# Patient Record
Sex: MALE | Race: WHITE | Employment: OTHER | URBAN - METROPOLITAN AREA
[De-identification: names, ages, dates, MRNs, and addresses within clinical notes are randomized per-mention and may not be internally consistent; named-entity substitution may affect disease eponyms.]

---

## 2017-01-03 ENCOUNTER — APPOINTMENT (OUTPATIENT)
Dept: GENERAL RADIOLOGY | Age: 72
DRG: 207 | End: 2017-01-03
Attending: FAMILY MEDICINE
Payer: MEDICARE

## 2017-01-03 ENCOUNTER — HOSPITAL ENCOUNTER (INPATIENT)
Age: 72
LOS: 23 days | Discharge: NURSING, REHAB OR LONG-TERM CARE | DRG: 207 | End: 2017-01-27
Attending: FAMILY MEDICINE | Admitting: INTERNAL MEDICINE
Payer: MEDICARE

## 2017-01-03 DIAGNOSIS — R09.02 HYPOXIA: ICD-10-CM

## 2017-01-03 DIAGNOSIS — J20.9 ACUTE BRONCHITIS, UNSPECIFIED ORGANISM: ICD-10-CM

## 2017-01-03 DIAGNOSIS — R06.03 ACUTE RESPIRATORY DISTRESS: Primary | ICD-10-CM

## 2017-01-03 LAB
ALBUMIN SERPL BCP-MCNC: 4.1 G/DL (ref 3.4–5)
ALBUMIN/GLOB SERPL: 1.1 {RATIO} (ref 0.8–1.7)
ALP SERPL-CCNC: 94 U/L (ref 45–117)
ALT SERPL-CCNC: 32 U/L (ref 16–61)
ANION GAP BLD CALC-SCNC: 7 MMOL/L (ref 3–18)
APPEARANCE UR: CLEAR
ARTERIAL PATENCY WRIST A: YES
AST SERPL W P-5'-P-CCNC: 17 U/L (ref 15–37)
BASE EXCESS BLD CALC-SCNC: 7 MMOL/L
BASOPHILS # BLD AUTO: 0 K/UL (ref 0–0.06)
BASOPHILS # BLD: 0 % (ref 0–2)
BDY SITE: ABNORMAL
BILIRUB SERPL-MCNC: 0.4 MG/DL (ref 0.2–1)
BILIRUB UR QL: NEGATIVE
BNP SERPL-MCNC: 852 PG/ML (ref 0–900)
BODY TEMPERATURE: 98.6
BUN SERPL-MCNC: 18 MG/DL (ref 7–18)
BUN/CREAT SERPL: 17 (ref 12–20)
CALCIUM SERPL-MCNC: 8.8 MG/DL (ref 8.5–10.1)
CHLORIDE SERPL-SCNC: 102 MMOL/L (ref 100–108)
CO2 SERPL-SCNC: 35 MMOL/L (ref 21–32)
COLOR UR: YELLOW
CREAT SERPL-MCNC: 1.05 MG/DL (ref 0.6–1.3)
DIFFERENTIAL METHOD BLD: ABNORMAL
EOSINOPHIL # BLD: 0.3 K/UL (ref 0–0.4)
EOSINOPHIL NFR BLD: 2 % (ref 0–5)
ERYTHROCYTE [DISTWIDTH] IN BLOOD BY AUTOMATED COUNT: 14.5 % (ref 11.6–14.5)
EST. AVERAGE GLUCOSE BLD GHB EST-MCNC: 126 MG/DL
FLUAV AG NPH QL IA: NEGATIVE
FLUBV AG NOSE QL IA: NEGATIVE
GAS FLOW.O2 O2 DELIVERY SYS: ABNORMAL L/MIN
GLOBULIN SER CALC-MCNC: 3.6 G/DL (ref 2–4)
GLUCOSE BLD STRIP.AUTO-MCNC: 127 MG/DL (ref 70–110)
GLUCOSE BLD STRIP.AUTO-MCNC: 132 MG/DL (ref 70–110)
GLUCOSE SERPL-MCNC: 137 MG/DL (ref 74–99)
GLUCOSE UR STRIP.AUTO-MCNC: NEGATIVE MG/DL
HBA1C MFR BLD: 6 % (ref 4.5–5.6)
HCO3 BLD-SCNC: 31.3 MMOL/L (ref 22–26)
HCT VFR BLD AUTO: 40.6 % (ref 36–48)
HGB BLD-MCNC: 13 G/DL (ref 13–16)
HGB UR QL STRIP: NEGATIVE
KETONES UR QL STRIP.AUTO: NEGATIVE MG/DL
LACTATE SERPL-SCNC: 1.3 MMOL/L (ref 0.4–2)
LEUKOCYTE ESTERASE UR QL STRIP.AUTO: NEGATIVE
LYMPHOCYTES # BLD AUTO: 9 % (ref 21–52)
LYMPHOCYTES # BLD: 1.1 K/UL (ref 0.9–3.6)
MAGNESIUM SERPL-MCNC: 2.1 MG/DL (ref 1.8–2.4)
MCH RBC QN AUTO: 28.9 PG (ref 24–34)
MCHC RBC AUTO-ENTMCNC: 32 G/DL (ref 31–37)
MCV RBC AUTO: 90.2 FL (ref 74–97)
MONOCYTES # BLD: 0.6 K/UL (ref 0.05–1.2)
MONOCYTES NFR BLD AUTO: 5 % (ref 3–10)
NEUTS SEG # BLD: 9.6 K/UL (ref 1.8–8)
NEUTS SEG NFR BLD AUTO: 84 % (ref 40–73)
NITRITE UR QL STRIP.AUTO: NEGATIVE
O2/TOTAL GAS SETTING VFR VENT: 0.21 %
PCO2 BLD: 46.5 MMHG (ref 35–45)
PH BLD: 7.44 [PH] (ref 7.35–7.45)
PH UR STRIP: 6 [PH] (ref 5–8)
PHOSPHATE SERPL-MCNC: 5.3 MG/DL (ref 2.5–4.9)
PLATELET # BLD AUTO: 297 K/UL (ref 135–420)
PMV BLD AUTO: 9.5 FL (ref 9.2–11.8)
PO2 BLD: 56 MMHG (ref 80–100)
POTASSIUM SERPL-SCNC: 4.3 MMOL/L (ref 3.5–5.5)
PROT SERPL-MCNC: 7.7 G/DL (ref 6.4–8.2)
PROT UR STRIP-MCNC: NEGATIVE MG/DL
RBC # BLD AUTO: 4.5 M/UL (ref 4.7–5.5)
SAO2 % BLD: 89 % (ref 92–97)
SERVICE CMNT-IMP: ABNORMAL
SODIUM SERPL-SCNC: 144 MMOL/L (ref 136–145)
SP GR UR REFRACTOMETRY: 1.01 (ref 1–1.03)
SPECIMEN TYPE: ABNORMAL
TOTAL RESP. RATE, ITRR: 20
UROBILINOGEN UR QL STRIP.AUTO: 0.2 EU/DL (ref 0.2–1)
WBC # BLD AUTO: 11.6 K/UL (ref 4.6–13.2)

## 2017-01-03 PROCEDURE — 87804 INFLUENZA ASSAY W/OPTIC: CPT | Performed by: FAMILY MEDICINE

## 2017-01-03 PROCEDURE — 77030005530 HC CATH URETH FOL40 BARD -B

## 2017-01-03 PROCEDURE — 85025 COMPLETE CBC W/AUTO DIFF WBC: CPT | Performed by: FAMILY MEDICINE

## 2017-01-03 PROCEDURE — 74011000250 HC RX REV CODE- 250: Performed by: FAMILY MEDICINE

## 2017-01-03 PROCEDURE — 74011250636 HC RX REV CODE- 250/636: Performed by: INTERNAL MEDICINE

## 2017-01-03 PROCEDURE — 83880 ASSAY OF NATRIURETIC PEPTIDE: CPT | Performed by: FAMILY MEDICINE

## 2017-01-03 PROCEDURE — 77010033678 HC OXYGEN DAILY

## 2017-01-03 PROCEDURE — 99285 EMERGENCY DEPT VISIT HI MDM: CPT

## 2017-01-03 PROCEDURE — 83036 HEMOGLOBIN GLYCOSYLATED A1C: CPT | Performed by: INTERNAL MEDICINE

## 2017-01-03 PROCEDURE — 74011250637 HC RX REV CODE- 250/637: Performed by: INTERNAL MEDICINE

## 2017-01-03 PROCEDURE — 94660 CPAP INITIATION&MGMT: CPT

## 2017-01-03 PROCEDURE — 80053 COMPREHEN METABOLIC PANEL: CPT | Performed by: FAMILY MEDICINE

## 2017-01-03 PROCEDURE — 94640 AIRWAY INHALATION TREATMENT: CPT

## 2017-01-03 PROCEDURE — 51702 INSERT TEMP BLADDER CATH: CPT

## 2017-01-03 PROCEDURE — 84100 ASSAY OF PHOSPHORUS: CPT | Performed by: INTERNAL MEDICINE

## 2017-01-03 PROCEDURE — 82803 BLOOD GASES ANY COMBINATION: CPT

## 2017-01-03 PROCEDURE — 81003 URINALYSIS AUTO W/O SCOPE: CPT | Performed by: FAMILY MEDICINE

## 2017-01-03 PROCEDURE — 71010 XR CHEST PORT: CPT

## 2017-01-03 PROCEDURE — 83735 ASSAY OF MAGNESIUM: CPT | Performed by: INTERNAL MEDICINE

## 2017-01-03 PROCEDURE — 77030013140 HC MSK NEB VYRM -A

## 2017-01-03 PROCEDURE — 74011000250 HC RX REV CODE- 250: Performed by: INTERNAL MEDICINE

## 2017-01-03 PROCEDURE — 83605 ASSAY OF LACTIC ACID: CPT | Performed by: FAMILY MEDICINE

## 2017-01-03 PROCEDURE — 36600 WITHDRAWAL OF ARTERIAL BLOOD: CPT

## 2017-01-03 PROCEDURE — 77030035694 HC MSK BIPAP FLL FAC PERFMAX PHIL -B

## 2017-01-03 PROCEDURE — 87040 BLOOD CULTURE FOR BACTERIA: CPT | Performed by: FAMILY MEDICINE

## 2017-01-03 PROCEDURE — 82962 GLUCOSE BLOOD TEST: CPT

## 2017-01-03 PROCEDURE — 93005 ELECTROCARDIOGRAM TRACING: CPT

## 2017-01-03 RX ORDER — GUAIFENESIN 100 MG/5ML
81 LIQUID (ML) ORAL DAILY
Status: DISCONTINUED | OUTPATIENT
Start: 2017-01-04 | End: 2017-01-27 | Stop reason: HOSPADM

## 2017-01-03 RX ORDER — MAGNESIUM SULFATE 100 %
4 CRYSTALS MISCELLANEOUS AS NEEDED
Status: DISCONTINUED | OUTPATIENT
Start: 2017-01-03 | End: 2017-01-27 | Stop reason: HOSPADM

## 2017-01-03 RX ORDER — GUAIFENESIN 100 MG/5ML
200 SOLUTION ORAL
COMMUNITY
End: 2017-01-27

## 2017-01-03 RX ORDER — FLUTICASONE PROPIONATE 50 MCG
2 SPRAY, SUSPENSION (ML) NASAL DAILY
Status: DISCONTINUED | OUTPATIENT
Start: 2017-01-04 | End: 2017-01-13

## 2017-01-03 RX ORDER — ISOSORBIDE MONONITRATE 30 MG/1
30 TABLET, EXTENDED RELEASE ORAL DAILY
Status: DISCONTINUED | OUTPATIENT
Start: 2017-01-04 | End: 2017-01-05

## 2017-01-03 RX ORDER — ISOSORBIDE DINITRATE 20 MG/1
20 TABLET ORAL 3 TIMES DAILY
Status: DISCONTINUED | OUTPATIENT
Start: 2017-01-03 | End: 2017-01-03

## 2017-01-03 RX ORDER — CITALOPRAM 40 MG/1
40 TABLET, FILM COATED ORAL DAILY
COMMUNITY

## 2017-01-03 RX ORDER — DEXTROSE 50 % IN WATER (D50W) INTRAVENOUS SYRINGE
25-50 AS NEEDED
Status: DISCONTINUED | OUTPATIENT
Start: 2017-01-03 | End: 2017-01-27 | Stop reason: HOSPADM

## 2017-01-03 RX ORDER — FENTANYL 50 UG/1
1 PATCH TRANSDERMAL
COMMUNITY
End: 2017-01-27

## 2017-01-03 RX ORDER — MELATONIN 5 MG
5 CAPSULE ORAL
COMMUNITY
End: 2017-01-27

## 2017-01-03 RX ORDER — INSULIN LISPRO 100 [IU]/ML
INJECTION, SOLUTION INTRAVENOUS; SUBCUTANEOUS
Status: DISCONTINUED | OUTPATIENT
Start: 2017-01-03 | End: 2017-01-05

## 2017-01-03 RX ORDER — ISOSORBIDE MONONITRATE 20 MG/1
20 TABLET ORAL 2 TIMES DAILY
COMMUNITY
End: 2017-01-27

## 2017-01-03 RX ORDER — IPRATROPIUM BROMIDE AND ALBUTEROL SULFATE 2.5; .5 MG/3ML; MG/3ML
3 SOLUTION RESPIRATORY (INHALATION)
Status: DISCONTINUED | OUTPATIENT
Start: 2017-01-03 | End: 2017-01-15

## 2017-01-03 RX ORDER — FUROSEMIDE 40 MG/1
40 TABLET ORAL DAILY
Status: DISCONTINUED | OUTPATIENT
Start: 2017-01-04 | End: 2017-01-05

## 2017-01-03 RX ORDER — FACIAL-BODY WIPES
10 EACH TOPICAL DAILY PRN
Status: DISCONTINUED | OUTPATIENT
Start: 2017-01-03 | End: 2017-01-27 | Stop reason: HOSPADM

## 2017-01-03 RX ORDER — ENALAPRIL MALEATE 5 MG/1
5 TABLET ORAL DAILY
COMMUNITY
End: 2017-01-27

## 2017-01-03 RX ORDER — ACETAMINOPHEN 325 MG/1
650 TABLET ORAL
Status: DISCONTINUED | OUTPATIENT
Start: 2017-01-03 | End: 2017-01-27 | Stop reason: HOSPADM

## 2017-01-03 RX ORDER — ALBUTEROL SULFATE 0.83 MG/ML
5 SOLUTION RESPIRATORY (INHALATION) ONCE
Status: COMPLETED | OUTPATIENT
Start: 2017-01-03 | End: 2017-01-03

## 2017-01-03 RX ORDER — BACLOFEN 10 MG/1
10 TABLET ORAL 3 TIMES DAILY
Status: DISCONTINUED | OUTPATIENT
Start: 2017-01-03 | End: 2017-01-21

## 2017-01-03 RX ORDER — ALBUTEROL SULFATE 0.83 MG/ML
2.5 SOLUTION RESPIRATORY (INHALATION)
Status: DISCONTINUED | OUTPATIENT
Start: 2017-01-03 | End: 2017-01-16

## 2017-01-03 RX ORDER — CLONIDINE 0.2 MG/24H
1 PATCH, EXTENDED RELEASE TRANSDERMAL
COMMUNITY

## 2017-01-03 RX ORDER — GUAIFENESIN 100 MG/5ML
200 SOLUTION ORAL
Status: DISCONTINUED | OUTPATIENT
Start: 2017-01-03 | End: 2017-01-27 | Stop reason: HOSPADM

## 2017-01-03 RX ORDER — IPRATROPIUM BROMIDE AND ALBUTEROL SULFATE 2.5; .5 MG/3ML; MG/3ML
3 SOLUTION RESPIRATORY (INHALATION)
Status: COMPLETED | OUTPATIENT
Start: 2017-01-03 | End: 2017-01-03

## 2017-01-03 RX ORDER — ENALAPRIL MALEATE 5 MG/1
5 TABLET ORAL DAILY
Status: DISCONTINUED | OUTPATIENT
Start: 2017-01-04 | End: 2017-01-05

## 2017-01-03 RX ORDER — FERROUS SULFATE 300 MG/5ML
LIQUID (ML) ORAL DAILY
Status: DISCONTINUED | OUTPATIENT
Start: 2017-01-03 | End: 2017-01-10

## 2017-01-03 RX ORDER — SODIUM CHLORIDE 0.9 % (FLUSH) 0.9 %
5-10 SYRINGE (ML) INJECTION AS NEEDED
Status: DISCONTINUED | OUTPATIENT
Start: 2017-01-03 | End: 2017-01-27 | Stop reason: HOSPADM

## 2017-01-03 RX ORDER — MENTHOL AND ZINC OXIDE .44; 20.625 G/100G; G/100G
OINTMENT TOPICAL 3 TIMES DAILY
COMMUNITY
End: 2017-01-27

## 2017-01-03 RX ORDER — LEVOFLOXACIN 5 MG/ML
750 INJECTION, SOLUTION INTRAVENOUS ONCE
Status: COMPLETED | OUTPATIENT
Start: 2017-01-03 | End: 2017-01-03

## 2017-01-03 RX ORDER — ASPIRIN 81 MG/1
81 TABLET ORAL DAILY
COMMUNITY

## 2017-01-03 RX ORDER — ISOSORBIDE DINITRATE 20 MG/1
20 TABLET ORAL 3 TIMES DAILY
COMMUNITY
End: 2017-01-03

## 2017-01-03 RX ORDER — CLONIDINE 0.1 MG/24H
1 PATCH, EXTENDED RELEASE TRANSDERMAL
Status: DISCONTINUED | OUTPATIENT
Start: 2017-01-09 | End: 2017-01-05

## 2017-01-03 RX ORDER — LOPERAMIDE HYDROCHLORIDE 2 MG/1
2 CAPSULE ORAL
COMMUNITY
End: 2017-01-27

## 2017-01-03 RX ORDER — DICLOFENAC SODIUM 10 MG/G
4 GEL TOPICAL 2 TIMES DAILY
Status: DISCONTINUED | OUTPATIENT
Start: 2017-01-03 | End: 2017-01-10

## 2017-01-03 RX ORDER — FENTANYL 50 UG/1
1 PATCH TRANSDERMAL
Status: DISCONTINUED | OUTPATIENT
Start: 2017-01-05 | End: 2017-01-05

## 2017-01-03 RX ORDER — PROMETHAZINE HYDROCHLORIDE 12.5 MG/1
12.5 TABLET ORAL
COMMUNITY
End: 2017-01-27

## 2017-01-03 RX ORDER — LABETALOL 200 MG/1
200 TABLET, FILM COATED ORAL 2 TIMES DAILY
Status: DISCONTINUED | OUTPATIENT
Start: 2017-01-03 | End: 2017-01-05

## 2017-01-03 RX ORDER — CITALOPRAM 20 MG/1
20 TABLET, FILM COATED ORAL DAILY
Status: DISCONTINUED | OUTPATIENT
Start: 2017-01-04 | End: 2017-01-05

## 2017-01-03 RX ORDER — LEVOFLOXACIN 5 MG/ML
500 INJECTION, SOLUTION INTRAVENOUS EVERY 24 HOURS
Status: DISCONTINUED | OUTPATIENT
Start: 2017-01-04 | End: 2017-01-13

## 2017-01-03 RX ORDER — HEPARIN SODIUM 5000 [USP'U]/ML
5000 INJECTION, SOLUTION INTRAVENOUS; SUBCUTANEOUS EVERY 8 HOURS
Status: DISCONTINUED | OUTPATIENT
Start: 2017-01-03 | End: 2017-01-27 | Stop reason: HOSPADM

## 2017-01-03 RX ADMIN — METHYLPREDNISOLONE SODIUM SUCCINATE 40 MG: 40 INJECTION, POWDER, FOR SOLUTION INTRAMUSCULAR; INTRAVENOUS at 14:56

## 2017-01-03 RX ADMIN — MINERAL SUPPLEMENT IRON 300 MG / 5 ML STRENGTH LIQUID 100 PER BOX UNFLAVORED 300 MG: at 22:28

## 2017-01-03 RX ADMIN — ALBUTEROL SULFATE 5 MG: 2.5 SOLUTION RESPIRATORY (INHALATION) at 15:17

## 2017-01-03 RX ADMIN — LEVOFLOXACIN 750 MG: 5 INJECTION, SOLUTION INTRAVENOUS at 14:56

## 2017-01-03 RX ADMIN — GUAIFENESIN 600 MG: 600 TABLET, EXTENDED RELEASE ORAL at 14:56

## 2017-01-03 RX ADMIN — BACLOFEN 10 MG: 10 TABLET ORAL at 22:28

## 2017-01-03 RX ADMIN — IPRATROPIUM BROMIDE AND ALBUTEROL SULFATE 3 ML: .5; 3 SOLUTION RESPIRATORY (INHALATION) at 20:41

## 2017-01-03 RX ADMIN — BACLOFEN 10 MG: 10 TABLET ORAL at 17:04

## 2017-01-03 RX ADMIN — ISOSORBIDE DINITRATE 20 MG: 20 TABLET ORAL at 17:04

## 2017-01-03 RX ADMIN — METHYLPREDNISOLONE SODIUM SUCCINATE 40 MG: 40 INJECTION, POWDER, FOR SOLUTION INTRAMUSCULAR; INTRAVENOUS at 23:41

## 2017-01-03 RX ADMIN — LABETALOL HCL 200 MG: 200 TABLET, FILM COATED ORAL at 22:20

## 2017-01-03 RX ADMIN — IPRATROPIUM BROMIDE AND ALBUTEROL SULFATE 3 ML: .5; 3 SOLUTION RESPIRATORY (INHALATION) at 15:16

## 2017-01-03 RX ADMIN — HEPARIN SODIUM 5000 UNITS: 5000 INJECTION, SOLUTION INTRAVENOUS; SUBCUTANEOUS at 17:01

## 2017-01-03 RX ADMIN — GUAIFENESIN 600 MG: 600 TABLET, EXTENDED RELEASE ORAL at 22:20

## 2017-01-03 NOTE — ED NOTES
Clonidine patch noted to patient right lower abdomen. [facility] called and confirmed clonidine patch was placed to right lower abdomen 1/2/2017.

## 2017-01-03 NOTE — ED TRIAGE NOTES
Patient arrived via EMS for respiratory distress. EMS states that patient has been having cold symptoms for several days. Today having resp distress. Patient given A&A treatment, nitro and 40mg of lasix. EMS gave A&A and placed patient on CPAP. Sepsis Screening completed    (  )Patient meets SIRS criteria. ( x )Patient does not meet SIRS criteria.       SIRS Criteria is achieved when two or more of the following are present   Temperature < 96.8°F (36°C) or > 100.9°F (38.3°C)   Heart Rate > 90 beats per minute   Respiratory Rate > 20 beats per minute   WBC count > 12,000 or <4,000 or > 10% bands

## 2017-01-03 NOTE — ED NOTES
Pt hourly rounding competed. Safety   Pt (x) resting on stretcher with side rails up and call bell in reach. () in chair    () in parents arms. Toileting   Pt offered ()Bedpan     ()Assistance to Restroom     ()Urinal  Ongoing Updates  Updated on plan of care and status of test results.   Pain Management  Inquired as to comfort and offered comfort measures:    () warm blankets   () dimmed lights

## 2017-01-03 NOTE — ED NOTES
Patient linen changed. Patient noted to be sweating. Patient has no complaints at this time. Patient still have cough and lungs sound wet.

## 2017-01-03 NOTE — ED NOTES
Patient states he has had a cough for a week. He states he has been coughing up yellow mucous. Family at bedside.

## 2017-01-03 NOTE — ED PROVIDER NOTES
[address]  EMERGENCY DEPARTMENT HISTORY AND PHYSICAL EXAM       Date: 1/3/2017   Patient Name: Sharath Campbell   YOB: 1945  Medical Record Number: [id number]    History of Presenting Illness     Chief Complaint   Patient presents with    Respiratory Distress        History Provided By:  patient and EMS    Additional History:   9:09 AM   Sharath Campbell is a 70 y.o. male who presents to the emergency department C/O SOB, onset this morning. Pt was seen by the doctor at [facility] and was give BEN, nitro, and Lasix because his sats were in the 80s. EMS gave pt another BEN and put pt on BiPAP with good relief. Per EMS, pt has had a cold x4-5 days and has a low grade fever. PMHX MI. Pt denies CP, leg swelling, and any other sxs or complaints. Primary Care Provider: Corie Hope MD   Specialist:    Past History     Past Medical History:   Past Medical History   Diagnosis Date    [facility] brain damage ([facility] [address]     Bursitis     CAD (coronary artery disease)     Cardiac arrest ([facility] [address]     Cognitive communication deficit     Contracture of muscle     Depression     Diabetes ([address]     Diarrhea     Disorder of kidney and ureter     Dysphagia     GERD (gastroesophageal reflux disease)     High cholesterol     Hypertension     Hypothyroid     Lack of coordination     Polyarthritis     Sleep apnea     Sleep disorder     Weakness         Past Surgical History:   Past Surgical History   Procedure Laterality Date    Hx gi       peg    Hx amputation      Hx hernia repair          Family History:   History reviewed. No pertinent family history. Social History:   Social History   Substance Use Topics    Smoking status: Never Smoker    Smokeless tobacco: None    Alcohol use No        Allergies:    Allergies   Allergen Reactions    Penicillins Itching        Review of Systems   Review of Systems   Unable to perform ROS: Acuity of condition (ROS limited)   Constitutional: Positive for fever. Respiratory: Positive for shortness of breath. Cardiovascular: Negative for chest pain and leg swelling. All other systems reviewed and are negative. Physical Exam  Vitals:    01/03/17 1130 01/03/17 1215 01/03/17 1500 01/03/17 1545   BP: 142/77 137/84 152/75 123/72   Pulse: 94 90 96 (!) 106   Resp: 18 17 16 18   Temp:       SpO2: 97% 96% 95% 94%   Weight:           Physical Exam   Nursing note and vitals reviewed. Vital signs and nursing notes reviewed    CONSTITUTIONAL: Alert, in no apparent distress; well-developed; well-nourished. Overweight, elderly male on BiPAP. Wearing adult diaper. HEAD:  Normocephalic, atraumatic  EYES: PERRL; EOM's intact. ENTM: Nose: no rhinorrhea; Throat: no erythema or exudate, mucous membranes moist  Neck:  No JVD, supple without lymphadenopathy  RESP: Diminished breath sounds bilaterally, does have some wet breath sounds. CV: S1 and S2 WNL; No murmurs, gallops or rubs. GI: Normal bowel sounds, abdomen soft and non-tender. No masses or organomegaly. Surgical scar of the abdomen. : No costo-vertebral angle tenderness. BACK:  Non-tender  UPPER EXT:  Contraction of the right arm  LOWER EXT: No edema, no calf tenderness. Distal pulses intact. NEURO: CN intact, reflexes 2/4 and sym, strength 5/5 and sym, sensation intact. SKIN: No rashes. Clammy. PSYCH:  Alert and oriented, normal affect.      Diagnostic Study Results     Labs -      Recent Results (from the past 12 hour(s))   EKG, 12 LEAD, INITIAL    Collection Time: 01/03/17  9:14 AM   Result Value Ref Range    Ventricular Rate 97 BPM    Atrial Rate 97 BPM    P-R Interval 176 ms    QRS Duration 80 ms    Q-T Interval 352 ms    QTC Calculation (Bezet) 447 ms    Calculated P Axis 4 degrees    Calculated R Axis 0 degrees    Calculated T Axis 108 degrees    Diagnosis       Normal sinus rhythm  Left ventricular hypertrophy with repolarization abnormality  Abnormal ECG  When compared with ECG of 23-NOV-2016 11:52,  No significant change was found     LACTIC ACID, PLASMA    Collection Time: 01/03/17  9:15 AM   Result Value Ref Range    Lactic acid 1.3 0.4 - 2.0 MMOL/L   METABOLIC PANEL, COMPREHENSIVE    Collection Time: 01/03/17  9:15 AM   Result Value Ref Range    Sodium 144 136 - 145 mmol/L    Potassium 4.3 3.5 - 5.5 mmol/L    Chloride 102 100 - 108 mmol/L    CO2 35 (H) 21 - 32 mmol/L    Anion gap 7 3.0 - 18 mmol/L    Glucose 137 (H) 74 - 99 mg/dL    BUN 18 7.0 - 18 MG/DL    Creatinine 1.05 0.6 - 1.3 MG/DL    BUN/Creatinine ratio 17 12 - 20      GFR est AA >60 >60 ml/min/1.73m2    GFR est non-AA >60 >60 ml/min/1.73m2    Calcium 8.8 8.5 - 10.1 MG/DL    Bilirubin, total 0.4 0.2 - 1.0 MG/DL    ALT 32 16 - 61 U/L    AST 17 15 - 37 U/L    Alk. phosphatase 94 45 - 117 U/L    Protein, total 7.7 6.4 - 8.2 g/dL    Albumin 4.1 3.4 - 5.0 g/dL    Globulin 3.6 2.0 - 4.0 g/dL    A-G Ratio 1.1 0.8 - 1.7     CBC WITH AUTOMATED DIFF    Collection Time: 01/03/17  9:15 AM   Result Value Ref Range    WBC 11.6 4.6 - 13.2 K/uL    RBC 4.50 (L) 4.70 - 5.50 M/uL    HGB 13.0 13.0 - 16.0 g/dL    HCT 40.6 36.0 - 48.0 %    MCV 90.2 74.0 - 97.0 FL    MCH 28.9 24.0 - 34.0 PG    MCHC 32.0 31.0 - 37.0 g/dL    RDW 14.5 11.6 - 14.5 %    PLATELET 675 178 - 265 K/uL    MPV 9.5 9.2 - 11.8 FL    NEUTROPHILS 84 (H) 40 - 73 %    LYMPHOCYTES 9 (L) 21 - 52 %    MONOCYTES 5 3 - 10 %    EOSINOPHILS 2 0 - 5 %    BASOPHILS 0 0 - 2 %    ABS. NEUTROPHILS 9.6 (H) 1.8 - 8.0 K/UL    ABS. LYMPHOCYTES 1.1 0.9 - 3.6 K/UL    ABS. MONOCYTES 0.6 0.05 - 1.2 K/UL    ABS. EOSINOPHILS 0.3 0.0 - 0.4 K/UL    ABS.  BASOPHILS 0.0 0.0 - 0.06 K/UL    DF AUTOMATED     PRO-BNP    Collection Time: 01/03/17  9:15 AM   Result Value Ref Range    NT pro- 0 - 900 PG/ML   URINALYSIS W/ RFLX MICROSCOPIC    Collection Time: 01/03/17 10:55 AM   Result Value Ref Range    Color YELLOW      Appearance CLEAR      Specific gravity 1.007 1.005 - 1.030 pH (UA) 6.0 5.0 - 8.0      Protein NEGATIVE  NEG mg/dL    Glucose NEGATIVE  NEG mg/dL    Ketone NEGATIVE  NEG mg/dL    Bilirubin NEGATIVE  NEG      Blood NEGATIVE  NEG      Urobilinogen 0.2 0.2 - 1.0 EU/dL    Nitrites NEGATIVE  NEG      Leukocyte Esterase NEGATIVE  NEG     INFLUENZA A & B AG (RAPID TEST)    Collection Time: 01/03/17 11:50 AM   Result Value Ref Range    Influenza A Antigen NEGATIVE  NEG      Influenza B Antigen NEGATIVE  NEG     POC G3    Collection Time: 01/03/17  1:29 PM   Result Value Ref Range    Device: ROOM AIR      FIO2 (POC) 0.21 %    pH (POC) 7.436 7.35 - 7.45      pCO2 (POC) 46.5 (H) 35.0 - 45.0 MMHG    pO2 (POC) 56 (L) 80 - 100 MMHG    HCO3 (POC) 31.3 (H) 22 - 26 MMOL/L    sO2 (POC) 89 (L) 92 - 97 %    Base excess (POC) 7 mmol/L    Allens test (POC) YES      Total resp. rate 20      Site RIGHT RADIAL      Patient temp. 98.6      Specimen type (POC) ARTERIAL      Performed by Xuan Small        Radiologic Studies -  XR CHEST PORT   Final Result   Radiographically stable exam. No acute cardiopulmonary process. As read by the radiologist.         Medical Decision Making   I am the first provider for this patient. I reviewed the vital signs, available nursing notes, past medical history, past surgical history, family history and social history. Vital Signs-Reviewed the patient's vital signs.    Patient Vitals for the past 12 hrs:   Temp Pulse Resp BP SpO2   01/03/17 1545 - (!) 106 18 123/72 94 %   01/03/17 1500 - 96 16 152/75 95 %   01/03/17 1215 - 90 17 137/84 96 %   01/03/17 1130 - 94 18 142/77 97 %   01/03/17 1115 - 96 17 130/74 -   01/03/17 1100 - 98 18 126/68 -   01/03/17 1045 - (!) 101 19 (!) 160/93 -   01/03/17 1030 - 97 22 (!) 127/94 -   01/03/17 0932 - - - - 100 %   01/03/17 0930 - 89 15 162/90 100 %   01/03/17 0920 - 95 18 (!) 174/95 100 %   01/03/17 0918 - - - - 100 %   01/03/17 0917 98.9 °F (37.2 °C) 96 17 (!) 203/110 98 %       Pulse Oximetry Analysis - Improved after treatment 100% on 2L O2. Cardiac Monitor:   Rate: 99 bpm  Rhythm: Normal Sinus Rhythm     EKG interpretation: (Preliminary)  9:14 AM  97 bpm, NSR, left ventricular hypertrophy with repolarization abnormality. EKG read by Keny Sampson MD    Provider Notes:   INITIAL CLINICAL IMPRESSION and PLANS:  The patient presents with the primary complaint(s) of: SOB. The presentation, to include historical aspects and clinical findings are consistent with the DX of respiratory distress. However, other possible DX's to consider as primary, associated with, or exacerbated by include: CHF, COPD, pna    Considering the above, my initial management plan to evaluate and therapeutic interventions include the following and as noted in the orders:    1. Labs: sepsis bundle, blood culture, lactic acid, UA, CMP, CBC, Pro-BNP  2. Imaging: EKG, CXR        ED Course:   12:28 PM Updated the family. Vitals remain stable. Sats 92% on O2. Will continue to wean him off O2 and try to d/c home. 1:58 PM Discussed patient's history, exam, and available diagnostics results with Cory Oconnor DO, internal medicine, who agrees to admit to Telemetry    2:20 PM Dr. Joselito Pinto is at bedside. States that pt has been off O2 and his sats are in the low 90s. Recommends observing for 1 hour and giving a neb tx.     3:50 PM  Discussed patient's history, exam, and available diagnostics results with Cory Oconnor DO, internal medicine, who agree with admission.      Medications Given in the ED:  Medications   sodium chloride (NS) flush 5-10 mL (not administered)   levoFLOXacin (LEVAQUIN) 750 mg in D5W IVPB (750 mg IntraVENous New Bag 1/3/17 1456)   guaiFENesin SR (MUCINEX) tablet 600 mg (600 mg Oral Given 1/3/17 1456)   albuterol (PROVENTIL VENTOLIN) nebulizer solution 5 mg (5 mg Nebulization Given 1/3/17 0747)   albuterol-ipratropium (DUO-NEB) 2.5 MG-0.5 MG/3 ML (3 mL Nebulization Given 1/3/17 0046)   methylPREDNISolone (PF) (SOLU-MEDROL) injection 40 mg (40 mg IntraVENous Given 1/3/17 1456)     3:42 PM  Patient is being admitted to the hospital by Leah Castillo DO. The results of their tests and reasons for their admission have been discussed with them and/or available family. They convey agreement and understanding for the need to be admitted and for their admission diagnosis. CONDITIONS ON ADMISSION:  Deep Vein Thrombosis is not present at the time of admission. Thrombosis is not present at the time of admission. Urinary Tract Infection is not present at the time of admission. Pneumonia is not present at the time of admission. MRSA is not present at the time of admission. Wound infection is not present at the time of admission. Pressure Ulcer is not present at the time of admission. Diagnosis   Clinical Impression:   1. Acute respiratory distress (HCC)    2. Hypoxia    3. Acute bronchitis, unspecified organism         Discussion: Pt presented with respiratory distress. Hypoxic per EMS. Required CPAP by ems and BiPAP initially by us. His lab evaluation has been fairly stable and unremarkable. CXR showed no acute failure or infiltrate. Flu swab was negative. Hes been weaned off BiPAP but had an abnormal blood gas. Will be put on O2 and admitted to Telemetry. PLAN:  1. D/C Home  2. Current Discharge Medication List      CONTINUE these medications which have NOT CHANGED    Details   fentaNYL (DURAGESIC) 50 mcg/hr PATCH 1 Patch by TransDERmal route every seventy-two (72) hours. isosorbide dinitrate (ISORDIL) 20 mg tablet Take 20 mg by mouth three (3) times daily. enalapril (VASOTEC) 5 mg tablet Take  by mouth daily. levothyroxine (SYNTHROID) 175 mcg tablet Take 175 mcg by mouth Daily (before breakfast). cloNIDine (CATAPRES) 0.1 mg/24 hr patch 1 Patch by TransDERmal route every seven (7) days. furosemide (LASIX) 40 mg tablet Take 40 mg by mouth daily.       nitroglycerin (NITRODUR) 0.1 mg/hr 1 Patch by TransDERmal route daily. baclofen (LIORESAL) 10 mg tablet Take 10 mg by mouth three (3) times daily. pantoprazole (PROTONIX) 20 mg tablet Take 20 mg by mouth daily. potassium chloride (KAON 10%) 20 mEq/15 mL solution Take 20 mEq by mouth daily. Quantity 22.5 mL      guaiFENesin (ROBAFEN) 100 mg/5 mL liquid Take 200 mg by mouth every six (6) hours as needed for Cough. labetalol (NORMODYNE) 200 mg tablet Take 200 mg by mouth two (2) times a day. Hold for heart rate less than 60 and advise provider. citalopram (CELEXA) 20 mg tablet Take 1 Tab by mouth daily. Qty: 10 Tab, Refills: 0      oxyCODONE-acetaminophen (PERCOCET) 5-325 mg per tablet Take 1 Tab by mouth every four (4) hours as needed for Pain. Max Daily Amount: 6 Tabs. Qty: 10 Tab, Refills: 0      aspirin 81 mg chewable tablet 1 Tab by Per G Tube route daily. Qty: 1 Tab, Refills: 0      nitroglycerin (NITROBID) 2 % ointment Apply 1 Inch to affected area every six (6) hours. Qty: 60 g, Refills: 0      magnesium hydroxide (MCCAULEY MILK OF MAGNESIA) 400 mg/5 mL suspension Take 30 mL by mouth daily as needed for Constipation. bisacodyl (DULCOLAX, BISACODYL,) 10 mg suppository Insert 10 mg into rectum daily as needed. multivitamin, tx-iron-ca-min (THERA-M W/ IRON) 9 mg iron-400 mcg tab tablet Take 1 Tab by mouth daily. mometasone (NASONEX) 50 mcg/actuation nasal spray 2 Sprays by Both Nostrils route daily. GLUCOSAMINE HCL/CHONDR MAY A NA (GLUCOSAMINE-CHONDROITIN) 750-600 mg tab Take 1 Tab by mouth two (2) times a day. predniSONE (DELTASONE) 10 mg tablet Take 10 mg by mouth daily. ferrous sulfate (FEROSUL) 220 mg (44 mg iron)/5 mL elix Take 7.4 mL by mouth daily. acetaminophen (TYLENOL) 325 mg tablet Take 650 mg by mouth every six (6) hours as needed for Pain. diclofenac (VOLTAREN) 1 % gel Apply 4 g to affected area two (2) times a day.  For bilateral knee pain- apply thin layer topically to entire joint area      lidocaine (LIDODERM) 5 % 2 Patches by TransDERmal route every twenty-four (24) hours. Place Two patches to right trapezius region in morning. Remove patches 12 hours later. Apply 7AM Remove 7PM.  Qty: 1 Each, Refills: 0      albuterol-ipratropium (DUO-NEB) 2.5 mg-0.5 mg/3 ml nebulizer solution 3 mL by Nebulization route every four (4) hours as needed for Wheezing. Qty: 30 Vial, Refills: 0           3. Follow-up Information     None        _______________________________   Attestations:     SCRIBE ATTESTATION:  This note is prepared by Cindi Mike, acting as Scribe for Emmie Lindo MD.    PROVIDER ATTESTATION:  Emmie Lindo MD: The scribe's documentation has been prepared under my direction and personally reviewed by me in its entirety.  I confirm that the note above accurately reflects all work, treatment, procedures, and medical decision making performed by me.   _______________________________

## 2017-01-03 NOTE — PROGRESS NOTES
Came to give neb treatment,pt was finishing lunch,audiable wheezes,02 cannula was in nose but was turned off at flowmeter,neb given and placed on 3L NC,02 SATS 100%.

## 2017-01-03 NOTE — H&P
History & Physical    Patient: Sarah Gregorio MRN: [id number]  CSN: [id number]    YOB: 1945  Age: 70 y.o. Sex: male      DOA: 1/3/2017  Primary Care Provider:  J Luis Gardner MD      Assessment/Plan   1. Acute COPD exacerbation  2. Acute bronchitis  3. CAD w cardiac arrest in past  4. Anoxic brain injury  5. Bed- chair bound state  6. DM2  7. HTN      PLAN:  - Admit to medical service with telemetry monitoring  - start IV glucocorticoids, IV antibiotics and scheduled duoenbs  - mucinex prn  - continue home antihypertensives, lasix, pain regimen  - monitor accuchecks ac/hs and utilize correction as able  - he is full code, POST reviewed, dvt ppx heparin    Patient Active Problem List   Diagnosis Code    Cardiac arrest ([facility] I46.9    Anoxic encephalopathy ([facility] G93.1    Acute respiratory failure ([facility] J96.00    DM (diabetes mellitus) ([facility] D45.9    Diastolic congestive heart failure, NYHA class 1 (HCC) I50.30    HTN (hypertension) I10    Hypoxia R09.02     HPI:   CC:shortness of breath  Sarah Gregorio is a 70 y.o. male with past medical history significant for CAD w cardiac arrest and subsequent anoxic brain injury, asbestosis, Dm2 debilitated bed- chair bound male presents to the Er with shortness of breath. He states that he had been coughing for the past 2-3 days, nonproductive , which was associated w low grade fever. He was noted to be hypoxic at the [facility]val[facility] center and was promptly transfer to ER. On presentation to the ER he was afebrile, hypertensive satting in 90s on bipap. His exam revealed scattered rhonchi and expiratory wheezes, no edema or JVD noted. EKG with nothing acute, CXR clear, BNP normal, he was with out leukocytosis or significant left shift, his chemistries were normal as well. He received duonebs, steroids and IV antibioitcs w persistent wheezing. Medicine is asked to admit for further management.          Past Medical History   Diagnosis Date    Anoxic brain damage (HCC)     Bursitis     CAD (coronary artery disease)     Cardiac arrest (HCC)     Cognitive communication deficit     Contracture of muscle     Depression     Diabetes ([facility]     Diarrhea     Disorder of kidney and ureter     Dysphagia     GERD (gastroesophageal reflux disease)     High cholesterol     Hypertension     Hypothyroid     Lack of coordination     Polyarthritis     Sleep apnea     Sleep disorder     Weakness      Past Surgical History   Procedure Laterality Date    Hx gi       peg    Hx amputation      Hx hernia repair        Social History   Substance Use Topics    Smoking status: Never Smoker    Smokeless tobacco: None    Alcohol use No     History reviewed. No pertinent family history. No current facility-administered medications on file prior to encounter. Current Outpatient Prescriptions on File Prior to Encounter   Medication Sig Dispense Refill    levothyroxine (SYNTHROID) 175 mcg tablet Take 175 mcg by mouth Daily (before breakfast).  cloNIDine (CATAPRES) 0.1 mg/24 hr patch 1 Patch by TransDERmal route every seven (7) days.  furosemide (LASIX) 40 mg tablet Take 40 mg by mouth daily.  nitroglycerin (NITRODUR) 0.1 mg/hr 1 Patch by TransDERmal route daily.  baclofen (LIORESAL) 10 mg tablet Take 10 mg by mouth three (3) times daily.  pantoprazole (PROTONIX) 20 mg tablet Take 20 mg by mouth daily.  potassium chloride (KAON 10%) 20 mEq/15 mL solution Take 20 mEq by mouth daily. Quantity 22.5 mL      guaiFENesin (ROBAFEN) 100 mg/5 mL liquid Take 200 mg by mouth every six (6) hours as needed for Cough.  labetalol (NORMODYNE) 200 mg tablet Take 200 mg by mouth two (2) times a day. Hold for heart rate less than 60 and advise provider.  citalopram (CELEXA) 20 mg tablet Take 1 Tab by mouth daily.  10 Tab 0    oxyCODONE-acetaminophen (PERCOCET) 5-325 mg per tablet Take 1 Tab by mouth every four (4) hours as needed for Pain. Max Daily Amount: 6 Tabs. 10 Tab 0    aspirin 81 mg chewable tablet 1 Tab by Per G Tube route daily. 1 Tab 0    nitroglycerin (NITROBID) 2 % ointment Apply 1 Inch to affected area every six (6) hours. 60 g 0    magnesium hydroxide (MCCAULEY MILK OF MAGNESIA) 400 mg/5 mL suspension Take 30 mL by mouth daily as needed for Constipation.  bisacodyl (DULCOLAX, BISACODYL,) 10 mg suppository Insert 10 mg into rectum daily as needed.  multivitamin, tx-iron-ca-min (THERA-M W/ IRON) 9 mg iron-400 mcg tab tablet Take 1 Tab by mouth daily.  mometasone (NASONEX) 50 mcg/actuation nasal spray 2 Sprays by Both Nostrils route daily.  GLUCOSAMINE HCL/CHONDR MAY A NA (GLUCOSAMINE-CHONDROITIN) 750-600 mg tab Take 1 Tab by mouth two (2) times a day.  predniSONE (DELTASONE) 10 mg tablet Take 10 mg by mouth daily.  ferrous sulfate (FEROSUL) 220 mg (44 mg iron)/5 mL elix Take 7.4 mL by mouth daily.  acetaminophen (TYLENOL) 325 mg tablet Take 650 mg by mouth every six (6) hours as needed for Pain.  diclofenac (VOLTAREN) 1 % gel Apply 4 g to affected area two (2) times a day. For bilateral knee pain- apply thin layer topically to entire joint area      lidocaine (LIDODERM) 5 % 2 Patches by TransDERmal route every twenty-four (24) hours. Place Two patches to right trapezius region in morning. Remove patches 12 hours later. Apply 7AM Remove 7PM. 1 Each 0    albuterol-ipratropium (DUO-NEB) 2.5 mg-0.5 mg/3 ml nebulizer solution 3 mL by Nebulization route every four (4) hours as needed for Wheezing.  30 Vial 0      Allergies   Allergen Reactions    Penicillins Itching           Review of Systems  Constitutional: No fever, chills, diaphoresis,+ malaise, + fatigue - weight gain/loss or falls  Skin: no itching or rashes  HEENT: no neck stiffness, hearing loss, tinnitus, epistaxis or sore throat  EYES: no blurry vision, double vision or photophobia  CARDIOVASCULAR: no, cp, palpitations, orthopnea, pnd or LE edema  PULMONARY: no cough, +wheeze,+ shortness of breath - sputum production  GI: no nausea, vomiting, diarrhea, abdominal pain, melena, hematemesis or brbpr  : no dysuria, hematuria  MUSCULOSKELETAL: no back pain, joint pain or myalgias  ENDOCRINE: no heat/cold intolerance, polyuria or polydipsia  HEME: no easy bruising or bleeding  NEURO: no unilateral weakness, numbness, tingling or seizures      Physical Exam:        Visit Vitals    /84    Pulse 90    Temp 98.9 °F (37.2 °C)    Resp 17    Wt 93.4 kg (206 lb)    SpO2 96%    BMI 29.56 kg/m2      O2 Device: BIPAP    Temp (24hrs), Av.9 °F (37.2 °C), Min:98.9 °F (37.2 °C), Max:98.9 °F (37.2 °C)           Body mass index is 29.56 kg/(m^2). General:  Awake, cooperative, no distress. Head:  Normocephalic, without obvious abnormality, atraumatic. Eyes:  Conjunctivae/corneas clear, sclera anicteric, PERRL, EOMs intact. Nose: Nares normal. No drainage or sinus tenderness. Throat: Lips, mucosa, and tongue normal. .   Neck: Supple, symmetrical, trachea midline, no adenopathy. Lungs:   Diffuse expiratory wheezes, no rales or rhonchi noted       Heart:  Regular rate and rhythm, S1, S2 normal, no murmur, click, rub or gallop, cap refill normal      Abdomen: Soft, non-tender. Bowel sounds normal. No masses,  No organomegaly. Extremities: Extremities normal, atraumatic, no cyanosis or edema. Pulses: 2+ and symmetric all extremities. Skin: Skin color pale, texture, turgor normal. No rashes or lesions   Neurologic: CNII-XII intact. No focal motor or sensory deficit.            Laboratory Studies:    CMP:   Lab Results   Component Value Date/Time     2017 09:15 AM    K 4.3 2017 09:15 AM     2017 09:15 AM    CO2 35 (H) 2017 09:15 AM    AGAP 7 2017 09:15 AM     (H) 2017 09:15 AM    BUN 18 2017 09:15 AM    CREA 1.05 2017 09:15 AM    GFRAA >60 2017 09:15 AM GFRNA >60 01/03/2017 09:15 AM    CA 8.8 01/03/2017 09:15 AM    ALB 4.1 01/03/2017 09:15 AM    TP 7.7 01/03/2017 09:15 AM    GLOB 3.6 01/03/2017 09:15 AM    AGRAT 1.1 01/03/2017 09:15 AM    SGOT 17 01/03/2017 09:15 AM    ALT 32 01/03/2017 09:15 AM     CBC:   Lab Results   Component Value Date/Time    WBC 11.6 01/03/2017 09:15 AM    HGB 13.0 01/03/2017 09:15 AM    HCT 40.6 01/03/2017 09:15 AM     01/03/2017 09:15 AM       Imaging studies personally reviewed:  CXR: clear  EKG: nothing acute    Reviewed old records including old H&P, consultation notes and DC summaries    ACP : 15 minutes spent aside from h&p    Saulo Orozco DO  Internal Medicine/Geriatrics

## 2017-01-03 NOTE — PROGRESS NOTES
Admission Medication Reconciliation has been performed on this ED patient consisting of interview of the patient regarding their PTA Home Medication List, Allergies and PMH as well as obtaining outpatient pharmacy information. Pt is a resident of [facility] and Med list sent with pt. Medication Reconciliation Interventions:   Wrong Medication Identified 2  Wrong/missing medication strength or dose identified  4  Wrong/missing Interval Identified 0  Wrong/missing Route Identified 0  Medication Duplication 0  Omissions 5  Commissions 1  Other Issue(s) Identified (Indicate): 0            Medication Compliance Issues and/or Medication Concerns:  TigerTexted Dr. Sofy Tee as admission orders already written.     [address] [place] [address] Pharmacist  [phone]

## 2017-01-04 ENCOUNTER — APPOINTMENT (OUTPATIENT)
Dept: GENERAL RADIOLOGY | Age: 72
DRG: 207 | End: 2017-01-04
Attending: FAMILY MEDICINE
Payer: MEDICARE

## 2017-01-04 LAB
ANION GAP BLD CALC-SCNC: 10 MMOL/L (ref 3–18)
BASOPHILS # BLD AUTO: 0 K/UL (ref 0–0.06)
BASOPHILS # BLD: 0 % (ref 0–2)
BUN SERPL-MCNC: 32 MG/DL (ref 7–18)
BUN/CREAT SERPL: 18 (ref 12–20)
CALCIUM SERPL-MCNC: 9.1 MG/DL (ref 8.5–10.1)
CHLORIDE SERPL-SCNC: 100 MMOL/L (ref 100–108)
CO2 SERPL-SCNC: 29 MMOL/L (ref 21–32)
CREAT SERPL-MCNC: 1.81 MG/DL (ref 0.6–1.3)
DIFFERENTIAL METHOD BLD: ABNORMAL
EOSINOPHIL # BLD: 0 K/UL (ref 0–0.4)
EOSINOPHIL NFR BLD: 0 % (ref 0–5)
ERYTHROCYTE [DISTWIDTH] IN BLOOD BY AUTOMATED COUNT: 14.5 % (ref 11.6–14.5)
GLUCOSE BLD STRIP.AUTO-MCNC: 130 MG/DL (ref 70–110)
GLUCOSE BLD STRIP.AUTO-MCNC: 171 MG/DL (ref 70–110)
GLUCOSE BLD STRIP.AUTO-MCNC: 172 MG/DL (ref 70–110)
GLUCOSE BLD STRIP.AUTO-MCNC: 176 MG/DL (ref 70–110)
GLUCOSE SERPL-MCNC: 148 MG/DL (ref 74–99)
HCT VFR BLD AUTO: 39.3 % (ref 36–48)
HGB BLD-MCNC: 12.4 G/DL (ref 13–16)
LYMPHOCYTES # BLD AUTO: 3 % (ref 21–52)
LYMPHOCYTES # BLD: 0.4 K/UL (ref 0.9–3.6)
MCH RBC QN AUTO: 28.4 PG (ref 24–34)
MCHC RBC AUTO-ENTMCNC: 31.6 G/DL (ref 31–37)
MCV RBC AUTO: 89.9 FL (ref 74–97)
MONOCYTES # BLD: 0.5 K/UL (ref 0.05–1.2)
MONOCYTES NFR BLD AUTO: 4 % (ref 3–10)
NEUTS SEG # BLD: 13.6 K/UL (ref 1.8–8)
NEUTS SEG NFR BLD AUTO: 93 % (ref 40–73)
PLATELET # BLD AUTO: 331 K/UL (ref 135–420)
PMV BLD AUTO: 9.9 FL (ref 9.2–11.8)
POTASSIUM SERPL-SCNC: 4.8 MMOL/L (ref 3.5–5.5)
RBC # BLD AUTO: 4.37 M/UL (ref 4.7–5.5)
SODIUM SERPL-SCNC: 139 MMOL/L (ref 136–145)
WBC # BLD AUTO: 14.5 K/UL (ref 4.6–13.2)

## 2017-01-04 PROCEDURE — 80048 BASIC METABOLIC PNL TOTAL CA: CPT | Performed by: INTERNAL MEDICINE

## 2017-01-04 PROCEDURE — 71010 XR CHEST PORT: CPT

## 2017-01-04 PROCEDURE — 36415 COLL VENOUS BLD VENIPUNCTURE: CPT | Performed by: INTERNAL MEDICINE

## 2017-01-04 PROCEDURE — 77010033678 HC OXYGEN DAILY

## 2017-01-04 PROCEDURE — 92611 MOTION FLUOROSCOPY/SWALLOW: CPT

## 2017-01-04 PROCEDURE — 74011636637 HC RX REV CODE- 636/637: Performed by: INTERNAL MEDICINE

## 2017-01-04 PROCEDURE — 92610 EVALUATE SWALLOWING FUNCTION: CPT

## 2017-01-04 PROCEDURE — 65660000000 HC RM CCU STEPDOWN

## 2017-01-04 PROCEDURE — 82962 GLUCOSE BLOOD TEST: CPT

## 2017-01-04 PROCEDURE — 74230 X-RAY XM SWLNG FUNCJ C+: CPT

## 2017-01-04 PROCEDURE — 71020 XR CHEST PA LAT: CPT

## 2017-01-04 PROCEDURE — 85025 COMPLETE CBC W/AUTO DIFF WBC: CPT | Performed by: INTERNAL MEDICINE

## 2017-01-04 PROCEDURE — 74011250636 HC RX REV CODE- 250/636: Performed by: INTERNAL MEDICINE

## 2017-01-04 PROCEDURE — 94640 AIRWAY INHALATION TREATMENT: CPT

## 2017-01-04 PROCEDURE — 74011000255 HC RX REV CODE- 255: Performed by: INTERNAL MEDICINE

## 2017-01-04 PROCEDURE — 74011000250 HC RX REV CODE- 250: Performed by: INTERNAL MEDICINE

## 2017-01-04 PROCEDURE — 74011250637 HC RX REV CODE- 250/637: Performed by: INTERNAL MEDICINE

## 2017-01-04 RX ADMIN — HEPARIN SODIUM 5000 UNITS: 5000 INJECTION, SOLUTION INTRAVENOUS; SUBCUTANEOUS at 16:19

## 2017-01-04 RX ADMIN — CITALOPRAM HYDROBROMIDE 20 MG: 20 TABLET ORAL at 09:41

## 2017-01-04 RX ADMIN — LEVOFLOXACIN 500 MG: 5 INJECTION, SOLUTION INTRAVENOUS at 15:04

## 2017-01-04 RX ADMIN — METHYLPREDNISOLONE SODIUM SUCCINATE 40 MG: 40 INJECTION, POWDER, FOR SOLUTION INTRAMUSCULAR; INTRAVENOUS at 19:16

## 2017-01-04 RX ADMIN — MULTIPLE VITAMINS W/ MINERALS TAB 1 TABLET: TAB at 09:41

## 2017-01-04 RX ADMIN — LEVOTHYROXINE SODIUM 175 MCG: 150 TABLET ORAL at 06:46

## 2017-01-04 RX ADMIN — INSULIN LISPRO 2 UNITS: 100 INJECTION, SOLUTION INTRAVENOUS; SUBCUTANEOUS at 21:43

## 2017-01-04 RX ADMIN — LABETALOL HCL 200 MG: 200 TABLET, FILM COATED ORAL at 09:41

## 2017-01-04 RX ADMIN — BARIUM SULFATE 60 ML: 400 SUSPENSION ORAL at 15:14

## 2017-01-04 RX ADMIN — BARIUM SULFATE 15 ML: 400 SUSPENSION ORAL at 15:13

## 2017-01-04 RX ADMIN — GUAIFENESIN 600 MG: 600 TABLET, EXTENDED RELEASE ORAL at 21:42

## 2017-01-04 RX ADMIN — INSULIN LISPRO 2 UNITS: 100 INJECTION, SOLUTION INTRAVENOUS; SUBCUTANEOUS at 16:30

## 2017-01-04 RX ADMIN — HEPARIN SODIUM 5000 UNITS: 5000 INJECTION, SOLUTION INTRAVENOUS; SUBCUTANEOUS at 01:18

## 2017-01-04 RX ADMIN — LABETALOL HCL 200 MG: 200 TABLET, FILM COATED ORAL at 21:42

## 2017-01-04 RX ADMIN — IPRATROPIUM BROMIDE AND ALBUTEROL SULFATE 3 ML: .5; 3 SOLUTION RESPIRATORY (INHALATION) at 19:38

## 2017-01-04 RX ADMIN — METHYLPREDNISOLONE SODIUM SUCCINATE 40 MG: 40 INJECTION, POWDER, FOR SOLUTION INTRAMUSCULAR; INTRAVENOUS at 06:48

## 2017-01-04 RX ADMIN — BACLOFEN 10 MG: 10 TABLET ORAL at 09:41

## 2017-01-04 RX ADMIN — ALBUTEROL SULFATE 2.5 MG: 2.5 SOLUTION RESPIRATORY (INHALATION) at 00:57

## 2017-01-04 RX ADMIN — IPRATROPIUM BROMIDE AND ALBUTEROL SULFATE 3 ML: .5; 3 SOLUTION RESPIRATORY (INHALATION) at 13:03

## 2017-01-04 RX ADMIN — BARIUM SULFATE 15 ML: 400 PASTE ORAL at 15:15

## 2017-01-04 RX ADMIN — BARIUM SULFATE 40 G: 960 POWDER, FOR SUSPENSION ORAL at 15:10

## 2017-01-04 RX ADMIN — BACLOFEN 10 MG: 10 TABLET ORAL at 16:20

## 2017-01-04 RX ADMIN — ISOSORBIDE MONONITRATE 30 MG: 30 TABLET, EXTENDED RELEASE ORAL at 09:41

## 2017-01-04 RX ADMIN — GUAIFENESIN 600 MG: 600 TABLET, EXTENDED RELEASE ORAL at 09:41

## 2017-01-04 RX ADMIN — IPRATROPIUM BROMIDE AND ALBUTEROL SULFATE 3 ML: .5; 3 SOLUTION RESPIRATORY (INHALATION) at 16:44

## 2017-01-04 RX ADMIN — METHYLPREDNISOLONE SODIUM SUCCINATE 40 MG: 40 INJECTION, POWDER, FOR SOLUTION INTRAMUSCULAR; INTRAVENOUS at 12:34

## 2017-01-04 RX ADMIN — ASPIRIN 81 MG 81 MG: 81 TABLET ORAL at 09:41

## 2017-01-04 RX ADMIN — HEPARIN SODIUM 5000 UNITS: 5000 INJECTION, SOLUTION INTRAVENOUS; SUBCUTANEOUS at 09:42

## 2017-01-04 RX ADMIN — IPRATROPIUM BROMIDE AND ALBUTEROL SULFATE 3 ML: .5; 3 SOLUTION RESPIRATORY (INHALATION) at 07:42

## 2017-01-04 RX ADMIN — BACLOFEN 10 MG: 10 TABLET ORAL at 21:42

## 2017-01-04 RX ADMIN — ALBUTEROL SULFATE 2.5 MG: 2.5 SOLUTION RESPIRATORY (INHALATION) at 05:02

## 2017-01-04 RX ADMIN — FUROSEMIDE 40 MG: 40 TABLET ORAL at 09:41

## 2017-01-04 NOTE — ROUTINE PROCESS
TRANSFER - IN REPORT:    Verbal report received from David Yo(name) on Lennox Host  being received from ED (unit) for routine progression of care      Report consisted of patients Situation, Background, Assessment and   Recommendations(SBAR). Information from the following report(s) SBAR, Kardex, ED Summary, MAR, Recent Results and Med Rec Status was reviewed with the receiving nurse. Opportunity for questions and clarification was provided. Assessment completed upon patients arrival to unit and care assumed.

## 2017-01-04 NOTE — ROUTINE PROCESS
0941 am- accepted po meds with apple sauce / crushed. HOB to 45 degree. Moist cough noted. Encouraged to slow down and no talking when taking po . yaunkeer in use PRN. Weakness to upper extremities noted. Needs assist on each meal .   1120 am- address issues with Hospitalist- New order received. Barium swallow and NPO for now. Possible aspiration. 1340 pm- to modified barium test via bed. o at 2l/NC. Intermittent cough after po intake. Will keep NPO for now. 1545 Bedside and Verbal shift change report given to ROBERTA Rivas RN (oncoming nurse) by Bryan Estrella RN   (offgoing nurse). Report included the following information SBAR, Kardex, Intake/Output, MAR, Med Rec Status and Cardiac Rhythm NSR.

## 2017-01-04 NOTE — PROGRESS NOTES
Hospitalist Progress Note    Patient: Wayne Spicer MRN: [id number]  CSN: [id number]    YOB: 1945  Age: 70 y.o. Sex: male    DOA: 1/3/2017 LOS:  LOS: 0 days          Chief Complaint:    Shortness of breath. Assessment/Plan     Principal Problem:    Acute respiratory failure ([facility] (8/3/2015): Currently treating as COPD exacerbation but increasing concern for aspiration based on his physical exam.  Continue levaquin and solumedrol as he will likely have difficulty transitioning to PO medications. Continuous pulse oximetry. NPO until modified swallow complete. Repeat CXR today. Active Problems:    Anoxic encephalopathy ([facility] (8/3/2015): As above. NPO, swallow study. Diastolic congestive heart failure, NYHA class 1 ([facility] (11/23/2016): monitor HR and fluid balance. HTN (hypertension) (11/23/2016): at goal.  No changes. Subjective:    SOB improved. No new complaints.      Review of systems:    Constitutional: denies fevers, chills, myalgias  Respiratory: denies SOB, cough  Cardiovascular: denies chest pain, palpitations  Gastrointestinal: denies nausea, vomiting, diarrhea      Vital signs/Intake and Output:  Visit Vitals    /81 (BP 1 Location: Left arm, BP Patient Position: At rest;Supine)    Pulse 86    Temp 98.4 °F (36.9 °C)    Resp 20    Wt 93.4 kg (206 lb)    SpO2 94%    BMI 29.56 kg/m2     Current Shift:  01/04 0701 - 01/04 1900  In: 480 [P.O.:480]  Out: -   Last three shifts:       Exam:    General: Well developed, alert, NAD appears to be having difficulty managing secretions/liquids  Head/Neck: NCAT, supple, No masses, No lymphadenopathy  CVS:Regular rate and rhythm, no M/R/G, S1/S2 heard, no thrill  Lungs:Coarse breath sounds throughout; more prominent in RUL  Abdomen: Soft, Nontender, No distention, Normal Bowel sounds, No hepatomegaly  Extremities: No C/C/E, pulses palpable 2+  Skin:normal texture and turgor, no rashes, no lesions  Neuro:post-cva; follows commands  Psych:answering questions; slow to respond appropriately. Labs: Results:       Chemistry Recent Labs      01/04/17 0217 01/03/17 0915   GLU  148*  137*   NA  139  144   K  4.8  4.3   CL  100  102   CO2  29  35*   BUN  32*  18   CREA  1.81*  1.05   CA  9.1  8.8   AGAP  10  7   BUCR  18  17   AP   --   94   TP   --   7.7   ALB   --   4.1   GLOB   --   3.6   AGRAT   --   1.1      CBC w/Diff Recent Labs      01/04/17 0217 01/03/17 0915   WBC  14.5*  11.6   RBC  4.37*  4.50*   HGB  12.4*  13.0   HCT  39.3  40.6   PLT  331  297   GRANS  93*  84*   LYMPH  3*  9*   EOS  0  2      Cardiac Enzymes No results for input(s): CPK, CKND1, MARCO A in the last 72 hours. No lab exists for component: CKRMB, TROIP   Coagulation No results for input(s): PTP, INR, APTT in the last 72 hours. No lab exists for component: INREXT    Lipid Panel No results found for: CHOL, CHOLPOCT, CHOLX, CHLST, CHOLV, [id number], HDL, LDL, NLDLCT, DLDL, LDLC, DLDLP, 525005, VLDLC, VLDL, TGL, TGLX, TRIGL, [id number], TRIGP, TGLPOCT, [id number], CHHD, CHHDX   BNP No results for input(s): BNPP in the last 72 hours.    Liver Enzymes Recent Labs      01/03/17 0915   TP  7.7   ALB  4.1   AP  94   SGOT  17      Thyroid Studies Lab Results   Component Value Date/Time    TSH 1.37 08/13/2015 04:35 AM        Procedures/imaging: see electronic medical records for all procedures/Xrays and details which were not copied into this note but were reviewed prior to creation of [id number] Wilfrido Mendoza DO

## 2017-01-04 NOTE — ED NOTES
Pt hourly rounding competed. Safety   Pt (x) resting on stretcher with side rails up and call bell in reach. () in chair    () in parents arms. Toileting   Pt offered ()Bedpan     ()Assistance to Restroom     (x)Urinal  Ongoing Updates  Updated on plan of care and status of test results.   Pain Management  Inquired as to comfort and offered comfort measures:    (x) warm blankets   () dimmed lights

## 2017-01-04 NOTE — ROUTINE PROCESS
Bedside and Verbal shift change report given to Julisa Maria (oncoming nurse) by Savanah Hawkins (offgoing nurse). Report included the following information SBAR, Kardex, MAR, Accordion, Recent Results, Med Rec Status and Cardiac Rhythm NSR/ST.

## 2017-01-04 NOTE — ED NOTES
Pt hourly rounding competed. Safety   Pt (x) resting on stretcher with side rails up and call bell in reach. () in chair    () in parents arms. Toileting   Pt offered ()Bedpan     ()Assistance to Restroom     (x)Urinal  Ongoing Updates  Updated on plan of care and status of test results.   Pain Management  Inquired as to comfort and offered comfort measures:    () warm blankets   (x) dimmed lights

## 2017-01-04 NOTE — ED NOTES
Bedside report received from Sully Byrd RN using SBAR. Patient cleaned of stool and urine, clean brief applied. Patient denies pain at this time. Requesting breathing treatment. Patient updated on plan of care. Vital signs stable.

## 2017-01-04 NOTE — PROGRESS NOTES
Chart Reviewed. Noted patient admitted to the hospital for further medical management. Care Management to follow up with patient and/or family for transition of care needs prn.

## 2017-01-04 NOTE — PROGRESS NOTES
Problem: Dysphagia (Adult)  Goal: *Acute Goals and Plan of Care (Insert Text)  MBS Complete. Based on the objective data described below, the patient presents with (+) mild oral and moderate pharyngeal dysphagia. (+) moderate penetration with NTL via cup/straw and thin liquid via cup, (+) flash penetration with HTL via cup, (+) silent aspiration noted with thin liquid via straw during the course of this study. Consistencies evaluated included puree, mechanical soft, mixed, solid, honey thick liquid, nectar thick liquid, and thin liquid. Deglutition characterized by adequate mastication, delayed bolus formation and anterior-posterior transit with all consistencies. Patient with delayed pharyngeal swallow initiation with all consistencies resulting in premature spillage to the valleculae with pooling for 2-3 seconds with NTL and thin; pooling in the valleculae noted for 6-8 seconds with intermittent prompting to initiate swallow response with puree, mech soft, mixed, solid, and HTL. Patient with decreased base of tongue retraction with positive valleculae residue with puree, mech soft and solid requiring min-mod cues from therapist to double swallow to remediate residue. Residue is successfully remediated; liquid presentation also successfully in remediating residuals. Again, (+) penetration observed with HTL via cup, NTL via cup and straw, as well as thin liquid via cup, (+) silent aspiration noted with thin liquid via straw visualized. Compensatory swallow strategies to include small bites/sips, alternate liquids/solids and double swallow and aspiration/reflux precautions including HOB > 30 with all po and upright at least 30 degrees after po discussed at length with patient and nurse, Odilia Merlos. Recommend regular diet with HTL. Results of modified barium swallow and recommendations discussed at length with patient and nursing. Diet recommendations approved per Dr. Aline Lehman.  Patient is at increased risk for aspiration due to respiratory compromise and ease of increased work of breathing. Rec: Regular with HTL with assistance and supervision with all po. Strict aspiration/reflux precautions  1:1 supervision with all po  Pt must be fed w/ HOB > 45, remain >45 for 30-45 minutes after po   Small bites/sips; alternate liquid/solid with slow feeding rate   Medications crushed in applesauce  Monitor WOB during po feeding sessions    Patient will:  Utilize aspiration/reflux precautions, compensatory eating/swallow strategies, diet modifications and oral care recommendations with minimal A (visual, verbal, tactile cues) in 4/5 trials. Tolerate least restrictive diet using above without overt s/sx aspiration/distress in 4/5 trials with min A (visual, verbal, tactile cues). Perform compensatory swallow maneuvers and exercise to increase swallow function/safety as indicated with min A (visual, verbal, tactile cues). Outcome: Progressing Towards Goal  SPEECH-LANGUAGE PATHOLOGY MODIFIED BARIUM SWALLOW STUDY  Patient: Patric Díaz (89 y.o. male)  Date: 1/4/2017  Primary Diagnosis: Hypoxia        Precautions: Aspiration     ASSESSMENT :  As above. Patient will benefit from skilled intervention to address the above impairments. Patients rehabilitation potential is considered to be Fair  Factors which may influence rehabilitation potential include:   [ ]              None noted  [X]              Mental ability/status  [X]              Medical condition  [X]              Home/family situation and support systems  [X]              Safety awareness  [ ]              Pain tolerance/management  [ ]              Other:        PLAN :  Recommendations and Planned Interventions:  As above. Frequency/Duration: Patient will be followed by speech-language pathology 3 times a week to address goals. Discharge Recommendations: To Be Determined       SUBJECTIVE:   Patient stated That's that white stuff.   OBJECTIVE:       Past Medical History   Diagnosis Date    Anoxic brain damage ([facility] [address]      Bursitis      CAD (coronary artery disease)      Cardiac arrest (HCC)      Cognitive communication deficit      Contracture of muscle      Depression      Diabetes (HCC)      Diarrhea      Disorder of kidney and ureter      Dysphagia      GERD (gastroesophageal reflux disease)      High cholesterol      Hypertension      Hypothyroid      Lack of coordination      Polyarthritis      Sleep apnea      Sleep disorder      Weakness       Past Surgical History   Procedure Laterality Date    Hx gi           peg    Hx amputation        Hx hernia repair         Prior Level of Function/Home Situation:   Home Situation  Living Alone: No  Support Systems: Skilled nursing facility  Patient Expects to be Discharged to[de-identified] Skilled nursing facility  Current Diet:  NPO  Barriers to Learning/Limitations: yes;  sensory deficits-vision/hearing/speech  Compensate with: visual, verbal, tactile, kinesthetic cues/model  Radiologist:    Film Views: Lateral;Fluoro  Patient Position: Upright in chair at 90  Trial 1: Trial 2:   Consistency Presented: Mechanical soft;Mixed consistency;Puree; Solid Consistency Presented: Honey thick liquid   How Presented: SLP-fed/presented;Spoon How Presented: Self-fed/presented;Cup/sip;Straw;Successive swallows   Bolus Acceptance: No impairment Bolus Acceptance: No impairment   Bolus Formation/Control: Impaired: Premature spillage Bolus Formation/Control: Impaired: Delayed;Premature spillage   Propulsion: No impairment Propulsion: No impairment   Oral Residue: None Oral Residue: None   Initiation of Swallow: Triggered at vallecula Initiation of Swallow: Triggered at valleculae   Timing: Pooling 6-10 sec;Vallecular Timing: Pooling 1-5 sec;Vallecular   Penetration: None Penetration: Flash/transient;Trace;During swallow; To laryngeal vestibule (Cup)   Aspiration/Timing: No evidence of aspiration Aspiration/Timing: No evidence of aspiration   Pharyngeal Clearance: Vallecular residue; Less than 10% Pharyngeal Clearance: No residue   Attempted Modifications: Double swallow;Small sips and bites; Spoon Attempted Modifications: Cup/sip;Small sips and bites;Straw   Effective Modifications:  (Double swallow;Small sips and bites; Spoon) Effective Modifications:  (Cup/sip;Small sips and bites;Straw)   Cues for Modifications: Minimal-moderate Cues for Modifications: Minimal       Trial 3: Trial 4:   Consistency Presented: Nectar thick liquid Consistency Presented: Thin liquid   How Presented: Self-fed/presented;Cup/sip;Straw;Successive swallows How Presented: Self-fed/presented;Cup/sip;Straw;Successive swallows   Bolus Acceptance: No impairment Bolus Acceptance: No impairment   Bolus Formation/Control: Impaired: Premature spillage Bolus Formation/Control: Impaired: Premature spillage   Propulsion: No impairment Propulsion: No impairment   Oral Residue: None Oral Residue: None   Initiation of Swallow: Triggered at valleculae     Timing: Pooling 1-5 sec;Vallecular Timing: Pooling 1-5 sec;Vallecular   Penetration: Trace;During swallow;From initial swallow (Moderate penetation) Penetration: Flash/transient;Trace;During swallow; To laryngeal vestibule;From initial swallow (Moderate penetration wtih cup)   Aspiration/Timing: No evidence of aspiration Aspiration/Timing: Silent ;Trace;During;From initial swallow (With straw)   Pharyngeal Clearance: No residue Pharyngeal Clearance: No residue   Attempted Modifications: Cup/sip;Small sips and bites;Straw Attempted Modifications: Cup/sip;Small sips and bites;Straw   Effective Modifications:  (Cup/sip;Small sips and bites;Straw) Effective Modifications: None   Cues for Modifications: Minimal Cues for Modifications: Minimal-Mod   Decreased Tongue Base Retraction?: Yes  Laryngeal Elevation: Incomplete laryngeal closure  Aspiration/Penetration Score: 8 (Aspiration-Contrast passes cords/glottis with no effort to eject, ie/silent aspiration)  Pharyngeal Symmetry: Not assessed  Pharyngeal-Esophageal Segment: No impairment  Pharyngeal Dysfunction: Decreased tongue base retraction;Decreased strength;Decreased elevation/closure  Oral Phase Severity: Mild  Pharyngeal Phase Severity: Moderate  COMMUNICATION/EDUCATION:   [X]  The patients plan of care including recommendations, planned interventions, and recommended diet changes were discussed with: Registered NurseJulio. [X]  Posted safety precautions in patient's room. [X]  Patient/family have participated as able in goal setting and plan of care. [ ]  Patient/family agree to work toward stated goals and plan of care. [ ]  Patient understands intent and goals of therapy, but is neutral about his/her participation. [ ]  Patient is unable to participate in goal setting and plan of care.      Thank you for this referral.  Matti Ashley, SLP  Time Calculation: 30 mins

## 2017-01-04 NOTE — ROUTINE PROCESS
TRANSFER - OUT REPORT:    Verbal report given to Ubaldo Hines RN (name) on Sharmin Cabrera  being transferred to Telemetry (unit) for routine progression of care       Report consisted of patients Situation, Background, Assessment and   Recommendations(SBAR). Information from the following report(s) SBAR, ED Summary and MAR was reviewed with the receiving nurse. Lines:   Peripheral IV 01/03/17 Right Arm (Active)   Site Assessment Clean, dry, & intact 1/3/2017  9:15 AM   Phlebitis Assessment 0 1/3/2017  9:15 AM   Infiltration Assessment 0 1/3/2017  9:15 AM   Dressing Status Clean, dry, & intact 1/3/2017  9:15 AM   Dressing Type Transparent 1/3/2017  9:15 AM   Hub Color/Line Status Green 1/3/2017  9:15 AM   Alcohol Cap Used Yes 1/3/2017  9:15 AM       Peripheral IV 01/03/17 Left Arm (Active)   Site Assessment Clean, dry, & intact 1/3/2017  4:10 PM   Phlebitis Assessment 0 1/3/2017  4:10 PM   Infiltration Assessment 0 1/3/2017  4:10 PM   Dressing Status Clean, dry, & intact 1/3/2017  4:10 PM   Hub Color/Line Status Pink 1/3/2017  4:10 PM        Opportunity for questions and clarification was provided.       Patient transported with:   Monitor  O2 @ 2 liters  Tech

## 2017-01-04 NOTE — ROUTINE PROCESS
1688- Pt transferred in from ED department. Admission assessment performed and documented. Patient oriented to room, no signs of distress. Call bell within reach. Whiteboard updated, bed locked and in lowest position. Alarm parameters reviewed and opportunities for questions provided. Education provided on how to call for assistance, patient demonstrated and verbalized understanding. Pt is on 2L/min nasal cannula oxygen and no continuous IV fluids. Pt sounds congested, paged RT for evaluation and duoneb treatment. 1721- RT performed deep suction via nasal passages. Pt reports being able to breathe better. Pt denies pain at this time. 0158- Pt resting quietly in bed. No needs at this time. 1141- Reassessment performed and documented. Auscultation of Pt, Pt remains moist in upper airway. Pt coughing but unable to produce productive cough. Paged RT for nebulization treatment and deep suction per Pt's request.     Shana Luna- Pt states is able to swallow pill with pudding or applesauce. Placed consult order for speech evaluation. Tolerated well taking pills in applesauce.

## 2017-01-04 NOTE — PROGRESS NOTES
Problem: Dysphagia (Adult)  Goal: *Acute Goals and Plan of Care (Insert Text)  Patient presents with (+) mild oral and moderate pharyngeal dysphagia. Patient with (+) overt s/sx of aspiration noted with thin, NTL and HTL characterized by increase in RR, facial redness, and strong coughing. Patient able to safely tolerate trials of solid, puree and mechanical soft without overt s/sx of aspiration. Patient with adequate oral manipulation of consistencies with cohesive bolus formation. He is able to maintain bolus cohesion during propulsion and clear oral cavity without stasis post swallow. Delayed and decreased hyolaryngeal excursion is noted. Again s/sx of aspiration noted with 100% of thin trials, 100% of NTL trials and 10% of HTL trials. Patient with intermittent reflexive swallows after initially swallow which may be indicated of decreased hyolaryngeal excursion with incomplete bolus clearance from the pharynx and possible pharyngeal residue. Patient noted to previously have PEG tube in 2015, however, he is without PEG tube now. Patient first states feeding tube was removed months ago then he stated years ago. He is not able to definitive identify the current diet he consumes at [facility]. Patient admitted for hypoxia and is at increased risk for aspiration and aspiration related complications. Recommend regular with HTL diet pending completion of MBS with strict 1:1 supervision and presentation of all po by nursing. Patients nurse, Wilfrido Hawley, informed of recommendation. Strict use of compensatory swallow strategies and aspiration/reflux precautions required; strategies and precautions include small bites, small, single sips of liquids, alternate liquids/solids, slow feeding rate, upright at least 90 degrees with all po, and upright at least 30 degrees after po.      Rec: Cautious po of Regular with HONEY THICK LIQUIDS with SUPERVISION with all po   Strict aspiration/reflux precautions; HOB >30 at all times   Small bites/sips; alternate liquid/solids  Upright at least 90 with all po and upright at least 30 after po  Monitor fatigue during po feeding sessions    Patient will: Tolerate least restrictive diet using above without overt s/sx aspiration/distress in 4/5 trials with min A (visual, verbal, tactile cues). Utilize aspiration/reflux precautions, compensatory eating/swallow strategies, diet modifications and oral care recommendations with minimal A (visual, verbal, tactile cues) in 4/5 trials. Perform compensatory swallow maneuvers and exercise to increase swallow function/safety as indicated with min A (visual, verbal, tactile cues). Outcome: Progressing Towards Goal  SPEECH LANGUAGE PATHOLOGY BEDSIDE SWALLOW EVALUATION     Patient: Charlotte Foreman (67 y.o. male)  Date: 1/4/2017  Primary Diagnosis: Hypoxia        Precautions: Aspiration         ASSESSMENT :  As above. Patient will benefit from skilled intervention to address the above impairments. Patients rehabilitation potential is considered to be Fair  Factors which may influence rehabilitation potential include:   [ ]            None noted  [X]            Mental ability/status  [X]            Medical condition  [ ]            Home/family situation and support systems  [X]            Safety awareness  [ ]            Pain tolerance/management  [ ]            Other:        PLAN :  Recommendations and Planned Interventions:  As above. Frequency/Duration: Patient will be followed by speech-language pathology 3 times a week to address goals. Discharge Recommendations: To Be Determined       SUBJECTIVE:   Patient stated Angeles Simmons know over at the home.   OBJECTIVE:       Past Medical History   Diagnosis Date    [facility]oxic brain damage ([facility] [address]      Bursitis      CAD (coronary artery disease)      Cardiac arrest (HCC)      Cognitive communication deficit      Contracture of muscle      Depression      Diabetes (HCC)      Diarrhea      Disorder of kidney and ureter      Dysphagia      GERD (gastroesophageal reflux disease)      High cholesterol      Hypertension      Hypothyroid      Lack of coordination      Polyarthritis      Sleep apnea      Sleep disorder      Weakness       Past Surgical History   Procedure Laterality Date    Hx gi           peg    Hx amputation        Hx hernia repair         Prior Level of Function/Home Situation:   Home Situation  Living Alone: No  Support Systems: Skilled nursing facility  Patient Expects to be Discharged to[de-identified] Skilled nursing facility  Current Diet: Regular   Cognitive and Communication Status:  Neurologic State: Alert, Appropriate for age  Orientation Level: Oriented to person, Oriented to place, Oriented to situation  Cognition: Follows commands  Perception: Appears intact  Perseveration: No perseveration noted  Safety/Judgement: Decreased insight into deficits  Oral Assessment:  Oral Assessment  Labial: Decreased rate  Dentition: Natural  Oral Hygiene: Good  Lingual: Decreased rate  Velum: No impairment  Mandible: No impairment  Gag Reflex:  (Not elicited)  P.O. Trials:  Patient Position: HOB > 45  Vocal quality prior to P.O.: No impairment  Consistency Presented: Honey thick liquid; Ice chips;Mechanical soft; Nectar thick liquid;Puree; Solid; Thin liquid  How Presented: SLP-fed/presented;Cup/sip;Spoon;Straw     Bolus Acceptance: No impairment  Bolus Formation/Control: Impaired  Type of Impairment: Premature spillage  Propulsion: No impairment  Oral Residue: None  Initiation of Swallow: Delayed (# of seconds)  Laryngeal Elevation: Decreased;Weak  Aspiration Signs/Symptoms: Facial redness; Increase in RR;Strong cough  Pharyngeal Phase Characteristics: Multiple swallows; Suspected pharyngeal residue  Effective Modifications: Cup/sip;Straw;Spoon;Small sips and bites  Cues for Modifications: Maximal  Oral Phase Severity: Mild  Pharyngeal Phase Severity : Moderate     GCODESwallowing:  Swallow Current Status CK= 40-59%   Swallow Goal Status CJ= 20-39%     The severity rating is based on the following outcomes:  LINUS Noms Swallow Level 5              Clinical Judgement     PAIN:  Start of Eval: 0  End of Eval: 0     After treatment:   [ ]            Patient left in no apparent distress sitting up in chair  [X]            Patient left in no apparent distress in bed  [X]            Call bell left within reach  [X]            Nursing notified  [ ]            Family present  [ ]            Caregiver present  [ ]            Bed alarm activated  COMMUNICATION/EDUCATION:   [X]            Aspiration precautions; swallow safety; compensatory techniques. [X]            Patient/family have participated as able in goal setting and plan of care. [ ]            Patient/family agree to work toward stated goals and plan of care. [ ]            Patient understands intent and goals of therapy; neutral about participation. [ ]            Patient unable to participate in goal setting/plan of care; educ ongoing with interdisciplinary staff  [ ]             Posted safety precautions in patient's room.      Thank you for this referral.  Ruiz Sosa, SLP  Time Calculation: 26 mins

## 2017-01-05 ENCOUNTER — APPOINTMENT (OUTPATIENT)
Dept: GENERAL RADIOLOGY | Age: 72
DRG: 207 | End: 2017-01-05
Attending: HOSPITALIST
Payer: MEDICARE

## 2017-01-05 ENCOUNTER — ANESTHESIA (OUTPATIENT)
Dept: ICU | Age: 72
DRG: 207 | End: 2017-01-05
Payer: MEDICARE

## 2017-01-05 ENCOUNTER — APPOINTMENT (OUTPATIENT)
Dept: GENERAL RADIOLOGY | Age: 72
DRG: 207 | End: 2017-01-05
Attending: INTERNAL MEDICINE
Payer: MEDICARE

## 2017-01-05 ENCOUNTER — ANESTHESIA EVENT (OUTPATIENT)
Dept: ICU | Age: 72
DRG: 207 | End: 2017-01-05
Payer: MEDICARE

## 2017-01-05 PROBLEM — J44.0 COPD (CHRONIC OBSTRUCTIVE PULMONARY DISEASE) WITH ACUTE BRONCHITIS (HCC): Status: ACTIVE | Noted: 2017-01-05

## 2017-01-05 PROBLEM — N17.9 AKI (ACUTE KIDNEY INJURY) (HCC): Status: ACTIVE | Noted: 2017-01-05

## 2017-01-05 PROBLEM — J20.9 COPD (CHRONIC OBSTRUCTIVE PULMONARY DISEASE) WITH ACUTE BRONCHITIS (HCC): Status: ACTIVE | Noted: 2017-01-05

## 2017-01-05 LAB
ALBUMIN SERPL BCP-MCNC: 3.7 G/DL (ref 3.4–5)
ALBUMIN/GLOB SERPL: 1.1 {RATIO} (ref 0.8–1.7)
ALP SERPL-CCNC: 71 U/L (ref 45–117)
ALT SERPL-CCNC: 33 U/L (ref 16–61)
ANION GAP BLD CALC-SCNC: 10 MMOL/L (ref 3–18)
ANION GAP BLD CALC-SCNC: 14 MMOL/L (ref 3–18)
ARTERIAL PATENCY WRIST A: YES
AST SERPL W P-5'-P-CCNC: 23 U/L (ref 15–37)
BASE DEFICIT BLD-SCNC: 3 MMOL/L
BASOPHILS # BLD AUTO: 0 K/UL (ref 0–0.06)
BASOPHILS # BLD: 0 % (ref 0–2)
BDY SITE: ABNORMAL
BILIRUB SERPL-MCNC: 0.4 MG/DL (ref 0.2–1)
BNP SERPL-MCNC: 2072 PG/ML (ref 0–900)
BODY TEMPERATURE: 98.6
BUN SERPL-MCNC: 68 MG/DL (ref 7–18)
BUN SERPL-MCNC: 83 MG/DL (ref 7–18)
BUN/CREAT SERPL: 23 (ref 12–20)
BUN/CREAT SERPL: 23 (ref 12–20)
CALCIUM SERPL-MCNC: 8.6 MG/DL (ref 8.5–10.1)
CALCIUM SERPL-MCNC: 9.1 MG/DL (ref 8.5–10.1)
CHLORIDE SERPL-SCNC: 100 MMOL/L (ref 100–108)
CHLORIDE SERPL-SCNC: 100 MMOL/L (ref 100–108)
CK MB CFR SERPL CALC: 3.3 % (ref 0–4)
CK MB SERPL-MCNC: 5.8 NG/ML (ref 0.5–3.6)
CK SERPL-CCNC: 175 U/L (ref 39–308)
CO2 SERPL-SCNC: 30 MMOL/L (ref 21–32)
CO2 SERPL-SCNC: 30 MMOL/L (ref 21–32)
CREAT SERPL-MCNC: 2.97 MG/DL (ref 0.6–1.3)
CREAT SERPL-MCNC: 3.68 MG/DL (ref 0.6–1.3)
D DIMER PPP FEU-MCNC: 2.14 UG/ML(FEU)
DIFFERENTIAL METHOD BLD: ABNORMAL
EOSINOPHIL # BLD: 0 K/UL (ref 0–0.4)
EOSINOPHIL NFR BLD: 0 % (ref 0–5)
ERYTHROCYTE [DISTWIDTH] IN BLOOD BY AUTOMATED COUNT: 14.8 % (ref 11.6–14.5)
ERYTHROCYTE [DISTWIDTH] IN BLOOD BY AUTOMATED COUNT: 14.9 % (ref 11.6–14.5)
GAS FLOW.O2 O2 DELIVERY SYS: ABNORMAL L/MIN
GAS FLOW.O2 SETTING OXYMISER: 14 BPM
GLOBULIN SER CALC-MCNC: 3.4 G/DL (ref 2–4)
GLUCOSE BLD STRIP.AUTO-MCNC: 109 MG/DL (ref 70–110)
GLUCOSE BLD STRIP.AUTO-MCNC: 134 MG/DL (ref 70–110)
GLUCOSE BLD STRIP.AUTO-MCNC: 207 MG/DL (ref 70–110)
GLUCOSE SERPL-MCNC: 156 MG/DL (ref 74–99)
GLUCOSE SERPL-MCNC: 158 MG/DL (ref 74–99)
HCO3 BLD-SCNC: 25.7 MMOL/L (ref 22–26)
HCT VFR BLD AUTO: 36.5 % (ref 36–48)
HCT VFR BLD AUTO: 38.1 % (ref 36–48)
HGB BLD-MCNC: 11.5 G/DL (ref 13–16)
HGB BLD-MCNC: 12.3 G/DL (ref 13–16)
LYMPHOCYTES # BLD AUTO: 4 % (ref 21–52)
LYMPHOCYTES # BLD: 0.6 K/UL (ref 0.9–3.6)
MAGNESIUM SERPL-MCNC: 3 MG/DL (ref 1.8–2.4)
MCH RBC QN AUTO: 28.9 PG (ref 24–34)
MCH RBC QN AUTO: 29 PG (ref 24–34)
MCHC RBC AUTO-ENTMCNC: 31.5 G/DL (ref 31–37)
MCHC RBC AUTO-ENTMCNC: 32.3 G/DL (ref 31–37)
MCV RBC AUTO: 89.6 FL (ref 74–97)
MCV RBC AUTO: 92.2 FL (ref 74–97)
MONOCYTES # BLD: 0.5 K/UL (ref 0.05–1.2)
MONOCYTES NFR BLD AUTO: 3 % (ref 3–10)
NEUTS SEG # BLD: 15.2 K/UL (ref 1.8–8)
NEUTS SEG NFR BLD AUTO: 93 % (ref 40–73)
O2/TOTAL GAS SETTING VFR VENT: 1 %
PCO2 BLD: 68.4 MMHG (ref 35–45)
PEEP RESPIRATORY: 5 CMH2O
PH BLD: 7.18 [PH] (ref 7.35–7.45)
PLATELET # BLD AUTO: 305 K/UL (ref 135–420)
PLATELET # BLD AUTO: 331 K/UL (ref 135–420)
PMV BLD AUTO: 9.5 FL (ref 9.2–11.8)
PMV BLD AUTO: 9.8 FL (ref 9.2–11.8)
PO2 BLD: 246 MMHG (ref 80–100)
POTASSIUM SERPL-SCNC: 4.4 MMOL/L (ref 3.5–5.5)
POTASSIUM SERPL-SCNC: 4.7 MMOL/L (ref 3.5–5.5)
PROT SERPL-MCNC: 7.1 G/DL (ref 6.4–8.2)
RBC # BLD AUTO: 3.96 M/UL (ref 4.7–5.5)
RBC # BLD AUTO: 4.25 M/UL (ref 4.7–5.5)
SAO2 % BLD: 100 % (ref 92–97)
SERVICE CMNT-IMP: ABNORMAL
SODIUM SERPL-SCNC: 140 MMOL/L (ref 136–145)
SODIUM SERPL-SCNC: 144 MMOL/L (ref 136–145)
SPECIMEN TYPE: ABNORMAL
TOTAL RESP. RATE, ITRR: 20
TROPONIN I SERPL-MCNC: 0.09 NG/ML (ref 0–0.06)
VENTILATION MODE VENT: ABNORMAL
VT SETTING VENT: 450 ML
WBC # BLD AUTO: 16.3 K/UL (ref 4.6–13.2)
WBC # BLD AUTO: 16.3 K/UL (ref 4.6–13.2)

## 2017-01-05 PROCEDURE — 5A12012 PERFORMANCE OF CARDIAC OUTPUT, SINGLE, MANUAL: ICD-10-PCS | Performed by: HOSPITALIST

## 2017-01-05 PROCEDURE — 77010033678 HC OXYGEN DAILY

## 2017-01-05 PROCEDURE — 83880 ASSAY OF NATRIURETIC PEPTIDE: CPT | Performed by: INTERNAL MEDICINE

## 2017-01-05 PROCEDURE — 82962 GLUCOSE BLOOD TEST: CPT

## 2017-01-05 PROCEDURE — 74011000258 HC RX REV CODE- 258: Performed by: INTERNAL MEDICINE

## 2017-01-05 PROCEDURE — 36600 WITHDRAWAL OF ARTERIAL BLOOD: CPT

## 2017-01-05 PROCEDURE — 74011250636 HC RX REV CODE- 250/636: Performed by: INTERNAL MEDICINE

## 2017-01-05 PROCEDURE — 94640 AIRWAY INHALATION TREATMENT: CPT

## 2017-01-05 PROCEDURE — 92950 HEART/LUNG RESUSCITATION CPR: CPT

## 2017-01-05 PROCEDURE — 83735 ASSAY OF MAGNESIUM: CPT | Performed by: HOSPITALIST

## 2017-01-05 PROCEDURE — 74011636637 HC RX REV CODE- 636/637: Performed by: INTERNAL MEDICINE

## 2017-01-05 PROCEDURE — 65610000006 HC RM INTENSIVE CARE

## 2017-01-05 PROCEDURE — 85025 COMPLETE CBC W/AUTO DIFF WBC: CPT | Performed by: INTERNAL MEDICINE

## 2017-01-05 PROCEDURE — 74011000250 HC RX REV CODE- 250: Performed by: HOSPITALIST

## 2017-01-05 PROCEDURE — 87086 URINE CULTURE/COLONY COUNT: CPT | Performed by: HOSPITALIST

## 2017-01-05 PROCEDURE — 36415 COLL VENOUS BLD VENIPUNCTURE: CPT | Performed by: INTERNAL MEDICINE

## 2017-01-05 PROCEDURE — 82553 CREATINE MB FRACTION: CPT | Performed by: HOSPITALIST

## 2017-01-05 PROCEDURE — 80053 COMPREHEN METABOLIC PANEL: CPT | Performed by: HOSPITALIST

## 2017-01-05 PROCEDURE — 87106 FUNGI IDENTIFICATION YEAST: CPT | Performed by: INTERNAL MEDICINE

## 2017-01-05 PROCEDURE — 5A1945Z RESPIRATORY VENTILATION, 24-96 CONSECUTIVE HOURS: ICD-10-PCS | Performed by: HOSPITALIST

## 2017-01-05 PROCEDURE — 74011000250 HC RX REV CODE- 250: Performed by: INTERNAL MEDICINE

## 2017-01-05 PROCEDURE — 85379 FIBRIN DEGRADATION QUANT: CPT | Performed by: INTERNAL MEDICINE

## 2017-01-05 PROCEDURE — 74000 XR ABD (KUB): CPT

## 2017-01-05 PROCEDURE — 94002 VENT MGMT INPAT INIT DAY: CPT

## 2017-01-05 PROCEDURE — 82803 BLOOD GASES ANY COMBINATION: CPT

## 2017-01-05 PROCEDURE — 0BH17EZ INSERTION OF ENDOTRACHEAL AIRWAY INTO TRACHEA, VIA NATURAL OR ARTIFICIAL OPENING: ICD-10-PCS | Performed by: HOSPITALIST

## 2017-01-05 PROCEDURE — 80048 BASIC METABOLIC PNL TOTAL CA: CPT | Performed by: INTERNAL MEDICINE

## 2017-01-05 PROCEDURE — 92526 ORAL FUNCTION THERAPY: CPT

## 2017-01-05 PROCEDURE — 74011250636 HC RX REV CODE- 250/636: Performed by: HOSPITALIST

## 2017-01-05 PROCEDURE — 74011000258 HC RX REV CODE- 258: Performed by: HOSPITALIST

## 2017-01-05 PROCEDURE — C9113 INJ PANTOPRAZOLE SODIUM, VIA: HCPCS | Performed by: INTERNAL MEDICINE

## 2017-01-05 PROCEDURE — 85027 COMPLETE CBC AUTOMATED: CPT | Performed by: HOSPITALIST

## 2017-01-05 PROCEDURE — 74000 XR ABD PORT  1 V: CPT

## 2017-01-05 PROCEDURE — 74011250637 HC RX REV CODE- 250/637: Performed by: INTERNAL MEDICINE

## 2017-01-05 PROCEDURE — 74011250636 HC RX REV CODE- 250/636

## 2017-01-05 PROCEDURE — 71010 XR CHEST SNGL V: CPT

## 2017-01-05 PROCEDURE — 87070 CULTURE OTHR SPECIMN AEROBIC: CPT | Performed by: INTERNAL MEDICINE

## 2017-01-05 RX ORDER — INSULIN LISPRO 100 [IU]/ML
INJECTION, SOLUTION INTRAVENOUS; SUBCUTANEOUS EVERY 6 HOURS
Status: DISCONTINUED | OUTPATIENT
Start: 2017-01-06 | End: 2017-01-27 | Stop reason: HOSPADM

## 2017-01-05 RX ORDER — PROPOFOL 10 MG/ML
INJECTION, EMULSION INTRAVENOUS AS NEEDED
Status: DISCONTINUED | OUTPATIENT
Start: 2017-01-05 | End: 2017-01-05 | Stop reason: HOSPADM

## 2017-01-05 RX ORDER — MIDAZOLAM IN 0.9 % SOD.CHLORID 1 MG/ML
2-10 PLASTIC BAG, INJECTION (ML) INTRAVENOUS
Status: DISCONTINUED | OUTPATIENT
Start: 2017-01-05 | End: 2017-01-09

## 2017-01-05 RX ORDER — SODIUM CHLORIDE 9 MG/ML
100 INJECTION, SOLUTION INTRAVENOUS CONTINUOUS
Status: DISCONTINUED | OUTPATIENT
Start: 2017-01-05 | End: 2017-01-08

## 2017-01-05 RX ADMIN — CLINDAMYCIN PHOSPHATE 600 MG: 150 INJECTION, SOLUTION, CONCENTRATE INTRAVENOUS at 23:38

## 2017-01-05 RX ADMIN — ASPIRIN 81 MG 81 MG: 81 TABLET ORAL at 10:22

## 2017-01-05 RX ADMIN — METHYLPREDNISOLONE SODIUM SUCCINATE 40 MG: 40 INJECTION, POWDER, FOR SOLUTION INTRAMUSCULAR; INTRAVENOUS at 13:44

## 2017-01-05 RX ADMIN — PROPOFOL 100 MG: 10 INJECTION, EMULSION INTRAVENOUS at 18:30

## 2017-01-05 RX ADMIN — IPRATROPIUM BROMIDE AND ALBUTEROL SULFATE 3 ML: .5; 3 SOLUTION RESPIRATORY (INHALATION) at 19:35

## 2017-01-05 RX ADMIN — CITALOPRAM HYDROBROMIDE 20 MG: 20 TABLET ORAL at 10:22

## 2017-01-05 RX ADMIN — METHYLPREDNISOLONE SODIUM SUCCINATE 40 MG: 40 INJECTION, POWDER, FOR SOLUTION INTRAMUSCULAR; INTRAVENOUS at 10:22

## 2017-01-05 RX ADMIN — IPRATROPIUM BROMIDE AND ALBUTEROL SULFATE 3 ML: .5; 3 SOLUTION RESPIRATORY (INHALATION) at 15:39

## 2017-01-05 RX ADMIN — MULTIPLE VITAMINS W/ MINERALS TAB 1 TABLET: TAB at 10:23

## 2017-01-05 RX ADMIN — LEVOTHYROXINE SODIUM 175 MCG: 150 TABLET ORAL at 07:08

## 2017-01-05 RX ADMIN — HEPARIN SODIUM 5000 UNITS: 5000 INJECTION, SOLUTION INTRAVENOUS; SUBCUTANEOUS at 02:09

## 2017-01-05 RX ADMIN — BACLOFEN 10 MG: 10 TABLET ORAL at 17:31

## 2017-01-05 RX ADMIN — MINERAL SUPPLEMENT IRON 300 MG / 5 ML STRENGTH LIQUID 100 PER BOX UNFLAVORED 300 MG: at 10:22

## 2017-01-05 RX ADMIN — LEVOFLOXACIN 500 MG: 5 INJECTION, SOLUTION INTRAVENOUS at 17:44

## 2017-01-05 RX ADMIN — BACLOFEN 10 MG: 10 TABLET ORAL at 10:22

## 2017-01-05 RX ADMIN — PROPOFOL 100 MG: 10 INJECTION, EMULSION INTRAVENOUS at 18:27

## 2017-01-05 RX ADMIN — SODIUM CHLORIDE 1000 ML: 900 INJECTION, SOLUTION INTRAVENOUS at 19:00

## 2017-01-05 RX ADMIN — GUAIFENESIN 600 MG: 600 TABLET, EXTENDED RELEASE ORAL at 10:23

## 2017-01-05 RX ADMIN — ALBUTEROL SULFATE 2.5 MG: 2.5 SOLUTION RESPIRATORY (INHALATION) at 19:35

## 2017-01-05 RX ADMIN — IPRATROPIUM BROMIDE AND ALBUTEROL SULFATE 3 ML: .5; 3 SOLUTION RESPIRATORY (INHALATION) at 11:49

## 2017-01-05 RX ADMIN — ENALAPRIL MALEATE 5 MG: 5 TABLET ORAL at 10:22

## 2017-01-05 RX ADMIN — Medication 2 MG/HR: at 19:26

## 2017-01-05 RX ADMIN — FLUTICASONE PROPIONATE 2 SPRAY: 50 SPRAY, METERED NASAL at 10:34

## 2017-01-05 RX ADMIN — HEPARIN SODIUM 5000 UNITS: 5000 INJECTION, SOLUTION INTRAVENOUS; SUBCUTANEOUS at 10:23

## 2017-01-05 RX ADMIN — PROPOFOL 100 MG: 10 INJECTION, EMULSION INTRAVENOUS at 18:26

## 2017-01-05 RX ADMIN — METHYLPREDNISOLONE SODIUM SUCCINATE 40 MG: 40 INJECTION, POWDER, FOR SOLUTION INTRAMUSCULAR; INTRAVENOUS at 21:56

## 2017-01-05 RX ADMIN — SODIUM CHLORIDE 40 MG: 9 INJECTION, SOLUTION INTRAMUSCULAR; INTRAVENOUS; SUBCUTANEOUS at 23:38

## 2017-01-05 RX ADMIN — AZTREONAM 1 G: 1 INJECTION, POWDER, LYOPHILIZED, FOR SOLUTION INTRAMUSCULAR; INTRAVENOUS at 21:56

## 2017-01-05 RX ADMIN — METHYLPREDNISOLONE SODIUM SUCCINATE 40 MG: 40 INJECTION, POWDER, FOR SOLUTION INTRAMUSCULAR; INTRAVENOUS at 02:10

## 2017-01-05 RX ADMIN — HEPARIN SODIUM 5000 UNITS: 5000 INJECTION, SOLUTION INTRAVENOUS; SUBCUTANEOUS at 17:42

## 2017-01-05 RX ADMIN — INSULIN LISPRO 4 UNITS: 100 INJECTION, SOLUTION INTRAVENOUS; SUBCUTANEOUS at 13:44

## 2017-01-05 RX ADMIN — LABETALOL HCL 200 MG: 200 TABLET, FILM COATED ORAL at 10:22

## 2017-01-05 RX ADMIN — IPRATROPIUM BROMIDE AND ALBUTEROL SULFATE 3 ML: .5; 3 SOLUTION RESPIRATORY (INHALATION) at 07:10

## 2017-01-05 RX ADMIN — ISOSORBIDE MONONITRATE 30 MG: 30 TABLET, EXTENDED RELEASE ORAL at 10:23

## 2017-01-05 RX ADMIN — FUROSEMIDE 40 MG: 40 TABLET ORAL at 10:23

## 2017-01-05 RX ADMIN — SODIUM CHLORIDE 100 ML/HR: 900 INJECTION, SOLUTION INTRAVENOUS at 22:00

## 2017-01-05 NOTE — PROGRESS NOTES
Chart reviewed, noted 70 yr old  male with Medicare and hx anoxic brain injury admitted from [facility] for acute resp failure. Met with pt, no family at bedside. Pt indicated that he came from [facility] and he planned to return there. Pt was agreeable to me following up with his wife. T/C Yoel Phillips ([phone]): left voice message requesting call back to my pager. For continuity of care, referral called to CMS for [facility]. T/C [facility] / Koko Brown ([phone]) who reported that: pt was private pay long term care with them since Oct 2015; they were holding his bed; if he had a 3 night stay, they would bring him back SNF. Will await response from pt's wife. Addendum: met with pt's wife who stated that pt would be returning to [facility] when stable. Will cont to follow for return to [facility] when stable.

## 2017-01-05 NOTE — WOUND CARE
Pt seen by wound care at this time, pt turned and assessed, no skin issues noted at this time. Wound care will continue to follow pt per protocol.

## 2017-01-05 NOTE — PROGRESS NOTES
Problem: Dysphagia (Adult)  Goal: *Acute Goals and Plan of Care (Insert Text)  Patient seen for follow up diagnostic dysphagia therapy this morning to reassess swallow function. Patient with suspected aspiration event overnight as per nurse patient became short of breath and vomited. He was made NPO at that time and chest x-ray completed. X-ray came back without new infiltrate. Patient repositioned in bed with the assistance of nursing aid and HOB elevated to 45 degrees. Patient with wheezing noted at baseline at rest prior to po presentations. Patient presented trials of HTL, puree, and mechanical soft. Patient without increase in RR or change in vocal quality nor was increase in wheezing noted with po presentations. Patient able to tolerate 1 ounce of puree, 2 ounces of mechanical soft, HTL via straw in 7/8 presentations, HTL via cup in 1/3 presentation and 4 tsp of HTL. Patient with strong cough noted with HTL presented via cup in 2/3 trials. As noted during MBS completed on 1/4/17, patient with penetration of HTL via cup presentation in 100% of trials, but efficient airway protection with HTL presented via straw. Patient continues to be at increased risk for aspiration due to respiratory compromise and ease of increased work of breathing. He should be assisted with all po with strict 1:1 supervision and no independent po. Strict aspiration precautions and safe swallow strategies should be used to include small bites/sips and slow presentation rate with the use of liquid wash as needed. Rec: Mechanical soft with HTL with assistance and supervision with all po.   Strict aspiration/reflux precautions  1:1 supervision with all po  Pt must be fed w/ HOB > 45, remain >45 for 30-45 minutes after po   Small bites/sips; alternate liquid/solid with slow feeding rate   Medications crushed in applesauce  Monitor WOB during po feeding sessions    Patient will:  Utilize aspiration/reflux precautions, compensatory eating/swallow strategies, diet modifications and oral care recommendations with minimal A (visual, verbal, tactile cues) in 4/5 trials. Tolerate least restrictive diet using above without overt s/sx aspiration/distress in 4/5 trials with min A (visual, verbal, tactile cues). Perform compensatory swallow maneuvers and exercise to increase swallow function/safety as indicated with min A (visual, verbal, tactile cues). Outcome: Progressing Towards Goal  SPEECH LANGUAGE PATHOLOGY DYSPHAGIA TREATMENT     Patient: Devin Chandler (21 y.o. male)  Date: 1/5/2017  Diagnosis: Hypoxia Acute respiratory failure (HCC)       Precautions: Aspiration        ASSESSMENT:  As above. Progression toward goals:  [ ]         Improving appropriately and progressing toward goals  [X]         Improving slowly and progressing toward goals  [ ]         Not making progress toward goals and plan of care will be adjusted       PLAN:  Recommendations and Planned Interventions:  As above. Patient continues to benefit from skilled intervention to address the above impairments. Continue treatment per established plan of care. Discharge Recommendations: To Be Determined       SUBJECTIVE:   Patient stated Thank you.   OBJECTIVE:   Cognitive and Communication Status:  Neurologic State: Alert  Orientation Level: Oriented to person, Oriented to place, Oriented to situation  Cognition: Follows commands  Perception: Appears intact  Perseveration: No perseveration noted  Safety/Judgement: Decreased insight into deficits  Dysphagia Treatment:  Oral Assessment:  Oral Assessment  Labial: Decreased rate  Dentition: Natural  Oral Hygiene: Fair  Lingual: Decreased rate  Velum: No impairment  Mandible: No impairment  Gag Reflex:  (Not elicited)  P.O.  Trials:  Patient Position: HOB > 45  Vocal quality prior to P.O.: No impairment  Consistency Presented: Honey thick liquid, Mechanical soft, Puree  How Presented: SLP-fed/presented, Cup/sip, Spoon, Straw  Bolus Acceptance: No impairment  Bolus Formation/Control: Impaired  Type of Impairment: Delayed, Premature spillage  Propulsion: No impairment  Oral Residue: None  Initiation of Swallow: Delayed (# of seconds)  Laryngeal Elevation: Decreased  Aspiration Signs/Symptoms: Strong cough  Pharyngeal Phase Characteristics: Other (comment)  Effective Modifications: Alternate liquids/solids, Cup/sip, Straw, Spoon, Small sips and bites  Cues for Modifications: Maximal  Oral Phase Severity: Mild  Pharyngeal Phase Severity : Moderate   Pain:  Pain Scale 1: Numeric (0 - 10)  Pain Intensity 1: 0  After treatment:   [ ]              Patient left in no apparent distress sitting up in chair  [X]              Patient left in no apparent distress in bed  [X]              Call bell left within reach  [X]              Nursing notified  [ ]              Caregiver present  [ ]              Bed alarm activated  COMMUNICATION/EDUCATION:   [X]              Posted safety precautions in patient's room.      AL Adler  Time Calculation: 27 mins

## 2017-01-05 NOTE — PROGRESS NOTES
2300: Assumed patient care, he was resting quietly with no signs of distress noted. Patient was A&OX3-4. Vital signs were stable. MEWs score was a one. Patient denied any pain, discomfort, nausea, vomiting, dizziness, or anxiety. Respiratory status remained stable on 2L via nasal cannula. White board was updated and explained. Bed was locked and in lowest position. Call bell and personal belongings were within reach. Patient had no questions, comments or concerns after bedside shift report.

## 2017-01-05 NOTE — PROGRESS NOTES
1930: Pt called nurse to bedside. Pt vomited, increased audible wheezing, feeling distressed. Notified Dr. Vickie Churchill. Orders for stat Xray placed. Will monitor. 2330: Bedside and Verbal shift change report given to Natalia (oncoming nurse) by Darell Baldwin RN   (offgoing nurse). Report included the following information SBAR, Kardex and MAR.

## 2017-01-05 NOTE — PROGRESS NOTES
Code blue note     Admit Date: 1/3/2017 Date/ time: 1/5/2017, 6:46 PM   Start Time 1/5/2017, 6:09  End Time: 1/5/2017, 6: 15 PM  Assessment:   Type of Arrest: PEA  Other medical problems:   Principal Problem:    Acute respiratory failure ([address] (8/3/2015)    Active Problems:    Cardiac arrest ([address] (8/1/2015)      Anoxic encephalopathy ([facility] (8/3/2015)      DM (diabetes mellitus) ([address] (5/4/3563)      Diastolic congestive heart failure, NYHA class 1 ([address] (11/23/2016)      HTN (hypertension) (11/23/2016)      COPD (chronic obstructive pulmonary disease) with acute bronchitis ([facility] (1/5/2017)      TIFFANIE (acute kidney injury) ([address] (1/5/2017)       Patient Active Problem List   Diagnosis Code    Cardiac arrest ([facility] I46.9    Anoxic encephalopathy ([facility] G93.1    Acute respiratory failure ([address] J96.00    DM (diabetes mellitus) ([address] P60.5    Diastolic congestive heart failure, NYHA class 1 ([facility]) I50.30    HTN (hypertension) I10    Hypoxia R09.02    COPD (chronic obstructive pulmonary disease) with acute bronchitis ([address] J44.0    TIFFANIE (acute kidney injury) ([address] N17.9      Past Medical History   Diagnosis Date    Anoxic brain damage ([address]     Bursitis     CAD (coronary artery disease)     Cardiac arrest ([address]     Cognitive communication deficit     Contracture of muscle     Depression     Diabetes ([address]     Diarrhea     Disorder of kidney and ureter     Dysphagia     GERD (gastroesophageal reflux disease)     High cholesterol     Hypertension     Hypothyroid     Lack of coordination     Polyarthritis     Sleep apnea     Sleep disorder     Weakness       Plan:   Transfer: icu    Interventions: intubation   CPR yes   Defib: none   Cardiovert: none   Epi; 1 mg   Atropine 0  amps   Amiodarone ; none     Pt was admitted to hospital due copd exacerbation. Code blue was called due to unresponsiveness. Cpr was started immediately. Epi was given for PEA s/p hypoxia s/p aspiration.  Patient pulse back after 1 mg epi. Patient became responsiveness,but still looked 'blue\". Patient was intubated per anesthesiology. Food was removed from  Grant during the intubation, Dr. Elvi Pruett was consulted. Lab/ekg/ cxr ordered. Family was updated. azactam was added for possible aspiration pneumonia

## 2017-01-05 NOTE — PROGRESS NOTES
Hospitalist Progress Note    Patient: Magdaleno Yao MRN: [id number]  CSN: [id number]    YOB: 1945  Age: 70 y.o. Sex: male    DOA: 1/3/2017 LOS:  LOS: 1 day          Chief Complaint:    Ac COPD exacerbation      Assessment/Plan     70 y.o. debilitated male with prior anoxic injury who lives at a NH    Ac COPD exac with bronchitis  Severe debility  Diastolic CHF  TIFFANIE  HTN  Diabetes  Morbid obesity    Continue IV steroids, nebs, IV abx, add deep suctioning per resp therapist  He has a lot of secretions that he is having trouble mobilizing  I will hold ACE and lasix as Cr elevated and bladder scan him to see if hes retaining  IVF hydration, repeat BMP in am  Monitor BS levels  DVT proph    Patient Active Problem List   Diagnosis Code    Cardiac arrest ([facility] I46.9    Anoxic encephalopathy ([facility] G93.1    Acute respiratory failure ([address] J96.00    DM (diabetes mellitus) ([facility] S43.4    Diastolic congestive heart failure, NYHA class 1 ([address] I50.30    HTN (hypertension) I10    Hypoxia R09.02    COPD (chronic obstructive pulmonary disease) with acute bronchitis ([facility] J44.0    TIFFANIE (acute kidney injury) ([facility] N17.9       Subjective:  Denies CP  Coarse congestion and cough  Having trouble getting sputum up  Denies fevers/chills      Review of systems:    Constitutional: denies fevers, chills, myalgias  Cardiovascular: denies chest pain, palpitations  Gastrointestinal: denies nausea, vomiting, diarrhea      Vital signs/Intake and Output:  Visit Vitals    /85 (BP 1 Location: Left leg, BP Patient Position: At rest;Supine; Head of bed elevated (Comment degrees))    Pulse 89    Temp 98.6 °F (37 °C)    Resp 18    Wt 89 kg (196 lb 3.4 oz)    SpO2 95%    BMI 28.15 kg/m2     Current Shift:     Last three shifts:  01/03 1901 - 01/05 0700  In: 720 [P.O.:720]  Out: 0     Exam:    General: debilitated WM, NAD, coarse audible congested breath sounds  CVS:Regular rate and rhythm, no M/R/G, S1/S2 heard, no thrill  Lungs:rhonchi upper airways, no wheezes, coarse BS  Abdomen: firm, NT, Normal Bowel sounds, No hepatomegaly  Extremities: No C/C/E, pulses palpable 2+  Neuro:grossly normal , follows commands  Psych:appropriate                Labs: Results:       Chemistry Recent Labs      01/05/17   0500  01/04/17 0217 01/03/17   0915   GLU  158*  148*  137*   NA  140  139  144   K  4.4  4.8  4.3   CL  100  100  102   CO2  30  29  35*   BUN  68*  32*  18   CREA  2.97*  1.81*  1.05   CA  9.1  9.1  8.8   AGAP  10  10  7   BUCR  23*  18  17   AP   --    --   94   TP   --    --   7.7   ALB   --    --   4.1   GLOB   --    --   3.6   AGRAT   --    --   1.1      CBC w/Diff Recent Labs      01/05/17   0500  01/04/17 0217 01/03/17   0915   WBC  16.3*  14.5*  11.6   RBC  4.25*  4.37*  4.50*   HGB  12.3*  12.4*  13.0   HCT  38.1  39.3  40.6   PLT  305  331  297   GRANS  93*  93*  84*   LYMPH  4*  3*  9*   EOS  0  0  2      Cardiac Enzymes No results for input(s): CPK, CKND1, MARCO A in the last 72 hours. No lab exists for component: CKRMB, TROIP   Coagulation No results for input(s): PTP, INR, APTT in the last 72 hours. No lab exists for component: INREXT    Lipid Panel No results found for: CHOL, CHOLPOCT, CHOLX, CHLST, CHOLV, [id number], HDL, LDL, NLDLCT, DLDL, LDLC, DLDLP, [id number], VLDLC, VLDL, TGL, TGLX, TRIGL, [id number], TRIGP, TGLPOCT, [id number], CHHD, CHHDX   BNP No results for input(s): BNPP in the last 72 hours.    Liver Enzymes Recent Labs      01/03/17   0915   TP  7.7   ALB  4.1   AP  94   SGOT  17      Thyroid Studies Lab Results   Component Value Date/Time    TSH 1.37 08/13/2015 04:35 AM        Procedures/imaging: see electronic medical records for all procedures/Xrays and details which were not copied into this note but were reviewed prior to creation of Raghav Martinez MD

## 2017-01-05 NOTE — PROGRESS NOTES
1809 Code blue called, compressions begun  1810 Pulse check, no pulse, compressions resumed. 1813 Epi pushed  1815 Pulse check monitor 111 a-fib; code completed  [id number] present Andre Red pt placed on 100% O2 via ambu bag  1821 /90  1825 /90  1826 200 mg propofol  1828 /77  1829 STAT order placed for chest xray per verbal order from Dr. Beatrice Altamirano  1830 100 mg propofol pushed; CRNA performed suctions  1831 144/88  1833 Pt  Intubated   1837 BP 88/56 and 100 mcg phenylephrine  1840  BP 82/57   100 mcg phenylephrine  1843 BP 86/59  1mL phenylephrine  1847 Pt transferred to ICU with nurse present.      Attending staff: Otis Mejia; - pushed medications; Charan Giraldo, RT.

## 2017-01-06 ENCOUNTER — APPOINTMENT (OUTPATIENT)
Dept: GENERAL RADIOLOGY | Age: 72
DRG: 207 | End: 2017-01-06
Attending: INTERNAL MEDICINE
Payer: MEDICARE

## 2017-01-06 LAB
ANION GAP BLD CALC-SCNC: 16 MMOL/L (ref 3–18)
ARTERIAL PATENCY WRIST A: ABNORMAL
ATRIAL RATE: 97 BPM
BASE DEFICIT BLD-SCNC: 3 MMOL/L
BASOPHILS # BLD AUTO: 0 K/UL (ref 0–0.06)
BASOPHILS # BLD: 0 % (ref 0–2)
BDY SITE: ABNORMAL
BODY TEMPERATURE: 98.5
BUN SERPL-MCNC: 75 MG/DL (ref 7–18)
BUN/CREAT SERPL: 26 (ref 12–20)
CALCIUM SERPL-MCNC: 6.7 MG/DL (ref 8.5–10.1)
CALCULATED P AXIS, ECG09: 4 DEGREES
CALCULATED R AXIS, ECG10: 0 DEGREES
CALCULATED T AXIS, ECG11: 108 DEGREES
CHLORIDE SERPL-SCNC: 109 MMOL/L (ref 100–108)
CO2 SERPL-SCNC: 23 MMOL/L (ref 21–32)
CREAT SERPL-MCNC: 2.84 MG/DL (ref 0.6–1.3)
DIAGNOSIS, 93000: NORMAL
DIFFERENTIAL METHOD BLD: ABNORMAL
EOSINOPHIL # BLD: 0 K/UL (ref 0–0.4)
EOSINOPHIL NFR BLD: 0 % (ref 0–5)
ERYTHROCYTE [DISTWIDTH] IN BLOOD BY AUTOMATED COUNT: 15 % (ref 11.6–14.5)
GAS FLOW.O2 O2 DELIVERY SYS: ABNORMAL L/MIN
GAS FLOW.O2 SETTING OXYMISER: 18 BPM
GLUCOSE BLD STRIP.AUTO-MCNC: 119 MG/DL (ref 70–110)
GLUCOSE BLD STRIP.AUTO-MCNC: 143 MG/DL (ref 70–110)
GLUCOSE BLD STRIP.AUTO-MCNC: 144 MG/DL (ref 70–110)
GLUCOSE BLD STRIP.AUTO-MCNC: 152 MG/DL (ref 70–110)
GLUCOSE SERPL-MCNC: 128 MG/DL (ref 74–99)
HCO3 BLD-SCNC: 23 MMOL/L (ref 22–26)
HCT VFR BLD AUTO: 31 % (ref 36–48)
HGB BLD-MCNC: 9.7 G/DL (ref 13–16)
LACTATE SERPL-SCNC: 1.1 MMOL/L (ref 0.4–2)
LYMPHOCYTES # BLD AUTO: 3 % (ref 21–52)
LYMPHOCYTES # BLD: 0.4 K/UL (ref 0.9–3.6)
MAGNESIUM SERPL-MCNC: 2.1 MG/DL (ref 1.8–2.4)
MCH RBC QN AUTO: 28.4 PG (ref 24–34)
MCHC RBC AUTO-ENTMCNC: 31.3 G/DL (ref 31–37)
MCV RBC AUTO: 90.6 FL (ref 74–97)
MONOCYTES # BLD: 0.5 K/UL (ref 0.05–1.2)
MONOCYTES NFR BLD AUTO: 4 % (ref 3–10)
NEUTS SEG # BLD: 10.7 K/UL (ref 1.8–8)
NEUTS SEG NFR BLD AUTO: 93 % (ref 40–73)
O2/TOTAL GAS SETTING VFR VENT: 0.6 %
P-R INTERVAL, ECG05: 176 MS
PCO2 BLD: 43.2 MMHG (ref 35–45)
PEEP RESPIRATORY: 5 CMH2O
PH BLD: 7.33 [PH] (ref 7.35–7.45)
PIP ISTAT,IPIP: 18
PLATELET # BLD AUTO: 246 K/UL (ref 135–420)
PMV BLD AUTO: 9.7 FL (ref 9.2–11.8)
PO2 BLD: 88 MMHG (ref 80–100)
POTASSIUM SERPL-SCNC: 3 MMOL/L (ref 3.5–5.5)
Q-T INTERVAL, ECG07: 352 MS
QRS DURATION, ECG06: 80 MS
QTC CALCULATION (BEZET), ECG08: 447 MS
RBC # BLD AUTO: 3.42 M/UL (ref 4.7–5.5)
SAO2 % BLD: 96 % (ref 92–97)
SERVICE CMNT-IMP: ABNORMAL
SODIUM SERPL-SCNC: 148 MMOL/L (ref 136–145)
SPECIMEN TYPE: ABNORMAL
TOTAL RESP. RATE, ITRR: 18
VENTILATION MODE VENT: ABNORMAL
VENTRICULAR RATE, ECG03: 97 BPM
VOLUME CONTROL IVLC: YES
VT SETTING VENT: 450 ML
WBC # BLD AUTO: 11.6 K/UL (ref 4.6–13.2)

## 2017-01-06 PROCEDURE — 74011250636 HC RX REV CODE- 250/636: Performed by: INTERNAL MEDICINE

## 2017-01-06 PROCEDURE — 74011000250 HC RX REV CODE- 250: Performed by: INTERNAL MEDICINE

## 2017-01-06 PROCEDURE — 36415 COLL VENOUS BLD VENIPUNCTURE: CPT | Performed by: HOSPITALIST

## 2017-01-06 PROCEDURE — 74011000250 HC RX REV CODE- 250: Performed by: HOSPITALIST

## 2017-01-06 PROCEDURE — 94640 AIRWAY INHALATION TREATMENT: CPT

## 2017-01-06 PROCEDURE — 82962 GLUCOSE BLOOD TEST: CPT

## 2017-01-06 PROCEDURE — 77030012003 HC AIRWY OP BRMN VYRM -A

## 2017-01-06 PROCEDURE — 83735 ASSAY OF MAGNESIUM: CPT | Performed by: HOSPITALIST

## 2017-01-06 PROCEDURE — 94003 VENT MGMT INPAT SUBQ DAY: CPT

## 2017-01-06 PROCEDURE — 85025 COMPLETE CBC W/AUTO DIFF WBC: CPT | Performed by: HOSPITALIST

## 2017-01-06 PROCEDURE — 80048 BASIC METABOLIC PNL TOTAL CA: CPT | Performed by: HOSPITALIST

## 2017-01-06 PROCEDURE — 77010033678 HC OXYGEN DAILY

## 2017-01-06 PROCEDURE — 74011000258 HC RX REV CODE- 258: Performed by: INTERNAL MEDICINE

## 2017-01-06 PROCEDURE — 82803 BLOOD GASES ANY COMBINATION: CPT

## 2017-01-06 PROCEDURE — 65610000006 HC RM INTENSIVE CARE

## 2017-01-06 PROCEDURE — 71010 XR CHEST PORT: CPT

## 2017-01-06 PROCEDURE — 74011250637 HC RX REV CODE- 250/637: Performed by: INTERNAL MEDICINE

## 2017-01-06 PROCEDURE — 74011000258 HC RX REV CODE- 258: Performed by: HOSPITALIST

## 2017-01-06 PROCEDURE — 74011636637 HC RX REV CODE- 636/637: Performed by: INTERNAL MEDICINE

## 2017-01-06 PROCEDURE — 74000 XR ABD PORT  1 V: CPT

## 2017-01-06 PROCEDURE — 83605 ASSAY OF LACTIC ACID: CPT | Performed by: INTERNAL MEDICINE

## 2017-01-06 PROCEDURE — 36600 WITHDRAWAL OF ARTERIAL BLOOD: CPT

## 2017-01-06 PROCEDURE — 74011250636 HC RX REV CODE- 250/636: Performed by: HOSPITALIST

## 2017-01-06 RX ORDER — LABETALOL HYDROCHLORIDE 5 MG/ML
20 INJECTION, SOLUTION INTRAVENOUS
Status: DISCONTINUED | OUTPATIENT
Start: 2017-01-06 | End: 2017-01-15

## 2017-01-06 RX ORDER — CHLORHEXIDINE GLUCONATE 1.2 MG/ML
15 RINSE ORAL EVERY 12 HOURS
Status: DISCONTINUED | OUTPATIENT
Start: 2017-01-06 | End: 2017-01-18

## 2017-01-06 RX ORDER — CLONIDINE 0.1 MG/24H
1 PATCH, EXTENDED RELEASE TRANSDERMAL
Status: DISCONTINUED | OUTPATIENT
Start: 2017-01-06 | End: 2017-01-22

## 2017-01-06 RX ORDER — POTASSIUM CHLORIDE 7.45 MG/ML
10 INJECTION INTRAVENOUS
Status: COMPLETED | OUTPATIENT
Start: 2017-01-06 | End: 2017-01-14

## 2017-01-06 RX ADMIN — CHLORHEXIDINE GLUCONATE 15 ML: 1.2 RINSE ORAL at 12:31

## 2017-01-06 RX ADMIN — LEVOFLOXACIN 500 MG: 5 INJECTION, SOLUTION INTRAVENOUS at 15:08

## 2017-01-06 RX ADMIN — METHYLPREDNISOLONE SODIUM SUCCINATE 40 MG: 40 INJECTION, POWDER, FOR SOLUTION INTRAMUSCULAR; INTRAVENOUS at 21:50

## 2017-01-06 RX ADMIN — IPRATROPIUM BROMIDE AND ALBUTEROL SULFATE 3 ML: .5; 3 SOLUTION RESPIRATORY (INHALATION) at 08:22

## 2017-01-06 RX ADMIN — POTASSIUM CHLORIDE 10 MEQ: 10 INJECTION, SOLUTION INTRAVENOUS at 21:50

## 2017-01-06 RX ADMIN — METHYLPREDNISOLONE SODIUM SUCCINATE 40 MG: 40 INJECTION, POWDER, FOR SOLUTION INTRAMUSCULAR; INTRAVENOUS at 15:08

## 2017-01-06 RX ADMIN — METHYLPREDNISOLONE SODIUM SUCCINATE 40 MG: 40 INJECTION, POWDER, FOR SOLUTION INTRAMUSCULAR; INTRAVENOUS at 01:19

## 2017-01-06 RX ADMIN — AZTREONAM 1 G: 1 INJECTION, POWDER, LYOPHILIZED, FOR SOLUTION INTRAMUSCULAR; INTRAVENOUS at 21:50

## 2017-01-06 RX ADMIN — HEPARIN SODIUM 5000 UNITS: 5000 INJECTION, SOLUTION INTRAVENOUS; SUBCUTANEOUS at 01:20

## 2017-01-06 RX ADMIN — POTASSIUM CHLORIDE 10 MEQ: 10 INJECTION, SOLUTION INTRAVENOUS at 19:34

## 2017-01-06 RX ADMIN — CLINDAMYCIN PHOSPHATE 600 MG: 150 INJECTION, SOLUTION, CONCENTRATE INTRAVENOUS at 05:58

## 2017-01-06 RX ADMIN — CLINDAMYCIN PHOSPHATE 600 MG: 150 INJECTION, SOLUTION, CONCENTRATE INTRAVENOUS at 14:25

## 2017-01-06 RX ADMIN — AZTREONAM 1 G: 1 INJECTION, POWDER, LYOPHILIZED, FOR SOLUTION INTRAMUSCULAR; INTRAVENOUS at 16:20

## 2017-01-06 RX ADMIN — AZTREONAM 1 G: 1 INJECTION, POWDER, LYOPHILIZED, FOR SOLUTION INTRAMUSCULAR; INTRAVENOUS at 09:22

## 2017-01-06 RX ADMIN — Medication 3 MG/HR: at 13:18

## 2017-01-06 RX ADMIN — HEPARIN SODIUM 5000 UNITS: 5000 INJECTION, SOLUTION INTRAVENOUS; SUBCUTANEOUS at 16:21

## 2017-01-06 RX ADMIN — METHYLPREDNISOLONE SODIUM SUCCINATE 40 MG: 40 INJECTION, POWDER, FOR SOLUTION INTRAMUSCULAR; INTRAVENOUS at 09:22

## 2017-01-06 RX ADMIN — POTASSIUM CHLORIDE 10 MEQ: 10 INJECTION, SOLUTION INTRAVENOUS at 16:16

## 2017-01-06 RX ADMIN — CHLORHEXIDINE GLUCONATE 15 ML: 1.2 RINSE ORAL at 22:46

## 2017-01-06 RX ADMIN — POTASSIUM CHLORIDE 10 MEQ: 10 INJECTION, SOLUTION INTRAVENOUS at 23:05

## 2017-01-06 RX ADMIN — AZTREONAM 1 G: 1 INJECTION, POWDER, LYOPHILIZED, FOR SOLUTION INTRAMUSCULAR; INTRAVENOUS at 03:06

## 2017-01-06 RX ADMIN — POTASSIUM CHLORIDE 10 MEQ: 10 INJECTION, SOLUTION INTRAVENOUS at 17:44

## 2017-01-06 RX ADMIN — INSULIN LISPRO 2 UNITS: 100 INJECTION, SOLUTION INTRAVENOUS; SUBCUTANEOUS at 12:30

## 2017-01-06 RX ADMIN — HEPARIN SODIUM 5000 UNITS: 5000 INJECTION, SOLUTION INTRAVENOUS; SUBCUTANEOUS at 09:22

## 2017-01-06 RX ADMIN — IPRATROPIUM BROMIDE AND ALBUTEROL SULFATE 3 ML: .5; 3 SOLUTION RESPIRATORY (INHALATION) at 11:41

## 2017-01-06 RX ADMIN — LABETALOL HYDROCHLORIDE 20 MG: 5 INJECTION, SOLUTION INTRAVENOUS at 19:34

## 2017-01-06 RX ADMIN — SODIUM CHLORIDE 100 ML/HR: 900 INJECTION, SOLUTION INTRAVENOUS at 09:37

## 2017-01-06 RX ADMIN — CLINDAMYCIN PHOSPHATE 600 MG: 150 INJECTION, SOLUTION, CONCENTRATE INTRAVENOUS at 22:46

## 2017-01-06 NOTE — PROGRESS NOTES
RESPIRATORY NOTE:  Pt keeps biting and clamping down on ETT, changed ETT pacheco with bite block, taped at 22 cm at the lip.

## 2017-01-06 NOTE — CONSULTS
[facility]  Pulmonary, Critical Care, and Sleep Medicine    Name: Magdaleno Yao MRN: [id number]   : 1945 Hospital: [facility] [place]   Date: 2017        Critical Care Initial Patient Consult    Requesting MD:                                                  Reason for CC Consult:  IMPRESSION:   · Aspiration Pneumonia  · Respiratory Failure  · Previous anoxic head encephalopathy  · Diastolic CHF  · Sp PEA arrest  · COPD exacerbation  · TIFFANIE      RECOMMENDATIONS:   · Neuro- intubated and sedated not arousable. Lighten up on Versed drip RASS of -2. Full code  · Cards- hemodynamically stable now. Resp arrest led to cardiac arrest 1 mg epi given and 1 minute of CPR with ROSC and intubation last evening. History of diastolic dysfunction  · Pulm-  Admitted with COPD exacerbation on steroids and aerosol therapy in SNF after previous anoxic head injury. Rest 48 hours and then weaning trials tomorrow and over weekend  · Renal-  Worsening renal function with electrolyte replacement. IV fluids start enteral feeds  · GI-  Need to advance NG tube prophylaxis  · Heme- H/H and plt stable  · ID- levaquin, clindamycin and azactam for aspiration pneumonia food seen at cords  · DVT prophylaxis  · GI prophylaxis     Subjective/History: This patient has been seen and evaluated at the request of Dr. Bre Younger for aspiration pneumonia and resp failure. The patient is a 70 y.o. male admitted 3  with COPD exacerbation to the medical floor. Last evening vomited noticed to be hypoxic an dactually lost pulse. 1 Mg epi with ROSC and intubated for cyanosis. Moved to ICU discussed with family and wife should be here this afternoon. In SNF prior to this admission and has diastolic dysfunction at baseline.   Currently stable on vent    The patient is critically ill and can not provide additional history due to intubation and sedation     Past Medical History   Diagnosis Date    Anoxic brain damage ([facility] [address]     Bursitis     CAD (coronary artery disease)     Cardiac arrest (HCC)     Cognitive communication deficit     Contracture of muscle     Depression     Diabetes (HCC)     Diarrhea     Disorder of kidney and ureter     Dysphagia     GERD (gastroesophageal reflux disease)     High cholesterol     Hypertension     Hypothyroid     Lack of coordination     Polyarthritis     Sleep apnea     Sleep disorder     Weakness       Past Surgical History   Procedure Laterality Date    Hx gi       peg    Hx amputation      Hx hernia repair        Prior to Admission medications    Medication Sig Start Date End Date Taking? Authorizing Provider   nitroglycerin (NITROBID) 2 % ointment Apply 1 Inch to affected area every six (6) hours. 8/14/15  Yes Greg Farley MD   fentaNYL (DURAGESIC) 50 mcg/hr PATCH 1 Patch by TransDERmal route every seventy-two (72) hours. Mirna Hernandez MD   enalapril (VASOTEC) 5 mg tablet Take 5 mg by mouth daily. Mirna Hernandez MD   Menthol-Zinc Oxide (RISAMINE) 0.44-20.6 % oint Apply  to affected area three (3) times daily. Historical Provider   citalopram (CELEXA) 40 mg tablet Take 40 mg by mouth daily. Historical Provider   melatonin 5 mg cap capsule Take 5 mg by mouth nightly. Historical Provider   isosorbide mononitrate (ISMO, MONOKET) 20 mg tablet Take 20 mg by mouth two (2) times a day. Historical Provider   loperamide (IMODIUM) 2 mg capsule Take 2 mg by mouth four (4) times daily as needed for Diarrhea. Historical Provider   promethazine (PHENERGAN) 12.5 mg tablet Take 12.5 mg by mouth every six (6) hours as needed for Nausea. Historical Provider   aspirin delayed-release 81 mg tablet Take 81 mg by mouth daily. Historical Provider   guaiFENesin (ROBAFEN) 100 mg/5 mL liquid Take 200 mg by mouth every six (6) hours as needed for Cough. Historical Provider   cloNIDine (CATAPRES) 0.2 mg/24 hr patch 1 Patch by TransDERmal route every Monday. Historical Provider   levothyroxine (SYNTHROID) 175 mcg tablet Take 175 mcg by mouth Daily (before breakfast). Historical Provider   furosemide (LASIX) 40 mg tablet Take 40 mg by mouth daily. Historical Provider   magnesium hydroxide (MCCAULEY MILK OF MAGNESIA) 400 mg/5 mL suspension Take 30 mL by mouth daily as needed for Constipation. Historical Provider   bisacodyl (DULCOLAX, BISACODYL,) 10 mg suppository Insert 10 mg into rectum daily as needed. Historical Provider   multivitamin, tx-iron-ca-min (THERA-M W/ IRON) 9 mg iron-400 mcg tab tablet Take 1 Tab by mouth daily. Historical Provider   mometasone (NASONEX) 50 mcg/actuation nasal spray 2 Sprays by Both Nostrils route daily. Historical Provider   GLUCOSAMINE HCL/CHONDR MAY A NA (GLUCOSAMINE-CHONDROITIN) 750-600 mg tab Take 1 Tab by mouth two (2) times a day. Historical Provider   baclofen (LIORESAL) 10 mg tablet Take 10 mg by mouth three (3) times daily. Historical Provider   pantoprazole (PROTONIX) 20 mg tablet Take 20 mg by mouth daily. Historical Provider   predniSONE (DELTASONE) 10 mg tablet Take 10 mg by mouth daily. Historical Provider   ferrous sulfate (FEROSUL) 220 mg (44 mg iron)/5 mL elix Take 7.4 mL by mouth daily. Historical Provider   potassium chloride (KAON 10%) 20 mEq/15 mL solution Take 30 mEq by mouth daily. Quantity 22.5 mL     Historical Provider   acetaminophen (TYLENOL) 325 mg tablet Take 650 mg by mouth every six (6) hours as needed for Pain. Historical Provider   diclofenac (VOLTAREN) 1 % gel Apply 4 g to affected area two (2) times a day. For bilateral knee pain- apply thin layer topically to entire joint area    Historical Provider   labetalol (NORMODYNE) 200 mg tablet Take 200 mg by mouth two (2) times a day. Hold for heart rate less than 60 and advise provider. Historical Provider   lidocaine (LIDODERM) 5 % 2 Patches by TransDERmal route every twenty-four (24) hours.  Place Two patches to right trapezius region in morning. Remove patches 12 hours later. Apply 7AM Remove 7PM. 11/25/16   Priya Meadows MD   oxyCODONE-acetaminophen (PERCOCET) 5-325 mg per tablet Take 1 Tab by mouth every four (4) hours as needed for Pain. Max Daily Amount: 6 Tabs. 11/25/16   Priya Meadows MD   albuterol-ipratropium (DUO-NEB) 2.5 mg-0.5 mg/3 ml nebulizer solution 3 mL by Nebulization route every four (4) hours as needed for Wheezing.  8/14/15   Mark Nguyễn MD     Current Facility-Administered Medications   Medication Dose Route Frequency    chlorhexidine (PERIDEX) 0.12 % mouthwash 15 mL  15 mL Oral Q12H    0.9% sodium chloride infusion  100 mL/hr IntraVENous CONTINUOUS    aztreonam (AZACTAM) 1 g in 0.9% sodium chloride (MBP/ADV) 100 mL MBP  1 g IntraVENous Q6H    midazolam in normal saline (VERSED) 1 mg/mL infusion  2-10 mg/hr IntraVENous TITRATE    pantoprazole (PROTONIX) 40 mg in sodium chloride 0.9 % 10 mL injection  40 mg IntraVENous DAILY    clindamycin phosphate (CLEOCIN) 600 mg in 0.9% sodium chloride (MBP/ADV) 100 mL ADV  600 mg IntraVENous Q8H    insulin lispro (HUMALOG) injection   SubCUTAneous Q6H    guaiFENesin SR (MUCINEX) tablet 600 mg  600 mg Oral Q12H    albuterol-ipratropium (DUO-NEB) 2.5 MG-0.5 MG/3 ML  3 mL Nebulization QID RT    aspirin chewable tablet 81 mg  81 mg Per G Tube DAILY    baclofen (LIORESAL) tablet 10 mg  10 mg Oral TID    diclofenac (VOLTAREN) 1 % topical gel 4 g  4 g Topical BID    ferrous sulfate 300 mg (60 mg iron)/5 mL oral syrup   Oral DAILY    levothyroxine (SYNTHROID) tablet 175 mcg  175 mcg Oral ACB    fluticasone (FLONASE) 50 mcg/actuation nasal spray 2 Spray  2 Spray Both Nostrils DAILY    multivitamin, tx-iron-ca-min (THERA-M w/ IRON) tablet 1 Tab  1 Tab Oral DAILY    levoFLOXacin (LEVAQUIN) 500 mg in D5W IVPB  500 mg IntraVENous Q24H    heparin (porcine) injection 5,000 Units  5,000 Units SubCUTAneous Q8H    methylPREDNISolone (PF) (SOLU-MEDROL) injection 40 mg  40 mg IntraVENous Q6H     Allergies   Allergen Reactions    Penicillins Itching      Social History   Substance Use Topics    Smoking status: Never Smoker    Smokeless tobacco: Not on file    Alcohol use No      History reviewed. No pertinent family history. Review of Systems:  Review of systems not obtained due to patient factors. Objective:   Vital Signs:    Visit Vitals    /73    Pulse 73    Temp 98.4 °F (36.9 °C)    Resp 18    Ht 5' 10.08\" (1.78 m)    Wt 89 kg (196 lb 3.4 oz)    SpO2 97%    BMI 28.09 kg/m2       O2 Device: Endotracheal tube, Ventilator   O2 Flow Rate (L/min): 5 l/min   Temp (24hrs), Av.4 °F (36.9 °C), Min:98 °F (36.7 °C), Max:98.7 °F (37.1 °C)       Intake/Output:   Last shift:      701 - 1900  In: -   Out: 375 [Urine:375]  Last 3 shifts: 1901 -  0700  In: 2636 [P.O.:240; I.V.:2336]  Out: 565 [Urine:565]    Intake/Output Summary (Last 24 hours) at 17 1235  Last data filed at 17 1112   Gross per 24 hour   Intake          2396.02 ml   Output              940 ml   Net          1456.02 ml     Physical Exam:    General:  Intubated and sedated on versed drip no response. Head:  Normocephalic, without obvious abnormality,    Eyes:  Conjunctivae/corneas clear. PERRL,   Nose: Nares normal. Septum midline. Mucosa normal. No drainage or sinus tenderness. Throat: Lips, mucosa, and tongue normal.    Neck: Supple, symmetrical, trachea midline, no adenopathy, no carotid bruit and no JVD. Lungs:   Clear to auscultation bilaterally. Chest wall:  No tenderness or deformity. Heart:  Regular rate and rhythm, S1, S2 normal, no murmur, click, rub or gallop. Abdomen:   Soft, non-tender. Bowel sounds normal. No masses,  No organomegaly. Extremities: Extremities normal, atraumatic, no cyanosis or edema.                        Data:     Recent Results (from the past 24 hour(s))   GLUCOSE, POC    Collection Time: 17  4:54 PM   Result Value Ref Range    Glucose (POC) 109 70 - 110 mg/dL   CARDIAC PANEL,(CK, CKMB & TROPONIN)    Collection Time: 01/05/17  7:16 PM   Result Value Ref Range     39 - 308 U/L    CK - MB 5.8 (H) 0.5 - 3.6 ng/ml    CK-MB Index 3.3 0.0 - 4.0 %    Troponin-I, Qt. 0.09 (H) 0.00 - 2.89 NG/ML   METABOLIC PANEL, COMPREHENSIVE    Collection Time: 01/05/17  7:16 PM   Result Value Ref Range    Sodium 144 136 - 145 mmol/L    Potassium 4.7 3.5 - 5.5 mmol/L    Chloride 100 100 - 108 mmol/L    CO2 30 21 - 32 mmol/L    Anion gap 14 3.0 - 18 mmol/L    Glucose 156 (H) 74 - 99 mg/dL    BUN 83 (H) 7.0 - 18 MG/DL    Creatinine 3.68 (H) 0.6 - 1.3 MG/DL    BUN/Creatinine ratio 23 (H) 12 - 20      GFR est AA 20 (L) >60 ml/min/1.73m2    GFR est non-AA 16 (L) >60 ml/min/1.73m2    Calcium 8.6 8.5 - 10.1 MG/DL    Bilirubin, total 0.4 0.2 - 1.0 MG/DL    ALT 33 16 - 61 U/L    AST 23 15 - 37 U/L    Alk.  phosphatase 71 45 - 117 U/L    Protein, total 7.1 6.4 - 8.2 g/dL    Albumin 3.7 3.4 - 5.0 g/dL    Globulin 3.4 2.0 - 4.0 g/dL    A-G Ratio 1.1 0.8 - 1.7     CBC W/O DIFF    Collection Time: 01/05/17  7:16 PM   Result Value Ref Range    WBC 16.3 (H) 4.6 - 13.2 K/uL    RBC 3.96 (L) 4.70 - 5.50 M/uL    HGB 11.5 (L) 13.0 - 16.0 g/dL    HCT 36.5 36.0 - 48.0 %    MCV 92.2 74.0 - 97.0 FL    MCH 29.0 24.0 - 34.0 PG    MCHC 31.5 31.0 - 37.0 g/dL    RDW 14.9 (H) 11.6 - 14.5 %    PLATELET 227 107 - 904 K/uL    MPV 9.5 9.2 - 11.8 FL   MAGNESIUM    Collection Time: 01/05/17  7:16 PM   Result Value Ref Range    Magnesium 3.0 (H) 1.8 - 2.4 mg/dL   D DIMER    Collection Time: 01/05/17  7:16 PM   Result Value Ref Range    D DIMER 2.14 (H) <0.46 ug/ml(FEU)   PRO-BNP    Collection Time: 01/05/17  7:16 PM   Result Value Ref Range    NT pro-BNP 2072 (H) 0 - 900 PG/ML   POC G3    Collection Time: 01/05/17  9:06 PM   Result Value Ref Range    Device: VENT      FIO2 (POC) 1.0 %    pH (POC) 7.184 (LL) 7.35 - 7.45      pCO2 (POC) 68.4 (H) 35.0 - 45.0 MMHG    pO2 (POC) 246 (H) 80 - 100 MMHG    HCO3 (POC) 25.7 22 - 26 MMOL/L    sO2 (POC) 100 (H) 92 - 97 %    Base deficit (POC) 3 mmol/L    Mode ASSIST CONTROL      Tidal volume 450 ml    Set Rate 14 bpm    PEEP/CPAP (POC) 5 cmH2O    Allens test (POC) YES      Total resp. rate 20      Site RIGHT RADIAL      Patient temp. 98.6      Specimen type (POC) ARTERIAL      Performed by Ernie Marie    LACTIC ACID, PLASMA    Collection Time: 01/06/17 12:00 AM   Result Value Ref Range    Lactic acid 1.1 0.4 - 2.0 MMOL/L   GLUCOSE, POC    Collection Time: 01/06/17 12:00 AM   Result Value Ref Range    Glucose (POC) 119 (H) 70 - 110 mg/dL   CBC WITH AUTOMATED DIFF    Collection Time: 01/06/17  4:15 AM   Result Value Ref Range    WBC 11.6 4.6 - 13.2 K/uL    RBC 3.42 (L) 4.70 - 5.50 M/uL    HGB 9.7 (L) 13.0 - 16.0 g/dL    HCT 31.0 (L) 36.0 - 48.0 %    MCV 90.6 74.0 - 97.0 FL    MCH 28.4 24.0 - 34.0 PG    MCHC 31.3 31.0 - 37.0 g/dL    RDW 15.0 (H) 11.6 - 14.5 %    PLATELET 078 921 - 797 K/uL    MPV 9.7 9.2 - 11.8 FL    NEUTROPHILS 93 (H) 40 - 73 %    LYMPHOCYTES 3 (L) 21 - 52 %    MONOCYTES 4 3 - 10 %    EOSINOPHILS 0 0 - 5 %    BASOPHILS 0 0 - 2 %    ABS. NEUTROPHILS 10.7 (H) 1.8 - 8.0 K/UL    ABS. LYMPHOCYTES 0.4 (L) 0.9 - 3.6 K/UL    ABS. MONOCYTES 0.5 0.05 - 1.2 K/UL    ABS. EOSINOPHILS 0.0 0.0 - 0.4 K/UL    ABS.  BASOPHILS 0.0 0.0 - 0.06 K/UL    DF AUTOMATED     METABOLIC PANEL, BASIC    Collection Time: 01/06/17  4:15 AM   Result Value Ref Range    Sodium 148 (H) 136 - 145 mmol/L    Potassium 3.0 (L) 3.5 - 5.5 mmol/L    Chloride 109 (H) 100 - 108 mmol/L    CO2 23 21 - 32 mmol/L    Anion gap 16 3.0 - 18 mmol/L    Glucose 128 (H) 74 - 99 mg/dL    BUN 75 (H) 7.0 - 18 MG/DL    Creatinine 2.84 (H) 0.6 - 1.3 MG/DL    BUN/Creatinine ratio 26 (H) 12 - 20      GFR est AA 27 (L) >60 ml/min/1.73m2    GFR est non-AA 22 (L) >60 ml/min/1.73m2    Calcium 6.7 (L) 8.5 - 10.1 MG/DL   MAGNESIUM    Collection Time: 01/06/17  4:15 AM   Result Value Ref Range    Magnesium 2.1 1.8 - 2.4 mg/dL   POC G3    Collection Time: 01/06/17  5:54 AM   Result Value Ref Range    Device: VENT      FIO2 (POC) 0.6 %    pH (POC) 7.335 (L) 7.35 - 7.45      pCO2 (POC) 43.2 35.0 - 45.0 MMHG    pO2 (POC) 88 80 - 100 MMHG    HCO3 (POC) 23.0 22 - 26 MMOL/L    sO2 (POC) 96 92 - 97 %    Base deficit (POC) 3 mmol/L    Mode ASSIST CONTROL      Tidal volume 450 ml    Set Rate 18 bpm    PEEP/CPAP (POC) 5 cmH2O    PIP (POC) 18      Allens test (POC) N/A      Total resp. rate 18      Site RIGHT RADIAL      Patient temp. 98.5      Specimen type (POC) ARTERIAL      Performed by Nelda Larios     Volume control YES     GLUCOSE, POC    Collection Time: 01/06/17  6:01 AM   Result Value Ref Range    Glucose (POC) 143 (H) 70 - 110 mg/dL   GLUCOSE, POC    Collection Time: 01/06/17 11:39 AM   Result Value Ref Range    Glucose (POC) 152 (H) 70 - 110 mg/dL           Recent Labs      01/06/17   0554  01/05/17   2106  01/03/17   1329   FIO2I  0.6  1.0  0.21   HCO3I  23.0  25.7  31.3*   PCO2I  43.2  68.4*  46.5*   PHI  7.335*  7.184*  7.436   PO2I  88  246*  56*     Telemetry:normal sinus rhythm    Imaging:  I have personally reviewed the patients radiographs and have reviewed the reports:     Best practice :  Core measures; Antibiotic choice:  Severe Sepsis bundles;SIRS screen met? Yes  Fluids  Lactic acid ordered- initial and repeat Q6hrs if elevated till normalized. Cultures drawn. Antibiotic administered within 1hr-ICU  And 3hrs ED  Large bore IV- 2 sites          Pressors aim MAP >65mmHg  Steriods  Glycemic control  Mech. Ventilated patients- aim to keep peak plateau pressure 81-74DG H2O.   Sress ulcer prophylaxis  DVT prophylaxis       Ac Bundle Followed and Vent Bundle Followed, Vent Day 1       Total critical care time exclusive of procedures: 50 minutes  Mateo Menjivar MD

## 2017-01-06 NOTE — ROUTINE PROCESS
Bedside and Verbal shift change report given to Bella Lopez RN (oncoming nurse) by Naye Romero RN (offgoing nurse).  Report included the following information SBAR, Kardex, Intake/Output, MAR, Recent Results, Med Rec Status and Cardiac Rhythm SR.

## 2017-01-06 NOTE — PROGRESS NOTES
Problem: Dysphagia (Adult)  Goal: *Acute Goals and Plan of Care (Insert Text)    Rec: Mechanical soft with HTL with assistance and supervision with all po. Strict aspiration/reflux precautions  1:1 supervision with all po  Pt must be fed w/ HOB > 45, remain >45 for 30-45 minutes after po   Small bites/sips; alternate liquid/solid with slow feeding rate   Medications crushed in applesauce  Monitor WOB during po feeding sessions    Patient will:  Utilize aspiration/reflux precautions, compensatory eating/swallow strategies, diet modifications and oral care recommendations with minimal A (visual, verbal, tactile cues) in 4/5 trials. Tolerate least restrictive diet using above without overt s/sx aspiration/distress in 4/5 trials with min A (visual, verbal, tactile cues). Perform compensatory swallow maneuvers and exercise to increase swallow function/safety as indicated with min A (visual, verbal, tactile cues). Outcome: Not Met  Speech-Language Pathology: Follow up diagnostic dysphagia therapy attempted; however, upon completion of chart review, pt not appropriate for SLP intervention at this time. Currently, patient is:     [ ]  Nausea/vomiting  [ ]  RN Communication/ suggestion  [ ]  Extreme Pain  [ ]  Dialysis treatment in progress  [ ]  Tolerating current po diet (per RN report)  [ ]  Tolerating current po diet; however, poor po intake (per Rn report)    [ ]  Receiving nutrition via tube feeding  [ ]  Chema Kecia, or difficult to arouse for safe po trials   [ ]  Unable to manage secretions  [ ]  Receiving intervention for respiratory distress  [ ]  <6 hours s/p extubation   [X]   Other: Intubated/on vent. Due to change in patient status, order for SLP intervention will be discontinued, please re-consult when patient extubated and appropriate for skilled SLP intervention. Thank you.      Yanique Markham M.A., [address] [place] [address]

## 2017-01-06 NOTE — PROGRESS NOTES
Attempted visit patient is vented and unavailable, no family was seen. Chaplains remain available to provide pastoral support to patient and family as needed and requested. Rev.  Sujata Mason    [phone]

## 2017-01-06 NOTE — PROGRESS NOTES
0759--Patient turned and repositioned. 0958--Versed gtt decreased from 3mg/hr to 2mg/hr. Patient's RASS -5. Goal -2. Patient turned and repositioned. 1245--Patient turned and repositioned. Attempted to advance OGT with no success. Tube was curling in oral cavity. Patient was not tolerating procedure well and was biting ETT. 1318--Versed increased to 3mg per verbal order from Dr. Jose Enrique Madrigal for moderate sedation. We were in the process of making several attempts to pass both oral and nasal gastric tubes and patient was becoming restless and uncooperative. 1330--Patient turned and repositioned. 1535--Patient turned and repositioned. 1800--Patient turned and repositioned. 1850--Patient had some HTN with SBPs into the 170s. Telephone orders via Dr. Jose Enrique Madrigal received for labetalol 20mg q6hrs PRN SBP >170 and to resume clonidine patch. 1934--Patient given 20mg IVP labetalol for /81. Awaiting arrival of clonidine patch from central pharmacy. 2000--BP is now 179/83. Still awaiting clonidine patch from pharmacy. 2030--BP is 196/88    2033--Clonidine patch placed on L upper chest.     2045--Bedside and Verbal shift change report given to Lynda Lott RN (oncoming nurse) by Ninoska Cuellar RN (offgoing nurse).  Report included the following information SBAR, Kardex, Intake/Output, MAR, Recent Results, Med Rec Status and Cardiac Rhythm SR.

## 2017-01-06 NOTE — PROGRESS NOTES
Asher Malone notified per monitor tech pt desat rapidly, nurse enter room immediately, pt cyanotic no pulse, code blue called, pt rcvd emergency meds, IV insertion x2, intubated and transferred to ICU, report given at bedside in ICU, wife accompanied to ICU and escorted into waiting room, staff aware of spouse present, cora melendez

## 2017-01-06 NOTE — DIABETES MGMT
GLYCEMIC CONTROL & NUTRITION:        - Discussed in rounds and chart reviewed.  Known h/o DM, controlled  - steroids on board, POCT + Humalog corrective coverage orders in place, recommend continue  - BG currently within target range, requiring minimal corrective coverage  - TF to possibly start today, anticipate insulin needs will increase with nutrition on board, recommend watch trends    Recent Glucose Results:   Lab Results   Component Value Date/Time     (H) 01/06/2017 04:15 AM     (H) 01/05/2017 07:16 PM    GLUCPOC 143 (H) 01/06/2017 06:01 AM    GLUCPOC 119 (H) 01/06/2017 12:00 AM    GLUCPOC 109 01/05/2017 04:54 PM                 Scott Francis, MPH, RD

## 2017-01-06 NOTE — PROGRESS NOTES
Report received from 3N RN and assumed care of pt. Assessment completed per flowsheet. Pt intubated and sedated, will open his eyes at times and follow limited commands. Frequently biting the ETT. Pupils reactive, cough and gag intact. Extremities rigid. Skin tear noted to left forearm. OGT coiled in mouth despite being 2nd attempt at passing tube into stomach. Attempted to pass through nares but unsuccessful, able to go through mouth again, will await radiology report. Abd distended and firm, bowel sounds hypoactive. 2120 - Spoke with Dr. Massimo Hewitt, updated him on pt status, radiology reports, and recent ABG results. Orders received. 2145 - Radiology report shows that OGT still not in satisfactory position, tube returning brown fluid on suction. Will keep tube in place to LCS for now.

## 2017-01-06 NOTE — PROGRESS NOTES
INITIAL NUTRITION ASSESSMENT     RECOMMENDATIONS/PLAN:   - Once appropriate feeding tube placement confirmed recommend TF's with Osmolite 1.2 at 30 mL/hr, advance 15 mL q4h to reach goal rate 70 mL/hr with Prosource packet x1 daily. Provides total 1908 kcal, 100 g protein, 1263 mL water, 243 g CHO, and >100% RDI's for micronutrients. - Additional free water flushes per MD discretion, consider 80 mL q3h for 1 mL/kcal  - Replete potassium    REASON FOR ASSESSMENT:   [x] ICU admission  NUTRITION ASSESSMENT:   Client History: 70 yrs old Male admitted with acute COPD exacerbation and acute bronchitis was intubated yesterday due to suspected aspiration pneumonia resulting in code blue. Pt with h/o CAD, anoxic brain injury, bed/chair- bound state, DM2, HTN. Discussed in IDR, recommended beginning TF's with Osmolite 1.2 to maintain nutrition status once proper OGT placement confirmed.    PMHx: anoxic brain injury, severe debility, CHF, HTN, DM2, asbestosis, depression, GERD, HLD, dysphagia   Cultural/Judaism Food Preferences: None Identified    FOOD/NUTRITION HISTORY  Diet History: no appetite changes PTA   Food Allergies:  [x] NKFA     [] Yes      NUTRITION INTAKE   Diet Order:  NPO (previously NDD3/Honey)      Average PO Intake:       Patient Vitals for the past 100 hrs:   % Diet Eaten   01/04/17 2300 100 %   01/04/17 1024 100 %   Pertinent Medications:  [x] Reviewed; NS, abx, FeSO4, solu-medrol, MVI, ppi, versed  Electrolyte Replacement Protocol: []K  []Mg  []PO4  []Ca   Insulin:  [x] SSI   [] Pre-meal:   []  Basal:   [] None    Pt expected to meet estimated nutrient needs through next review:          [x]  Yes     [] No; with TF's advanced to goal ANTHROPOMETRICS  Height: 5' 10.08\" (178 cm)       Weight: 89 kg (196 lb 3.4 oz)    BMI: 28.1 kg/m^2  -  overweight (25.0%-29.9% BMI)        Weight change: stable                                  Comparison to Reference Standards:  IBW: 166 lbs      %IBW: 118%      AdjBW: N/A    NUTRITION-FOCUSED PHYSICAL ASSESSMENT  Skin: intact aside from skin tear. GI: last BM 1/06    BIOCHEMICAL DATA & MEDICAL TESTS  Pertinent Labs:  [x] Reviewed; POC -207, Na 148, K 3,  BUN 75, Ca 6.7, phos 5.3, HbA1c 6    NUTRITION PRESCRIPTION  Calories: 5945-2760 kcal/day based on [facility] eqn  Protein:  g/day based on 1-1.3 g/kg  CHO: 230 g/day based on 50% of total energy  Fluid: 1435-8456 ml/day based on 1 kcal/ml      NUTRITION DIAGNOSES:   1- At risk for inadequate oral intake related to cardiac arrest as evidenced by intubated and NPO x1 day      NUTRITION INTERVENTIONS:   INTERVENTIONS:        GOALS:  1. TF's with osmolite 1.2 at 70 ml/hr + prosource x1 1. Meet >50% estimated energy needs, maintain body weight, prevent skin breakdown, BM q 1-3 days by next review 1-3 days   2. Replete K 2. K WNL by next review 1-3 days     LEARNING NEEDS (Diet, Supplementation, Food/Nutrient-Drug Interaction):   [] None Identified  [] Education provided/documented (refer to Education section of EMR)  [x] Identified and patient:  [] Declined     [x] Was not appropriate/indicated  NUTRITION MONITORING /EVALUATION:   Adjust EN/PN as appropriate  Monitor wt  Monitor renal labs, electrolytes, fluid status    NUTRITION RISK:   [x]  High                         []  Moderate                         []  Minimal/Uncompromised    [x] Participated in Interdisciplinary Rounds  [x] [address] [place] [address] Reviewed/Documented  [x] Discharge Nutrition Plan: TBD     Will follow-up per High Risk policy.   Marcy Etienne, JERONIMO  PAGER:  [phone]

## 2017-01-06 NOTE — ANESTHESIA PROCEDURE NOTES
Emergent Intubation  Performed by: Raymond Rodriguez  Authorized by: Raymond Rodriguez     Emergent Intubation:   Date/Time:  1/5/2017 6:33 PM  Indications:  Respiratory failure (code blue with PEA and respiratory failure)  Expected sedation level: initially unresponsive. responding prior to intubatio/sedation. Airway Documentation:   Airway:  ETT - Cuffed (Juliano Call ETT)  Technique:  Cricoid pressure  Advanced Technique:  Rodriguez  Insertion Site:  Oral  Blade Size:  3  ETT size (mm):  7.5  ETT Line Dhaval:  Lips  ETT Insertion depth (cm):  23  Placement verified by: auscultation, EtCO2 and BBS    Attempts:  1    Difficult airway: No    Responded to code blue. Upon arrival RT providing mask-bag ventilation. After CPR and medication, spontaneous rhythm. Patient was breathing, but ineffective respiration and airway needed to be secured. Propofol administered. Rodriguez inserted. Food (pineapple) noted in oropharynx and laryngopharynx. Solid food removed with suction and Magil Forceps. ETT intubation. ETT catheter suctioned prior to ett-ambubag ventilation. Discussed finding with , who was present during code blue and intubation.

## 2017-01-06 NOTE — PROGRESS NOTES
Hospitalist Progress Note    Patient: Kenrick Martinez MRN: [id number]  CSN: [id number]    YOB: 1945  Age: 70 y.o. Sex: male    DOA: 1/3/2017 LOS:  LOS: 2 days                Assessment/Plan     Patient Active Problem List   Diagnosis Code    Cardiac arrest ([facility] I46.9    Anoxic encephalopathy ([facility] G93.1    Acute respiratory failure ([facility] J96.00    DM (diabetes mellitus) ([facility] J52.9    Diastolic congestive heart failure, NYHA class 1 ([facility]) I50.30    HTN (hypertension) I10    Hypoxia R09.02    COPD (chronic obstructive pulmonary disease) with acute bronchitis ([facility]) J44.0    TIFFANIE (acute kidney injury) ([facility] N17.9       69 yo WM with history of prior anoxic brain injury from NH, admitted for COPD exacerbation. On 01/05/2017, patient had aspiration event and went into respiratory arrest and subsequently cardiac arrest. PEA arrest. CPR initiated and epi given. Patient intubated. He had ROSC. He is transferred to ICU    CRITICAL CARE PLAN    Resp - See vent orders, VAP bundle. HOB>30 degrees. Bronchodilators. Follow sputum cx. CXR. COPD - continue solumedrol    ID - Follow up blood and urine cx. ANTIBIOTICS - levaquin azactam.   Trend temps, wbc, LA in normal range. CVS - Monitor HD. Stable hemodynamically. Heme/onc - Follow H&H, plts. INR. Renal - Trend BUN, Cr, follow I/O, linton in place. Check and replace Mg, K.    Endocrine -  Follow FSG    Neuro/ Pain/ Sedation -versed prn. Sedation bundle. History of anoxic brain injury    GI - NPO for now. NG tube, tube feeding. Prophylaxis - DVT: heparin, GI: protonix    45 minutes of critical care time spent in the direct evaluation and treatment of this high risk patient.  The reason for providing this level of medical care for this critically ill patient was due a critical illness that impaired one or more vital organ systems such that there was a high probability of imminent or life threatening deterioration in the patients condition. This care involved high complexity decision making to assess, manipulate, and support vital system functions, to treat this degreee vital organ system failure and to prevent further life threatening deterioration of the patients condition. Physical Exam:  General: As above, patient intubated and sedated.    HEENT: NC, Atraumatic. PERRLA, anicteric sclerae. Lungs: CTA Bilaterally. No Wheezing/Rhonchi/Rales. Heart:  Regular  rhythm,  No murmur, No Rubs, No Gallops  Abdomen: Soft, Non distended, Non tender.  +Bowel sounds,   Extremities: No c/c/e  Psych:   Not anxious or agitated. Neurologic:  No acute neurological deficit. Vital signs/Intake and Output:  Visit Vitals    /73    Pulse 73    Temp 99.7 °F (37.6 °C)    Resp 18    Ht 5' 10.08\" (1.78 m)    Wt 89 kg (196 lb 3.4 oz)    SpO2 97%    BMI 28.09 kg/m2     Current Shift:  01/06 0701 - 01/06 1900  In: -   Out: 800 [Urine:800]  Last three shifts:  01/04 1901 - 01/06 0700  In: 2636 [P.O.:240;  I.V.:2336]  Out: 565 [Urine:565]            Labs: Results:       Chemistry Recent Labs      01/06/17 0415 01/05/17 1916 01/05/17   0500   GLU  128*  156*  158*   NA  148*  144  140   K  3.0*  4.7  4.4   CL  109*  100  100   CO2  23  30  30   BUN  75*  83*  68*   CREA  2.84*  3.68*  2.97*   CA  6.7*  8.6  9.1   AGAP  16  14  10   BUCR  26*  23*  23*   AP   --   71   --    TP   --   7.1   --    ALB   --   3.7   --    GLOB   --   3.4   --    AGRAT   --   1.1   --       CBC w/Diff Recent Labs      01/06/17 0415 01/05/17 1916 01/05/17   0500  01/04/17   0217   WBC  11.6  16.3*  16.3*  14.5*   RBC  3.42*  3.96*  4.25*  4.37*   HGB  9.7*  11.5*  12.3*  12.4*   HCT  31.0*  36.5  38.1  39.3   PLT  246  331  305  331   GRANS  93*   --   93*  93*   LYMPH  3*   --   4*  3*   EOS  0   --   0  0      Cardiac Enzymes Recent Labs      01/05/17   1916   CPK  175   CKND1  3.3      Coagulation No results for input(s): PTP, INR, APTT in the last 72 hours. No lab exists for component: INREXT    Lipid Panel No results found for: CHOL, CHOLPOCT, CHOLX, CHLST, CHOLV, [id number], HDL, LDL, NLDLCT, DLDL, LDLC, DLDLP, 202825, VLDLC, VLDL, TGL, TGLX, TRIGL, [id number], TRIGP, TGLPOCT, [id number], CHHD, CHHDX   BNP No results for input(s): BNPP in the last 72 hours.    Liver Enzymes Recent Labs      01/05/17   1916   TP  7.1   ALB  3.7   AP  71   SGOT  23      Thyroid Studies Lab Results   Component Value Date/Time    TSH 1.37 08/13/2015 04:35 AM        Procedures/imaging: see electronic medical records for all procedures/Xrays and details which were not copied into this note but were reviewed prior to creation of Plan

## 2017-01-06 NOTE — INTERDISCIPLINARY ROUNDS
CRITICAL CARE INTERDISCIPLINARY ROUNDS    Patient Information:    Name:   Marisa Dewitt    Age:   70 y.o. Admission Date:   1/3/2017    Critical Care Day:  2 (s/p resp/cardiac arrest 1/5/16)    Surgery Date:      Attending Provider:   Tanya Easley DO    Surgeon:        Consultant(s): Bairon Watson Physician:  Blanca Holland    Code Status: Full Code    Problem List:     Patient Active Problem List   Diagnosis Code    Cardiac arrest ([facility] I46.9    Anoxic encephalopathy ([facility] G93.1    Acute respiratory failure ([address] J96.00    DM (diabetes mellitus) ([facility] B40.0    Diastolic congestive heart failure, NYHA class 1 ([facility]) I50.30    HTN (hypertension) I10    Hypoxia R09.02    COPD (chronic obstructive pulmonary disease) with acute bronchitis ([facility]) J44.0    TIFFANIE (acute kidney injury) ([facility] N17.9       Principal Problem:  Acute respiratory failure ([facility]    During rounds the following quality care indicators and evidence based practices were addressed :  DVT Prophylaxis and PUD Prophylaxis Ac Day 2 ([facility]) ; Central Line Day:na Isolation:na;  Antibiotic Stewardship: review; Probiotics Necessary: na    Ventilator Bundle:  Ventilator Bundle Order Set:  y Sedation Vacation na; Spontaneous Breathing Trial na; Restraints no (Order, Necessity, Documentation)  Vent Day: 2    Sepsis Order Set: na or Elements of Sepsis Bundle Reviewed (Fluids, Antibiotics, Lactic Acid, Cultures, Vasopressors, Steriods, Glycemic control, Peak Plateau)    Glycemic Control:   Recent Labs      01/06/17   0415  01/05/17   1916  01/05/17   0500  01/04/17   0217   GLU  128*  156*  158*  148*   ;  Recent Labs      01/06/17   0554  01/05/17   2106  01/03/17   1329   PHI  7.335*  7.184*  7.436   PCO2I  43.2  68.4*  46.5*   PO2I  88  246*  56*    Adjustments     Acute MI/PCI:   Not applicable    Door to Balloon: Admission Time: 0905      Heart Failure:    Not applicable     SCIP Measures for other Surgeries:   Not applicable Pneumonia:    Not applicable    Interdisciplinary team rounds were held with the following team membersCare Management, Diabetes Treatment Specialist, Nursing, Nutrition, Pharmacy, Physician and Respiratory Therapy. Plan of care discussed. Goals of Care/ Recommendations:  Continue to sedation (versed) for now for another 24 hrs. Advance OGT. See clinical pathway and/or care plan for interventions and desired outcomes. Order set for vent bundle - advance ETT 2 cm.      Critical Care Discharge Status:  Red

## 2017-01-06 NOTE — WOUND CARE
Pt seen by wound care, no changes to previous skin assessment, wound care will continue to monitor pt daily while in ICU.

## 2017-01-06 NOTE — PROGRESS NOTES
Pt transferred yesterday to ICU after resp/cardiac arrest. Pt currently intubated. Referral has been placed for pt to return to [place] [address] when medically stable. Pt is long term resident of facility. Currently no plan finalized for his return, CM will follow up on Monday.

## 2017-01-06 NOTE — PROGRESS NOTES
responded to a \"Code S' for Go Brown, who is a 70 y.o.,male. Patients Primary Language is: [place]. According to the patients EMR Scientology Affiliation is: Adventist.     The reason the Patient came to the hospital is:   Patient Active Problem List    Diagnosis Date Noted    COPD (chronic obstructive pulmonary disease) with acute bronchitis ([facility] 01/05/2017    TIFFANIE (acute kidney injury) ([facility] 01/05/2017    Hypoxia 78/12/8648    Diastolic congestive heart failure, NYHA class 1 ([facility] 11/23/2016    HTN (hypertension) 11/23/2016    Anoxic encephalopathy ([facility] 08/03/2015    Acute respiratory failure ([facility] 08/03/2015    DM (diabetes mellitus) ([facility] 08/03/2015    Cardiac arrest ([facility] 08/01/2015        The  provided the following Interventions:  Initiated a relationship of care and support with wife of patient. Provided Crisis Pastoral Care  Listened empathically. Offered prayer and assurance of continued prayers on patient's behalf. Chart reviewed. The following outcomes were achieved:      Assessment:  Code successful. Patient moved to ICU. Plan:  Chaplains will continue to follow and will provide pastoral care on an as needed/requested basis.  recommends bedside caregivers page  on duty if patient shows signs of acute spiritual or emotional distress.   Jia Valadez 68  Board Certified   Director, [address] [address]  Office: [phone]  Mobile: [phone]  Pager: [phone]

## 2017-01-07 ENCOUNTER — APPOINTMENT (OUTPATIENT)
Dept: GENERAL RADIOLOGY | Age: 72
DRG: 207 | End: 2017-01-07
Attending: INTERNAL MEDICINE
Payer: MEDICARE

## 2017-01-07 PROBLEM — J69.0 ASPIRATION PNEUMONIA (HCC): Status: ACTIVE | Noted: 2017-01-07

## 2017-01-07 LAB
ANION GAP BLD CALC-SCNC: 15 MMOL/L (ref 3–18)
ARTERIAL PATENCY WRIST A: ABNORMAL
ARTERIAL PATENCY WRIST A: YES
BACTERIA SPEC CULT: NORMAL
BASE DEFICIT BLD-SCNC: 3 MMOL/L
BASE DEFICIT BLD-SCNC: 3 MMOL/L
BASOPHILS # BLD AUTO: 0 K/UL (ref 0–0.1)
BASOPHILS # BLD: 0 % (ref 0–3)
BDY SITE: ABNORMAL
BDY SITE: ABNORMAL
BODY TEMPERATURE: 99.1
BUN SERPL-MCNC: 74 MG/DL (ref 7–18)
BUN/CREAT SERPL: 36 (ref 12–20)
CALCIUM SERPL-MCNC: 8.6 MG/DL (ref 8.5–10.1)
CHLORIDE SERPL-SCNC: 112 MMOL/L (ref 100–108)
CO2 SERPL-SCNC: 22 MMOL/L (ref 21–32)
CREAT SERPL-MCNC: 2.04 MG/DL (ref 0.6–1.3)
DIFFERENTIAL METHOD BLD: ABNORMAL
EOSINOPHIL # BLD: 0 K/UL (ref 0–0.4)
EOSINOPHIL NFR BLD: 0 % (ref 0–5)
ERYTHROCYTE [DISTWIDTH] IN BLOOD BY AUTOMATED COUNT: 15.2 % (ref 11.6–14.5)
GAS FLOW.O2 O2 DELIVERY SYS: ABNORMAL L/MIN
GAS FLOW.O2 O2 DELIVERY SYS: ABNORMAL L/MIN
GAS FLOW.O2 SETTING OXYMISER: 18 BPM
GLUCOSE BLD STRIP.AUTO-MCNC: 140 MG/DL (ref 70–110)
GLUCOSE BLD STRIP.AUTO-MCNC: 141 MG/DL (ref 70–110)
GLUCOSE BLD STRIP.AUTO-MCNC: 147 MG/DL (ref 70–110)
GLUCOSE BLD STRIP.AUTO-MCNC: 166 MG/DL (ref 70–110)
GLUCOSE SERPL-MCNC: 157 MG/DL (ref 74–99)
HCO3 BLD-SCNC: 21.5 MMOL/L (ref 22–26)
HCO3 BLD-SCNC: 21.8 MMOL/L (ref 22–26)
HCT VFR BLD AUTO: 33.9 % (ref 36–48)
HGB BLD-MCNC: 10.7 G/DL (ref 13–16)
LYMPHOCYTES # BLD AUTO: 7 % (ref 20–51)
LYMPHOCYTES # BLD: 0.5 K/UL (ref 0.8–3.5)
MAGNESIUM SERPL-MCNC: 2.7 MG/DL (ref 1.8–2.4)
MCH RBC QN AUTO: 28.2 PG (ref 24–34)
MCHC RBC AUTO-ENTMCNC: 31.6 G/DL (ref 31–37)
MCV RBC AUTO: 89.2 FL (ref 74–97)
MONOCYTES # BLD: 0.2 K/UL (ref 0–1)
MONOCYTES NFR BLD AUTO: 2 % (ref 2–9)
NEUTS SEG # BLD: 7 K/UL (ref 1.8–8)
NEUTS SEG NFR BLD AUTO: 91 % (ref 42–75)
O2/TOTAL GAS SETTING VFR VENT: 0.5 %
O2/TOTAL GAS SETTING VFR VENT: 50 %
PCO2 BLD: 34.5 MMHG (ref 35–45)
PCO2 BLD: 36.2 MMHG (ref 35–45)
PEEP RESPIRATORY: 5 CMH2O
PEEP RESPIRATORY: 5 CMH2O
PH BLD: 7.39 [PH] (ref 7.35–7.45)
PH BLD: 7.4 [PH] (ref 7.35–7.45)
PIP ISTAT,IPIP: 18
PLATELET # BLD AUTO: 210 K/UL (ref 135–420)
PMV BLD AUTO: 9.6 FL (ref 9.2–11.8)
PO2 BLD: 74 MMHG (ref 80–100)
PO2 BLD: 76 MMHG (ref 80–100)
POTASSIUM SERPL-SCNC: 3.8 MMOL/L (ref 3.5–5.5)
PRESSURE SUPPORT SETTING VENT: 7 CMH2O
RBC # BLD AUTO: 3.8 M/UL (ref 4.7–5.5)
RBC MORPH BLD: ABNORMAL
SAO2 % BLD: 94 % (ref 92–97)
SAO2 % BLD: 95 % (ref 92–97)
SERVICE CMNT-IMP: ABNORMAL
SERVICE CMNT-IMP: ABNORMAL
SERVICE CMNT-IMP: NORMAL
SODIUM SERPL-SCNC: 149 MMOL/L (ref 136–145)
SPECIMEN TYPE: ABNORMAL
SPECIMEN TYPE: ABNORMAL
TOTAL RESP. RATE, ITRR: 24
TOTAL RESP. RATE, ITRR: 25
VENTILATION MODE VENT: ABNORMAL
VENTILATION MODE VENT: ABNORMAL
VOLUME CONTROL IVLC: YES
VT SETTING VENT: 450 ML
WBC # BLD AUTO: 7.7 K/UL (ref 4.6–13.2)

## 2017-01-07 PROCEDURE — 82803 BLOOD GASES ANY COMBINATION: CPT

## 2017-01-07 PROCEDURE — 74011000258 HC RX REV CODE- 258: Performed by: INTERNAL MEDICINE

## 2017-01-07 PROCEDURE — 94003 VENT MGMT INPAT SUBQ DAY: CPT

## 2017-01-07 PROCEDURE — 36415 COLL VENOUS BLD VENIPUNCTURE: CPT | Performed by: HOSPITALIST

## 2017-01-07 PROCEDURE — 71010 XR CHEST PORT: CPT

## 2017-01-07 PROCEDURE — 74011250636 HC RX REV CODE- 250/636: Performed by: INTERNAL MEDICINE

## 2017-01-07 PROCEDURE — 74011250636 HC RX REV CODE- 250/636

## 2017-01-07 PROCEDURE — 74011000258 HC RX REV CODE- 258: Performed by: HOSPITALIST

## 2017-01-07 PROCEDURE — 94640 AIRWAY INHALATION TREATMENT: CPT

## 2017-01-07 PROCEDURE — C9113 INJ PANTOPRAZOLE SODIUM, VIA: HCPCS | Performed by: INTERNAL MEDICINE

## 2017-01-07 PROCEDURE — 74011250637 HC RX REV CODE- 250/637: Performed by: INTERNAL MEDICINE

## 2017-01-07 PROCEDURE — 77010033678 HC OXYGEN DAILY

## 2017-01-07 PROCEDURE — 80048 BASIC METABOLIC PNL TOTAL CA: CPT | Performed by: HOSPITALIST

## 2017-01-07 PROCEDURE — 83735 ASSAY OF MAGNESIUM: CPT | Performed by: HOSPITALIST

## 2017-01-07 PROCEDURE — 36600 WITHDRAWAL OF ARTERIAL BLOOD: CPT

## 2017-01-07 PROCEDURE — 65610000006 HC RM INTENSIVE CARE

## 2017-01-07 PROCEDURE — 82962 GLUCOSE BLOOD TEST: CPT

## 2017-01-07 PROCEDURE — 74011000250 HC RX REV CODE- 250: Performed by: INTERNAL MEDICINE

## 2017-01-07 PROCEDURE — 74011250636 HC RX REV CODE- 250/636: Performed by: FAMILY MEDICINE

## 2017-01-07 PROCEDURE — 85025 COMPLETE CBC W/AUTO DIFF WBC: CPT | Performed by: HOSPITALIST

## 2017-01-07 PROCEDURE — 74011636637 HC RX REV CODE- 636/637: Performed by: INTERNAL MEDICINE

## 2017-01-07 PROCEDURE — 74011000250 HC RX REV CODE- 250: Performed by: HOSPITALIST

## 2017-01-07 RX ORDER — POTASSIUM CHLORIDE 7.45 MG/ML
10 INJECTION INTRAVENOUS
Status: COMPLETED | OUTPATIENT
Start: 2017-01-07 | End: 2017-01-14

## 2017-01-07 RX ORDER — POTASSIUM CHLORIDE 7.45 MG/ML
INJECTION INTRAVENOUS
Status: COMPLETED
Start: 2017-01-07 | End: 2017-01-07

## 2017-01-07 RX ORDER — PROPOFOL 10 MG/ML
5-50 VIAL (ML) INTRAVENOUS
Status: DISCONTINUED | OUTPATIENT
Start: 2017-01-07 | End: 2017-01-09

## 2017-01-07 RX ORDER — HYDRALAZINE HYDROCHLORIDE 20 MG/ML
20 INJECTION INTRAMUSCULAR; INTRAVENOUS
Status: DISCONTINUED | OUTPATIENT
Start: 2017-01-07 | End: 2017-01-27 | Stop reason: HOSPADM

## 2017-01-07 RX ADMIN — AZTREONAM 1 G: 1 INJECTION, POWDER, LYOPHILIZED, FOR SOLUTION INTRAMUSCULAR; INTRAVENOUS at 02:50

## 2017-01-07 RX ADMIN — LABETALOL HYDROCHLORIDE 20 MG: 5 INJECTION, SOLUTION INTRAVENOUS at 06:01

## 2017-01-07 RX ADMIN — CLINDAMYCIN PHOSPHATE 600 MG: 150 INJECTION, SOLUTION, CONCENTRATE INTRAVENOUS at 06:00

## 2017-01-07 RX ADMIN — HYDRALAZINE HYDROCHLORIDE 20 MG: 20 INJECTION INTRAMUSCULAR; INTRAVENOUS at 04:50

## 2017-01-07 RX ADMIN — METHYLPREDNISOLONE SODIUM SUCCINATE 40 MG: 40 INJECTION, POWDER, FOR SOLUTION INTRAMUSCULAR; INTRAVENOUS at 02:50

## 2017-01-07 RX ADMIN — CLINDAMYCIN PHOSPHATE 600 MG: 150 INJECTION, SOLUTION, CONCENTRATE INTRAVENOUS at 14:27

## 2017-01-07 RX ADMIN — CHLORHEXIDINE GLUCONATE 15 ML: 1.2 RINSE ORAL at 20:25

## 2017-01-07 RX ADMIN — INSULIN LISPRO 2 UNITS: 100 INJECTION, SOLUTION INTRAVENOUS; SUBCUTANEOUS at 06:00

## 2017-01-07 RX ADMIN — IPRATROPIUM BROMIDE AND ALBUTEROL SULFATE 3 ML: .5; 3 SOLUTION RESPIRATORY (INHALATION) at 20:33

## 2017-01-07 RX ADMIN — IPRATROPIUM BROMIDE AND ALBUTEROL SULFATE 3 ML: .5; 3 SOLUTION RESPIRATORY (INHALATION) at 16:02

## 2017-01-07 RX ADMIN — METHYLPREDNISOLONE SODIUM SUCCINATE 40 MG: 40 INJECTION, POWDER, FOR SOLUTION INTRAMUSCULAR; INTRAVENOUS at 14:27

## 2017-01-07 RX ADMIN — CLINDAMYCIN PHOSPHATE 600 MG: 150 INJECTION, SOLUTION, CONCENTRATE INTRAVENOUS at 22:48

## 2017-01-07 RX ADMIN — IPRATROPIUM BROMIDE AND ALBUTEROL SULFATE 3 ML: .5; 3 SOLUTION RESPIRATORY (INHALATION) at 12:45

## 2017-01-07 RX ADMIN — METHYLPREDNISOLONE SODIUM SUCCINATE 40 MG: 40 INJECTION, POWDER, FOR SOLUTION INTRAMUSCULAR; INTRAVENOUS at 08:23

## 2017-01-07 RX ADMIN — METHYLPREDNISOLONE SODIUM SUCCINATE 40 MG: 40 INJECTION, POWDER, FOR SOLUTION INTRAMUSCULAR; INTRAVENOUS at 20:25

## 2017-01-07 RX ADMIN — AZTREONAM 1 G: 1 INJECTION, POWDER, LYOPHILIZED, FOR SOLUTION INTRAMUSCULAR; INTRAVENOUS at 16:27

## 2017-01-07 RX ADMIN — HEPARIN SODIUM 5000 UNITS: 5000 INJECTION, SOLUTION INTRAVENOUS; SUBCUTANEOUS at 08:14

## 2017-01-07 RX ADMIN — POTASSIUM CHLORIDE 10 MEQ: 10 INJECTION, SOLUTION INTRAVENOUS at 09:44

## 2017-01-07 RX ADMIN — POTASSIUM CHLORIDE 10 MEQ: 10 INJECTION, SOLUTION INTRAVENOUS at 08:22

## 2017-01-07 RX ADMIN — IPRATROPIUM BROMIDE AND ALBUTEROL SULFATE 3 ML: .5; 3 SOLUTION RESPIRATORY (INHALATION) at 08:23

## 2017-01-07 RX ADMIN — LEVOFLOXACIN 500 MG: 5 INJECTION, SOLUTION INTRAVENOUS at 14:27

## 2017-01-07 RX ADMIN — AZTREONAM 1 G: 1 INJECTION, POWDER, LYOPHILIZED, FOR SOLUTION INTRAMUSCULAR; INTRAVENOUS at 11:23

## 2017-01-07 RX ADMIN — HEPARIN SODIUM 5000 UNITS: 5000 INJECTION, SOLUTION INTRAVENOUS; SUBCUTANEOUS at 00:28

## 2017-01-07 RX ADMIN — HEPARIN SODIUM 5000 UNITS: 5000 INJECTION, SOLUTION INTRAVENOUS; SUBCUTANEOUS at 16:14

## 2017-01-07 RX ADMIN — AZTREONAM 1 G: 1 INJECTION, POWDER, LYOPHILIZED, FOR SOLUTION INTRAMUSCULAR; INTRAVENOUS at 21:40

## 2017-01-07 RX ADMIN — PROPOFOL 20 MCG/KG/MIN: 10 INJECTION, EMULSION INTRAVENOUS at 14:25

## 2017-01-07 RX ADMIN — HYDRALAZINE HYDROCHLORIDE 20 MG: 20 INJECTION INTRAMUSCULAR; INTRAVENOUS at 11:37

## 2017-01-07 RX ADMIN — LABETALOL HYDROCHLORIDE 20 MG: 5 INJECTION, SOLUTION INTRAVENOUS at 19:13

## 2017-01-07 RX ADMIN — LABETALOL HYDROCHLORIDE 20 MG: 5 INJECTION, SOLUTION INTRAVENOUS at 00:32

## 2017-01-07 RX ADMIN — CHLORHEXIDINE GLUCONATE 15 ML: 1.2 RINSE ORAL at 11:23

## 2017-01-07 RX ADMIN — SODIUM CHLORIDE 40 MG: 9 INJECTION, SOLUTION INTRAMUSCULAR; INTRAVENOUS; SUBCUTANEOUS at 08:22

## 2017-01-07 RX ADMIN — PROPOFOL 20 MCG/KG/MIN: 10 INJECTION, EMULSION INTRAVENOUS at 16:45

## 2017-01-07 RX ADMIN — PROPOFOL 30 MCG/KG/MIN: 10 INJECTION, EMULSION INTRAVENOUS at 20:25

## 2017-01-07 RX ADMIN — POTASSIUM CHLORIDE 10 MEQ: 10 INJECTION, SOLUTION INTRAVENOUS at 01:00

## 2017-01-07 RX ADMIN — LEVOTHYROXINE SODIUM ANHYDROUS 87.5 MCG: 100 INJECTION, POWDER, LYOPHILIZED, FOR SOLUTION INTRAVENOUS at 10:19

## 2017-01-07 RX ADMIN — HYDRALAZINE HYDROCHLORIDE 20 MG: 20 INJECTION INTRAMUSCULAR; INTRAVENOUS at 20:25

## 2017-01-07 NOTE — PROGRESS NOTES
conducted a Follow up consultation and Spiritual Assessment for Devin Chandler, who is a 70 y.o.,male. Prayed for patient. He was not responsive at the time. The following outcomes were achieved:  Patient expressed gratitude for pastoral care visit. Assessment:  There are no spiritual or Voodoo issues which require intervention at this time. Plan:  Chaplains will continue to follow and will provide pastoral care on an as needed/requested basis.  recommends bedside caregivers page  on duty if patient shows signs of acute spiritual or emotional distress.        Sister Lydia Burrows, [address]  [phone]

## 2017-01-07 NOTE — PROGRESS NOTES
RESPIRATORY NOTE:  Pt back on previous vent settings per MD, SBT again in the am, will continue to monitor.

## 2017-01-07 NOTE — PROGRESS NOTES
Received report and assumed care of pt. Assessment completed per flowsheet. Pt initially restless, RASS +1 on Propofol gtt. Slight expiratory wheeze heard from the left lung field. OGT to continuous suction, small amount of green fluid draining. Hypoactive bowel sounds. BUE contracted, all extremities rigid. BP elevated. Will continue to monitor.

## 2017-01-07 NOTE — PROGRESS NOTES
Hospitalist Progress Note    Patient: Lorraine Faria MRN: [id number]  [facility]: [id number]    YOB: 1945  Age: 70 y.o. Sex: male    DOA: 1/3/2017 LOS:  LOS: 3 days                Assessment/Plan     Patient Active Problem List   Diagnosis Code    Cardiac arrest ([facility] I46.9    Anoxic encephalopathy ([facility] G93.1    [facility] ([facility] J96.00    DM (diabetes mellitus) ([facility] V25.0    Diastolic congestive heart failure, NYHA class 1 ([facility]) I50.30    HTN (hypertension) I10    Hypoxia R09.02    COPD (chronic obstructive pulmonary disease) with acute bronchitis ([facility]) J44.0    TIFFANIE (acute kidney injury) ([facility] N17.9    Aspiration pneumonia ([facility] J69.0           69 yo WM with history of prior anoxic brain injury from NH, admitted for COPD exacerbation.     On 01/05/2017, patient had aspiration event and went into respiratory arrest and subsequently cardiac arrest. PEA arrest. CPR initiated and epi given. Patient intubated. He had ROSC. He is transferred to ICU     CRITICAL CARE PLAN     Resp - See vent orders, VAP bundle. HOB>30 degrees. Bronchodilators. Follow sputum cx. CXR. COPD - continue solumedrol. SBT and extubation per pulm     ID - Follow up blood and urine cx. ANTIBIOTICS - levaquin azactam.   Trend temps, wbc, LA in normal range.     CVS - Monitor HD. Stable hemodynamically.      Heme/onc - Follow H&H, plts. INR.     Renal - Trend BUN, Cr, follow I/O, linton in place. Check and replace Mg, K. TIFFANIE - on CKD3  Hypernatremia improving.     Endocrine - Follow FSG     Neuro/ Pain/ Sedation -versed prn. Sedation bundle. History of anoxic brain injury     GI - NPO for now. NG tube, tube feeding.     Prophylaxis - DVT: heparin, GI: protonix     40 minutes of critical care time spent in the direct evaluation and treatment of this high risk patient.  The reason for providing this level of medical care for this critically ill patient was due a critical illness that impaired one or more vital organ systems such that there was a high probability of imminent or life threatening deterioration in the patients condition. This care involved high complexity decision making to assess, manipulate, and support vital system functions, to treat this degreee vital organ system failure and to prevent further life threatening deterioration of the patients condition.               Physical Exam:  General: As above, patient intubated and sedated.    HEENT: NC, Atraumatic. PERRLA, anicteric sclerae. Lungs: CTA Bilaterally. No Wheezing/Rhonchi/Rales. Heart:  Regular rhythm,  No murmur, No Rubs, No Gallops  Abdomen: Soft, Non distended, Non tender.  +Bowel sounds,   Extremities: No c/c/e  Psych:   Not anxious or agitated. Neurologic:  No acute neurological deficit.        Vital signs/Intake and Output:  Visit Vitals    /73    Pulse 95    Temp 99.7 °F (37.6 °C)    Resp 20    Ht 5' 10.08\" (1.78 m)    Wt 89 kg (196 lb 3.4 oz)    SpO2 92%    BMI 28.09 kg/m2     Current Shift:  01/07 0701 - 01/07 1900  In: -   Out: 450 [Urine:450]  Last three shifts:  01/05 1901 - 01/07 0700  In: 4820.2 [I.V.:4760.2]  Out: 2790 [Urine:2390]            Labs: Results:       Chemistry Recent Labs      01/07/17 0513 01/06/17 0415 01/05/17 1916   GLU  157*  128*  156*   NA  149*  148*  144   K  3.8  3.0*  4.7   CL  112*  109*  100   CO2  22  23  30   BUN  74*  75*  83*   CREA  2.04*  2.84*  3.68*   CA  8.6  6.7*  8.6   AGAP  15  16  14   BUCR  36*  26*  23*   AP   --    --   71   TP   --    --   7.1   ALB   --    --   3.7   GLOB   --    --   3.4   AGRAT   --    --   1.1      CBC w/Diff Recent Labs      01/07/17 0513 01/06/17 0415 01/05/17 1916 01/05/17   0500   WBC  7.7  11.6  16.3*  16.3*   RBC  3.80*  3.42*  3.96*  4.25*   HGB  10.7*  9.7*  11.5*  12.3*   HCT  33.9*  31.0*  36.5  38.1   PLT  210  246  331  305   GRANS  91*  93*   --   93*   LYMPH  7*  3*   --   4*   EOS  0  0   --   0      Cardiac Enzymes Recent Labs      01/05/17 1916   CPK  175   CKND1  3.3      Coagulation No results for input(s): PTP, INR, APTT in the last 72 hours. No lab exists for component: INREXT, INREXT    Lipid Panel No results found for: CHOL, CHOLPOCT, CHOLX, CHLST, CHOLV, [id number], HDL, LDL, NLDLCT, DLDL, LDLC, DLDLP, [id number], VLDLC, VLDL, TGL, TGLX, TRIGL, [id number], TRIGP, TGLPOCT, [id number], CHHD, CHHDX   BNP No results for input(s): BNPP in the last 72 hours.    Liver Enzymes Recent Labs      01/05/17 1916   TP  7.1   ALB  3.7   AP  71   SGOT  23      Thyroid Studies Lab Results   Component Value Date/Time    TSH 1.37 08/13/2015 04:35 AM        Procedures/imaging: see electronic medical records for all procedures/Xrays and details which were not copied into this note but were reviewed prior to creation of Plan

## 2017-01-07 NOTE — CONSULTS
[facility]  Pulmonary, Critical Care, and Sleep Medicine    Name: Shaan Peralta MRN: [id number]   : 1945 Hospital: [facility] [place]   Date: 2017        Critical Care Initial Patient Consult    Requesting MD:                                                  Reason for CC Consult:  IMPRESSION:   · Aspiration Pneumonia  · Respiratory Failure  · Previous anoxic head encephalopathy  · Diastolic CHF  · Sp PEA arrest  · COPD exacerbation  · TIFFANIE      RECOMMENDATIONS:   · Neuro- intubated and sedated awake and alert on low dose versed. Sedation holiday and assess for extubation  · Cards- hemodynamically stable now. Resp arrest led to cardiac arrest 1 mg epi given and 1 minute of CPR with ROSC and intubation last evening. History of diastolic dysfunction restarted hypertensive medications clonidine and labetalol as needed  · Pulm-  Admitted with COPD exacerbation on steroids and aerosol therapy in SNF after previous anoxic head injury. Rested 48 hours started weaning trial today  · Renal-  Worsening renal function with electrolyte replacement. IV fluids start enteral feeds  · GI-  Need to advance NG tube prophylaxis  · Heme- H/H and plt stable  · ID- levaquin, clindamycin and azactam for aspiration pneumonia food seen at cords  · DVT prophylaxis  · GI prophylaxis     Subjective/History:   2017  Patient awake and alert versed held and will assess for extubation. Excellent ABG settings    HPI  This patient has been seen and evaluated at the request of Dr. Beatrice Altamirano for aspiration pneumonia and resp failure. The patient is a 70 y.o. male admitted 3  with COPD exacerbation to the medical floor. Last evening vomited noticed to be hypoxic an dactually lost pulse. 1 Mg epi with ROSC and intubated for cyanosis. Moved to ICU discussed with family and wife should be here this afternoon. In SNF prior to this admission and has diastolic dysfunction at baseline.   Currently stable on vent    The patient is critically ill and can not provide additional history due to intubation and sedation     Past Medical History   Diagnosis Date    [facility] [address]     Bursitis     CAD (coronary artery disease)     Cardiac arrest ([facility] [address] [facility]     Cognitive communication deficit     Contracture of muscle     Depression     Diabetes ([facility] [address] [facility]     Diarrhea     Disorder of kidney and ureter     Dysphagia     GERD (gastroesophageal reflux disease)     High cholesterol     Hypertension     Hypothyroid     Lack of coordination     Polyarthritis     Sleep apnea     Sleep disorder     Weakness       Past Surgical History   Procedure Laterality Date    Hx gi       peg    Hx amputation      Hx hernia repair        Prior to Admission medications    Medication Sig Start Date End Date Taking? Authorizing Provider   nitroglycerin (NITROBID) 2 % ointment Apply 1 Inch to affected area every six (6) hours. 8/14/15  Yes Suri Romero MD   fentaNYL (DURAGESIC) 50 mcg/hr PATCH 1 Patch by TransDERmal route every seventy-two (72) hours. Mirna Hernandez MD   enalapril (VASOTEC) 5 mg tablet Take 5 mg by mouth daily. Mirna Hernandez MD   Menthol-Zinc Oxide (RISAMINE) 0.44-20.6 % oint Apply  to affected area three (3) times daily. Historical Provider   citalopram (CELEXA) 40 mg tablet Take 40 mg by mouth daily. Historical Provider   melatonin 5 mg cap capsule Take 5 mg by mouth nightly. Historical Provider   isosorbide mononitrate (ISMO, MONOKET) 20 mg tablet Take 20 mg by mouth two (2) times a day. Historical Provider   loperamide (IMODIUM) 2 mg capsule Take 2 mg by mouth four (4) times daily as needed for Diarrhea. Historical Provider   promethazine (PHENERGAN) 12.5 mg tablet Take 12.5 mg by mouth every six (6) hours as needed for Nausea. Historical Provider   aspirin delayed-release 81 mg tablet Take 81 mg by mouth daily.     Historical Provider   guaiFENesin (ROBAFEN) 100 mg/5 mL liquid Take 200 mg by mouth every six (6) hours as needed for Cough. Historical Provider   cloNIDine (CATAPRES) 0.2 mg/24 hr patch 1 Patch by TransDERmal route every Monday. Historical Provider   levothyroxine (SYNTHROID) 175 mcg tablet Take 175 mcg by mouth Daily (before breakfast). Historical Provider   furosemide (LASIX) 40 mg tablet Take 40 mg by mouth daily. Historical Provider   magnesium hydroxide (M[place] MILK OF MAGNESIA) 400 mg/5 mL suspension Take 30 mL by mouth daily as needed for Constipation. Historical Provider   bisacodyl (DULCOLAX, BISACODYL,) 10 mg suppository Insert 10 mg into rectum daily as needed. Historical Provider   multivitamin, tx-iron-ca-min (THERA-M W/ IRON) 9 mg iron-400 mcg tab tablet Take 1 Tab by mouth daily. Historical Provider   mometasone (NASONEX) 50 mcg/actuation nasal spray 2 Sprays by Both Nostrils route daily. Historical Provider   GLUCOSAMINE HCL/CHONDR MAY A NA (GLUCOSAMINE-CHONDROITIN) 750-600 mg tab Take 1 Tab by mouth two (2) times a day. Historical Provider   baclofen (LIORESAL) 10 mg tablet Take 10 mg by mouth three (3) times daily. Historical Provider   pantoprazole (PROTONIX) 20 mg tablet Take 20 mg by mouth daily. Historical Provider   predniSONE (DELTASONE) 10 mg tablet Take 10 mg by mouth daily. Historical Provider   ferrous sulfate (FEROSUL) 220 mg (44 mg iron)/5 mL elix Take 7.4 mL by mouth daily. Historical Provider   potassium chloride (KAON 10%) 20 mEq/15 mL solution Take 30 mEq by mouth daily. Quantity 22.5 mL     Historical Provider   acetaminophen (TYLENOL) 325 mg tablet Take 650 mg by mouth every six (6) hours as needed for Pain. Historical Provider   diclofenac (VOLTAREN) 1 % gel Apply 4 g to affected area two (2) times a day. For bilateral knee pain- apply thin layer topically to entire joint area    Historical Provider   labetalol (NORMODYNE) 200 mg tablet Take 200 mg by mouth two (2) times a day.  Hold for heart rate less than 60 and advise provider. Historical Provider   lidocaine (LIDODERM) 5 % 2 Patches by TransDERmal route every twenty-four (24) hours. Place Two patches to right trapezius region in morning. Remove patches 12 hours later. Apply 7AM Remove 7PM. 11/25/16   Renetta Powell MD   oxyCODONE-acetaminophen (PERCOCET) 5-325 mg per tablet Take 1 Tab by mouth every four (4) hours as needed for Pain. Max Daily Amount: 6 Tabs. 11/25/16   Renetta Powell MD   albuterol-ipratropium (DUO-NEB) 2.5 mg-0.5 mg/3 ml nebulizer solution 3 mL by Nebulization route every four (4) hours as needed for Wheezing.  8/14/15   Clarissa Guidry MD     Current Facility-Administered Medications   Medication Dose Route Frequency    levothyroxine (SYNTHROID) injection 87.5 mcg  87.5 mcg IntraVENous Q24H    chlorhexidine (PERIDEX) 0.12 % mouthwash 15 mL  15 mL Oral Q12H    cloNIDine (CATAPRES) 0.1 mg/24 hr patch 1 Patch  1 Patch TransDERmal Q7D    0.9% sodium chloride infusion  100 mL/hr IntraVENous CONTINUOUS    aztreonam (AZACTAM) 1 g in 0.9% sodium chloride (MBP/ADV) 100 mL MBP  1 g IntraVENous Q6H    midazolam in normal saline (VERSED) 1 mg/mL infusion  2-10 mg/hr IntraVENous TITRATE    pantoprazole (PROTONIX) 40 mg in sodium chloride 0.9 % 10 mL injection  40 mg IntraVENous DAILY    clindamycin phosphate (CLEOCIN) 600 mg in 0.9% sodium chloride (MBP/ADV) 100 mL ADV  600 mg IntraVENous Q8H    insulin lispro (HUMALOG) injection   SubCUTAneous Q6H    guaiFENesin SR (MUCINEX) tablet 600 mg  600 mg Oral Q12H    albuterol-ipratropium (DUO-NEB) 2.5 MG-0.5 MG/3 ML  3 mL Nebulization QID RT    aspirin chewable tablet 81 mg  81 mg Per G Tube DAILY    baclofen (LIORESAL) tablet 10 mg  10 mg Oral TID    diclofenac (VOLTAREN) 1 % topical gel 4 g  4 g Topical BID    ferrous sulfate 300 mg (60 mg iron)/5 mL oral syrup   Oral DAILY    fluticasone (FLONASE) 50 mcg/actuation nasal spray 2 Spray  2 Spray Both Nostrils DAILY    multivitamin, tx-iron-ca-min (THERA-M w/ IRON) tablet 1 Tab  1 Tab Oral DAILY    levoFLOXacin (LEVAQUIN) 500 mg in D5W IVPB  500 mg IntraVENous Q24H    heparin (porcine) injection 5,000 Units  5,000 Units SubCUTAneous Q8H    methylPREDNISolone (PF) (SOLU-MEDROL) injection 40 mg  40 mg IntraVENous Q6H     Allergies   Allergen Reactions    Penicillins Itching      Social History   Substance Use Topics    Smoking status: Never Smoker    Smokeless tobacco: Not on file    Alcohol use No          Objective:   Vital Signs:    Visit Vitals    /70    Pulse 84    Temp 100.1 °F (37.8 °C)    Resp 21    Ht 5' 10.08\" (1.78 m)    Wt 89 kg (196 lb 3.4 oz)    SpO2 97%    BMI 28.09 kg/m2       O2 Device: Endotracheal tube, Ventilator   O2 Flow Rate (L/min): 5 l/min   Temp (24hrs), Av.1 °F (37.3 °C), Min:98.4 °F (36.9 °C), Max:100.1 °F (37.8 °C)       Intake/Output:   Last shift:         Last 3 shifts:  1901 -  0700  In: 4820.2 [I.V.:4760.2]  Out: 2790 [Urine:2390]    Intake/Output Summary (Last 24 hours) at 17 1056  Last data filed at 17 0600   Gross per 24 hour   Intake          3424.21 ml   Output             2225 ml   Net          1199.21 ml     Physical Exam:    General:  Intubated and sedated on versed drip follows commands. Head:  Normocephalic, without obvious abnormality,    Eyes:  Conjunctivae/corneas clear. PERRL,   Nose: Nares normal. Septum midline. Mucosa normal. No drainage or sinus tenderness. Throat: Lips, mucosa, and tongue normal.    Neck: Supple, symmetrical, trachea midline, no adenopathy, no carotid bruit and no JVD. Lungs:   Clear to auscultation bilaterally. Chest wall:  No tenderness or deformity. Heart:  Regular rate and rhythm, S1, S2 normal, no murmur, click, rub or gallop. Abdomen:   Soft, non-tender. Bowel sounds normal. No masses,  No organomegaly. Extremities: Extremities normal, atraumatic, no cyanosis or edema.                        Data:     Recent Results (from the past 24 hour(s))   GLUCOSE, POC    Collection Time: 01/06/17 11:39 AM   Result Value Ref Range    Glucose (POC) 152 (H) 70 - 110 mg/dL   GLUCOSE, POC    Collection Time: 01/06/17  5:35 PM   Result Value Ref Range    Glucose (POC) 144 (H) 70 - 110 mg/dL   GLUCOSE, POC    Collection Time: 01/07/17 12:11 AM   Result Value Ref Range    Glucose (POC) 147 (H) 70 - 110 mg/dL   CBC WITH AUTOMATED DIFF    Collection Time: 01/07/17  5:13 AM   Result Value Ref Range    WBC 7.7 4.6 - 13.2 K/uL    RBC 3.80 (L) 4.70 - 5.50 M/uL    HGB 10.7 (L) 13.0 - 16.0 g/dL    HCT 33.9 (L) 36.0 - 48.0 %    MCV 89.2 74.0 - 97.0 FL    MCH 28.2 24.0 - 34.0 PG    MCHC 31.6 31.0 - 37.0 g/dL    RDW 15.2 (H) 11.6 - 14.5 %    PLATELET 007 034 - 179 K/uL    MPV 9.6 9.2 - 11.8 FL    NEUTROPHILS 91 (H) 42 - 75 %    LYMPHOCYTES 7 (L) 20 - 51 %    MONOCYTES 2 2 - 9 %    EOSINOPHILS 0 0 - 5 %    BASOPHILS 0 0 - 3 %    ABS. NEUTROPHILS 7.0 1.8 - 8.0 K/UL    ABS. LYMPHOCYTES 0.5 (L) 0.8 - 3.5 K/UL    ABS. MONOCYTES 0.2 0 - 1.0 K/UL    ABS. EOSINOPHILS 0.0 0.0 - 0.4 K/UL    ABS.  BASOPHILS 0.0 0.0 - 0.1 K/UL    RBC COMMENTS POIKILOCYTOSIS  1+        RBC COMMENTS OVALOCYTES  1+        RBC COMMENTS MATHEUS CELLS  1+        DF MANUAL     METABOLIC PANEL, BASIC    Collection Time: 01/07/17  5:13 AM   Result Value Ref Range    Sodium 149 (H) 136 - 145 mmol/L    Potassium 3.8 3.5 - 5.5 mmol/L    Chloride 112 (H) 100 - 108 mmol/L    CO2 22 21 - 32 mmol/L    Anion gap 15 3.0 - 18 mmol/L    Glucose 157 (H) 74 - 99 mg/dL    BUN 74 (H) 7.0 - 18 MG/DL    Creatinine 2.04 (H) 0.6 - 1.3 MG/DL    BUN/Creatinine ratio 36 (H) 12 - 20      GFR est AA 39 (L) >60 ml/min/1.73m2    GFR est non-AA 32 (L) >60 ml/min/1.73m2    Calcium 8.6 8.5 - 10.1 MG/DL   MAGNESIUM    Collection Time: 01/07/17  5:13 AM   Result Value Ref Range    Magnesium 2.7 (H) 1.8 - 2.4 mg/dL   POC G3    Collection Time: 01/07/17  5:45 AM   Result Value Ref Range    Device: VENT      FIO2 (POC) 0.5 %    pH (POC) 7.389 7.35 - 7.45      pCO2 (POC) 36.2 35.0 - 45.0 MMHG    pO2 (POC) 74 (L) 80 - 100 MMHG    HCO3 (POC) 21.8 (L) 22 - 26 MMOL/L    sO2 (POC) 94 92 - 97 %    Base deficit (POC) 3 mmol/L    Mode ASSIST CONTROL      Tidal volume 450 ml    Set Rate 18 bpm    PEEP/CPAP (POC) 5 cmH2O    PIP (POC) 18      Allens test (POC) N/A      Total resp. rate 24      Site RIGHT RADIAL      Patient temp. 99.1      Specimen type (POC) ARTERIAL      Performed by Angela Ferrera     Volume control YES     GLUCOSE, POC    Collection Time: 01/07/17  5:55 AM   Result Value Ref Range    Glucose (POC) 166 (H) 70 - 110 mg/dL           Recent Labs      01/07/17   0545  01/06/17   0554  01/05/17   2106   FIO2I  0.5  0.6  1.0   HCO3I  21.8*  23.0  25.7   PCO2I  36.2  43.2  68.4*   PHI  7.389  7.335*  7.184*   PO2I  74*  88  246*     Telemetry:normal sinus rhythm    Imaging:  I have personally reviewed the patients radiographs and have reviewed the reports:     Best practice :  Core measures; Antibiotic choice:  Severe Sepsis bundles;SIRS screen met? Yes  Fluids  Lactic acid ordered- initial and repeat Q6hrs if elevated till normalized. Cultures drawn. Antibiotic administered within 1hr-ICU  And 3hrs ED  Large bore IV- 2 sites          Pressors aim MAP >65mmHg  Steriods  Glycemic control  Mech. Ventilated patients- aim to keep peak plateau pressure 55-55ZD H2O.   Sress ulcer prophylaxis  DVT prophylaxis       Ac Bundle Followed and Vent Bundle Followed, Vent Day 2       Total critical care time exclusive of procedures: 35 minutes  Sathish Byrd MD

## 2017-01-07 NOTE — PROGRESS NOTES
Received report and transfer of trust completed with Emily Posada RN. In no distress on vent. Versed gtt verified and infusing. Patient responsive but comfortable. 0800 assessment as noted. Repositioned mouth care completed in no distress. 0830 wife called in and updated. 1000 repositioned. Versed stopped earlier and patient placed on SBT. 1200 repositioned in no distress. resp easy on SBT. 1300 abgs drawn and resulted to Dr [employer] orders noted. Plan is to stop sedation in am and start SBT early so patient is ready for extubation. 1400 propofol initiated. Patient BP responding to normal level and repositioned. Suctioned again and mouth care completed. Thick white secretions noted. 1600 remains as earlier. Repositioned and back care completed. 1900 report and transfer of trust completed with Emily Posada RN. In no distress.

## 2017-01-08 ENCOUNTER — APPOINTMENT (OUTPATIENT)
Dept: GENERAL RADIOLOGY | Age: 72
DRG: 207 | End: 2017-01-08
Attending: INTERNAL MEDICINE
Payer: MEDICARE

## 2017-01-08 LAB
ANION GAP BLD CALC-SCNC: 13 MMOL/L (ref 3–18)
ARTERIAL PATENCY WRIST A: YES
BASE DEFICIT BLD-SCNC: 5 MMOL/L
BASOPHILS # BLD AUTO: 0 K/UL (ref 0–0.06)
BASOPHILS # BLD: 0 % (ref 0–2)
BDY SITE: ABNORMAL
BODY TEMPERATURE: 98.7
BUN SERPL-MCNC: 57 MG/DL (ref 7–18)
BUN/CREAT SERPL: 40 (ref 12–20)
CALCIUM SERPL-MCNC: 8.6 MG/DL (ref 8.5–10.1)
CHLORIDE SERPL-SCNC: 118 MMOL/L (ref 100–108)
CO2 SERPL-SCNC: 22 MMOL/L (ref 21–32)
CREAT SERPL-MCNC: 1.41 MG/DL (ref 0.6–1.3)
DIFFERENTIAL METHOD BLD: ABNORMAL
EOSINOPHIL # BLD: 0 K/UL (ref 0–0.4)
EOSINOPHIL NFR BLD: 0 % (ref 0–5)
ERYTHROCYTE [DISTWIDTH] IN BLOOD BY AUTOMATED COUNT: 15.6 % (ref 11.6–14.5)
GAS FLOW.O2 O2 DELIVERY SYS: ABNORMAL L/MIN
GAS FLOW.O2 SETTING OXYMISER: 18 BPM
GLUCOSE BLD STRIP.AUTO-MCNC: 133 MG/DL (ref 70–110)
GLUCOSE BLD STRIP.AUTO-MCNC: 142 MG/DL (ref 70–110)
GLUCOSE BLD STRIP.AUTO-MCNC: 142 MG/DL (ref 70–110)
GLUCOSE BLD STRIP.AUTO-MCNC: 150 MG/DL (ref 70–110)
GLUCOSE BLD STRIP.AUTO-MCNC: 171 MG/DL (ref 70–110)
GLUCOSE SERPL-MCNC: 170 MG/DL (ref 74–99)
HCO3 BLD-SCNC: 20.1 MMOL/L (ref 22–26)
HCT VFR BLD AUTO: 32.6 % (ref 36–48)
HGB BLD-MCNC: 10.7 G/DL (ref 13–16)
LYMPHOCYTES # BLD AUTO: 7 % (ref 21–52)
LYMPHOCYTES # BLD: 0.4 K/UL (ref 0.9–3.6)
MAGNESIUM SERPL-MCNC: 2.6 MG/DL (ref 1.8–2.4)
MCH RBC QN AUTO: 29.4 PG (ref 24–34)
MCHC RBC AUTO-ENTMCNC: 32.8 G/DL (ref 31–37)
MCV RBC AUTO: 89.6 FL (ref 74–97)
MONOCYTES # BLD: 0.2 K/UL (ref 0.05–1.2)
MONOCYTES NFR BLD AUTO: 3 % (ref 3–10)
NEUTS SEG # BLD: 5.6 K/UL (ref 1.8–8)
NEUTS SEG NFR BLD AUTO: 90 % (ref 40–73)
O2/TOTAL GAS SETTING VFR VENT: 50 %
PCO2 BLD: 34.5 MMHG (ref 35–45)
PEEP RESPIRATORY: 5 CMH2O
PH BLD: 7.37 [PH] (ref 7.35–7.45)
PLATELET # BLD AUTO: 209 K/UL (ref 135–420)
PMV BLD AUTO: 9.7 FL (ref 9.2–11.8)
PO2 BLD: 84 MMHG (ref 80–100)
POTASSIUM SERPL-SCNC: 3.4 MMOL/L (ref 3.5–5.5)
POTASSIUM SERPL-SCNC: 4.5 MMOL/L (ref 3.5–5.5)
RBC # BLD AUTO: 3.64 M/UL (ref 4.7–5.5)
SAO2 % BLD: 96 % (ref 92–97)
SERVICE CMNT-IMP: ABNORMAL
SODIUM SERPL-SCNC: 153 MMOL/L (ref 136–145)
SPECIMEN TYPE: ABNORMAL
TOTAL RESP. RATE, ITRR: 22
VENTILATION MODE VENT: ABNORMAL
VOLUME CONTROL IVLC: YES
VT SETTING VENT: 450 ML
WBC # BLD AUTO: 6.2 K/UL (ref 4.6–13.2)

## 2017-01-08 PROCEDURE — 65610000006 HC RM INTENSIVE CARE

## 2017-01-08 PROCEDURE — 77010033678 HC OXYGEN DAILY

## 2017-01-08 PROCEDURE — 74011000258 HC RX REV CODE- 258: Performed by: HOSPITALIST

## 2017-01-08 PROCEDURE — 74011250636 HC RX REV CODE- 250/636: Performed by: INTERNAL MEDICINE

## 2017-01-08 PROCEDURE — 74011636637 HC RX REV CODE- 636/637: Performed by: INTERNAL MEDICINE

## 2017-01-08 PROCEDURE — 74011000250 HC RX REV CODE- 250: Performed by: HOSPITALIST

## 2017-01-08 PROCEDURE — 82962 GLUCOSE BLOOD TEST: CPT

## 2017-01-08 PROCEDURE — 74011250637 HC RX REV CODE- 250/637: Performed by: INTERNAL MEDICINE

## 2017-01-08 PROCEDURE — 82803 BLOOD GASES ANY COMBINATION: CPT

## 2017-01-08 PROCEDURE — 85025 COMPLETE CBC W/AUTO DIFF WBC: CPT | Performed by: HOSPITALIST

## 2017-01-08 PROCEDURE — 77030011989 HC AIRWY NP ROBTZ RUSC -A

## 2017-01-08 PROCEDURE — 77030018846 HC SOL IRR STRL H20 ICUM -A

## 2017-01-08 PROCEDURE — 94640 AIRWAY INHALATION TREATMENT: CPT

## 2017-01-08 PROCEDURE — 71010 XR CHEST PORT: CPT

## 2017-01-08 PROCEDURE — 74011000250 HC RX REV CODE- 250: Performed by: INTERNAL MEDICINE

## 2017-01-08 PROCEDURE — 94003 VENT MGMT INPAT SUBQ DAY: CPT

## 2017-01-08 PROCEDURE — C9113 INJ PANTOPRAZOLE SODIUM, VIA: HCPCS | Performed by: INTERNAL MEDICINE

## 2017-01-08 PROCEDURE — 36415 COLL VENOUS BLD VENIPUNCTURE: CPT | Performed by: HOSPITALIST

## 2017-01-08 PROCEDURE — 36600 WITHDRAWAL OF ARTERIAL BLOOD: CPT

## 2017-01-08 PROCEDURE — 74011000258 HC RX REV CODE- 258: Performed by: INTERNAL MEDICINE

## 2017-01-08 PROCEDURE — 74011250636 HC RX REV CODE- 250/636: Performed by: FAMILY MEDICINE

## 2017-01-08 PROCEDURE — 77030020887 HC CRM SKN PROT DIMTH S&N -A

## 2017-01-08 PROCEDURE — 83735 ASSAY OF MAGNESIUM: CPT | Performed by: HOSPITALIST

## 2017-01-08 PROCEDURE — 84132 ASSAY OF SERUM POTASSIUM: CPT | Performed by: HOSPITALIST

## 2017-01-08 RX ORDER — POTASSIUM CHLORIDE 7.45 MG/ML
INJECTION INTRAVENOUS
Status: DISPENSED
Start: 2017-01-08 | End: 2017-01-09

## 2017-01-08 RX ORDER — POTASSIUM CHLORIDE 7.45 MG/ML
10 INJECTION INTRAVENOUS
Status: DISPENSED | OUTPATIENT
Start: 2017-01-08 | End: 2017-01-08

## 2017-01-08 RX ORDER — DEXTROSE MONOHYDRATE AND SODIUM CHLORIDE 5; .45 G/100ML; G/100ML
75 INJECTION, SOLUTION INTRAVENOUS CONTINUOUS
Status: DISCONTINUED | OUTPATIENT
Start: 2017-01-08 | End: 2017-01-13

## 2017-01-08 RX ADMIN — LABETALOL HYDROCHLORIDE 20 MG: 5 INJECTION, SOLUTION INTRAVENOUS at 20:50

## 2017-01-08 RX ADMIN — CLINDAMYCIN PHOSPHATE 600 MG: 150 INJECTION, SOLUTION, CONCENTRATE INTRAVENOUS at 13:33

## 2017-01-08 RX ADMIN — CHLORHEXIDINE GLUCONATE 15 ML: 1.2 RINSE ORAL at 09:47

## 2017-01-08 RX ADMIN — AZTREONAM 1 G: 1 INJECTION, POWDER, LYOPHILIZED, FOR SOLUTION INTRAMUSCULAR; INTRAVENOUS at 17:06

## 2017-01-08 RX ADMIN — AZTREONAM 1 G: 1 INJECTION, POWDER, LYOPHILIZED, FOR SOLUTION INTRAMUSCULAR; INTRAVENOUS at 09:47

## 2017-01-08 RX ADMIN — AZTREONAM 1 G: 1 INJECTION, POWDER, LYOPHILIZED, FOR SOLUTION INTRAMUSCULAR; INTRAVENOUS at 03:19

## 2017-01-08 RX ADMIN — POTASSIUM CHLORIDE 10 MEQ: 10 INJECTION, SOLUTION INTRAVENOUS at 13:36

## 2017-01-08 RX ADMIN — LABETALOL HYDROCHLORIDE 20 MG: 5 INJECTION, SOLUTION INTRAVENOUS at 11:41

## 2017-01-08 RX ADMIN — BACLOFEN 10 MG: 10 TABLET ORAL at 09:48

## 2017-01-08 RX ADMIN — LEVOTHYROXINE SODIUM ANHYDROUS 87.5 MCG: 100 INJECTION, POWDER, LYOPHILIZED, FOR SOLUTION INTRAVENOUS at 12:17

## 2017-01-08 RX ADMIN — METHYLPREDNISOLONE SODIUM SUCCINATE 40 MG: 40 INJECTION, POWDER, FOR SOLUTION INTRAMUSCULAR; INTRAVENOUS at 09:47

## 2017-01-08 RX ADMIN — SODIUM CHLORIDE 40 MG: 9 INJECTION, SOLUTION INTRAMUSCULAR; INTRAVENOUS; SUBCUTANEOUS at 09:47

## 2017-01-08 RX ADMIN — HYDRALAZINE HYDROCHLORIDE 20 MG: 20 INJECTION INTRAMUSCULAR; INTRAVENOUS at 13:15

## 2017-01-08 RX ADMIN — ALBUTEROL SULFATE 2.5 MG: 2.5 SOLUTION RESPIRATORY (INHALATION) at 10:30

## 2017-01-08 RX ADMIN — IPRATROPIUM BROMIDE AND ALBUTEROL SULFATE 3 ML: .5; 3 SOLUTION RESPIRATORY (INHALATION) at 11:57

## 2017-01-08 RX ADMIN — IPRATROPIUM BROMIDE AND ALBUTEROL SULFATE 3 ML: .5; 3 SOLUTION RESPIRATORY (INHALATION) at 16:26

## 2017-01-08 RX ADMIN — MINERAL SUPPLEMENT IRON 300 MG / 5 ML STRENGTH LIQUID 100 PER BOX UNFLAVORED 300 MG: at 13:15

## 2017-01-08 RX ADMIN — ASPIRIN 81 MG 81 MG: 81 TABLET ORAL at 09:47

## 2017-01-08 RX ADMIN — HEPARIN SODIUM 5000 UNITS: 5000 INJECTION, SOLUTION INTRAVENOUS; SUBCUTANEOUS at 00:46

## 2017-01-08 RX ADMIN — CLINDAMYCIN PHOSPHATE 600 MG: 150 INJECTION, SOLUTION, CONCENTRATE INTRAVENOUS at 05:09

## 2017-01-08 RX ADMIN — FLUTICASONE PROPIONATE 2 SPRAY: 50 SPRAY, METERED NASAL at 09:47

## 2017-01-08 RX ADMIN — HEPARIN SODIUM 5000 UNITS: 5000 INJECTION, SOLUTION INTRAVENOUS; SUBCUTANEOUS at 17:06

## 2017-01-08 RX ADMIN — POTASSIUM CHLORIDE 10 MEQ: 10 INJECTION, SOLUTION INTRAVENOUS at 06:56

## 2017-01-08 RX ADMIN — HEPARIN SODIUM 5000 UNITS: 5000 INJECTION, SOLUTION INTRAVENOUS; SUBCUTANEOUS at 08:14

## 2017-01-08 RX ADMIN — PROPOFOL 30 MCG/KG/MIN: 10 INJECTION, EMULSION INTRAVENOUS at 00:46

## 2017-01-08 RX ADMIN — METHYLPREDNISOLONE SODIUM SUCCINATE 40 MG: 40 INJECTION, POWDER, FOR SOLUTION INTRAMUSCULAR; INTRAVENOUS at 02:52

## 2017-01-08 RX ADMIN — METHYLPREDNISOLONE SODIUM SUCCINATE 40 MG: 40 INJECTION, POWDER, FOR SOLUTION INTRAMUSCULAR; INTRAVENOUS at 20:49

## 2017-01-08 RX ADMIN — LEVOFLOXACIN 500 MG: 5 INJECTION, SOLUTION INTRAVENOUS at 14:30

## 2017-01-08 RX ADMIN — HYDRALAZINE HYDROCHLORIDE 20 MG: 20 INJECTION INTRAMUSCULAR; INTRAVENOUS at 05:09

## 2017-01-08 RX ADMIN — POTASSIUM CHLORIDE 10 MEQ: 10 INJECTION, SOLUTION INTRAVENOUS at 11:23

## 2017-01-08 RX ADMIN — BACLOFEN 10 MG: 10 TABLET ORAL at 17:06

## 2017-01-08 RX ADMIN — INSULIN LISPRO 2 UNITS: 100 INJECTION, SOLUTION INTRAVENOUS; SUBCUTANEOUS at 18:33

## 2017-01-08 RX ADMIN — CHLORHEXIDINE GLUCONATE 15 ML: 1.2 RINSE ORAL at 21:00

## 2017-01-08 RX ADMIN — LABETALOL HYDROCHLORIDE 20 MG: 5 INJECTION, SOLUTION INTRAVENOUS at 04:36

## 2017-01-08 RX ADMIN — POTASSIUM CHLORIDE 10 MEQ: 10 INJECTION, SOLUTION INTRAVENOUS at 09:33

## 2017-01-08 RX ADMIN — IPRATROPIUM BROMIDE AND ALBUTEROL SULFATE 3 ML: .5; 3 SOLUTION RESPIRATORY (INHALATION) at 08:02

## 2017-01-08 RX ADMIN — CLINDAMYCIN PHOSPHATE 600 MG: 150 INJECTION, SOLUTION, CONCENTRATE INTRAVENOUS at 21:23

## 2017-01-08 RX ADMIN — AZTREONAM 1 G: 1 INJECTION, POWDER, LYOPHILIZED, FOR SOLUTION INTRAMUSCULAR; INTRAVENOUS at 21:23

## 2017-01-08 RX ADMIN — IPRATROPIUM BROMIDE AND ALBUTEROL SULFATE 3 ML: .5; 3 SOLUTION RESPIRATORY (INHALATION) at 20:00

## 2017-01-08 RX ADMIN — METHYLPREDNISOLONE SODIUM SUCCINATE 40 MG: 40 INJECTION, POWDER, FOR SOLUTION INTRAMUSCULAR; INTRAVENOUS at 13:36

## 2017-01-08 RX ADMIN — INSULIN LISPRO 2 UNITS: 100 INJECTION, SOLUTION INTRAVENOUS; SUBCUTANEOUS at 00:45

## 2017-01-08 RX ADMIN — DEXTROSE MONOHYDRATE AND SODIUM CHLORIDE 75 ML/HR: 5; .45 INJECTION, SOLUTION INTRAVENOUS at 09:46

## 2017-01-08 NOTE — CONSULTS
[address] [place] [address] [facility]  Pulmonary, Critical Care, and Sleep Medicine    Name: Jv Mello MRN: [id number]   : 1945 Hospital: [facility] [place]   Date: 2017        Critical Care Initial Patient Consult    Requesting MD:                                                  Reason for CC Consult:  IMPRESSION:   · Aspiration Pneumonia  · Respiratory Failure  · Previous anoxic head encephalopathy  · Diastolic CHF  · Sp PEA arrest  · COPD exacerbation  · TIFFANIE      RECOMMENDATIONS:   · Neuro- awake an d alert this morning excellent ABG and extubated with no problems. Off all sedation and remains full code. Denies pain  · Cards- hemodynamically stable now. Resp arrest led to cardiac arrest 1 mg epi given and 1 minute of CPR with ROSC and intubation last evening. History of diastolic dysfunction restarted hypertensive medications clonidine and labetalol as needed  · Pulm-  Admitted with COPD exacerbation on steroids and aerosol therapy in SNF after previous anoxic head injury. Rested 48 hours started weaning trial today. Poor lung volumes with atelectasis distended stomach despite NG tube  · Renal-  Renal function continues to improve changed to half normal saline with hypernatremia. .  IV fluids start enteral feeds  · GI-  Prophylaxis and will need swallowing eval  · Heme- H/H and plt stable  · ID- levaquin, clindamycin and azactam for aspiration pneumonia food seen at cords  · DVT prophylaxis     Subjective/History:   2017  Patient awake and alert extubated this morning much more comfortable no distress. Assess swallowing before feeding    HPI  This patient has been seen and evaluated at the request of Dr. Donna Mccullough for aspiration pneumonia and resp failure. The patient is a 70 y.o. male admitted 3  with COPD exacerbation to the medical floor. Last evening vomited noticed to be hypoxic an dactually lost pulse. 1 Mg epi with ROSC and intubated for cyanosis.   Moved to ICU discussed with family and wife should be here this afternoon. In SNF prior to this admission and has diastolic dysfunction at baseline. Currently stable on vent    The patient is critically ill and can not provide additional history due to intubation and sedation     Past Medical History   Diagnosis Date    [facility]     Bursitis     CAD (coronary artery disease)     Cardiac arrest ([facility]     Cognitive communication deficit     Contracture of muscle     Depression     Diabetes ([facility]     Diarrhea     Disorder of kidney and ureter     Dysphagia     GERD (gastroesophageal reflux disease)     High cholesterol     Hypertension     Hypothyroid     Lack of coordination     Polyarthritis     Sleep apnea     Sleep disorder     Weakness       Past Surgical History   Procedure Laterality Date    Hx gi       peg    Hx amputation      Hx hernia repair        Prior to Admission medications    Medication Sig Start Date End Date Taking? Authorizing Provider   nitroglycerin (NITROBID) 2 % ointment Apply 1 Inch to affected area every six (6) hours. 8/14/15  Yes Suri Romero MD   fentaNYL (DURAGESIC) 50 mcg/hr PATCH 1 Patch by TransDERmal route every seventy-two (72) hours. Mirna Hernandez MD   enalapril (VASOTEC) 5 mg tablet Take 5 mg by mouth daily. Phys MD Mary   Menthol-Zinc Oxide (RISAMINE) 0.44-20.6 % oint Apply  to affected area three (3) times daily. Historical Provider   citalopram (CELEXA) 40 mg tablet Take 40 mg by mouth daily. Historical Provider   melatonin 5 mg cap capsule Take 5 mg by mouth nightly. Historical Provider   isosorbide mononitrate (ISMO, MONOKET) 20 mg tablet Take 20 mg by mouth two (2) times a day. Historical Provider   loperamide (IMODIUM) 2 mg capsule Take 2 mg by mouth four (4) times daily as needed for Diarrhea. Historical Provider   promethazine (PHENERGAN) 12.5 mg tablet Take 12.5 mg by mouth every six (6) hours as needed for Nausea.     Historical Provider aspirin delayed-release 81 mg tablet Take 81 mg by mouth daily. Historical Provider   guaiFENesin (ROBAFEN) 100 mg/5 mL liquid Take 200 mg by mouth every six (6) hours as needed for Cough. Historical Provider   cloNIDine (CATAPRES) 0.2 mg/24 hr patch 1 Patch by TransDERmal route every Monday. Historical Provider   levothyroxine (SYNTHROID) 175 mcg tablet Take 175 mcg by mouth Daily (before breakfast). Historical Provider   furosemide (LASIX) 40 mg tablet Take 40 mg by mouth daily. Historical Provider   magnesium hydroxide ([employer] MILK OF MAGNESIA) 400 mg/5 mL suspension Take 30 mL by mouth daily as needed for Constipation. Historical Provider   bisacodyl (DULCOLAX, BISACODYL,) 10 mg suppository Insert 10 mg into rectum daily as needed. Historical Provider   multivitamin, tx-iron-ca-min (THERA-M W/ IRON) 9 mg iron-400 mcg tab tablet Take 1 Tab by mouth daily. Historical Provider   mometasone (NASONEX) 50 mcg/actuation nasal spray 2 Sprays by Both Nostrils route daily. Historical Provider   GLUCOSAMINE HCL/CHONDR MAY A NA (GLUCOSAMINE-CHONDROITIN) 750-600 mg tab Take 1 Tab by mouth two (2) times a day. Historical Provider   baclofen (LIORESAL) 10 mg tablet Take 10 mg by mouth three (3) times daily. Historical Provider   pantoprazole (PROTONIX) 20 mg tablet Take 20 mg by mouth daily. Historical Provider   predniSONE (DELTASONE) 10 mg tablet Take 10 mg by mouth daily. Historical Provider   ferrous sulfate (FEROSUL) 220 mg (44 mg iron)/5 mL elix Take 7.4 mL by mouth daily. Historical Provider   potassium chloride (KAON 10%) 20 mEq/15 mL solution Take 30 mEq by mouth daily. Quantity 22.5 mL     Historical Provider   acetaminophen (TYLENOL) 325 mg tablet Take 650 mg by mouth every six (6) hours as needed for Pain. Historical Provider   diclofenac (VOLTAREN) 1 % gel Apply 4 g to affected area two (2) times a day.  For bilateral knee pain- apply thin layer topically to entire joint area    Historical Provider   labetalol (NORMODYNE) 200 mg tablet Take 200 mg by mouth two (2) times a day. Hold for heart rate less than 60 and advise provider. Historical Provider   lidocaine (LIDODERM) 5 % 2 Patches by TransDERmal route every twenty-four (24) hours. Place Two patches to right trapezius region in morning. Remove patches 12 hours later. Apply 7AM Remove 7PM. 11/25/16   Bridget White MD   oxyCODONE-acetaminophen (PERCOCET) 5-325 mg per tablet Take 1 Tab by mouth every four (4) hours as needed for Pain. Max Daily Amount: 6 Tabs. 11/25/16   Bridget White MD   albuterol-ipratropium (DUO-NEB) 2.5 mg-0.5 mg/3 ml nebulizer solution 3 mL by Nebulization route every four (4) hours as needed for Wheezing.  8/14/15   Rudy Yao MD     Current Facility-Administered Medications   Medication Dose Route Frequency    potassium chloride 10 mEq in 100 ml IVPB  10 mEq IntraVENous Q1H    dextrose 5 % - 0.45% NaCl infusion  75 mL/hr IntraVENous CONTINUOUS    levothyroxine (SYNTHROID) injection 87.5 mcg  87.5 mcg IntraVENous Q24H    propofol (DIPRIVAN) infusion  5-50 mcg/kg/min IntraVENous TITRATE    chlorhexidine (PERIDEX) 0.12 % mouthwash 15 mL  15 mL Oral Q12H    cloNIDine (CATAPRES) 0.1 mg/24 hr patch 1 Patch  1 Patch TransDERmal Q7D    aztreonam (AZACTAM) 1 g in 0.9% sodium chloride (MBP/ADV) 100 mL MBP  1 g IntraVENous Q6H    midazolam in normal saline (VERSED) 1 mg/mL infusion  2-10 mg/hr IntraVENous TITRATE    pantoprazole (PROTONIX) 40 mg in sodium chloride 0.9 % 10 mL injection  40 mg IntraVENous DAILY    clindamycin phosphate (CLEOCIN) 600 mg in 0.9% sodium chloride (MBP/ADV) 100 mL ADV  600 mg IntraVENous Q8H    insulin lispro (HUMALOG) injection   SubCUTAneous Q6H    guaiFENesin SR (MUCINEX) tablet 600 mg  600 mg Oral Q12H    albuterol-ipratropium (DUO-NEB) 2.5 MG-0.5 MG/3 ML  3 mL Nebulization QID RT    aspirin chewable tablet 81 mg  81 mg Per G Tube DAILY    baclofen (LIORESAL) tablet 10 mg 10 mg Oral TID    diclofenac (VOLTAREN) 1 % topical gel 4 g  4 g Topical BID    ferrous sulfate 300 mg (60 mg iron)/5 mL oral syrup   Oral DAILY    fluticasone (FLONASE) 50 mcg/actuation nasal spray 2 Spray  2 Spray Both Nostrils DAILY    multivitamin, tx-iron-ca-min (THERA-M w/ IRON) tablet 1 Tab  1 Tab Oral DAILY    levoFLOXacin (LEVAQUIN) 500 mg in D5W IVPB  500 mg IntraVENous Q24H    heparin (porcine) injection 5,000 Units  5,000 Units SubCUTAneous Q8H    methylPREDNISolone (PF) (SOLU-MEDROL) injection 40 mg  40 mg IntraVENous Q6H     Allergies   Allergen Reactions    Penicillins Itching      Social History   Substance Use Topics    Smoking status: Never Smoker    Smokeless tobacco: Not on file    Alcohol use No          Objective:   Vital Signs:    Visit Vitals    /81    Pulse 96    Temp 98.9 °F (37.2 °C)    Resp 20    Ht 5' 10.08\" (1.78 m)    Wt 89 kg (196 lb 3.4 oz)    SpO2 97%    BMI 28.09 kg/m2       O2 Device: Endotracheal tube, Ventilator   O2 Flow Rate (L/min): 5 l/min   Temp (24hrs), Av.5 °F (37.5 °C), Min:98.7 °F (37.1 °C), Max:100.2 °F (37.9 °C)       Intake/Output:   Last shift:         Last 3 shifts:  1901 -  0700  In: 5062.7 [I.V.:5062.7]  Out: 5022 [Urine:3025]    Intake/Output Summary (Last 24 hours) at 17 0939  Last data filed at 17 0657   Gross per 24 hour   Intake          3461.03 ml   Output             2500 ml   Net           961.03 ml     Physical Exam:    General:  Extubated very raspy and hoarse but follows commands. Head:  Normocephalic, without obvious abnormality,    Eyes:  Conjunctivae/corneas clear. PERRL,   Nose: Nares normal. Septum midline. Mucosa normal. No drainage or sinus tenderness. Throat: Lips, mucosa, and tongue normal.    Neck: Supple, symmetrical, trachea midline, no adenopathy, no carotid bruit and no JVD.        Lungs:   Clear to auscultation bilaterally shallow poor lung volumes       Heart:  Regular rate and rhythm, S1, S2 normal, no murmur, click, rub or gallop. Abdomen:   Soft, non-tender. Bowel sounds normal. No masses,  No organomegaly. Extremities: Extremities normal, atraumatic, no cyanosis or edema. Data:     Recent Results (from the past 24 hour(s))   GLUCOSE, POC    Collection Time: 01/07/17 11:59 AM   Result Value Ref Range    Glucose (POC) 141 (H) 70 - 110 mg/dL   POC G3    Collection Time: 01/07/17  1:21 PM   Result Value Ref Range    Device: VENT      FIO2 (POC) 50 %    pH (POC) 7.402 7.35 - 7.45      pCO2 (POC) 34.5 (L) 35.0 - 45.0 MMHG    pO2 (POC) 76 (L) 80 - 100 MMHG    HCO3 (POC) 21.5 (L) 22 - 26 MMOL/L    sO2 (POC) 95 92 - 97 %    Base deficit (POC) 3 mmol/L    Mode CPAP/SPON      PEEP/CPAP (POC) 5 cmH2O    Pressure support 7 cmH2O    Allens test (POC) YES      Total resp. rate 25      Site LEFT RADIAL      Specimen type (POC) ARTERIAL      Performed by Pedro Clifton    GLUCOSE, POC    Collection Time: 01/07/17  6:03 PM   Result Value Ref Range    Glucose (POC) 140 (H) 70 - 110 mg/dL   GLUCOSE, POC    Collection Time: 01/08/17 12:09 AM   Result Value Ref Range    Glucose (POC) 150 (H) 70 - 110 mg/dL   CBC WITH AUTOMATED DIFF    Collection Time: 01/08/17  5:20 AM   Result Value Ref Range    WBC 6.2 4.6 - 13.2 K/uL    RBC 3.64 (L) 4.70 - 5.50 M/uL    HGB 10.7 (L) 13.0 - 16.0 g/dL    HCT 32.6 (L) 36.0 - 48.0 %    MCV 89.6 74.0 - 97.0 FL    MCH 29.4 24.0 - 34.0 PG    MCHC 32.8 31.0 - 37.0 g/dL    RDW 15.6 (H) 11.6 - 14.5 %    PLATELET 169 122 - 695 K/uL    MPV 9.7 9.2 - 11.8 FL    NEUTROPHILS 90 (H) 40 - 73 %    LYMPHOCYTES 7 (L) 21 - 52 %    MONOCYTES 3 3 - 10 %    EOSINOPHILS 0 0 - 5 %    BASOPHILS 0 0 - 2 %    ABS. NEUTROPHILS 5.6 1.8 - 8.0 K/UL    ABS. LYMPHOCYTES 0.4 (L) 0.9 - 3.6 K/UL    ABS. MONOCYTES 0.2 0.05 - 1.2 K/UL    ABS. EOSINOPHILS 0.0 0.0 - 0.4 K/UL    ABS.  BASOPHILS 0.0 0.0 - 0.06 K/UL    DF AUTOMATED     METABOLIC PANEL, BASIC    Collection Time: 01/08/17  5:20 AM   Result Value Ref Range    Sodium 153 (H) 136 - 145 mmol/L    Potassium 3.4 (L) 3.5 - 5.5 mmol/L    Chloride 118 (H) 100 - 108 mmol/L    CO2 22 21 - 32 mmol/L    Anion gap 13 3.0 - 18 mmol/L    Glucose 170 (H) 74 - 99 mg/dL    BUN 57 (H) 7.0 - 18 MG/DL    Creatinine 1.41 (H) 0.6 - 1.3 MG/DL    BUN/Creatinine ratio 40 (H) 12 - 20      GFR est AA >60 >60 ml/min/1.73m2    GFR est non-AA 50 (L) >60 ml/min/1.73m2    Calcium 8.6 8.5 - 10.1 MG/DL   MAGNESIUM    Collection Time: 01/08/17  5:20 AM   Result Value Ref Range    Magnesium 2.6 (H) 1.8 - 2.4 mg/dL   POC G3    Collection Time: 01/08/17  5:57 AM   Result Value Ref Range    Device: VENT      FIO2 (POC) 50 %    pH (POC) 7.374 7.35 - 7.45      pCO2 (POC) 34.5 (L) 35.0 - 45.0 MMHG    pO2 (POC) 84 80 - 100 MMHG    HCO3 (POC) 20.1 (L) 22 - 26 MMOL/L    sO2 (POC) 96 92 - 97 %    Base deficit (POC) 5 mmol/L    Mode ASSIST CONTROL      Tidal volume 450 ml    Set Rate 18 bpm    PEEP/CPAP (POC) 5 cmH2O    Allens test (POC) YES      Total resp. rate 22      Site LEFT RADIAL      Patient temp. 98.7      Specimen type (POC) ARTERIAL      Performed by Erik Borjas     Volume control YES     GLUCOSE, POC    Collection Time: 01/08/17  6:03 AM   Result Value Ref Range    Glucose (POC) 133 (H) 70 - 110 mg/dL           Recent Labs      01/08/17   0557  01/07/17   1321  01/07/17   0545   FIO2I  50  50  0.5   HCO3I  20.1*  21.5*  21.8*   PCO2I  34.5*  34.5*  36.2   PHI  7.374  7.402  7.389   PO2I  84  76*  74*     Telemetry:normal sinus rhythm    Imaging:  I have personally reviewed the patients radiographs and have reviewed the reports:     Best practice :  Core measures; Antibiotic choice:  Severe Sepsis bundles;SIRS screen met? Yes  Fluids  Lactic acid ordered- initial and repeat Q6hrs if elevated till normalized. Cultures drawn. Large bore IV- 2 sites          Pressors aim MAP >65mmHg  Steriods  Glycemic control  Mech.  Ventilated patients- aim to keep peak plateau pressure 25-30cm H2O.   Sress ulcer prophylaxis  DVT prophylaxis       Ac Bundle Followed       Total critical care time exclusive of procedures: 40 minutes  Ricki Jarvis MD

## 2017-01-08 NOTE — PROGRESS NOTES
RESPIRATORY NOTE:  Pt struggling with labored breathing, SPO2 90% on 6 LPM nasal cannula, loud gurgling Rhonch noted, NT suctioned for large amt of yellow tan secretions, will continue to periodically NT suction.

## 2017-01-08 NOTE — PROGRESS NOTES
0730  Report received from Steven Travis. Assumed patient care. 0930  Patient extubated by RT, RN at bedside. Patient stable on 6L NC.      1117  Patient has been attempting to yell for RN rather than using call button. Found that due to limited movement in upper extremities, patient can not use call buttons. Will increase frequency of rounds to met patient needs. 1130  Checked on patient, offered sips of water. 26  Assisted patient onto bedpan, patient unable to have bowel movement. 1300  Family at bedside. 1345  Check on patient, offered sips of water. 1430  Patient sleeping, RN did not wake patient at this time. 1510  Patient remains asleep. 1553  Patient asleep, RN did not wake patient. Andrzej 132 with Dannielle Foster RN. Patient still sleeping at this time. 1800  Patient's wife called, updated on patient condition. Wife states that she is snowed in and will not be able to visit tomorrow, but hopes to make it in on Tuesday. 1805  Patient awake, TV turned on.      1930  Bedside and Verbal shift change report given to LANNY Valente RN (oncoming nurse) by Suzy Gabriel. Umair Coronado RN (offgoing nurse).  Report included the following information SBAR, Kardex, Intake/Output, MAR, Recent Results and Cardiac Rhythm SR.

## 2017-01-08 NOTE — PROGRESS NOTES
Hospitalist Progress Note    Patient: Sharmin Cabrera MRN: [id number]  CSN: [id number]    YOB: 1945  Age: 70 y.o. Sex: male    DOA: 1/3/2017 LOS:  LOS: 4 days                Assessment/Plan     Patient Active Problem List   Diagnosis Code    Cardiac arrest ([facility] I46.9    Anoxic encephalopathy ([facility] G93.1    Acute respiratory failure ([facility] J96.00    DM (diabetes mellitus) ([facility] B21.2    Diastolic congestive heart failure, NYHA class 1 ([facility]) I50.30    HTN (hypertension) I10    Hypoxia R09.02    COPD (chronic obstructive pulmonary disease) with acute bronchitis ([facility]) J44.0    TIFFANIE (acute kidney injury) ([facility] N17.9    Aspiration pneumonia ([facility] J69.0           71 yo WM with history of prior anoxic brain injury from NH, admitted for COPD exacerbation.      On 01/05/2017, patient had aspiration event and went into respiratory arrest and subsequently cardiac arrest. PEA arrest. CPR initiated and epi given. Patient intubated. He had ROSC. He is transferred to ICU. Opens eyes and follows command, orally intubated and on sedation.      CRITICAL CARE PLAN      Resp - See vent orders, VAP bundle. HOB>30 degrees. Bronchodilators. Follow sputum cx. CXR. COPD - continue solumedrol. SBT and extubation per pulm      ID - Follow up blood and urine cx. ANTIBIOTICS - levaquin azactam.   Trend temps, wbc, LA in normal range.      CVS - Monitor HD. Stable hemodynamically.       Heme/onc - Follow H&H, plts. INR.     Renal - Trend BUN, Cr, follow I/O, linton in place. Check and replace Mg, K. TIFFANIE - on CKD3, creatine improving  Hypernatremia - free water flushes. Follow sodium levels      Endocrine - Follow FSG      Neuro/ Pain/ Sedation -versed prn. Sedation bundle. History of anoxic brain injury      GI - NPO for now. NG tube, tube feeding.      Prophylaxis - DVT: heparin, GI: protonix      40 minutes of critical care time spent in the direct evaluation and treatment of this high risk patient.  The reason for providing this level of medical care for this critically ill patient was due a critical illness that impaired one or more vital organ systems such that there was a high probability of imminent or life threatening deterioration in the patients condition. This care involved high complexity decision making to assess, manipulate, and support vital system functions, to treat this degreee vital organ system failure and to prevent further life threatening deterioration of the patients condition.                  Physical Exam:  General: As above, patient orally intubated and sedated.    HEENT: NC, Atraumatic. PERRLA, anicteric sclerae. Lungs: CTA Bilaterally. No Wheezing/Rhonchi/Rales. Heart: Regular rhythm,  No murmur, No Rubs, No Gallops  Abdomen: Soft, Non distended, Non tender.  +Bowel sounds,   Extremities: No c/c/e  Psych:   Not anxious or agitated.   Neurologic:  No acute neurological deficit.          Vital signs/Intake and Output:  Visit Vitals    /79    Pulse 82    Temp 98.9 °F (37.2 °C)    Resp 22    Ht 5' 10.08\" (1.78 m)    Wt 89 kg (196 lb 3.4 oz)    SpO2 98%    BMI 28.09 kg/m2     Current Shift:     Last three shifts:  01/06 1901 - 01/08 0700  In: 5062.7 [I.V.:5062.7]  Out: 9886 [Urine:3025]            Labs: Results:       Chemistry Recent Labs      01/08/17 0520 01/07/17 0513 01/06/17 0415  01/05/17   1916   GLU  170*  157*  128*  156*   NA  153*  149*  148*  144   K  3.4*  3.8  3.0*  4.7   CL  118*  112*  109*  100   CO2  22  22  23  30   BUN  57*  74*  75*  83*   CREA  1.41*  2.04*  2.84*  3.68*   CA  8.6  8.6  6.7*  8.6   AGAP  13  15  16  14   BUCR  40*  36*  26*  23*   AP   --    --    --   71   TP   --    --    --   7.1   ALB   --    --    --   3.7   GLOB   --    --    --   3.4   AGRAT   --    --    --   1.1      CBC w/Diff Recent Labs      01/08/17 0520 01/07/17   0513  01/06/17   0415   WBC  6.2  7.7  11.6   RBC  3.64*  3.80*  3.42*   HGB  10.7*  10.7*  9.7* HCT  32.6*  33.9*  31.0*   PLT  209  210  246   GRANS  90*  91*  93*   LYMPH  7*  7*  3*   EOS  0  0  0      Cardiac Enzymes Recent Labs      01/05/17 1916   CPK  175   CKND1  3.3      Coagulation No results for input(s): PTP, INR, APTT in the last 72 hours. No lab exists for component: INREXT    Lipid Panel No results found for: CHOL, CHOLPOCT, CHOLX, CHLST, CHOLV, [id number], HDL, LDL, NLDLCT, DLDL, LDLC, DLDLP, 138424, VLDLC, VLDL, TGL, TGLX, TRIGL, [id number], TRIGP, TGLPOCT, [id number], CHHD, CHHDX   BNP No results for input(s): BNPP in the last 72 hours.    Liver Enzymes Recent Labs      01/05/17 1916   TP  7.1   ALB  3.7   AP  71   SGOT  23      Thyroid Studies Lab Results   Component Value Date/Time    TSH 1.37 08/13/2015 04:35 AM        Procedures/imaging: see electronic medical records for all procedures/Xrays and details which were not copied into this note but were reviewed prior to creation of Plan

## 2017-01-08 NOTE — PROGRESS NOTES
RESPIRATORY NOTE:  Pt awake and following commands, initiated SPT 7/5 45% FIO2, pt tolerating well at this time, RN aware, will monitor.

## 2017-01-09 LAB
BACTERIA SPEC CULT: NORMAL
GLUCOSE BLD STRIP.AUTO-MCNC: 146 MG/DL (ref 70–110)
GLUCOSE BLD STRIP.AUTO-MCNC: 157 MG/DL (ref 70–110)
GLUCOSE BLD STRIP.AUTO-MCNC: 166 MG/DL (ref 70–110)
GLUCOSE BLD STRIP.AUTO-MCNC: 166 MG/DL (ref 70–110)
MAGNESIUM SERPL-MCNC: 2.5 MG/DL (ref 1.8–2.4)
SERVICE CMNT-IMP: NORMAL

## 2017-01-09 PROCEDURE — C9113 INJ PANTOPRAZOLE SODIUM, VIA: HCPCS | Performed by: INTERNAL MEDICINE

## 2017-01-09 PROCEDURE — 94660 CPAP INITIATION&MGMT: CPT

## 2017-01-09 PROCEDURE — 74011250636 HC RX REV CODE- 250/636: Performed by: INTERNAL MEDICINE

## 2017-01-09 PROCEDURE — 83735 ASSAY OF MAGNESIUM: CPT | Performed by: INTERNAL MEDICINE

## 2017-01-09 PROCEDURE — 74011000258 HC RX REV CODE- 258: Performed by: HOSPITALIST

## 2017-01-09 PROCEDURE — 74011636637 HC RX REV CODE- 636/637: Performed by: INTERNAL MEDICINE

## 2017-01-09 PROCEDURE — 82962 GLUCOSE BLOOD TEST: CPT

## 2017-01-09 PROCEDURE — 77010033678 HC OXYGEN DAILY

## 2017-01-09 PROCEDURE — 74011000250 HC RX REV CODE- 250: Performed by: HOSPITALIST

## 2017-01-09 PROCEDURE — 74011000258 HC RX REV CODE- 258: Performed by: INTERNAL MEDICINE

## 2017-01-09 PROCEDURE — 36415 COLL VENOUS BLD VENIPUNCTURE: CPT | Performed by: INTERNAL MEDICINE

## 2017-01-09 PROCEDURE — 74011250636 HC RX REV CODE- 250/636: Performed by: FAMILY MEDICINE

## 2017-01-09 PROCEDURE — 74011000250 HC RX REV CODE- 250: Performed by: INTERNAL MEDICINE

## 2017-01-09 PROCEDURE — 65610000006 HC RM INTENSIVE CARE

## 2017-01-09 PROCEDURE — 92610 EVALUATE SWALLOWING FUNCTION: CPT

## 2017-01-09 PROCEDURE — 94640 AIRWAY INHALATION TREATMENT: CPT

## 2017-01-09 RX ADMIN — CHLORHEXIDINE GLUCONATE 15 ML: 1.2 RINSE ORAL at 20:26

## 2017-01-09 RX ADMIN — HEPARIN SODIUM 5000 UNITS: 5000 INJECTION, SOLUTION INTRAVENOUS; SUBCUTANEOUS at 03:47

## 2017-01-09 RX ADMIN — IPRATROPIUM BROMIDE AND ALBUTEROL SULFATE 3 ML: .5; 3 SOLUTION RESPIRATORY (INHALATION) at 15:37

## 2017-01-09 RX ADMIN — METHYLPREDNISOLONE SODIUM SUCCINATE 40 MG: 40 INJECTION, POWDER, FOR SOLUTION INTRAMUSCULAR; INTRAVENOUS at 20:26

## 2017-01-09 RX ADMIN — HEPARIN SODIUM 5000 UNITS: 5000 INJECTION, SOLUTION INTRAVENOUS; SUBCUTANEOUS at 16:37

## 2017-01-09 RX ADMIN — METHYLPREDNISOLONE SODIUM SUCCINATE 40 MG: 40 INJECTION, POWDER, FOR SOLUTION INTRAMUSCULAR; INTRAVENOUS at 10:21

## 2017-01-09 RX ADMIN — HYDRALAZINE HYDROCHLORIDE 20 MG: 20 INJECTION INTRAMUSCULAR; INTRAVENOUS at 17:20

## 2017-01-09 RX ADMIN — AZTREONAM 1 G: 1 INJECTION, POWDER, LYOPHILIZED, FOR SOLUTION INTRAMUSCULAR; INTRAVENOUS at 05:23

## 2017-01-09 RX ADMIN — INSULIN LISPRO 2 UNITS: 100 INJECTION, SOLUTION INTRAVENOUS; SUBCUTANEOUS at 17:56

## 2017-01-09 RX ADMIN — METHYLPREDNISOLONE SODIUM SUCCINATE 40 MG: 40 INJECTION, POWDER, FOR SOLUTION INTRAMUSCULAR; INTRAVENOUS at 03:48

## 2017-01-09 RX ADMIN — AZTREONAM 1 G: 1 INJECTION, POWDER, LYOPHILIZED, FOR SOLUTION INTRAMUSCULAR; INTRAVENOUS at 10:02

## 2017-01-09 RX ADMIN — CHLORHEXIDINE GLUCONATE 15 ML: 1.2 RINSE ORAL at 10:04

## 2017-01-09 RX ADMIN — HEPARIN SODIUM 5000 UNITS: 5000 INJECTION, SOLUTION INTRAVENOUS; SUBCUTANEOUS at 10:18

## 2017-01-09 RX ADMIN — LEVOTHYROXINE SODIUM ANHYDROUS 87.5 MCG: 100 INJECTION, POWDER, LYOPHILIZED, FOR SOLUTION INTRAVENOUS at 10:27

## 2017-01-09 RX ADMIN — IPRATROPIUM BROMIDE AND ALBUTEROL SULFATE 3 ML: .5; 3 SOLUTION RESPIRATORY (INHALATION) at 08:24

## 2017-01-09 RX ADMIN — METHYLPREDNISOLONE SODIUM SUCCINATE 40 MG: 40 INJECTION, POWDER, FOR SOLUTION INTRAMUSCULAR; INTRAVENOUS at 16:40

## 2017-01-09 RX ADMIN — LABETALOL HYDROCHLORIDE 20 MG: 5 INJECTION, SOLUTION INTRAVENOUS at 22:06

## 2017-01-09 RX ADMIN — INSULIN LISPRO 2 UNITS: 100 INJECTION, SOLUTION INTRAVENOUS; SUBCUTANEOUS at 23:26

## 2017-01-09 RX ADMIN — HEPARIN SODIUM 5000 UNITS: 5000 INJECTION, SOLUTION INTRAVENOUS; SUBCUTANEOUS at 23:26

## 2017-01-09 RX ADMIN — FLUTICASONE PROPIONATE 2 SPRAY: 50 SPRAY, METERED NASAL at 10:20

## 2017-01-09 RX ADMIN — IPRATROPIUM BROMIDE AND ALBUTEROL SULFATE 3 ML: .5; 3 SOLUTION RESPIRATORY (INHALATION) at 11:49

## 2017-01-09 RX ADMIN — INSULIN LISPRO 2 UNITS: 100 INJECTION, SOLUTION INTRAVENOUS; SUBCUTANEOUS at 12:52

## 2017-01-09 RX ADMIN — CLINDAMYCIN PHOSPHATE 600 MG: 150 INJECTION, SOLUTION, CONCENTRATE INTRAVENOUS at 05:23

## 2017-01-09 RX ADMIN — LEVOFLOXACIN 500 MG: 5 INJECTION, SOLUTION INTRAVENOUS at 16:35

## 2017-01-09 RX ADMIN — DEXTROSE MONOHYDRATE AND SODIUM CHLORIDE 75 ML/HR: 5; .45 INJECTION, SOLUTION INTRAVENOUS at 16:26

## 2017-01-09 RX ADMIN — IPRATROPIUM BROMIDE AND ALBUTEROL SULFATE 3 ML: .5; 3 SOLUTION RESPIRATORY (INHALATION) at 19:19

## 2017-01-09 RX ADMIN — LABETALOL HYDROCHLORIDE 20 MG: 5 INJECTION, SOLUTION INTRAVENOUS at 01:45

## 2017-01-09 RX ADMIN — LABETALOL HYDROCHLORIDE 20 MG: 5 INJECTION, SOLUTION INTRAVENOUS at 11:15

## 2017-01-09 RX ADMIN — SODIUM CHLORIDE 40 MG: 9 INJECTION, SOLUTION INTRAMUSCULAR; INTRAVENOUS; SUBCUTANEOUS at 10:22

## 2017-01-09 RX ADMIN — HYDRALAZINE HYDROCHLORIDE 20 MG: 20 INJECTION INTRAMUSCULAR; INTRAVENOUS at 05:27

## 2017-01-09 NOTE — PROGRESS NOTES
Speech-Language Pathology:    SLP evaluation orders received; however, upon completion of chart review, pt not appropriate for SLP evaluation this day.  Currently, patient is:    []  Nausea/vomiting  [x]  RN Communication/ suggestion  []  Extreme Pain  []  Dialysis treatment in progress  []  Tolerating current po diet (per RN report)  []  Tolerating current po diet; however, poor po intake (per Rn report)    []  Receiving nutrition via tube feeding  []  Lethargic, solomnent, or difficult to arouse for safe po trials   []  Unable to manage secretions  [x]  Receiving intervention for respiratory distress- requiring Bi-Pap  []  <6 hours s/p extubation   [x]   Other: Pt requiring constant bi-pap at this time as when attempted to be placed on nasal cannula for evaluation increase in RR noted by nursing. SLP educated nurse, Kristian Jean-Baptiste, patient's risk for aspiration with thin liquid presentations as on MBS completed on 1/4/17 patient with silent aspiration of thin liquids and only oral care should be provided pending completion of bedside swallow evaluation. Will follow up as patient's schedule allows. Thank you.     Rory Anderson M.A., [address] [facility], [place], operated [facility]

## 2017-01-09 NOTE — PROGRESS NOTES
Problem: Dysphagia (Adult)  Goal: *Acute Goals and Plan of Care (Insert Text)  Dysphagia evaluation completed at beside to assess for readiness of diet initiation. Patient awake upon SLP arrival. He is alert and oriented x 2. Patient able to complete oral motor examination which revealed natural dentition with decreased ROM of labial and lingual structures and reduced strength. Patient with wet vocal quality upon SLP entrance into room. He is able to cough upon command, however, cough is ineffective in remediating wet vocal quality. Patient provided oral care first to remediate oral dryness. Patient without s/sx of aspiration noted with oral care trials. SLP then presented pureed and HTL trial. Patient with decreased bolus formation and propulsion with 25-50% of puree residue remaining in oral cavity post swallow. Patient with multiple swallows and strong cough post swallow with puree presentation. SLP also presented HTL trial via tsp. Patient with better oral manipulation and is able to form cohesive bolus propel posteriorly without stasis post swallow, however, hyolaryngeal excursion is decreased and weak. Patient with positive s/sx of aspiration noted with po presentations to increase strong cough and increase in wet vocal quality. Patient able to cough upon command to reduce wet vocal quality. Patient provided oral care with the use of oral suction via yankauer to ensure oral cavity clean and free of all residuals. Patient is at increased risk for aspiration and aspiration related complications. Patient with nonproductive cough and increased respiratory compromise, status post extubation. No further PO trials were given at this time secondary to risk of aspiration. At this time, it is recommended that the patient be NPO with alternate means of nutrition/hydration and consideration for long term alternate mean of nutrition such as PEG tube. No family is present at bedside at this time.  Patient, RN Denis Espana), educated on diet recommendations and POC. SLP to continue following patient as appropriate for diagnostic dysphagia therapy. Recommendations also discussed with Dr. Ashtyn Maldonado. Rec: NPO except oral care via oral care swab q 4 hours  Strict aspiration/reflux precautions; HOB >30 at all times   Alternate means of nutrition/hydration    Patient will:  1. Utilize compensatory swallow maneuvers (decrease bolus size, decrease rate of intake, cyclical ingestion, etc.) to increase swallow function/safety with min visual, verbal and/or tactile cues in 4/5 trials. 2. Tolerate PO trials utilizing compensatory swallow techniques (decrease bolus size, decrease rate of intake, cyclical ingestion, etc.) without overt s/sx aspiration/distress in 4/5 trials with min visual, verbal, and/or tactile cues    3. Perform restorative oropharyngeal exercises and swallow maneuvers (supraglottic swallow, Mendelson maneuver, effortful swallow, OMEs etc.) to increase oropharyngeal strength/awareness/agility, tongue base retraction, hyolaryngeal excursion, airway protection, and/or bolus clearance with min visual, verbal and/or tactile cues in 4/5 trials. 4. Demonstrate the ability to adequately self-monitor swallowing skills and perform appropriate compensatory techniques to reduce s/sx of aspiration and to safely consume least-restrictive diet with min verbal, visual and/or tactile cues in 4/5 trials. 5. Complete an objective measure of swallow fxn (i.e., MBSS) to assess oropharyngeal anatomy/physiology for determination of safest least-restrictive diet and/or appropriate rehabilitation techniques. Outcome: Progressing Towards Goal  SPEECH LANGUAGE PATHOLOGY BEDSIDE SWALLOW EVALUATION     Patient: Laquita Craig (84 y.o. male)  Date: 1/9/2017  Primary Diagnosis: Hypoxia        Precautions: Aspiration         ASSESSMENT :  As above. Patient will benefit from skilled intervention to address the above impairments.   Patients rehabilitation potential is considered to be Guarded  Factors which may influence rehabilitation potential include:   [ ]            None noted  [X]            Mental ability/status  [X]            Medical condition  [ ]            Home/family situation and support systems  [X]            Safety awareness  [ ]            Pain tolerance/management  [ ]            Other:        PLAN :  Recommendations and Planned Interventions:  As above. Frequency/Duration: Patient will be followed by speech-language pathology 3 times a week to address goals. Discharge Recommendations: To Be Determined       SUBJECTIVE:   Patient stated Could I have some water?   OBJECTIVE:       Past Medical History   Diagnosis Date    [facility] brain damage ([facility] [address]      Bursitis      CAD (coronary artery disease)      Cardiac arrest (HCC)      Cognitive communication deficit      Contracture of muscle      Depression      Diabetes (HCC)      Diarrhea      Disorder of kidney and ureter      Dysphagia      GERD (gastroesophageal reflux disease)      High cholesterol      Hypertension      Hypothyroid      Lack of coordination      Polyarthritis      Sleep apnea      Sleep disorder      Weakness       Past Surgical History   Procedure Laterality Date    Hx gi           peg    Hx amputation        Hx hernia repair         Prior Level of Function/Home Situation:   Home Situation  Home Environment: Skilled nursing facility  One/Two Story Residence: One story  Living Alone: No  Support Systems: Skilled nursing facility  Patient Expects to be Discharged to[de-identified] Skilled nursing facility  Current DME Used/Available at Home: Hospital bed  Current Diet:  NPO  Cognitive and Communication Status:  Neurologic State: Alert  Orientation Level: Oriented to person, Oriented to place  Cognition: Follows commands  Perception: Appears intact  Perseveration: No perseveration noted  Safety/Judgement: Decreased insight into deficits  Oral Assessment:  Oral Assessment  Labial: Decreased rate  Dentition: Natural  Oral Hygiene: Good  Lingual: Decreased rate  Velum: Unable to visualize  Mandible: No impairment  Gag Reflex: Present  P.O. Trials:  Patient Position: HOB > 45  Vocal quality prior to P.O.: Wet  Consistency Presented: Honey thick liquid;Puree (Oral care)  How Presented: SLP-fed/presented;Spoon  Bolus Acceptance: No impairment  Bolus Formation/Control: Impaired  Type of Impairment: Delayed; Incomplete;Poor;Premature spillage  Propulsion: No impairment  Oral Residue: 10-50% of bolus; Left;Right;Lingual  Initiation of Swallow: Delayed (# of seconds)  Laryngeal Elevation: Decreased;Weak  Aspiration Signs/Symptoms: Change vocal quality;Strong cough  Pharyngeal Phase Characteristics: Altered vocal quality;Multiple swallows; Suspected pharyngeal residue  Effective Modifications: None  Cues for Modifications: Maximal  Oral Phase Severity: Moderate  Pharyngeal Phase Severity : Severe     GCODESwallowing:  Swallow Current Status CN= 100%   Swallow Goal Status CM= 80-99%     The severity rating is based on the following outcomes:  LINUS Noms Swallow Level 1  Clinical Judgement     PAIN:  Start of Eval: 0  End of Eval: 0      After treatment:   [ ]            Patient left in no apparent distress sitting up in chair  [X]            Patient left in no apparent distress in bed  [X]            Call bell left within reach  [X]            Nursing notified  [ ]            Family present  [ ]            Caregiver present  [ ]            Bed alarm activated  COMMUNICATION/EDUCATION:   [X]            Aspiration precautions; swallow safety; compensatory techniques. [X]            Patient/family have participated as able in goal setting and plan of care. [ ]            Patient/family agree to work toward stated goals and plan of care. [ ]            Patient understands intent and goals of therapy; neutral about participation.   [ ]            Patient unable to participate in goal setting/plan of care; educ ongoing with interdisciplinary staff  [ ]             Posted safety precautions in patient's room.      Thank you for this referral.  Jennifer Weldon, SLP  Time Calculation: 18 mins

## 2017-01-09 NOTE — PROGRESS NOTES
Report rec'd from Leo Crawford RN. Shift assessment done. Pt oriented x3, speech a little bit hard to understand at times, denies pain , SOB, NC 6 L placed, NAD VSS    2230 pt request water, patient has weak cough and unable to clear secretions, requires NT sx for small amt thick tan secretions. explained to patient need for swallow eval in am, NPO at present. Mouth care given. Also spoke with Margarita Orr RT, re; previous RN stated she would be placing him on hi-flow O2, RT states he turned his O2 down to 4L NC. Sats at present 95, denies SOB. 0010 Reassessed no chg status, cont to encourage CDB, cough  Is stronger than earlier, swallowed sputum. 0150 pt calling RN, \"feels a little SOB\", encouraged to CDB, RT called, bilateral lungs auscultated with diminished sounds all lung fields, no wheezing or crackles noted. O2 sats 93% on 4L. 8732 Dr. Sami Holly paged updated pt's status, to place on BIPAP per RT. NAD     0345 Reassessed, resp even and unlabored, pt resting much better since bipap, denies SOB, pain. NAD VSS    0710 Bedside and Verbal shift change report given to RG BASHIR RN (oncoming nurse) by Monalisa Curry RN (offgoing nurse). Report included the following information SBAR, Kardex, Intake/Output, MAR, Recent Results, Cardiac Rhythm NSR and Alarm Parameters .

## 2017-01-09 NOTE — PROGRESS NOTES
Chart reviewed, pt has been extubated. Pt admitted from [place] [address] where he is resident. Noting pt RRAT score, pt may benefit from discussion for goals of care, palliative medicine consult, as well as PT/OT when able to participate. CM continues to follow for transition to [place] when medically stable.

## 2017-01-09 NOTE — PROGRESS NOTES
conducted a Follow up consultation and Spiritual Assessment for Janet Capps, who is a 70 y.o.,male. Patient was a bit difficult to understand but was talkative. He is a member of [employer]. They are aware he is here. Patient has good support from family. The  provided the following Interventions:  Continued the relationship of care and support. Listened empathically. Offered prayer and assurance of continued prayer on patients behalf. Chart reviewed. The following outcomes were achieved:  Patient expressed gratitude for Spiritual Care visit. Assessment:  There are no further spiritual or Jehovah's witness issues which require Spiritual Care Services intervention at this time. Plan:  Chaplains will continue to follow and will provide pastoral care as needed or requested.  recommends bedside caregivers page the  on duty if patient shows signs of acute spiritual or emotional distress. [address], [place].    Board Certified   [phone] - Office

## 2017-01-09 NOTE — PROGRESS NOTES
[facility]  Pulmonary, Critical Care, and Sleep Medicine    Name: Lorraine Faria MRN: [id number]   : 1945 Hospital: [facility] [place]   Date: 2017        Critical Care Initial Patient Consult    Requesting MD:                                                  Reason for CC Consult:  IMPRESSION:   · Aspiration Pneumonia  · Respiratory Failure resolved  · Previous anoxic head encephalopathy  · Diastolic CHF  · Sp PEA arrest  · COPD exacerbation  · TIFFANIE      RECOMMENDATIONS:   · Neuro- extubated. Alert and appropriate  · Cards- hemodynamically stable now. Resp arrest led to cardiac arrest 1 mg epi given and 1 minute of CPR with ROSC and intubation last evening. History of diastolic dysfunction restarted hypertensive medications clonidine and labetalol as needed  · Pulm-  Admitted with COPD exacerbation on steroids and aerosol therapy in SNF after previous anoxic head injury. Rested 48 hours started weaning trial today  · Renal-  Worsening renal function with electrolyte replacement. IV fluids start enteral feeds  · Heme- H/H and plt stable  · ID- levaquin,   aspiration pneumonia food seen at cords  · DVT prophylaxis  · GI prophylaxis  · Likely will need PEG. Subjective/History:     Pt on nc. NO issues overnight  Per speech sig issues with aspiration  Prior hx of PEG. Unclear why peg was removed    HPI  This patient has been seen and evaluated at the request of Dr. Maximo Childers for aspiration pneumonia and resp failure. The patient is a 70 y.o. male admitted 3  with COPD exacerbation to the medical floor. Last evening vomited noticed to be hypoxic an dactually lost pulse. 1 Mg epi with ROSC and intubated for cyanosis. Moved to ICU discussed with family and wife should be here this afternoon. In SNF prior to this admission and has diastolic dysfunction at baseline.   Currently stable on vent    The patient is critically ill and can not provide additional history due to intubation and sedation     Past Medical History   Diagnosis Date    [facility] damage ([facility] [address]     Bursitis     CAD (coronary artery disease)     Cardiac arrest (HCC)     Cognitive communication deficit     Contracture of muscle     Depression     Diabetes ([facility] [address]     Diarrhea     Disorder of kidney and ureter     Dysphagia     GERD (gastroesophageal reflux disease)     High cholesterol     Hypertension     Hypothyroid     Lack of coordination     Polyarthritis     Sleep apnea     Sleep disorder     Weakness       Past Surgical History   Procedure Laterality Date    Hx gi       peg    Hx amputation      Hx hernia repair        Prior to Admission medications    Medication Sig Start Date End Date Taking? Authorizing Provider   nitroglycerin (NITROBID) 2 % ointment Apply 1 Inch to affected area every six (6) hours. 8/14/15  Yes Virgilio Calvin MD   fentaNYL (DURAGESIC) 50 mcg/hr PATCH 1 Patch by TransDERmal route every seventy-two (72) hours. Mirna Hernandez MD   enalapril (VASOTEC) 5 mg tablet Take 5 mg by mouth daily. Phys MD Mary   Menthol-Zinc Oxide (RISAMINE) 0.44-20.6 % oint Apply  to affected area three (3) times daily. Historical Provider   citalopram (CELEXA) 40 mg tablet Take 40 mg by mouth daily. Historical Provider   melatonin 5 mg cap capsule Take 5 mg by mouth nightly. Historical Provider   isosorbide mononitrate (ISMO, MONOKET) 20 mg tablet Take 20 mg by mouth two (2) times a day. Historical Provider   loperamide (IMODIUM) 2 mg capsule Take 2 mg by mouth four (4) times daily as needed for Diarrhea. Historical Provider   promethazine (PHENERGAN) 12.5 mg tablet Take 12.5 mg by mouth every six (6) hours as needed for Nausea. Historical Provider   aspirin delayed-release 81 mg tablet Take 81 mg by mouth daily. Historical Provider   guaiFENesin (ROBAFEN) 100 mg/5 mL liquid Take 200 mg by mouth every six (6) hours as needed for Cough.     Historical Provider cloNIDine (CATAPRES) 0.2 mg/24 hr patch 1 Patch by TransDERmal route every Monday. Historical Provider   levothyroxine (SYNTHROID) 175 mcg tablet Take 175 mcg by mouth Daily (before breakfast). Historical Provider   furosemide (LASIX) 40 mg tablet Take 40 mg by mouth daily. Historical Provider   magnesium hydroxide (M[place] MILK OF MAGNESIA) 400 mg/5 mL suspension Take 30 mL by mouth daily as needed for Constipation. Historical Provider   bisacodyl (DULCOLAX, BISACODYL,) 10 mg suppository Insert 10 mg into rectum daily as needed. Historical Provider   multivitamin, tx-iron-ca-min (THERA-M W/ IRON) 9 mg iron-400 mcg tab tablet Take 1 Tab by mouth daily. Historical Provider   mometasone (NASONEX) 50 mcg/actuation nasal spray 2 Sprays by Both Nostrils route daily. Historical Provider   GLUCOSAMINE HCL/CHONDR MAY A NA (GLUCOSAMINE-CHONDROITIN) 750-600 mg tab Take 1 Tab by mouth two (2) times a day. Historical Provider   baclofen (LIORESAL) 10 mg tablet Take 10 mg by mouth three (3) times daily. Historical Provider   pantoprazole (PROTONIX) 20 mg tablet Take 20 mg by mouth daily. Historical Provider   predniSONE (DELTASONE) 10 mg tablet Take 10 mg by mouth daily. Historical Provider   ferrous sulfate (FEROSUL) 220 mg (44 mg iron)/5 mL elix Take 7.4 mL by mouth daily. Historical Provider   potassium chloride (KAON 10%) 20 mEq/15 mL solution Take 30 mEq by mouth daily. Quantity 22.5 mL     Historical Provider   acetaminophen (TYLENOL) 325 mg tablet Take 650 mg by mouth every six (6) hours as needed for Pain. Historical Provider   diclofenac (VOLTAREN) 1 % gel Apply 4 g to affected area two (2) times a day. For bilateral knee pain- apply thin layer topically to entire joint area    Historical Provider   labetalol (NORMODYNE) 200 mg tablet Take 200 mg by mouth two (2) times a day. Hold for heart rate less than 60 and advise provider.     Historical Provider   lidocaine (LIDODERM) 5 % 2 Patches by TransDERmal route every twenty-four (24) hours. Place Two patches to right trapezius region in morning. Remove patches 12 hours later. Apply 7AM Remove 7PM. 11/25/16   Priya Meadows MD   oxyCODONE-acetaminophen (PERCOCET) 5-325 mg per tablet Take 1 Tab by mouth every four (4) hours as needed for Pain. Max Daily Amount: 6 Tabs. 11/25/16   Priya Meadows MD   albuterol-ipratropium (DUO-NEB) 2.5 mg-0.5 mg/3 ml nebulizer solution 3 mL by Nebulization route every four (4) hours as needed for Wheezing.  8/14/15   Mark Nguyễn MD     Current Facility-Administered Medications   Medication Dose Route Frequency    dextrose 5 % - 0.45% NaCl infusion  75 mL/hr IntraVENous CONTINUOUS    levothyroxine (SYNTHROID) injection 87.5 mcg  87.5 mcg IntraVENous Q24H    chlorhexidine (PERIDEX) 0.12 % mouthwash 15 mL  15 mL Oral Q12H    cloNIDine (CATAPRES) 0.1 mg/24 hr patch 1 Patch  1 Patch TransDERmal Q7D    pantoprazole (PROTONIX) 40 mg in sodium chloride 0.9 % 10 mL injection  40 mg IntraVENous DAILY    insulin lispro (HUMALOG) injection   SubCUTAneous Q6H    guaiFENesin SR (MUCINEX) tablet 600 mg  600 mg Oral Q12H    albuterol-ipratropium (DUO-NEB) 2.5 MG-0.5 MG/3 ML  3 mL Nebulization QID RT    aspirin chewable tablet 81 mg  81 mg Per G Tube DAILY    baclofen (LIORESAL) tablet 10 mg  10 mg Oral TID    diclofenac (VOLTAREN) 1 % topical gel 4 g  4 g Topical BID    ferrous sulfate 300 mg (60 mg iron)/5 mL oral syrup   Oral DAILY    fluticasone (FLONASE) 50 mcg/actuation nasal spray 2 Spray  2 Spray Both Nostrils DAILY    multivitamin, tx-iron-ca-min (THERA-M w/ IRON) tablet 1 Tab  1 Tab Oral DAILY    levoFLOXacin (LEVAQUIN) 500 mg in D5W IVPB  500 mg IntraVENous Q24H    heparin (porcine) injection 5,000 Units  5,000 Units SubCUTAneous Q8H    methylPREDNISolone (PF) (SOLU-MEDROL) injection 40 mg  40 mg IntraVENous Q6H     Allergies   Allergen Reactions    Penicillins Itching      Social History   Substance Use Topics    Smoking status: Never Smoker    Smokeless tobacco: Not on file    Alcohol use No          Objective:   Vital Signs:    Visit Vitals    BP (!) 200/95    Pulse (!) 101    Temp 97.6 °F (36.4 °C)    Resp 28    Ht 5' 10.08\" (1.78 m)    Wt 89 kg (196 lb 3.4 oz)    SpO2 94%    BMI 28.09 kg/m2       O2 Device: Nasal cannula   O2 Flow Rate (L/min): 6 l/min   Temp (24hrs), Av °F (36.7 °C), Min:97.4 °F (36.3 °C), Max:98.6 °F (37 °C)       Intake/Output:   Last shift:      701 - 1900  In: -   Out: 375 [Urine:375]  Last 3 shifts: 1901 -  0700  In: 3606 [I.V.:3606]  Out: 2550 [Urine:2450]    Intake/Output Summary (Last 24 hours) at 17 1342  Last data filed at 17 1136   Gross per 24 hour   Intake             1745 ml   Output             1500 ml   Net              245 ml     Physical Exam:    General:  Intubated and sedated on versed drip follows commands. Head:  Normocephalic, without obvious abnormality,    Eyes:  Conjunctivae/corneas clear. PERRL,   Nose: Nares normal. Septum midline. Mucosa normal. No drainage or sinus tenderness. Throat: Lips, mucosa, and tongue normal.    Neck: Supple, symmetrical, trachea midline, no adenopathy, no carotid bruit and no JVD. Lungs:   Clear to auscultation bilaterally. Chest wall:  No tenderness or deformity. Heart:  Regular rate and rhythm, S1, S2 normal, no murmur, click, rub or gallop. Abdomen:   Soft, non-tender. Bowel sounds normal. No masses,  No organomegaly. Extremities: Extremities normal, atraumatic, no cyanosis or edema.                        Data:     Recent Results (from the past 24 hour(s))   GLUCOSE, POC    Collection Time: 17  5:34 PM   Result Value Ref Range    Glucose (POC) 171 (H) 70 - 110 mg/dL   POTASSIUM    Collection Time: 17  7:50 PM   Result Value Ref Range    Potassium 4.5 3.5 - 5.5 mmol/L   GLUCOSE, POC    Collection Time: 17 11:45 PM   Result Value Ref Range    Glucose (POC) 142 (H) 70 - 110 mg/dL   GLUCOSE, POC    Collection Time: 01/09/17  5:14 AM   Result Value Ref Range    Glucose (POC) 146 (H) 70 - 110 mg/dL   MAGNESIUM    Collection Time: 01/09/17  8:15 AM   Result Value Ref Range    Magnesium 2.5 (H) 1.8 - 2.4 mg/dL   GLUCOSE, POC    Collection Time: 01/09/17 11:50 AM   Result Value Ref Range    Glucose (POC) 157 (H) 70 - 110 mg/dL           Recent Labs      01/08/17   0557  01/07/17   1321  01/07/17   0545   FIO2I  50  50  0.5   HCO3I  20.1*  21.5*  21.8*   PCO2I  34.5*  34.5*  36.2   PHI  7.374  7.402  7.389   PO2I  84  76*  74*     Telemetry:normal sinus rhythm    Imaging:  I have personally reviewed the patients radiographs and have reviewed the reports:     Best practice :  Core measures; Antibiotic choice:  Severe Sepsis bundles;SIRS screen met? Yes  Fluids  Lactic acid ordered- initial and repeat Q6hrs if elevated till normalized. Cultures drawn. Antibiotic administered within 1hr-ICU  And 3hrs ED  Large bore IV- 2 sites          Pressors aim MAP >65mmHg  Steriods  Glycemic control  Mech. Ventilated patients- aim to keep peak plateau pressure 31-95TC H2O.   Sress ulcer prophylaxis  DVT prophylaxis            Total critical care time exclusive of procedures: 30 minutes  Eden Shore MD

## 2017-01-09 NOTE — PROGRESS NOTES
NUTRITION FOLLOW-UP    RECOMMENDATIONS/PLAN:   - Advance diet to 'Regular' with texture per SLP input, encourage adequate PO intake of meals as pt was NPO x4 days  - Add Ensure Enlive TID with meals (thickened to level recommended per SLP)    NUTRITION ASSESSMENT:   Client Update: 70 yrs old Male with aspiration pneumonia and respiratory failure extubated yesterday morning. Pt is s/p PEA arrest. Also with COPD exacerbation, TIFFANIE, and CHF in setting of previous anoxic head injury. Has dysphagia at baseline, anticipating SLP evaluation today to allow for diet advancement. Pt is NPO x4 days and does not appear to have received TF's during intubated period. FOOD/NUTRITION INTAKE   Diet Order:  NPO   Food Allergies: NKFA  IV Fluids: D5/0.45%NS at 75 mL/hr= 306 kcal/day   Pertinent Medications:  [x] Reviewed; FeSO4, MVI, ppi, solu-medrol, abx, nebulizers, synthroid  Electrolyte Replacement Protocol: [x]K []Mg []PO4 []Ca   Insulin:  [x] SSI     [] Pre-meal:      []  Basal:         Cultural/Islam Food Preferences: None Identified    BIOCHEMICAL DATA & MEDICAL TESTS  Pertinent Labs:  [x] Reviewed; POC -166, Na 153, BUN 57, Mg 2.5   ANTHROPOMETRICS  Height: 5' 10.08\" (178 cm)       Weight: 89 kg (196 lb 3.4 oz)         BMI: 28.1 kg/m^2 overweight (25.0%-29.9% BMI)   Adm Weight: 198 lbs                Weight change: N/A    NUTRITION-FOCUSED PHYSICAL ASSESSMENT  Skin: intact      GI: last BM 1/06    NUTRITION PRESCRIPTION  Calories: 3337-3037 kcal/day based on MSJ x1.4  Protein:  g/day based on 1-1.3 g/kg  CHO: 289 g/day based on 50% of total energy  Fluid: 3338-8520 ml/day based on 1 kcal/ml      NUTRITION DIAGNOSES:   1. Inadequate energy intake related to no source of nutrition as evidenced by pt receiving only 306 kcal/day from D5, NPO x4 days  - new    NUTRITION INTERVENTIONS:   INTERVENTIONS:        GOALS:  1.  Advance diet to 'Regular' with texture/consistency per SLP input      Add ONS- Ensure Vicky Re TID 1. Timely diet advancement, PO intake >50% meals/supplements, maintain body weight, prevent skin breakdown, BM q 1-3 days, POC -180 mg/dl  by next review 1-3 days     LEARNING NEEDS (Diet, Supplementation, Food/Nutrient-Drug Interaction):   [] None Identified     [] Education provided & documented        Identified and patient:   [] Declined      [x] Was not appropriate/indicated        NUTRITION MONITORING /EVALUATION:   Follow PO intake  Monitor wt  Monitor renal labs, electrolytes, fluid status  Monitor for additional supplement needs    Previous Recommendations Implemented: no        Previous Goals Met:  no   NUTRITION RISK:    [x] High                         [] Moderate                         []  Minimal/Uncompromised     [x] Participated in Interdisciplinary Rounds    [x] [address] [place] [address] Reviewed  [x] Discharge Nutrition Plan:  Texture per SLP    Will follow-up per High Risk policy.   Maisha Davis RD  PAGER:  [phone]

## 2017-01-09 NOTE — DIABETES MGMT
GLYCEMIC CONTROL & NUTRITION:          - Discussed in rounds and chart reviewed.  Known h/o DM, controlled  - steroids on board, POCT + Humalog corrective coverage orders in place, recommend continue  - BG currently within target range, requiring minimal corrective coverage  - TF was not initiated, has been NPO ~ 4 days, anticipate insulin needs will increase with nutrition on board, recommend watch trends  - see Clinical RD noted for full nutrition assessment    Recent Glucose Results:   Lab Results   Component Value Date/Time    GLUCPOC 146 (H) 01/09/2017 05:14 AM    GLUCPOC 142 (H) 01/08/2017 11:45 PM    GLUCPOC 171 (H) 01/08/2017 05:34 PM             Ludwig Mae, MPH, RD

## 2017-01-09 NOTE — PROGRESS NOTES
Hospitalist Progress Note    Patient: Federico Velazquez MRN: [id number]  CSN: [id number]    YOB: 1945  Age: 70 y.o. Sex: male    DOA: 1/3/2017 LOS:  LOS: 5 days                Assessment/Plan     Patient Active Problem List   Diagnosis Code    Cardiac arrest ([facility] I46.9    Anoxic encephalopathy ([facility] G93.1    Acute respiratory failure ([facility] J96.00    DM (diabetes mellitus) ([facility] G65.9    Diastolic congestive heart failure, NYHA class 1 ([facility]) I50.30    HTN (hypertension) I10    Hypoxia R09.02    COPD (chronic obstructive pulmonary disease) with acute bronchitis ([facility]) J44.0    TIFFANIE (acute kidney injury) ([facility] N17.9    Aspiration pneumonia ([facility] J69.0           71 yo WM with history of prior anoxic brain injury from NH, admitted for COPD exacerbation.      On 01/05/2017, patient had aspiration event and went into respiratory arrest and subsequently cardiac arrest. PEA arrest. CPR initiated and epi given. Patient intubated. He had ROSC. He is transferred to ICU.     Extubated 01/08/2017  Awake and answers to questions, follows command        CRITICAL CARE PLAN      Resp - on oxygen by NC.   COPD - continue solumedrol.      ID - Follow up blood and urine cx. ANTIBIOTICS - levaquin azactam.   Trend temps, wbc, LA in normal range.      CVS - Monitor HD. Stable hemodynamically.       Heme/onc - Follow H&H, plts. INR.     Renal - Trend BUN, Cr, follow I/O, linton in place. Check and replace Mg, K. TIFFANIE - on CKD3, creatine improving  Hypernatremia - free water flushes. Follow sodium levels      Endocrine - Follow FSG      Neuro - baseline neurologic deficit due to anoxic brain injury,       GI - SLP eval and diet per SLP.     Prophylaxis - DVT: heparin, GI: protonix                        Physical Exam:  General: As above.    HEENT: NC, Atraumatic. PERRLA, anicteric sclerae. Lungs: CTA Bilaterally. No Wheezing/Rhonchi/Rales.   Heart: Regular rhythm,  No murmur, No Rubs, No Gallops  Abdomen: Soft, Non distended, Non tender.  +Bowel sounds,   Extremities: No c/c/e  Psych:   Not anxious or agitated. Neurologic:  No acute neurological deficit. Vital signs/Intake and Output:  Visit Vitals    BP (!) 169/111 (BP 1 Location: Left arm, BP Patient Position: At rest;Head of bed elevated (Comment degrees))    Pulse 100    Temp 97.6 °F (36.4 °C)    Resp 30    Ht 5' 10.08\" (1.78 m)    Wt 89 kg (196 lb 3.4 oz)    SpO2 92%    BMI 28.09 kg/m2     Current Shift:  01/09 0701 - 01/09 1900  In: -   Out: 375 [Urine:375]  Last three shifts:  01/07 1901 - 01/09 0700  In: 3606 [I.V.:3606]  Out: 2550 [Urine:2450]            Labs: Results:       Chemistry Recent Labs      01/08/17   1950  01/08/17   0520  01/07/17   0513   GLU   --   170*  157*   NA   --   153*  149*   K  4.5  3.4*  3.8   CL   --   118*  112*   CO2   --   22  22   BUN   --   57*  74*   CREA   --   1.41*  2.04*   CA   --   8.6  8.6   AGAP   --   13  15   BUCR   --   40*  36*      CBC w/Diff Recent Labs      01/08/17   0520 01/07/17   0513   WBC  6.2  7.7   RBC  3.64*  3.80*   HGB  10.7*  10.7*   HCT  32.6*  33.9*   PLT  209  210   GRANS  90*  91*   LYMPH  7*  7*   EOS  0  0      Cardiac Enzymes No results for input(s): CPK, CKND1, MARCO A in the last 72 hours. No lab exists for component: CKRMB, TROIP   Coagulation No results for input(s): PTP, INR, APTT in the last 72 hours. No lab exists for component: INREXT    Lipid Panel No results found for: CHOL, CHOLPOCT, CHOLX, CHLST, CHOLV, [id number], HDL, LDL, NLDLCT, DLDL, LDLC, DLDLP, [id number], VLDLC, VLDL, TGL, TGLX, TRIGL, [id number], TRIGP, TGLPOCT, [id number], CHHD, CHHDX   BNP No results for input(s): BNPP in the last 72 hours. Liver Enzymes No results for input(s): TP, ALB, TBIL, AP, SGOT, GPT in the last 72 hours.     No lab exists for component: DBIL   Thyroid Studies Lab Results   Component Value Date/Time    TSH 1.37 08/13/2015 04:35 AM        Procedures/imaging: see electronic medical records for all procedures/Xrays and details which were not copied into this note but were reviewed prior to creation of Plan

## 2017-01-09 NOTE — WOUND CARE
Pt turned and assessed by wound care. Skin remains intact at this time. Wound care will continue to follow pt daily while in ICU.

## 2017-01-09 NOTE — PROGRESS NOTES
Speech Therapy Screening:    [facility] therapy based on results obtained during the nursing admission assessment. The patients chart was reviewed and the patient is appropriate for a skilled therapy evaluation. Order for formal swallow evaluation has been received and will be completed later this day. Thank you.     Mohan Brewster, SLP

## 2017-01-09 NOTE — PROGRESS NOTES
0715  Report received from LANNY Horvath RN. Assumed patient care. 200  Patient's wife called, updated on patient condition. 1119  Labetalol given per [facility] for SBP of 200.    1400  Patient seen by SLP for swallow eval.  SLP recommends patient remain strict NPO with exception of oral care swabs. 1600  Patient's wife called, updated on patient condition, states that she is hopeful that she will be able to get in tomorrow to visit patient. 1720  Hydralazine administered per [facility] for SBP >200.    1725  Called RT as patient's O2 sats had dropped. RT came and placed patient back on bipap. 1930  Bedside and Verbal shift change report given to Pablo Craven RN (oncoming nurse) by Lorie Trinh. Cl Zheng RN (offgoing nurse).  Report included the following information SBAR, Kardex, Procedure Summary, Intake/Output, MAR and Cardiac Rhythm SR.

## 2017-01-09 NOTE — PROGRESS NOTES
Patient placed back on Bipap because Spo2 of 90%.  Spo2 increased to 97% within 30 seconds of being on Bipap

## 2017-01-10 ENCOUNTER — APPOINTMENT (OUTPATIENT)
Dept: GENERAL RADIOLOGY | Age: 72
DRG: 207 | End: 2017-01-10
Attending: INTERNAL MEDICINE
Payer: MEDICARE

## 2017-01-10 LAB
ARTERIAL PATENCY WRIST A: YES
BACTERIA SPEC CULT: NORMAL
BASE DEFICIT BLD-SCNC: 4 MMOL/L
BDY SITE: ABNORMAL
BODY TEMPERATURE: 98.8
GAS FLOW.O2 O2 DELIVERY SYS: ABNORMAL L/MIN
GLUCOSE BLD STRIP.AUTO-MCNC: 158 MG/DL (ref 70–110)
GLUCOSE BLD STRIP.AUTO-MCNC: 164 MG/DL (ref 70–110)
GLUCOSE BLD STRIP.AUTO-MCNC: 176 MG/DL (ref 70–110)
GRAM STN SPEC: NORMAL
HCO3 BLD-SCNC: 21.4 MMOL/L (ref 22–26)
MAGNESIUM SERPL-MCNC: 2.4 MG/DL (ref 1.8–2.4)
O2/TOTAL GAS SETTING VFR VENT: 45 %
PCO2 BLD: 35.1 MMHG (ref 35–45)
PEEP RESPIRATORY: 6 CMH2O
PH BLD: 7.39 [PH] (ref 7.35–7.45)
PIP ISTAT,IPIP: 14
PO2 BLD: 73 MMHG (ref 80–100)
PRESSURE SUPPORT SETTING VENT: 8 CMH2O
SAO2 % BLD: 94 % (ref 92–97)
SERVICE CMNT-IMP: ABNORMAL
SERVICE CMNT-IMP: NORMAL
SPECIMEN TYPE: ABNORMAL
TOTAL RESP. RATE, ITRR: 30

## 2017-01-10 PROCEDURE — 36600 WITHDRAWAL OF ARTERIAL BLOOD: CPT

## 2017-01-10 PROCEDURE — 65610000006 HC RM INTENSIVE CARE

## 2017-01-10 PROCEDURE — 74011250637 HC RX REV CODE- 250/637: Performed by: INTERNAL MEDICINE

## 2017-01-10 PROCEDURE — 77010033678 HC OXYGEN DAILY

## 2017-01-10 PROCEDURE — 82962 GLUCOSE BLOOD TEST: CPT

## 2017-01-10 PROCEDURE — 94640 AIRWAY INHALATION TREATMENT: CPT

## 2017-01-10 PROCEDURE — 74011250636 HC RX REV CODE- 250/636: Performed by: INTERNAL MEDICINE

## 2017-01-10 PROCEDURE — 83735 ASSAY OF MAGNESIUM: CPT | Performed by: INTERNAL MEDICINE

## 2017-01-10 PROCEDURE — 71010 XR CHEST PORT: CPT

## 2017-01-10 PROCEDURE — 74011000250 HC RX REV CODE- 250: Performed by: INTERNAL MEDICINE

## 2017-01-10 PROCEDURE — 74011000258 HC RX REV CODE- 258: Performed by: INTERNAL MEDICINE

## 2017-01-10 PROCEDURE — 36415 COLL VENOUS BLD VENIPUNCTURE: CPT | Performed by: INTERNAL MEDICINE

## 2017-01-10 PROCEDURE — C9113 INJ PANTOPRAZOLE SODIUM, VIA: HCPCS | Performed by: INTERNAL MEDICINE

## 2017-01-10 PROCEDURE — 94003 VENT MGMT INPAT SUBQ DAY: CPT

## 2017-01-10 PROCEDURE — 74011636637 HC RX REV CODE- 636/637: Performed by: INTERNAL MEDICINE

## 2017-01-10 PROCEDURE — 82803 BLOOD GASES ANY COMBINATION: CPT

## 2017-01-10 PROCEDURE — 74011250636 HC RX REV CODE- 250/636: Performed by: FAMILY MEDICINE

## 2017-01-10 RX ORDER — LIDOCAINE 50 MG/G
1 PATCH TOPICAL EVERY 24 HOURS
Status: DISCONTINUED | OUTPATIENT
Start: 2017-01-10 | End: 2017-01-12

## 2017-01-10 RX ADMIN — LABETALOL HYDROCHLORIDE: 5 INJECTION, SOLUTION INTRAVENOUS at 14:36

## 2017-01-10 RX ADMIN — HYDRALAZINE HYDROCHLORIDE 20 MG: 20 INJECTION INTRAMUSCULAR; INTRAVENOUS at 08:34

## 2017-01-10 RX ADMIN — IPRATROPIUM BROMIDE AND ALBUTEROL SULFATE 3 ML: .5; 3 SOLUTION RESPIRATORY (INHALATION) at 11:34

## 2017-01-10 RX ADMIN — IPRATROPIUM BROMIDE AND ALBUTEROL SULFATE 3 ML: .5; 3 SOLUTION RESPIRATORY (INHALATION) at 15:13

## 2017-01-10 RX ADMIN — SODIUM CHLORIDE 40 MG: 9 INJECTION, SOLUTION INTRAMUSCULAR; INTRAVENOUS; SUBCUTANEOUS at 08:38

## 2017-01-10 RX ADMIN — BACLOFEN 10 MG: 10 TABLET ORAL at 08:43

## 2017-01-10 RX ADMIN — METHYLPREDNISOLONE SODIUM SUCCINATE 40 MG: 40 INJECTION, POWDER, FOR SOLUTION INTRAMUSCULAR; INTRAVENOUS at 02:25

## 2017-01-10 RX ADMIN — IPRATROPIUM BROMIDE AND ALBUTEROL SULFATE 3 ML: .5; 3 SOLUTION RESPIRATORY (INHALATION) at 08:08

## 2017-01-10 RX ADMIN — LABETALOL HYDROCHLORIDE 20 MG: 5 INJECTION, SOLUTION INTRAVENOUS at 17:45

## 2017-01-10 RX ADMIN — LEVOFLOXACIN 500 MG: 5 INJECTION, SOLUTION INTRAVENOUS at 14:26

## 2017-01-10 RX ADMIN — LABETALOL HYDROCHLORIDE 20 MG: 5 INJECTION, SOLUTION INTRAVENOUS at 04:30

## 2017-01-10 RX ADMIN — METHYLPREDNISOLONE SODIUM SUCCINATE 40 MG: 125 INJECTION, POWDER, FOR SOLUTION INTRAMUSCULAR; INTRAVENOUS at 13:30

## 2017-01-10 RX ADMIN — DEXTROSE MONOHYDRATE AND SODIUM CHLORIDE 75 ML/HR: 5; .45 INJECTION, SOLUTION INTRAVENOUS at 17:44

## 2017-01-10 RX ADMIN — HYDRALAZINE HYDROCHLORIDE 20 MG: 20 INJECTION INTRAMUSCULAR; INTRAVENOUS at 00:21

## 2017-01-10 RX ADMIN — IPRATROPIUM BROMIDE AND ALBUTEROL SULFATE 3 ML: .5; 3 SOLUTION RESPIRATORY (INHALATION) at 20:07

## 2017-01-10 RX ADMIN — HEPARIN SODIUM 5000 UNITS: 5000 INJECTION, SOLUTION INTRAVENOUS; SUBCUTANEOUS at 17:48

## 2017-01-10 RX ADMIN — LEVOTHYROXINE SODIUM ANHYDROUS 87.5 MCG: 100 INJECTION, POWDER, LYOPHILIZED, FOR SOLUTION INTRAVENOUS at 10:13

## 2017-01-10 RX ADMIN — METHYLPREDNISOLONE SODIUM SUCCINATE 40 MG: 125 INJECTION, POWDER, FOR SOLUTION INTRAMUSCULAR; INTRAVENOUS at 20:53

## 2017-01-10 RX ADMIN — GUAIFENESIN 600 MG: 600 TABLET, EXTENDED RELEASE ORAL at 08:43

## 2017-01-10 RX ADMIN — INSULIN LISPRO 2 UNITS: 100 INJECTION, SOLUTION INTRAVENOUS; SUBCUTANEOUS at 17:46

## 2017-01-10 RX ADMIN — METHYLPREDNISOLONE SODIUM SUCCINATE 40 MG: 125 INJECTION, POWDER, FOR SOLUTION INTRAMUSCULAR; INTRAVENOUS at 08:42

## 2017-01-10 RX ADMIN — HEPARIN SODIUM 5000 UNITS: 5000 INJECTION, SOLUTION INTRAVENOUS; SUBCUTANEOUS at 08:41

## 2017-01-10 RX ADMIN — BACLOFEN 10 MG: 10 TABLET ORAL at 17:49

## 2017-01-10 RX ADMIN — MULTIPLE VITAMINS W/ MINERALS TAB 1 TABLET: TAB at 08:41

## 2017-01-10 RX ADMIN — HYDRALAZINE HYDROCHLORIDE 20 MG: 20 INJECTION INTRAMUSCULAR; INTRAVENOUS at 21:15

## 2017-01-10 RX ADMIN — CHLORHEXIDINE GLUCONATE 15 ML: 1.2 RINSE ORAL at 08:37

## 2017-01-10 RX ADMIN — MINERAL SUPPLEMENT IRON 300 MG / 5 ML STRENGTH LIQUID 100 PER BOX UNFLAVORED 300 MG: at 08:40

## 2017-01-10 RX ADMIN — FLUTICASONE PROPIONATE 2 SPRAY: 50 SPRAY, METERED NASAL at 08:53

## 2017-01-10 RX ADMIN — INSULIN LISPRO 2 UNITS: 100 INJECTION, SOLUTION INTRAVENOUS; SUBCUTANEOUS at 06:03

## 2017-01-10 RX ADMIN — INSULIN LISPRO 2 UNITS: 100 INJECTION, SOLUTION INTRAVENOUS; SUBCUTANEOUS at 13:25

## 2017-01-10 RX ADMIN — ASPIRIN 81 MG 81 MG: 81 TABLET ORAL at 08:35

## 2017-01-10 RX ADMIN — CHLORHEXIDINE GLUCONATE 15 ML: 1.2 RINSE ORAL at 20:53

## 2017-01-10 NOTE — PROGRESS NOTES
[facility]  Pulmonary, Critical Care, and Sleep Medicine    Name: Renee Figueroa MRN: [id number]   : 1945 Hospital: [facility] [place]   Date: 1/10/2017        Critical Care Initial Patient Consult    Requesting MD:                                                  Reason for CC Consult:  IMPRESSION:   · Aspiration Pneumonia  · Acute hypoxic Respiratory Failure from above. · Previous anoxic head encephalopathy   · Diastolic CHF at baseline  · Sp PEA arrest  · COPD exacerbation  · TIFFANIE      RECOMMENDATIONS:   · Neuro-    Alert and appropriate  · Cards- hemodynamically stable now. Resp arrest led to cardiac arrest 1 mg epi given and 1 minute of CPR with ROSC and intubation last evening. History of diastolic dysfunction restarted hypertensive medications clonidine and labetalol as needed  · Pulm-  Admitted with COPD exacerbation on steroids will continue prn bilevel for now  · Renal-  Worsening renal function with electrolyte replacement. IV fluids start enteral feeds  · Heme- H/H and plt stable  · ID- levaquin,   aspiration pneumonia food seen at cords  · DVT prophylaxis  · GI prophylaxis  · Likely will need PEG. Subjective/History:   1/10  This am acute hypoxic back on bilevel mask ventilation. I have changed the setting to PEEP of 8 with ps of 10. Feeling better. Sig secretions per RT. NO pain. HPI  This patient has been seen and evaluated at the request of Dr. Johan Land for aspiration pneumonia and resp failure. The patient is a 70 y.o. male admitted 3  with COPD exacerbation to the medical floor. Last evening vomited noticed to be hypoxic an dactually lost pulse. 1 Mg epi with ROSC and intubated for cyanosis. Moved to ICU discussed with family and wife should be here this afternoon. In SNF prior to this admission and has diastolic dysfunction at baseline.   Currently stable on vent    The patient is critically ill and can not provide additional history due to intubation and sedation     Past Medical History   Diagnosis Date    [facility]     Bursitis     CAD (coronary artery disease)     Cardiac arrest (HCC)     Cognitive communication deficit     Contracture of muscle     Depression     Diabetes ([facility]     Diarrhea     Disorder of kidney and ureter     Dysphagia     GERD (gastroesophageal reflux disease)     High cholesterol     Hypertension     Hypothyroid     Lack of coordination     Polyarthritis     Sleep apnea     Sleep disorder     Weakness       Past Surgical History   Procedure Laterality Date    Hx gi       peg    Hx amputation      Hx hernia repair        Prior to Admission medications    Medication Sig Start Date End Date Taking? Authorizing Provider   nitroglycerin (NITROBID) 2 % ointment Apply 1 Inch to affected area every six (6) hours. 8/14/15  Yes Virgilio Calvin MD   fentaNYL (DURAGESIC) 50 mcg/hr PATCH 1 Patch by TransDERmal route every seventy-two (72) hours. Mirna Hernandez MD   enalapril (VASOTEC) 5 mg tablet Take 5 mg by mouth daily. Mirna Hernandez MD   Menthol-Zinc Oxide (RISAMINE) 0.44-20.6 % oint Apply  to affected area three (3) times daily. Historical Provider   citalopram (CELEXA) 40 mg tablet Take 40 mg by mouth daily. Historical Provider   melatonin 5 mg cap capsule Take 5 mg by mouth nightly. Historical Provider   isosorbide mononitrate (ISMO, MONOKET) 20 mg tablet Take 20 mg by mouth two (2) times a day. Historical Provider   loperamide (IMODIUM) 2 mg capsule Take 2 mg by mouth four (4) times daily as needed for Diarrhea. Historical Provider   promethazine (PHENERGAN) 12.5 mg tablet Take 12.5 mg by mouth every six (6) hours as needed for Nausea. Historical Provider   aspirin delayed-release 81 mg tablet Take 81 mg by mouth daily. Historical Provider   guaiFENesin (ROBAFEN) 100 mg/5 mL liquid Take 200 mg by mouth every six (6) hours as needed for Cough.     Historical Provider cloNIDine (CATAPRES) 0.2 mg/24 hr patch 1 Patch by TransDERmal route every Monday. Historical Provider   levothyroxine (SYNTHROID) 175 mcg tablet Take 175 mcg by mouth Daily (before breakfast). Historical Provider   furosemide (LASIX) 40 mg tablet Take 40 mg by mouth daily. Historical Provider   magnesium hydroxide (MCCAULEY MILK OF MAGNESIA) 400 mg/5 mL suspension Take 30 mL by mouth daily as needed for Constipation. Historical Provider   bisacodyl (DULCOLAX, BISACODYL,) 10 mg suppository Insert 10 mg into rectum daily as needed. Historical Provider   multivitamin, tx-iron-ca-min (THERA-M W/ IRON) 9 mg iron-400 mcg tab tablet Take 1 Tab by mouth daily. Historical Provider   mometasone (NASONEX) 50 mcg/actuation nasal spray 2 Sprays by Both Nostrils route daily. Historical Provider   GLUCOSAMINE HCL/CHONDR MAY A NA (GLUCOSAMINE-CHONDROITIN) 750-600 mg tab Take 1 Tab by mouth two (2) times a day. Historical Provider   baclofen (LIORESAL) 10 mg tablet Take 10 mg by mouth three (3) times daily. Historical Provider   pantoprazole (PROTONIX) 20 mg tablet Take 20 mg by mouth daily. Historical Provider   predniSONE (DELTASONE) 10 mg tablet Take 10 mg by mouth daily. Historical Provider   ferrous sulfate (FEROSUL) 220 mg (44 mg iron)/5 mL elix Take 7.4 mL by mouth daily. Historical Provider   potassium chloride (KAON 10%) 20 mEq/15 mL solution Take 30 mEq by mouth daily. Quantity 22.5 mL     Historical Provider   acetaminophen (TYLENOL) 325 mg tablet Take 650 mg by mouth every six (6) hours as needed for Pain. Historical Provider   diclofenac (VOLTAREN) 1 % gel Apply 4 g to affected area two (2) times a day. For bilateral knee pain- apply thin layer topically to entire joint area    Historical Provider   labetalol (NORMODYNE) 200 mg tablet Take 200 mg by mouth two (2) times a day. Hold for heart rate less than 60 and advise provider.     Historical Provider   lidocaine (LIDODERM) 5 % 2 Patches by TransDERmal route every twenty-four (24) hours. Place Two patches to right trapezius region in morning. Remove patches 12 hours later. Apply 7AM Remove 7PM. 11/25/16   Chloé Roman MD   oxyCODONE-acetaminophen (PERCOCET) 5-325 mg per tablet Take 1 Tab by mouth every four (4) hours as needed for Pain. Max Daily Amount: 6 Tabs. 11/25/16   Chloé Roman MD   albuterol-ipratropium (DUO-NEB) 2.5 mg-0.5 mg/3 ml nebulizer solution 3 mL by Nebulization route every four (4) hours as needed for Wheezing.  8/14/15   Arti Mena MD     Current Facility-Administered Medications   Medication Dose Route Frequency    methylPREDNISolone (PF) (SOLU-MEDROL) injection 40 mg  40 mg IntraVENous Q6H    dextrose 5 % - 0.45% NaCl infusion  75 mL/hr IntraVENous CONTINUOUS    levothyroxine (SYNTHROID) injection 87.5 mcg  87.5 mcg IntraVENous Q24H    chlorhexidine (PERIDEX) 0.12 % mouthwash 15 mL  15 mL Oral Q12H    cloNIDine (CATAPRES) 0.1 mg/24 hr patch 1 Patch  1 Patch TransDERmal Q7D    pantoprazole (PROTONIX) 40 mg in sodium chloride 0.9 % 10 mL injection  40 mg IntraVENous DAILY    insulin lispro (HUMALOG) injection   SubCUTAneous Q6H    guaiFENesin SR (MUCINEX) tablet 600 mg  600 mg Oral Q12H    albuterol-ipratropium (DUO-NEB) 2.5 MG-0.5 MG/3 ML  3 mL Nebulization QID RT    aspirin chewable tablet 81 mg  81 mg Per G Tube DAILY    baclofen (LIORESAL) tablet 10 mg  10 mg Oral TID    diclofenac (VOLTAREN) 1 % topical gel 4 g  4 g Topical BID    ferrous sulfate 300 mg (60 mg iron)/5 mL oral syrup   Oral DAILY    fluticasone (FLONASE) 50 mcg/actuation nasal spray 2 Spray  2 Spray Both Nostrils DAILY    multivitamin, tx-iron-ca-min (THERA-M w/ IRON) tablet 1 Tab  1 Tab Oral DAILY    levoFLOXacin (LEVAQUIN) 500 mg in D5W IVPB  500 mg IntraVENous Q24H    heparin (porcine) injection 5,000 Units  5,000 Units SubCUTAneous Q8H     Allergies   Allergen Reactions    Penicillins Itching      Social History   Substance Use Topics    Smoking status: Never Smoker    Smokeless tobacco: Not on file    Alcohol use No          Objective:   Vital Signs:    Visit Vitals    BP (!) 219/103    Pulse (!) 114    Temp 99.7 °F (37.6 °C)    Resp 25    Ht 5' 10.08\" (1.78 m)    Wt 89 kg (196 lb 3.4 oz)    SpO2 93%    BMI 28.09 kg/m2       O2 Device: Nasal cannula   O2 Flow Rate (L/min): 6 l/min   Temp (24hrs), Av.4 °F (36.9 °C), Min:97.6 °F (36.4 °C), Max:99.7 °F (37.6 °C)       Intake/Output:   Last shift:         Last 3 shifts:  1901 - 01/10 0700  In: 956.3 [I.V.:956.3]  Out: 2525 [Urine:2525]    Intake/Output Summary (Last 24 hours) at 01/10/17 1024  Last data filed at 01/10/17 4639   Gross per 24 hour   Intake                0 ml   Output             1625 ml   Net            -1625 ml     Physical Exam:    General:  Intubated and sedated on versed drip follows commands. Head:  Normocephalic, without obvious abnormality,    Eyes:  Conjunctivae/corneas clear. PERRL,   Nose: Nares normal. Septum midline. Mucosa normal. No drainage or sinus tenderness. Throat: Lips, mucosa, and tongue normal.    Neck: Supple, symmetrical, trachea midline, no adenopathy, no carotid bruit and no JVD. Lungs:   Clear to auscultation bilaterally. Chest wall:  No tenderness or deformity. Heart:  Regular rate and rhythm, S1, S2 normal, no murmur, click, rub or gallop. Abdomen:   Soft, non-tender. Bowel sounds normal. No masses,  No organomegaly. Extremities: Extremities normal, atraumatic, no cyanosis or edema.                        Data:     Recent Results (from the past 24 hour(s))   GLUCOSE, POC    Collection Time: 17 11:50 AM   Result Value Ref Range    Glucose (POC) 157 (H) 70 - 110 mg/dL   GLUCOSE, POC    Collection Time: 17  5:41 PM   Result Value Ref Range    Glucose (POC) 166 (H) 70 - 110 mg/dL   GLUCOSE, POC    Collection Time: 17 11:21 PM   Result Value Ref Range    Glucose (POC) 166 (H) 70 - 110 mg/dL   POC G3 Collection Time: 01/10/17  4:53 AM   Result Value Ref Range    Device: BIPAP      FIO2 (POC) 45 %    pH (POC) 7.392 7.35 - 7.45      pCO2 (POC) 35.1 35.0 - 45.0 MMHG    pO2 (POC) 73 (L) 80 - 100 MMHG    HCO3 (POC) 21.4 (L) 22 - 26 MMOL/L    sO2 (POC) 94 92 - 97 %    Base deficit (POC) 4 mmol/L    PEEP/CPAP (POC) 6 cmH2O    PIP (POC) 14      Pressure support 8 cmH2O    Allens test (POC) YES      Total resp. rate 30      Site LEFT RADIAL      Patient temp. 98.8      Specimen type (POC) ARTERIAL      Performed by Lakshmi Oglesby    MAGNESIUM    Collection Time: 01/10/17  5:12 AM   Result Value Ref Range    Magnesium 2.4 1.8 - 2.4 mg/dL   GLUCOSE, POC    Collection Time: 01/10/17  5:54 AM   Result Value Ref Range    Glucose (POC) 158 (H) 70 - 110 mg/dL           Recent Labs      01/10/17   0453  01/08/17   0557  01/07/17   1321   FIO2I  45  50  50   HCO3I  21.4*  20.1*  21.5*   PCO2I  35.1  34.5*  34.5*   PHI  7.392  7.374  7.402   PO2I  73*  84  76*     Telemetry:normal sinus rhythm    Imaging:  I have personally reviewed the patients radiographs and have reviewed the reports:     Best practice :  Core measures; Antibiotic choice:  Severe Sepsis bundles;SIRS screen met? Yes  Fluids  Lactic acid ordered- initial and repeat Q6hrs if elevated till normalized. Cultures drawn. Antibiotic administered within 1hr-ICU  And 3hrs ED  Large bore IV- 2 sites          Pressors aim MAP >65mmHg  Steriods  Glycemic control  Mech. Ventilated patients- aim to keep peak plateau pressure 27-87FI H2O.   Sress ulcer prophylaxis  DVT prophylaxis            Total critical care time exclusive of procedures: 30 minutes  Maricarmen Martinez MD

## 2017-01-10 NOTE — PROGRESS NOTES
conducted a Follow up consultation and Spiritual Assessment for Arash Campbell, who is a 70 y.o.,male. Patient was in good spirits. The  provided the following Interventions:  Continued the relationship of care and support. Listened empathically. Offered prayer and assurance of continued prayer on patients behalf. Chart reviewed. The following outcomes were achieved:  Patient expressed gratitude for Spiritual Care visit. Patient was reviewed in ICU Interdisciplinary Rounds. Assessment:  There are no further spiritual or Sikh issues which require Spiritual Care Services intervention at this time. Plan:  Chaplains will continue to follow and will provide pastoral care as needed or requested.  recommends bedside caregivers page the  on duty if patient shows signs of acute spiritual or emotional distress. [facility] [place] [id number].    Board Certified   [phone] - Office

## 2017-01-10 NOTE — PROGRESS NOTES
Initial assessment completed. Arrousable & squeezed both hands slightlly. Was just changed from bipap to n/c @ 6l/m by resp, therapist. Monitor shows low st w/out ectopy. Afebrile. .    1000- B/p better after prn labetalol. Unable to tolerate w/out bipap. 1200- Assessment no changed. Needs frequent nasal suctioning. Wife visited. 1300- Dr Shereen Crowley visited & spoke w/ wife. .    1400- Urine output fair amount. 1600- Assessnment unchanged. Prn med for b/p being give. Toerating bipap. Suction nasally & orall prn. Monitor remained low sinus tach . Temp 100.6 axilla. 1800- Condition no changed. Maintained npo for now. Frequent oral care & repositioning. .    1930- Bedside and Verbal shift change report given to [id number] (oncoming nurse) by Zulay Anderson RN (offgoing nurse). Report included the following information SBAR, Kardex, ED Summary, Intake/Output, MAR and Recent Results.

## 2017-01-10 NOTE — PROGRESS NOTES
Problem: Dysphagia (Adult)  Goal: *Acute Goals and Plan of Care (Insert Text)    Rec: NPO except oral care via oral care swab q 4 hours  Strict aspiration/reflux precautions; HOB >30 at all times   Alternate means of nutrition/hydration    Patient will:  1. Utilize compensatory swallow maneuvers (decrease bolus size, decrease rate of intake, cyclical ingestion, etc.) to increase swallow function/safety with min visual, verbal and/or tactile cues in 4/5 trials. 2. Tolerate PO trials utilizing compensatory swallow techniques (decrease bolus size, decrease rate of intake, cyclical ingestion, etc.) without overt s/sx aspiration/distress in 4/5 trials with min visual, verbal, and/or tactile cues    3. Perform restorative oropharyngeal exercises and swallow maneuvers (supraglottic swallow, Mendelson maneuver, effortful swallow, OMEs etc.) to increase oropharyngeal strength/awareness/agility, tongue base retraction, hyolaryngeal excursion, airway protection, and/or bolus clearance with min visual, verbal and/or tactile cues in 4/5 trials. 4. Demonstrate the ability to adequately self-monitor swallowing skills and perform appropriate compensatory techniques to reduce s/sx of aspiration and to safely consume least-restrictive diet with min verbal, visual and/or tactile cues in 4/5 trials. 5. Complete an objective measure of swallow fxn (i.e., MBSS) to assess oropharyngeal anatomy/physiology for determination of safest least-restrictive diet and/or appropriate rehabilitation techniques. Outcome: Not Progressing Towards Goal  Speech-Language Pathology: Follow up diagnostic dysphagia therapy attempted; however, upon completion of chart review, pt not appropriate for SLP intervention at this time.   Currently, patient is:     [ ]  Nausea/vomiting  [ ]  RN Communication/ suggestion  [ ]  Extreme Pain  [ ]  Dialysis treatment in progress  [ ]  Tolerating current po diet (per RN report)  [ ]  Tolerating current po diet; however, poor po intake (per Rn report)    [ ]  Receiving nutrition via tube feeding  [ ]  Nichol Delfino, or difficult to arouse for safe po trials   [ ]  Unable to manage secretions  [X]  Receiving intervention for respiratory distress- on Bi-pap   [ ]  <6 hours s/p extubation   [ ]   Other:      Will follow up next day as patient's schedule allows and as patient appropriate. Thank you.      Shanta Whitley M.A., [address] [facility], [place], operated by [facility]

## 2017-01-10 NOTE — PROGRESS NOTES
@0552 pt taken over awake and alert in bed oriented to self and place. Denies pain at present. Vital signs fluctuates. Remains on bypap. Ac remains insitu draining clear yellow urine. Assessment done and documented in appropriate flow sheets. Nursing management continues. @2206 bp elevated antihypertensive administered as prescribed observation continues. @0021 pb remains elevated medication administered as ordered nursing care continues. @18 pt wife called produced pass code brief update given as requested . Nursing management continues. @0400 pt in no distress resting comfortably denies pain when asleep care continues. @0600 pt awake alert denies pain  BP remains elevated medication being administered as prescribed care continues. @0750 Bedside and Verbal shift change report given to [address] [place] [address] (oncoming nurse) by Clarence Roth (offgoing nurse). Report included the following information SBAR, Kardex, Intake/Output, MAR, Recent Results and Med Rec Status.    Alarm parameters reviewed, on and audible Appropriate for patient clinical condition

## 2017-01-10 NOTE — ROUTINE PROCESS
EMR entered and reviewed by Professional  for the purpose of chart review in the course of performing educational functions and responsibilities related to performance improvement.

## 2017-01-10 NOTE — PROGRESS NOTES
NUTRITION UPDATE    - Discussed in IDR, pt to remain NPO per SLP recommendation, today is day 5 without nutrition.   - Recommend PEG placement for administration of tube feeds to maintain nutritional status. Will continue to follow per high risk policy.   Jose Raul Hernández, JERONIMO  PAGER:  [phone]

## 2017-01-10 NOTE — PROGRESS NOTES
Patient deep suctioned x3 for large amount of yellow thick sputum. Spo2 92% BS Rhonchi. Post suction 96% and coarse. Patient on 6lpm nc

## 2017-01-10 NOTE — PROGRESS NOTES
Patient taken off Bipap for NTS and moistened mouth. NTS for moderate amount of thick tan secretions.

## 2017-01-10 NOTE — WOUND CARE
Pt seen by wound care. No change in skin assessment. Wound care will continue to follow pt daily while in ICU.

## 2017-01-10 NOTE — CONSULTS
[address] [place] [address] Rd    Name:  Colletta Minium  MR#:  [id number]  :  1945  Account #:  [de-identified]  Date of Adm:  2017  Date of Consultation:  01/10/2017      A 68-year-old male who was admitted on 2017 because of  aspiration pneumonia. The patient was transferred from the nursing  home to here. 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 At that time, he had a PEG tube inserted and was removed in 2016, where at that time, he was able to eat by himself reasonably well  and he did well until at that time when he was admitted when he was  aspirated and had a severe pneumonia. The patient got transferred  yesterday night to the ICU because of exacerbation of his respiratory  status where almost he has a respiratory arrest, but was manageable  with a CVAP mask. The patient apparently was having a significant  amount of thick yellowish bronchial secretions that kept coming up. The patient has not eaten since last Thursday when he arrived, but  prior to this, apparently he used to eat well. Dr. Slim Lazaro, the intensivist, in  the ICU asked me to see him to insert a PEG tube to feed him. This patient has been confined to bed since the time he had anoxic  encephalopathy in 2015, but he understands and he is able to  communicate. He does not get up. He is able to eat with some help. He is incontinent of stools and urine. He wears diapers. He usually has  a couple of bowel movements every day. He already had a negative  colonoscopy by Dr. Clemente Glass a couple of years ago in . He never  smoked or abused alcohol. 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 He has coronary artery disease with  cardiac arrest in 2015, diabetes mellitus, obesity, diastolic  congestive heart failure, hypertension, asbestosis.  He has some  cognitive communication deficit after his anoxic encephalopathy. He has hypothyroidism, lack of coordination, polyarthritis, sleep apnea,  and weakness. He has a history of hernia repair, history of PEG  insertion between 08/2015 to 04/2016. He was never a smoker. ALLERGIES: PENICILLIN. MEDICATIONS AT HOME  1. Risamine ointment. 2. Celexa 40 mg.  3. Melatonin 5 mg.  4. Isosorbide mononitrate 20 mg tablets b.i.d.  5. Imodium 2 mg 4 times a day p.r.n.  6. Phenergan 12.5 mg every 6 hours p.r.n.  7. Aspirin 81 mg.  8. Robitussin 200 mg by mouth every 6 hours p.r.n. cough. 9. Catapres 0.2 mg patch every week. 10. Nitrobid 2% ointment 1 inch every 6 hours. 11. Duragesic 50 mcg patch every 72 hours. 12. Vasotec 5 mg. 13. Synthroid 175 mcg. 14. Lasix 40.  15. Magnesium hydroxide 30 mL as needed. 16. Dulcolax suppositories 10 mg as needed. 17. Multivitamins with iron. 18. Nasonex 50 mcg 2 sprays daily more. 19. Glucosamine. 20. Baclofen 10 mg tablets 3 times a day. 21. Protonix 20 mg daily. 22. Prednisone 10 mg daily. 23. Ferrous sulfate 220 mg liquid form daily. 24. Potassium chloride 30 mEq daily. 25. Tylenol p.r.n.  26. Voltaren gel 1%. 27. Labetalol 200 mg tablets b.i.d.  28. Presently on hold - Percocet p.r.n., DuoNeb p.r.n. The patient apparently does not have anymore cardiac event. He has  no history of stroke. He still has problem with his breathing. He is still  on the VPAP, but saturating at 97%. PHYSICAL EXAMINATION  VITAL SIGNS: Temperature is 99.6, pulse is 107 to 96 in sinus  tachycardia. Saturation 98%, breathing 27 with a VPAP mask. Blood  pressure 188/105 and presently 172/87. He weighs almost 200  pounds. HEENT: Face is cushingoid, puffy, pink with the CPAP mask on his  face. Eyes are unremarkable. The pupils are equal and reactive to  light. Sclerae are anicteric. The conjunctivae are pink. The  oropharyngeal cavity is hard to see, but he has his own teeth. His  mouth appears to be dry.   NECK: Supple. There is no palpable mass, no enlarged thyroid. CARDIAC: Regular cardiac rhythm, S1, S2 normal. No murmur. LUNGS: Clear anteriorly and on the side. ABDOMEN: Protuberant, bulgy, and tympanic. Bowel sounds are  normal. There is a tiny scar from previous PEG in the epigastric area. EXTREMITIES: Remarkable for trace bilateral leg edema. He moves  all his 4 extremities, but barely the lower extremities. NEUROLOGICAL: He appears to understand and collaborates with  simple commands. DIAGNOSTIC DATA: We have abdominal x-ray on January 6, 2017,  about 4 days ago. It showed that he has the esophageal tube in the  distal stomach. Residual contrast noted in the gastric antrum, dilated  bowel loops. Hemoglobin 10.7, WBC 6.2, normal MCV and MCH,  platelets 962, 86% neutrophils. Urinalysis normal. Presently has a  Ac catheter. Sodium from 2 days ago 153, potassium 3.4, glucose  170, BUN 57, creatinine 1.41, on the 6th BUN was 75 and the  creatinine was 2.84. On January 5, it was 3.68. ALT, AST, alkaline  phosphatase are all normal. Troponin was slightly elevated on arrival  at 0.09. CONCLUSION: This is 68-year-old male who has unfortunately  residual of anoxic encephalopathy, where he was confined to bed, but  he was able to manage eating by himself. He presented for the first  time since that cardiac arrest in August 2015 with an aspiration  pneumonia. Dr. Elías Vargas asked me to insert a PEG tube, but I think  because this is the first episode of aspiration, we may want to give him  some more time to recover from this acute respiratory problem. Hopefully, he will be able to go back to eating his food. We have to  keep always the head of the bed elevated because he is at risk of  aspiration. He has been already on Protonix 20 mg once daily.  For  now, we are going to hold on the PEG tube first because of the  indication as I mentioned above and second he still is very unstable  from respiratory point of view to do any procedure on him. Therefore,  from my side, I will see him as needed only once you feel that he  needs that absolutely, then I would be glad to come back and do the  procedure on him, but I want him to be stable enough to be able to  sustain a procedure where I have to remove his VPAP mask. His other health problems, he has diabetes mellitus. He has acute on  chronic kidney injury. He has congestive heart failure, chronic  obstructive pulmonary disease. He has chronic anemia. His abdomen  is distended, tympanic and this may cause pressure on his lungs and  cause him to have more problems breathing. I think we need to keep  his bowels going all the time by giving him MiraLax and milk of  magnesia. He also has hypothyroidism. He is obese. Therefore, from my side, I will see him as needed. Thank you for this consultation. MD VANCE Huff / Nena  D:  01/10/2017   15:36  T:  01/10/2017   16:56  Job #:  [id number]

## 2017-01-10 NOTE — PROGRESS NOTES
Hospitalist Progress Note    Patient: Arash Campbell MRN: [id number]  CSN: [id number]    YOB: 1945  Age: 70 y.o. Sex: male    DOA: 1/3/2017 LOS:  LOS: 6 days                Assessment/Plan     Patient Active Problem List   Diagnosis Code    Cardiac arrest ([facility] I46.9    Anoxic encephalopathy ([facility] G93.1    Acute respiratory failure ([facility] J96.00    DM (diabetes mellitus) ([facility] B57.2    Diastolic congestive heart failure, NYHA class 1 ([facility]) I50.30    HTN (hypertension) I10    Hypoxia R09.02    COPD (chronic obstructive pulmonary disease) with acute bronchitis ([facility]) J44.0    TIFFANIE (acute kidney injury) ([facility] N17.9    Aspiration pneumonia ([facility] J69.0           69 yo WM with history of prior anoxic brain injury from NH, admitted for COPD exacerbation.      On 01/05/2017, patient had aspiration event and went into respiratory arrest and subsequently cardiac arrest. PEA arrest. CPR initiated and epi given. Patient intubated. He had ROSC. He is transferred to ICU.      Extubated 01/08/2017  Awake and answers to questions, follows command         CRITICAL CARE PLAN      Resp - his resp rate increased and oxygen sats dropping. Started on BiPAP. COPD - continue solumedrol.      ID - Follow up blood and urine cx. ANTIBIOTICS - levaquin azactam.   Trend temps, wbc, LA in normal range.      CVS - Monitor HD. Stable hemodynamically.       Heme/onc - Follow H&H, plts. INR.     Renal - Trend BUN, Cr, follow I/O, linton in place. Check and replace Mg, K. TIFFANIE - on CKD3, creatine improving  Hypernatremia - free water flushes. Follow sodium levels      Endocrine - Follow FSG      Neuro - baseline neurologic deficit due to anoxic brain injury,       GI - seen by SLP, recommend NPO. GI consulted for PEG.     Prophylaxis - DVT: heparin, GI: protonix. Discussed with wife at bedside.                         Physical Exam:  General: As above.    HEENT: NC, Atraumatic. PERRLA, anicteric sclerae.   Lungs: CTA Bilaterally. No Wheezing/Rhonchi/Rales. Heart: Regular rhythm,  No murmur, No Rubs, No Gallops  Abdomen: Soft, Non distended, Non tender.  +Bowel sounds,   Extremities: No c/c/e  Psych:   Not anxious or agitated. Neurologic:  No acute neurological deficit. Vital signs/Intake and Output:  Visit Vitals    /80    Pulse 100    Temp (!) 101.1 °F (38.4 °C)    Resp (!) 34    Ht 5' 10.08\" (1.78 m)    Wt 89 kg (196 lb 3.4 oz)    SpO2 98%    BMI 28.09 kg/m2     Current Shift:  01/10 0701 - 01/10 1900  In: 750 [I.V.:750]  Out: 650 [Urine:650]  Last three shifts:  01/08 1901 - 01/10 0700  In: 956.3 [I.V.:956.3]  Out: 2525 [Urine:2525]            Labs: Results:       Chemistry Recent Labs      01/08/17   1950  01/08/17   0520   GLU   --   170*   NA   --   153*   K  4.5  3.4*   CL   --   118*   CO2   --   22   BUN   --   57*   CREA   --   1.41*   CA   --   8.6   AGAP   --   13   BUCR   --   40*      CBC w/Diff Recent Labs      01/08/17   0520   WBC  6.2   RBC  3.64*   HGB  10.7*   HCT  32.6*   PLT  209   GRANS  90*   LYMPH  7*   EOS  0      Cardiac Enzymes No results for input(s): CPK, CKND1, MARCO A in the last 72 hours. No lab exists for component: CKRMB, TROIP   Coagulation No results for input(s): PTP, INR, APTT in the last 72 hours. No lab exists for component: INREXT    Lipid Panel No results found for: CHOL, CHOLPOCT, CHOLX, CHLST, CHOLV, [id number], HDL, LDL, NLDLCT, DLDL, LDLC, DLDLP, 726528, VLDLC, VLDL, TGL, TGLX, TRIGL, [id number], TRIGP, TGLPOCT, [id number], CHHD, CHHDX   BNP No results for input(s): BNPP in the last 72 hours. Liver Enzymes No results for input(s): TP, ALB, TBIL, AP, SGOT, GPT in the last 72 hours.     No lab exists for component: DBIL   Thyroid Studies Lab Results   Component Value Date/Time    TSH 1.37 08/13/2015 04:35 AM        Procedures/imaging: see electronic medical records for all procedures/Xrays and details which were not copied into this note but were reviewed prior to creation of Plan

## 2017-01-10 NOTE — INTERDISCIPLINARY ROUNDS
CRITICAL CARE INTERDISCIPLINARY ROUNDS    Patient Information:    Name:   Lord Blanco    Age:   70 y.o. Admission Date:   1/3/2017    Critical Care Day: 4 (code blue 1-6)    Surgery Date:      Attending Provider:   Evette Gary DO    Surgeon:        Consultant(s):   [address] Physician:  nany    Code Status: Full Code    Problem List:     Patient Active Problem List   Diagnosis Code    Cardiac arrest ([facility] I46.9    Anoxic encephalopathy ([facility] G93.1    Acute respiratory failure ([address] J96.00    DM (diabetes mellitus) ([address] R01.5    Diastolic congestive heart failure, NYHA class 1 ([address] I50.30    HTN (hypertension) I10    Hypoxia R09.02    COPD (chronic obstructive pulmonary disease) with acute bronchitis ([facility] J44.0    TIFFANIE (acute kidney injury) ([facility] N17.9    Aspiration pneumonia ([facility] J69.0       Principal Problem:  Acute respiratory failure ([facility]    During rounds the following quality care indicators and evidence based practices were addressed :  DVT Prophylaxis and PUD Prophylaxis Ac Day 6 ([facility] yes) ; Central Line Day: na Isolation: na; Antibiotic Stewardship: reviewed; Probiotics Necessary: na      Sepsis Order Set:    Glycemic Control:   Recent Labs      01/08/17   0520   GLU  170*   ; Recent Labs      01/10/17   0453  01/08/17   0557  01/07/17   1321   PHI  7.392  7.374  7.402   PCO2I  35.1  34.5*  34.5*   PO2I  73*  84  76*    Adjustments     Acute MI/PCI:   Not applicable    Door to Balloon: Admission Time: 0905      Heart Failure:    Not applicable     SCIP Measures for other Surgeries:   Not applicable     Pneumonia:    Not applicable    Interdisciplinary team rounds were held with the following team membersCare Management, Nursing, Nutrition, Pharmacy, Physician and Respiratory Therapy. Plan of care discussed. Goals of Care/ Recommendations:  Palliative care consult. Possible need for PEG - lidocaine patch to knees. Will need nutrition .  Goals of care with family    See clinical pathway and/or care plan for interventions and desired outcomes.     Critical Care Discharge Status:  Red

## 2017-01-11 ENCOUNTER — APPOINTMENT (OUTPATIENT)
Dept: GENERAL RADIOLOGY | Age: 72
DRG: 207 | End: 2017-01-11
Attending: INTERNAL MEDICINE
Payer: MEDICARE

## 2017-01-11 LAB
ARTERIAL PATENCY WRIST A: YES
BASE DEFICIT BLD-SCNC: 2 MMOL/L
BDY SITE: ABNORMAL
BODY TEMPERATURE: 99.6
GAS FLOW.O2 O2 DELIVERY SYS: ABNORMAL L/MIN
GLUCOSE BLD STRIP.AUTO-MCNC: 177 MG/DL (ref 70–110)
GLUCOSE BLD STRIP.AUTO-MCNC: 186 MG/DL (ref 70–110)
GLUCOSE BLD STRIP.AUTO-MCNC: 188 MG/DL (ref 70–110)
GLUCOSE BLD STRIP.AUTO-MCNC: 203 MG/DL (ref 70–110)
HCO3 BLD-SCNC: 22.6 MMOL/L (ref 22–26)
MAGNESIUM SERPL-MCNC: 2.5 MG/DL (ref 1.8–2.4)
O2/TOTAL GAS SETTING VFR VENT: 35 %
PCO2 BLD: 34.9 MMHG (ref 35–45)
PEEP RESPIRATORY: 8 CMH2O
PH BLD: 7.42 [PH] (ref 7.35–7.45)
PIP ISTAT,IPIP: 18
PO2 BLD: 83 MMHG (ref 80–100)
PRESSURE SUPPORT SETTING VENT: 10 CMH2O
SAO2 % BLD: 96 % (ref 92–97)
SERVICE CMNT-IMP: ABNORMAL
SPECIMEN TYPE: ABNORMAL
TOTAL RESP. RATE, ITRR: 31

## 2017-01-11 PROCEDURE — 94002 VENT MGMT INPAT INIT DAY: CPT

## 2017-01-11 PROCEDURE — 94003 VENT MGMT INPAT SUBQ DAY: CPT

## 2017-01-11 PROCEDURE — 71010 XR CHEST PORT: CPT

## 2017-01-11 PROCEDURE — 65610000006 HC RM INTENSIVE CARE

## 2017-01-11 PROCEDURE — 77010033678 HC OXYGEN DAILY

## 2017-01-11 PROCEDURE — 31720 CLEARANCE OF AIRWAYS: CPT

## 2017-01-11 PROCEDURE — 74011250636 HC RX REV CODE- 250/636: Performed by: INTERNAL MEDICINE

## 2017-01-11 PROCEDURE — 74011250636 HC RX REV CODE- 250/636: Performed by: FAMILY MEDICINE

## 2017-01-11 PROCEDURE — 74011250637 HC RX REV CODE- 250/637: Performed by: INTERNAL MEDICINE

## 2017-01-11 PROCEDURE — 82962 GLUCOSE BLOOD TEST: CPT

## 2017-01-11 PROCEDURE — 36600 WITHDRAWAL OF ARTERIAL BLOOD: CPT

## 2017-01-11 PROCEDURE — 87070 CULTURE OTHR SPECIMN AEROBIC: CPT | Performed by: INTERNAL MEDICINE

## 2017-01-11 PROCEDURE — 74011250636 HC RX REV CODE- 250/636

## 2017-01-11 PROCEDURE — 31500 INSERT EMERGENCY AIRWAY: CPT

## 2017-01-11 PROCEDURE — 74011000258 HC RX REV CODE- 258: Performed by: INTERNAL MEDICINE

## 2017-01-11 PROCEDURE — 94640 AIRWAY INHALATION TREATMENT: CPT

## 2017-01-11 PROCEDURE — 82803 BLOOD GASES ANY COMBINATION: CPT

## 2017-01-11 PROCEDURE — 74011000250 HC RX REV CODE- 250: Performed by: INTERNAL MEDICINE

## 2017-01-11 PROCEDURE — 74011000250 HC RX REV CODE- 250

## 2017-01-11 PROCEDURE — 5A1955Z RESPIRATORY VENTILATION, GREATER THAN 96 CONSECUTIVE HOURS: ICD-10-PCS | Performed by: INTERNAL MEDICINE

## 2017-01-11 PROCEDURE — 36415 COLL VENOUS BLD VENIPUNCTURE: CPT | Performed by: INTERNAL MEDICINE

## 2017-01-11 PROCEDURE — 87106 FUNGI IDENTIFICATION YEAST: CPT | Performed by: INTERNAL MEDICINE

## 2017-01-11 PROCEDURE — C9113 INJ PANTOPRAZOLE SODIUM, VIA: HCPCS | Performed by: INTERNAL MEDICINE

## 2017-01-11 PROCEDURE — 83735 ASSAY OF MAGNESIUM: CPT | Performed by: INTERNAL MEDICINE

## 2017-01-11 PROCEDURE — 0BH17EZ INSERTION OF ENDOTRACHEAL AIRWAY INTO TRACHEA, VIA NATURAL OR ARTIFICIAL OPENING: ICD-10-PCS | Performed by: INTERNAL MEDICINE

## 2017-01-11 PROCEDURE — 74000 XR ABD (KUB): CPT

## 2017-01-11 PROCEDURE — 74011636637 HC RX REV CODE- 636/637: Performed by: INTERNAL MEDICINE

## 2017-01-11 PROCEDURE — 92526 ORAL FUNCTION THERAPY: CPT

## 2017-01-11 RX ORDER — ETOMIDATE 2 MG/ML
16 INJECTION INTRAVENOUS ONCE
Status: COMPLETED | OUTPATIENT
Start: 2017-01-11 | End: 2017-01-11

## 2017-01-11 RX ORDER — PROPOFOL 10 MG/ML
INJECTION, EMULSION INTRAVENOUS
Status: COMPLETED
Start: 2017-01-11 | End: 2017-01-11

## 2017-01-11 RX ORDER — ETOMIDATE 2 MG/ML
INJECTION INTRAVENOUS
Status: COMPLETED
Start: 2017-01-11 | End: 2017-01-11

## 2017-01-11 RX ORDER — SUCCINYLCHOLINE CHLORIDE 20 MG/ML
INJECTION INTRAMUSCULAR; INTRAVENOUS
Status: COMPLETED
Start: 2017-01-11 | End: 2017-01-11

## 2017-01-11 RX ORDER — PROPOFOL 10 MG/ML
5-50 VIAL (ML) INTRAVENOUS
Status: DISCONTINUED | OUTPATIENT
Start: 2017-01-11 | End: 2017-01-11

## 2017-01-11 RX ORDER — SUCCINYLCHOLINE CHLORIDE 20 MG/ML
150 INJECTION INTRAMUSCULAR; INTRAVENOUS
Status: COMPLETED | OUTPATIENT
Start: 2017-01-11 | End: 2017-01-11

## 2017-01-11 RX ORDER — PROPOFOL 10 MG/ML
5-50 VIAL (ML) INTRAVENOUS
Status: DISCONTINUED | OUTPATIENT
Start: 2017-01-11 | End: 2017-01-16

## 2017-01-11 RX ADMIN — HEPARIN SODIUM 5000 UNITS: 5000 INJECTION, SOLUTION INTRAVENOUS; SUBCUTANEOUS at 17:21

## 2017-01-11 RX ADMIN — IPRATROPIUM BROMIDE AND ALBUTEROL SULFATE 3 ML: .5; 3 SOLUTION RESPIRATORY (INHALATION) at 08:19

## 2017-01-11 RX ADMIN — INSULIN LISPRO 2 UNITS: 100 INJECTION, SOLUTION INTRAVENOUS; SUBCUTANEOUS at 05:46

## 2017-01-11 RX ADMIN — SUCCINYLCHOLINE CHLORIDE 150 MG: 20 INJECTION, SOLUTION INTRAMUSCULAR; INTRAVENOUS at 11:25

## 2017-01-11 RX ADMIN — PROPOFOL 5 MCG/KG/MIN: 10 INJECTION, EMULSION INTRAVENOUS at 12:01

## 2017-01-11 RX ADMIN — HYDRALAZINE HYDROCHLORIDE 20 MG: 20 INJECTION INTRAMUSCULAR; INTRAVENOUS at 08:02

## 2017-01-11 RX ADMIN — INSULIN LISPRO 2 UNITS: 100 INJECTION, SOLUTION INTRAVENOUS; SUBCUTANEOUS at 14:22

## 2017-01-11 RX ADMIN — METHYLPREDNISOLONE SODIUM SUCCINATE 40 MG: 125 INJECTION, POWDER, FOR SOLUTION INTRAMUSCULAR; INTRAVENOUS at 08:02

## 2017-01-11 RX ADMIN — INSULIN LISPRO 4 UNITS: 100 INJECTION, SOLUTION INTRAVENOUS; SUBCUTANEOUS at 17:46

## 2017-01-11 RX ADMIN — LEVOTHYROXINE SODIUM ANHYDROUS 87.5 MCG: 100 INJECTION, POWDER, LYOPHILIZED, FOR SOLUTION INTRAVENOUS at 14:25

## 2017-01-11 RX ADMIN — CHLORHEXIDINE GLUCONATE 15 ML: 1.2 RINSE ORAL at 20:23

## 2017-01-11 RX ADMIN — ETOMIDATE 16 MG: 2 INJECTION INTRAVENOUS at 11:35

## 2017-01-11 RX ADMIN — IPRATROPIUM BROMIDE AND ALBUTEROL SULFATE 3 ML: .5; 3 SOLUTION RESPIRATORY (INHALATION) at 20:42

## 2017-01-11 RX ADMIN — LEVOFLOXACIN 500 MG: 5 INJECTION, SOLUTION INTRAVENOUS at 14:25

## 2017-01-11 RX ADMIN — METHYLPREDNISOLONE SODIUM SUCCINATE 40 MG: 125 INJECTION, POWDER, FOR SOLUTION INTRAMUSCULAR; INTRAVENOUS at 02:27

## 2017-01-11 RX ADMIN — HEPARIN SODIUM 5000 UNITS: 5000 INJECTION, SOLUTION INTRAVENOUS; SUBCUTANEOUS at 08:03

## 2017-01-11 RX ADMIN — METHYLPREDNISOLONE SODIUM SUCCINATE 40 MG: 125 INJECTION, POWDER, FOR SOLUTION INTRAMUSCULAR; INTRAVENOUS at 20:23

## 2017-01-11 RX ADMIN — DEXTROSE MONOHYDRATE AND SODIUM CHLORIDE 75 ML/HR: 5; .45 INJECTION, SOLUTION INTRAVENOUS at 06:51

## 2017-01-11 RX ADMIN — PROPOFOL 25 MCG/KG/MIN: 10 INJECTION, EMULSION INTRAVENOUS at 18:32

## 2017-01-11 RX ADMIN — BACLOFEN 10 MG: 10 TABLET ORAL at 16:00

## 2017-01-11 RX ADMIN — ETOMIDATE 16 MG: 2 INJECTION, SOLUTION INTRAVENOUS at 11:35

## 2017-01-11 RX ADMIN — IPRATROPIUM BROMIDE AND ALBUTEROL SULFATE 3 ML: .5; 3 SOLUTION RESPIRATORY (INHALATION) at 15:49

## 2017-01-11 RX ADMIN — IPRATROPIUM BROMIDE AND ALBUTEROL SULFATE 3 ML: .5; 3 SOLUTION RESPIRATORY (INHALATION) at 11:52

## 2017-01-11 RX ADMIN — SODIUM CHLORIDE 40 MG: 9 INJECTION, SOLUTION INTRAMUSCULAR; INTRAVENOUS; SUBCUTANEOUS at 08:02

## 2017-01-11 RX ADMIN — CHLORHEXIDINE GLUCONATE 15 ML: 1.2 RINSE ORAL at 08:01

## 2017-01-11 RX ADMIN — LABETALOL HYDROCHLORIDE 20 MG: 5 INJECTION, SOLUTION INTRAVENOUS at 06:29

## 2017-01-11 RX ADMIN — FLUTICASONE PROPIONATE 2 SPRAY: 50 SPRAY, METERED NASAL at 08:11

## 2017-01-11 RX ADMIN — PROPOFOL 15 MCG/KG/MIN: 10 INJECTION, EMULSION INTRAVENOUS at 14:28

## 2017-01-11 RX ADMIN — METHYLPREDNISOLONE SODIUM SUCCINATE 40 MG: 125 INJECTION, POWDER, FOR SOLUTION INTRAMUSCULAR; INTRAVENOUS at 14:26

## 2017-01-11 RX ADMIN — SUCCINYLCHOLINE CHLORIDE 150 MG: 20 INJECTION INTRAMUSCULAR; INTRAVENOUS at 11:25

## 2017-01-11 RX ADMIN — HYDRALAZINE HYDROCHLORIDE 20 MG: 20 INJECTION INTRAMUSCULAR; INTRAVENOUS at 20:58

## 2017-01-11 RX ADMIN — DEXTROSE MONOHYDRATE AND SODIUM CHLORIDE 75 ML/HR: 5; .45 INJECTION, SOLUTION INTRAVENOUS at 17:21

## 2017-01-11 RX ADMIN — INSULIN LISPRO 2 UNITS: 100 INJECTION, SOLUTION INTRAVENOUS; SUBCUTANEOUS at 00:39

## 2017-01-11 RX ADMIN — HEPARIN SODIUM 5000 UNITS: 5000 INJECTION, SOLUTION INTRAVENOUS; SUBCUTANEOUS at 00:49

## 2017-01-11 NOTE — PROGRESS NOTES
Problem: Dysphagia (Adult)  Goal: *Acute Goals and Plan of Care (Insert Text)  Follow up diagnostic dysphagia therapy complete to assess readiness for diet initiation. Patient removed from bi-pap by RT prior to initiation of therapy session. Patient with adequate O2 saturation at 94%. Patient agreeable to participation in therapy session after removal from bi-pap. Due to dryness of oral cavity, SLP first provided oral care. Patient with non-productive, weak cough upon command. SLP used toothbrush, toothpaste, and toothette to clean oral cavity. Patient receptive to oral care. During oral care, patient with poor manipulation of oral care solution, no pharyngeal swallow response noted upon palpation. Despite no swallow response being noted, patient with decrease in O2 saturation to 86 and increase in RR to 35. Oral care discontinued and RT increased O2 via nasal canula to 8L. Patient then replaced on bipap with improvement of O2 saturation to 98%. Patient is at increased risk for aspiration and aspiration related complications due to respiratory compromise as well as ease of fatigue. Recommend continue NPO at this time except oral care. Recommendations and plan of care discussed with nurseSissy. Rec: NPO except oral care via oral care swab q 4 hours  Strict aspiration/reflux precautions; HOB >30 at all times   Alternate means of nutrition/hydration    Patient will:  1. Utilize compensatory swallow maneuvers (decrease bolus size, decrease rate of intake, cyclical ingestion, etc.) to increase swallow function/safety with min visual, verbal and/or tactile cues in 4/5 trials. 2. Tolerate PO trials utilizing compensatory swallow techniques (decrease bolus size, decrease rate of intake, cyclical ingestion, etc.) without overt s/sx aspiration/distress in 4/5 trials with min visual, verbal, and/or tactile cues    3.  Complete an objective measure of swallow fxn (i.e., MBSS) to assess oropharyngeal anatomy/physiology for determination of safest least-restrictive diet and/or appropriate rehabilitation techniques. Outcome: Progressing Towards Goal  SPEECH LANGUAGE PATHOLOGY DYSPHAGIA TREATMENT     Patient: Go Brown (9 y.o. male)  Date: 1/11/2017  Diagnosis: Hypoxia Acute respiratory failure (HCC)       Precautions: Aspiration        ASSESSMENT:  As above. Progression toward goals:  [ ]         Improving appropriately and progressing toward goals  [X]         Improving slowly and progressing toward goals  [ ]         Not making progress toward goals and plan of care will be adjusted       PLAN:  Recommendations and Planned Interventions:  As above. Patient continues to benefit from skilled intervention to address the above impairments. Continue treatment per established plan of care. Discharge Recommendations: To Be Determined       SUBJECTIVE:   Patient stated Thank you.   OBJECTIVE:   Cognitive and Communication Status:  Neurologic State: Alert  Orientation Level: Oriented to person  Cognition: Follows commands  Perception: Appears intact  Perseveration: No perseveration noted  Safety/Judgement: Decreased insight into deficits  Dysphagia Treatment:  Oral Assessment:  Oral Assessment  Labial: Decreased rate  Dentition: Natural  Oral Hygiene: Fair-Poor  Lingual: Decreased rate  Velum: Unable to visualize  Mandible: No impairment  Gag Reflex:  (Present)  P. O. Trials:  Patient Position: HOB @ 60  Vocal quality prior to P.O.: No impairment  Consistency Presented:  Thin liquid (Oral care)  How Presented: SLP-fed/presented  Bolus Acceptance: No impairment  Bolus Formation/Control: No impairment  Type of Impairment: Delayed, Incomplete, Poor, Premature spillage  Propulsion: No impairment  Oral Residue: None  Initiation of Swallow: Absent  Laryngeal Elevation: Absent  Aspiration Signs/Symptoms: Decrease in O2 saturations, Increase in RR  Pharyngeal Phase Characteristics: Other (comment)  Effective Modifications: None  Cues for Modifications: Maximal  Oral Phase Severity: Severe  Pharyngeal Phase Severity : Severe  Pain:  Pain Scale 1: Numeric (0 - 10)  Pain Intensity 1: 0  After treatment:   [ ]              Patient left in no apparent distress sitting up in chair  [X]              Patient left in no apparent distress in bed  [X]              Call bell left within reach  [X]              Nursing notified  [ ]              Caregiver present  [ ]              Bed alarm activated  COMMUNICATION/EDUCATION:   [X]              Posted safety precautions in patient's room.      AL Granda  Time Calculation: 17 mins

## 2017-01-11 NOTE — PROGRESS NOTES
[facility]  Pulmonary, Critical Care, and Sleep Medicine    Name: Renetta Hayes MRN: [id number]   : 1945 Hospital: [facility] [place]   Date: 2017        Critical Care Initial Patient Consult    Requesting MD:                                                  Reason for CC Consult:  IMPRESSION:   · Aspiration Pneumonia  · Acute hypoxic Respiratory Failure from above. · Previous anoxic head encephalopathy   · Diastolic CHF at baseline  · Sp PEA arrest  · COPD exacerbation  · TIFFANIE      RECOMMENDATIONS:   · Neuro-    Alert and appropriate  · Cards- hemodynamically stable now. Resp arrest led to cardiac arrest 1 mg epi given and 1 minute of CPR with ROSC and intubation last evening. History of diastolic dysfunction restarted hypertensive medications clonidine and labetalol as needed  · Pulm-  Admitted with COPD exacerbation on steroids will continue prn bilevel for now, if no improvement will need intubation   · Renal-  Worsening renal function with electrolyte replacement. IV fluids start enteral feeds  · Heme- H/H and plt stable  · ID- levaquin,   aspiration pneumonia food seen at cords  · DVT prophylaxis  · GI prophylaxis  ·       Subjective/History:     Remain on bilevel. Attempted to wean off this am unsuccessful. Discussed with wife yesterday. Wants everything possible to \"brining him back home\"  Reviewed the GI input. Agree that this may not be time to get PEG until respiratory status improves but will likely need PEG near future as I believe he has been chronically aspirating. Peri Gearing HPI  This patient has been seen and evaluated at the request of Dr. Jean Paul Washburn for aspiration pneumonia and resp failure. The patient is a 70 y.o. male admitted 3  with COPD exacerbation to the medical floor. Last evening vomited noticed to be hypoxic an dactually lost pulse. 1 Mg epi with ROSC and intubated for cyanosis.   Moved to ICU discussed with family and wife should be here this afternoon. In SNF prior to this admission and has diastolic dysfunction at baseline. Currently stable on vent    The patient is critically ill and can not provide additional history due to intubation and sedation     Past Medical History   Diagnosis Date    [facility]     Bursitis     CAD (coronary artery disease)     Cardiac arrest ([facility]     Cognitive communication deficit     Contracture of muscle     Depression     Diabetes ([facility]     Diarrhea     Disorder of kidney and ureter     Dysphagia     GERD (gastroesophageal reflux disease)     High cholesterol     Hypertension     Hypothyroid     Lack of coordination     Polyarthritis     Sleep apnea     Sleep disorder     Weakness       Past Surgical History   Procedure Laterality Date    Hx gi       peg    Hx amputation      Hx hernia repair        Prior to Admission medications    Medication Sig Start Date End Date Taking? Authorizing Provider   nitroglycerin (NITROBID) 2 % ointment Apply 1 Inch to affected area every six (6) hours. 8/14/15  Yes Hollie Mendosa MD   fentaNYL (DURAGESIC) 50 mcg/hr PATCH 1 Patch by TransDERmal route every seventy-two (72) hours. Mirna Hernandez MD   enalapril (VASOTEC) 5 mg tablet Take 5 mg by mouth daily. Mirna Hernandez MD   Menthol-Zinc Oxide (RISAMINE) 0.44-20.6 % oint Apply  to affected area three (3) times daily. Historical Provider   citalopram (CELEXA) 40 mg tablet Take 40 mg by mouth daily. Historical Provider   melatonin 5 mg cap capsule Take 5 mg by mouth nightly. Historical Provider   isosorbide mononitrate (ISMO, MONOKET) 20 mg tablet Take 20 mg by mouth two (2) times a day. Historical Provider   loperamide (IMODIUM) 2 mg capsule Take 2 mg by mouth four (4) times daily as needed for Diarrhea. Historical Provider   promethazine (PHENERGAN) 12.5 mg tablet Take 12.5 mg by mouth every six (6) hours as needed for Nausea.     Historical Provider   aspirin delayed-release 81 mg tablet Take 81 mg by mouth daily. Historical Provider   guaiFENesin (ROBAFEN) 100 mg/5 mL liquid Take 200 mg by mouth every six (6) hours as needed for Cough. Historical Provider   cloNIDine (CATAPRES) 0.2 mg/24 hr patch 1 Patch by TransDERmal route every Monday. Historical Provider   levothyroxine (SYNTHROID) 175 mcg tablet Take 175 mcg by mouth Daily (before breakfast). Historical Provider   furosemide (LASIX) 40 mg tablet Take 40 mg by mouth daily. Historical Provider   magnesium hydroxide (MCCAULEY MILK OF MAGNESIA) 400 mg/5 mL suspension Take 30 mL by mouth daily as needed for Constipation. Historical Provider   bisacodyl (DULCOLAX, BISACODYL,) 10 mg suppository Insert 10 mg into rectum daily as needed. Historical Provider   multivitamin, tx-iron-ca-min (THERA-M W/ IRON) 9 mg iron-400 mcg tab tablet Take 1 Tab by mouth daily. Historical Provider   mometasone (NASONEX) 50 mcg/actuation nasal spray 2 Sprays by Both Nostrils route daily. Historical Provider   GLUCOSAMINE HCL/CHONDR MAY A NA (GLUCOSAMINE-CHONDROITIN) 750-600 mg tab Take 1 Tab by mouth two (2) times a day. Historical Provider   baclofen (LIORESAL) 10 mg tablet Take 10 mg by mouth three (3) times daily. Historical Provider   pantoprazole (PROTONIX) 20 mg tablet Take 20 mg by mouth daily. Historical Provider   predniSONE (DELTASONE) 10 mg tablet Take 10 mg by mouth daily. Historical Provider   ferrous sulfate (FEROSUL) 220 mg (44 mg iron)/5 mL elix Take 7.4 mL by mouth daily. Historical Provider   potassium chloride (KAON 10%) 20 mEq/15 mL solution Take 30 mEq by mouth daily. Quantity 22.5 mL     Historical Provider   acetaminophen (TYLENOL) 325 mg tablet Take 650 mg by mouth every six (6) hours as needed for Pain. Historical Provider   diclofenac (VOLTAREN) 1 % gel Apply 4 g to affected area two (2) times a day.  For bilateral knee pain- apply thin layer topically to entire joint area    Historical Provider   labetalol (NORMODYNE) 200 mg tablet Take 200 mg by mouth two (2) times a day. Hold for heart rate less than 60 and advise provider. Historical Provider   lidocaine (LIDODERM) 5 % 2 Patches by TransDERmal route every twenty-four (24) hours. Place Two patches to right trapezius region in morning. Remove patches 12 hours later. Apply 7AM Remove 7PM. 11/25/16   Cici Quan MD   oxyCODONE-acetaminophen (PERCOCET) 5-325 mg per tablet Take 1 Tab by mouth every four (4) hours as needed for Pain. Max Daily Amount: 6 Tabs. 11/25/16   Cici Quan MD   albuterol-ipratropium (DUO-NEB) 2.5 mg-0.5 mg/3 ml nebulizer solution 3 mL by Nebulization route every four (4) hours as needed for Wheezing.  8/14/15   Jyoti Virgen MD     Current Facility-Administered Medications   Medication Dose Route Frequency    methylPREDNISolone (PF) (SOLU-MEDROL) injection 40 mg  40 mg IntraVENous Q6H    lidocaine (LIDODERM) 5 % patch 1 Patch  1 Patch TransDERmal Q24H    dextrose 5 % - 0.45% NaCl infusion  75 mL/hr IntraVENous CONTINUOUS    levothyroxine (SYNTHROID) injection 87.5 mcg  87.5 mcg IntraVENous Q24H    chlorhexidine (PERIDEX) 0.12 % mouthwash 15 mL  15 mL Oral Q12H    cloNIDine (CATAPRES) 0.1 mg/24 hr patch 1 Patch  1 Patch TransDERmal Q7D    pantoprazole (PROTONIX) 40 mg in sodium chloride 0.9 % 10 mL injection  40 mg IntraVENous DAILY    insulin lispro (HUMALOG) injection   SubCUTAneous Q6H    albuterol-ipratropium (DUO-NEB) 2.5 MG-0.5 MG/3 ML  3 mL Nebulization QID RT    aspirin chewable tablet 81 mg  81 mg Per G Tube DAILY    baclofen (LIORESAL) tablet 10 mg  10 mg Oral TID    fluticasone (FLONASE) 50 mcg/actuation nasal spray 2 Spray  2 Spray Both Nostrils DAILY    multivitamin, tx-iron-ca-min (THERA-M w/ IRON) tablet 1 Tab  1 Tab Oral DAILY    levoFLOXacin (LEVAQUIN) 500 mg in D5W IVPB  500 mg IntraVENous Q24H    heparin (porcine) injection 5,000 Units  5,000 Units SubCUTAneous Q8H     Allergies   Allergen Reactions  Penicillins Itching      Social History   Substance Use Topics    Smoking status: Never Smoker    Smokeless tobacco: Not on file    Alcohol use No          Objective:   Vital Signs:    Visit Vitals    BP (!) 191/149    Pulse (!) 116    Temp 99.2 °F (37.3 °C)    Resp 20    Ht 5' 10.08\" (1.78 m)    Wt 89 kg (196 lb 3.4 oz)    SpO2 97%    BMI 28.09 kg/m2       O2 Device: BIPAP   O2 Flow Rate (L/min): 6 l/min   Temp (24hrs), Av.8 °F (37.7 °C), Min:99.2 °F (37.3 °C), Max:101.1 °F (38.4 °C)       Intake/Output:   Last shift:         Last 3 shifts:  1901 -  0700  In: 750 [I.V.:750]  Out: 2900 [Urine:2900]    Intake/Output Summary (Last 24 hours) at 17 1017  Last data filed at 17 0800   Gross per 24 hour   Intake              750 ml   Output             1650 ml   Net             -900 ml     Physical Exam:    General:   back on bipap. Head:  Normocephalic, without obvious abnormality,    Eyes:  Conjunctivae/corneas clear. PERRL,   Nose: Nares normal. Septum midline. Mucosa normal. No drainage or sinus tenderness. Throat: Lips, mucosa, and tongue normal.    Neck: Supple, symmetrical, trachea midline, no adenopathy, no carotid bruit and no JVD. Lungs:   Clear to auscultation bilaterally. Chest wall:  No tenderness or deformity. Heart:  Regular rate and rhythm, S1, S2 normal, no murmur, click, rub or gallop. Abdomen:   Soft, non-tender. Bowel sounds normal. No masses,  No organomegaly. Extremities: Extremities normal, atraumatic, no cyanosis or edema.                        Data:     Recent Results (from the past 24 hour(s))   GLUCOSE, POC    Collection Time: 01/10/17 11:35 AM   Result Value Ref Range    Glucose (POC) 164 (H) 70 - 110 mg/dL   GLUCOSE, POC    Collection Time: 01/10/17  5:39 PM   Result Value Ref Range    Glucose (POC) 176 (H) 70 - 110 mg/dL   GLUCOSE, POC    Collection Time: 17 12:07 AM   Result Value Ref Range    Glucose (POC) 188 (H) 70 - 110 mg/dL   MAGNESIUM    Collection Time: 01/11/17  5:06 AM   Result Value Ref Range    Magnesium 2.5 (H) 1.8 - 2.4 mg/dL   POC G3    Collection Time: 01/11/17  5:14 AM   Result Value Ref Range    Device: BIPAP      FIO2 (POC) 35 %    pH (POC) 7.422 7.35 - 7.45      pCO2 (POC) 34.9 (L) 35.0 - 45.0 MMHG    pO2 (POC) 83 80 - 100 MMHG    HCO3 (POC) 22.6 22 - 26 MMOL/L    sO2 (POC) 96 92 - 97 %    Base deficit (POC) 2 mmol/L    PEEP/CPAP (POC) 8 cmH2O    PIP (POC) 18      Pressure support 10 cmH2O    Allens test (POC) YES      Total resp. rate 31      Site LEFT RADIAL      Patient temp. 99.6      Specimen type (POC) ARTERIAL      Performed by [employer]    GLUCOSE, POC    Collection Time: 01/11/17  5:27 AM   Result Value Ref Range    Glucose (POC) 177 (H) 70 - 110 mg/dL           Recent Labs      01/11/17   0514  01/10/17   0453   FIO2I  35  45   HCO3I  22.6  21.4*   PCO2I  34.9*  35.1   PHI  7.422  7. 392   PO2I  83  73*     Telemetry:normal sinus rhythm    Imaging:  I have personally reviewed the patients radiographs and have reviewed the reports:     Best practice :  Core measures; Antibiotic choice:  Severe Sepsis bundles;SIRS screen met? Yes  Fluids  Lactic acid ordered- initial and repeat Q6hrs if elevated till normalized. Cultures drawn. Antibiotic administered within 1hr-ICU  And 3hrs ED  Large bore IV- 2 sites          Pressors aim MAP >65mmHg  Steriods  Glycemic control  Mech. Ventilated patients- aim to keep peak plateau pressure 81-86JZ H2O.   Sress ulcer prophylaxis  DVT prophylaxis            Total critical care time exclusive of procedures: 40 minutes  Leticia Bartlett MD

## 2017-01-11 NOTE — PROGRESS NOTES
Pt unable to tolerate the bilevel with increase in work of breathing,  Pt wanted to be intubated. Pt is full code verified. Agree to proceed with intubation.

## 2017-01-11 NOTE — PROGRESS NOTES
46 Dr. Deanne Hummel requested this nurses assistance to intubate. Pt medicated with 150mg succs and 16mg etomidate. Patient intubated with 7.5 ETT using Dowd 4.0 blade. ETT secured at 24 at the lip on right side of mouth at 11:38. Diprivan gtt started at 5 mcqs. 18French OGT inserted. Will have Pobre RN  titrate to RASS-3. Report off to bedside RN Winter Ferrera. ARANZA to lcws. Hob elevated Portable chest xray ordered stat and pending. Wife at bedside she spoke with Dr. Deanne Hummel.     [phone] Radiologist called ETT is 1 cm above the lilli, RT at bedside she states she will pull it back 2 cm. This was reported to Shaquille .

## 2017-01-11 NOTE — PROGRESS NOTES
Hospitalist Progress Note    Patient: Lennox Host MRN: [id number]  CSN: [id number]    YOB: 1945  Age: 70 y.o. Sex: male    DOA: 1/3/2017 LOS:  LOS: 7 days                Assessment/Plan     Patient Active Problem List   Diagnosis Code    Cardiac arrest ([facility] I46.9    Anoxic encephalopathy ([facility] G93.1    Acute respiratory failure ([facility] J96.00    DM (diabetes mellitus) ([facility] H49.6    Diastolic congestive heart failure, NYHA class 1 ([facility] I50.30    HTN (hypertension) I10    Hypoxia R09.02    COPD (chronic obstructive pulmonary disease) with acute bronchitis ([facility]) J44.0    TIFFANIE (acute kidney injury) ([facility] N17.9    Aspiration pneumonia ([facility] J69.0           69 yo WM with history of prior anoxic brain injury from NH, admitted for COPD exacerbation.      On 01/05/2017, patient had aspiration event and went into respiratory arrest and subsequently cardiac arrest. PEA arrest. CPR initiated and epi given. Patient intubated. He had ROSC. He is transferred to ICU.      Extubated 01/08/2017  Awake and answers to questions, follows command          CRITICAL CARE PLAN      Resp - he remains on BiPAP. Discussed with Dr. Yani Allen, he may need intubation if he is not improving. COPD - continue solumedrol.      ID - Follow up blood and urine cx. ANTIBIOTICS - levaquin azactam.   Trend temps, wbc, LA in normal range.      CVS - Monitor HD. Stable hemodynamically.       Heme/onc - Follow H&H, plts. INR.     Renal - Trend BUN, Cr, follow I/O, linton in place. Check and replace Mg, K. TIFFANIE - on CKD3, creatine improving  Hypernatremia - free water flushes. Follow sodium levels      Endocrine - Follow FSG      Neuro - baseline neurologic deficit due to anoxic brain injury,       GI - seen by SLP, recommend NPO. GI consulted for PEG.     Prophylaxis - DVT: heparin, GI: protonix.     Discussed with wife at bedside.                          Physical Exam:  General: As above.    HEENT: NC, Atraumatic.  ECTOR anicteric sclerae. Lungs: CTA Bilaterally. No Wheezing/Rhonchi/Rales. Heart: Regular rhythm,  No murmur, No Rubs, No Gallops  Abdomen: Soft, Non distended, Non tender.  +Bowel sounds,   Extremities: No c/c/e  Psych:   Not anxious or agitated. Neurologic:  No acute neurological deficit.          Vital signs/Intake and Output:  Visit Vitals    /81 (BP 1 Location: Left arm, BP Patient Position: At rest)    Pulse 85    Temp 99.6 °F (37.6 °C)    Resp 21    Ht 5' 10.08\" (1.78 m)    Wt 89 kg (196 lb 3.4 oz)    SpO2 98%    BMI 28.09 kg/m2     Current Shift:  01/11 0701 - 01/11 1900  In: 728.9 [I.V.:728.9]  Out: 600 [Urine:600]  Last three shifts:  01/09 1901 - 01/11 0700  In: 750 [I.V.:750]  Out: 2900 [Urine:2900]            Labs: Results:       Chemistry Recent Labs      01/08/17   1950   K  4.5      CBC w/Diff No results for input(s): WBC, RBC, HGB, HCT, PLT, GRANS, LYMPH, EOS, HGBEXT, HCTEXT, PLTEXT in the last 72 hours. Cardiac Enzymes No results for input(s): CPK, CKND1, MARCO A in the last 72 hours. No lab exists for component: CKRMB, TROIP   Coagulation No results for input(s): PTP, INR, APTT in the last 72 hours. No lab exists for component: INREXT    Lipid Panel No results found for: CHOL, CHOLPOCT, CHOLX, CHLST, CHOLV, [id number], HDL, LDL, NLDLCT, DLDL, LDLC, DLDLP, [id number], VLDLC, VLDL, TGL, TGLX, TRIGL, [id number], TRIGP, TGLPOCT, [id number], CHHD, CHHDX   BNP No results for input(s): BNPP in the last 72 hours. Liver Enzymes No results for input(s): TP, ALB, TBIL, AP, SGOT, GPT in the last 72 hours.     No lab exists for component: DBIL   Thyroid Studies Lab Results   Component Value Date/Time    TSH 1.37 08/13/2015 04:35 AM        Procedures/imaging: see electronic medical records for all procedures/Xrays and details which were not copied into this note but were reviewed prior to creation of Plan

## 2017-01-11 NOTE — PROGRESS NOTES
Initial assessment completed. AA & follows command. Bipap just removed by resp, therapist. Monitor shows low ST w/out ectopy. Afebrile. Npo being observed for now. 0900- Speech therapist @ bedside for swallowing studies & failed & back to bipap after  Good oral care. 1000- Continue to suction nasally & orally w/ same large thick tan secretions. Prn b/p med given maintaining s/bp < 170. Dr Antonio Mcrae visited w/ new orders. 36- Tried to reach wife for possiblle intubation per Dr Antonio Mcrae & message left to call back    1200-Intubated by Dr Antonio Mcrae & pcxr done after ogt inserted. Wife @ bedside afterwards. Started on diprivan drip. See flowsheet forndosage & concentration. 1400- Urine output fair amount. Diprivan now @ 15mcg/kg/m to maintained Rass of -3 as per Dr Antonio Mcrae.    1600- Assessment unchanged, maintained sedated w/ diprivan to scale of -3. VS stable. No sob. Afebrile. Wife @ bedside. 1800- Condition no changed since intubated. VS stable. Wife @ bedside. 1915-Bedside and Verbal shift change report given to [facility]  (oncoming nurse) by Lázaro Worthy RN (offgoing nurse). Report included the following information SBAR, Kardex, Procedure Summary, Intake/Output, MAR and Recent Results.

## 2017-01-11 NOTE — PROGRESS NOTES
Took patient off Bipap and placed on 6lpm nc so Speech therapy could try to evaluate patient. Spo2 fell to 86% during the 5 minutes he was off. Patient placed back on Bipap.

## 2017-01-11 NOTE — PROGRESS NOTES
@2033 PT taken over awake alert in bed oriented to self and place, denies pain presently. Remains on bipap. Vital signs fluctuates at present. Ac remains in situ draining clear yellow urine. Assessment done and charted in appropriate flow sheets. Nursing management continues. @2130 no changes ,pt asleep intermittently ,easily aroused . No new development care continues. @2337 wife called to check in on pt ,no changes pt continues to deny pain nursing observation continues. @0000 no changes care continues. @0200 no new developments pt denies pain care continues. @0400 no changes observation continues. @0600no new changes care continues. @0924 Bedside and Verbal shift change report given to [address] [place] [address] (oncoming nurse) by Christian Brunner (offgoing nurse). Report included the following information SBAR, Kardex, Intake/Output, MAR, Recent Results and Med Rec Status.    @Alarm parameters reviewed, on and audible Appropriate for patient clinical condition

## 2017-01-11 NOTE — PROGRESS NOTES
Radiologist notified Andres Loyd RN that ETT was one cm above vinny. ETT pulled out rosanne 24 to 22 renzo at lip line. Bs equal and coarse.

## 2017-01-11 NOTE — PROGRESS NOTES
Patient intubated with 7.5 ETT using Dowd 4.0 blade by DR Laura Leblanc secured at 24 at the lip on right side of mouth at 11:38

## 2017-01-11 NOTE — PROGRESS NOTES
Discussed in IDR; noted plan for reintubation. CMgr will cont to follow for transition of care needs.

## 2017-01-11 NOTE — INTERDISCIPLINARY ROUNDS
CRITICAL CARE INTERDISCIPLINARY ROUNDS    Patient Information:    Name:   Lorraine Faria    Age:   70 y.o. Admission Date:   1/3/2017    Critical Care Day: 5 (code blue 1-6)    Surgery Date:      Attending Provider:   Schuyler Bender DO    Surgeon: n/a     Consultant(s):   dedra Peters    Critical Care Physician:  nany    Code Status: Full Code    Problem List:     Patient Active Problem List   Diagnosis Code    Cardiac arrest ([facility] I46.9    Anoxic encephalopathy ([facility] G93.1    Acute respiratory failure ([address] J96.00    DM (diabetes mellitus) ([address] S12.1    Diastolic congestive heart failure, NYHA class 1 ([facility] I50.30    HTN (hypertension) I10    Hypoxia R09.02    COPD (chronic obstructive pulmonary disease) with acute bronchitis ([facility] J44.0    TIFFANIE (acute kidney injury) ([facility] N17.9    Aspiration pneumonia ([facility] J69.0       Principal Problem:  Acute respiratory failure ([address]    During rounds the following quality care indicators and evidence based practices were addressed :  DVT Prophylaxis and PUD Prophylaxis Ac Day 7  ([facility] yes) ; Central Line Day: na Isolation: na; Antibiotic Stewardship: reviewed; Probiotics Necessary: na      Sepsis Order Set:    Glycemic Control:   No results for input(s): GLU in the last 72 hours.;  Recent Labs      01/11/17   0514  01/10/17   0453   PHI  7.422  7. 392   PCO2I  34.9*  35.1   PO2I  83  73*    Adjustments     Acute MI/PCI:   Not applicable    Door to Balloon: Admission Time: 0905      Heart Failure:    Not applicable     SCIP Measures for other Surgeries:   Not applicable     Pneumonia:    Not applicable    Interdisciplinary team rounds were held with the following team membersCare Management, Nursing, Nutrition, Pharmacy, Physician and Respiratory Therapy. Plan of care discussed. Goals of Care/ Recommendations:  Palliative care consult. Possible need for PEG - lidocaine patch to knees. Will need nutrition .  Goals of care with family have been addressed will plan on intubating now. Will probably need trach and peg. See clinical pathway and/or care plan for interventions and desired outcomes.     Critical Care Discharge Status:  Red

## 2017-01-11 NOTE — PROCEDURES
[facility]  Pulmonary, Critical Care, and Sleep Medicine    Name: Sharmin Cabrera MRN: [id number]   : 1945 Hospital: [facility] [place]   Date: 2017        Intubation Note    Procedure: elective/emergent intubation    Indication: respiratory insufficiency    Anesthesia- Rapid sequence:  ETomidate 16mg,  Succinylcholine 150mg  After assessing the airway, the patient underwent preoxygenation with 100% FiO2 for 5 min. etomidate and succnylcholine was given sequentially in rapid IV push. The Sellick maneuver was performed throughout the entire sequence. After adequate sedation and paralisys emergent intubation was performed. A4.0Mac blade  laryngoscope was used to visualize the epiglottis and vocal chords. After positive identification of the vocal cords, an 7.5 ET tube was placed into the trachea with direct visualization. The tube was seen passing through the vocal chords without / with some difficulty. CO2 colorimetry was employed immediately to verify tube in airway with /without appropriate color change indicating detection/lack of CO2. Water vapor was seen within the ET tube, and auscultation of the abdomen revealed no bubbling souds. Auscultation  and inspection of the chest after intubation showed symmetric chest excursion and symmetric air entry bilaterally. Chest X-ray has been ordered and is pending. The patient has been placed on a mechanical ventilator. There were no complications.     Jimbo Wong MD

## 2017-01-12 ENCOUNTER — APPOINTMENT (OUTPATIENT)
Dept: GENERAL RADIOLOGY | Age: 72
DRG: 207 | End: 2017-01-12
Attending: INTERNAL MEDICINE
Payer: MEDICARE

## 2017-01-12 LAB
ANION GAP BLD CALC-SCNC: 10 MMOL/L (ref 3–18)
ARTERIAL PATENCY WRIST A: YES
BASE EXCESS BLD CALC-SCNC: 0 MMOL/L
BDY SITE: NORMAL
BODY TEMPERATURE: 98.6
BUN SERPL-MCNC: 43 MG/DL (ref 7–18)
BUN/CREAT SERPL: 36 (ref 12–20)
CALCIUM SERPL-MCNC: 8.6 MG/DL (ref 8.5–10.1)
CHLORIDE SERPL-SCNC: 122 MMOL/L (ref 100–108)
CO2 SERPL-SCNC: 25 MMOL/L (ref 21–32)
CREAT SERPL-MCNC: 1.21 MG/DL (ref 0.6–1.3)
ERYTHROCYTE [DISTWIDTH] IN BLOOD BY AUTOMATED COUNT: 16.2 % (ref 11.6–14.5)
GAS FLOW.O2 O2 DELIVERY SYS: NORMAL L/MIN
GAS FLOW.O2 SETTING OXYMISER: 16 BPM
GLUCOSE BLD STRIP.AUTO-MCNC: 175 MG/DL (ref 70–110)
GLUCOSE BLD STRIP.AUTO-MCNC: 182 MG/DL (ref 70–110)
GLUCOSE BLD STRIP.AUTO-MCNC: 185 MG/DL (ref 70–110)
GLUCOSE BLD STRIP.AUTO-MCNC: 233 MG/DL (ref 70–110)
GLUCOSE SERPL-MCNC: 210 MG/DL (ref 74–99)
HCO3 BLD-SCNC: 24.1 MMOL/L (ref 22–26)
HCT VFR BLD AUTO: 33.7 % (ref 36–48)
HGB BLD-MCNC: 10.7 G/DL (ref 13–16)
MAGNESIUM SERPL-MCNC: 2.3 MG/DL (ref 1.8–2.4)
MCH RBC QN AUTO: 28.9 PG (ref 24–34)
MCHC RBC AUTO-ENTMCNC: 31.8 G/DL (ref 31–37)
MCV RBC AUTO: 91.1 FL (ref 74–97)
O2/TOTAL GAS SETTING VFR VENT: 40 %
PCO2 BLD: 35.9 MMHG (ref 35–45)
PEEP RESPIRATORY: 6 CMH2O
PH BLD: 7.43 [PH] (ref 7.35–7.45)
PLATELET # BLD AUTO: 178 K/UL (ref 135–420)
PMV BLD AUTO: 10.7 FL (ref 9.2–11.8)
PO2 BLD: 81 MMHG (ref 80–100)
POTASSIUM SERPL-SCNC: 3.6 MMOL/L (ref 3.5–5.5)
POTASSIUM SERPL-SCNC: 4.4 MMOL/L (ref 3.5–5.5)
RBC # BLD AUTO: 3.7 M/UL (ref 4.7–5.5)
SAO2 % BLD: 96 % (ref 92–97)
SERVICE CMNT-IMP: NORMAL
SODIUM SERPL-SCNC: 157 MMOL/L (ref 136–145)
SPECIMEN TYPE: NORMAL
TOTAL RESP. RATE, ITRR: 16
VENTILATION MODE VENT: NORMAL
VOLUME CONTROL IVLC: YES
VT SETTING VENT: 450 ML
WBC # BLD AUTO: 6.9 K/UL (ref 4.6–13.2)

## 2017-01-12 PROCEDURE — 74011000250 HC RX REV CODE- 250: Performed by: INTERNAL MEDICINE

## 2017-01-12 PROCEDURE — 83735 ASSAY OF MAGNESIUM: CPT | Performed by: INTERNAL MEDICINE

## 2017-01-12 PROCEDURE — 36600 WITHDRAWAL OF ARTERIAL BLOOD: CPT

## 2017-01-12 PROCEDURE — 85027 COMPLETE CBC AUTOMATED: CPT | Performed by: INTERNAL MEDICINE

## 2017-01-12 PROCEDURE — 74011250637 HC RX REV CODE- 250/637: Performed by: INTERNAL MEDICINE

## 2017-01-12 PROCEDURE — 74011250636 HC RX REV CODE- 250/636: Performed by: INTERNAL MEDICINE

## 2017-01-12 PROCEDURE — 65610000006 HC RM INTENSIVE CARE

## 2017-01-12 PROCEDURE — 74011636637 HC RX REV CODE- 636/637: Performed by: INTERNAL MEDICINE

## 2017-01-12 PROCEDURE — 94003 VENT MGMT INPAT SUBQ DAY: CPT

## 2017-01-12 PROCEDURE — 77030018846 HC SOL IRR STRL H20 ICUM -A

## 2017-01-12 PROCEDURE — 71010 XR CHEST PORT: CPT

## 2017-01-12 PROCEDURE — C9113 INJ PANTOPRAZOLE SODIUM, VIA: HCPCS | Performed by: INTERNAL MEDICINE

## 2017-01-12 PROCEDURE — 77010033678 HC OXYGEN DAILY

## 2017-01-12 PROCEDURE — 74011000258 HC RX REV CODE- 258: Performed by: INTERNAL MEDICINE

## 2017-01-12 PROCEDURE — 36415 COLL VENOUS BLD VENIPUNCTURE: CPT | Performed by: INTERNAL MEDICINE

## 2017-01-12 PROCEDURE — 82962 GLUCOSE BLOOD TEST: CPT

## 2017-01-12 PROCEDURE — 77030018798 HC PMP KT ENTRL FED COVD -A

## 2017-01-12 PROCEDURE — 94640 AIRWAY INHALATION TREATMENT: CPT

## 2017-01-12 PROCEDURE — 84132 ASSAY OF SERUM POTASSIUM: CPT | Performed by: INTERNAL MEDICINE

## 2017-01-12 PROCEDURE — 82803 BLOOD GASES ANY COMBINATION: CPT

## 2017-01-12 RX ORDER — POTASSIUM CHLORIDE 20MEQ/15ML
40 LIQUID (ML) ORAL
Status: COMPLETED | OUTPATIENT
Start: 2017-01-12 | End: 2017-01-12

## 2017-01-12 RX ORDER — LIDOCAINE 50 MG/G
2 PATCH TOPICAL EVERY 24 HOURS
Status: DISCONTINUED | OUTPATIENT
Start: 2017-01-12 | End: 2017-01-27 | Stop reason: HOSPADM

## 2017-01-12 RX ORDER — INSULIN GLARGINE 100 [IU]/ML
6 INJECTION, SOLUTION SUBCUTANEOUS DAILY
Status: DISCONTINUED | OUTPATIENT
Start: 2017-01-12 | End: 2017-01-13

## 2017-01-12 RX ADMIN — IPRATROPIUM BROMIDE AND ALBUTEROL SULFATE 3 ML: .5; 3 SOLUTION RESPIRATORY (INHALATION) at 15:13

## 2017-01-12 RX ADMIN — CHLORHEXIDINE GLUCONATE 15 ML: 1.2 RINSE ORAL at 09:22

## 2017-01-12 RX ADMIN — LEVOFLOXACIN 500 MG: 5 INJECTION, SOLUTION INTRAVENOUS at 15:02

## 2017-01-12 RX ADMIN — IPRATROPIUM BROMIDE AND ALBUTEROL SULFATE 3 ML: .5; 3 SOLUTION RESPIRATORY (INHALATION) at 07:44

## 2017-01-12 RX ADMIN — METHYLPREDNISOLONE SODIUM SUCCINATE 40 MG: 125 INJECTION, POWDER, FOR SOLUTION INTRAMUSCULAR; INTRAVENOUS at 01:52

## 2017-01-12 RX ADMIN — MULTIPLE VITAMINS W/ MINERALS TAB 1 TABLET: TAB at 09:23

## 2017-01-12 RX ADMIN — IPRATROPIUM BROMIDE AND ALBUTEROL SULFATE 3 ML: .5; 3 SOLUTION RESPIRATORY (INHALATION) at 21:38

## 2017-01-12 RX ADMIN — SODIUM CHLORIDE 40 MG: 9 INJECTION, SOLUTION INTRAMUSCULAR; INTRAVENOUS; SUBCUTANEOUS at 09:22

## 2017-01-12 RX ADMIN — IPRATROPIUM BROMIDE AND ALBUTEROL SULFATE 3 ML: .5; 3 SOLUTION RESPIRATORY (INHALATION) at 11:17

## 2017-01-12 RX ADMIN — BACLOFEN 10 MG: 10 TABLET ORAL at 15:02

## 2017-01-12 RX ADMIN — INSULIN LISPRO 2 UNITS: 100 INJECTION, SOLUTION INTRAVENOUS; SUBCUTANEOUS at 00:34

## 2017-01-12 RX ADMIN — HEPARIN SODIUM 5000 UNITS: 5000 INJECTION, SOLUTION INTRAVENOUS; SUBCUTANEOUS at 09:22

## 2017-01-12 RX ADMIN — INSULIN GLARGINE 6 UNITS: 100 INJECTION, SOLUTION SUBCUTANEOUS at 11:58

## 2017-01-12 RX ADMIN — METHYLPREDNISOLONE SODIUM SUCCINATE 40 MG: 125 INJECTION, POWDER, FOR SOLUTION INTRAMUSCULAR; INTRAVENOUS at 13:29

## 2017-01-12 RX ADMIN — HEPARIN SODIUM 5000 UNITS: 5000 INJECTION, SOLUTION INTRAVENOUS; SUBCUTANEOUS at 00:34

## 2017-01-12 RX ADMIN — PROPOFOL 35 MCG/KG/MIN: 10 INJECTION, EMULSION INTRAVENOUS at 10:16

## 2017-01-12 RX ADMIN — PROPOFOL 25 MCG/KG/MIN: 10 INJECTION, EMULSION INTRAVENOUS at 02:21

## 2017-01-12 RX ADMIN — INSULIN LISPRO 2 UNITS: 100 INJECTION, SOLUTION INTRAVENOUS; SUBCUTANEOUS at 05:24

## 2017-01-12 RX ADMIN — METHYLPREDNISOLONE SODIUM SUCCINATE 40 MG: 125 INJECTION, POWDER, FOR SOLUTION INTRAMUSCULAR; INTRAVENOUS at 20:12

## 2017-01-12 RX ADMIN — INSULIN LISPRO 4 UNITS: 100 INJECTION, SOLUTION INTRAVENOUS; SUBCUTANEOUS at 17:45

## 2017-01-12 RX ADMIN — BACLOFEN 10 MG: 10 TABLET ORAL at 23:40

## 2017-01-12 RX ADMIN — CHLORHEXIDINE GLUCONATE 15 ML: 1.2 RINSE ORAL at 20:14

## 2017-01-12 RX ADMIN — POTASSIUM CHLORIDE 40 MEQ: 20 SOLUTION ORAL at 15:01

## 2017-01-12 RX ADMIN — BACLOFEN 10 MG: 10 TABLET ORAL at 09:23

## 2017-01-12 RX ADMIN — ASPIRIN 81 MG 81 MG: 81 TABLET ORAL at 09:23

## 2017-01-12 RX ADMIN — INSULIN LISPRO 2 UNITS: 100 INJECTION, SOLUTION INTRAVENOUS; SUBCUTANEOUS at 11:57

## 2017-01-12 RX ADMIN — DEXTROSE MONOHYDRATE AND SODIUM CHLORIDE 75 ML/HR: 5; .45 INJECTION, SOLUTION INTRAVENOUS at 20:11

## 2017-01-12 RX ADMIN — HEPARIN SODIUM 5000 UNITS: 5000 INJECTION, SOLUTION INTRAVENOUS; SUBCUTANEOUS at 15:34

## 2017-01-12 RX ADMIN — LEVOTHYROXINE SODIUM ANHYDROUS 87.5 MCG: 100 INJECTION, POWDER, LYOPHILIZED, FOR SOLUTION INTRAVENOUS at 13:29

## 2017-01-12 RX ADMIN — METHYLPREDNISOLONE SODIUM SUCCINATE 40 MG: 125 INJECTION, POWDER, FOR SOLUTION INTRAMUSCULAR; INTRAVENOUS at 09:22

## 2017-01-12 RX ADMIN — PROPOFOL 35 MCG/KG/MIN: 10 INJECTION, EMULSION INTRAVENOUS at 16:17

## 2017-01-12 RX ADMIN — PROPOFOL 35 MCG/KG/MIN: 10 INJECTION, EMULSION INTRAVENOUS at 22:16

## 2017-01-12 NOTE — PROGRESS NOTES
Pt SR/ST on monitor with PVCs noted, SBP elevated this shift 160s at times, Dr. Laura Leblanc aware. On propofol, awakes to voice, follows commands, ADONAY. Pt intubated, scant/small amount of thick secretions being suctioned orally and via ETT. Pt TF started this shift, BG being monitored, lantus started. Ac in place, monitoring UOP. L. Arm skin tear noted, mepilex in place. Pt turned Q2 hours, bed is in lowest/locked position. Family (wife) by bedside, updated on POC, all questions answered. Bilateral wrist restraints in place to prevent pulling lines/tubes, being monitored Q2 hours. Potassium replaced per protocol.

## 2017-01-12 NOTE — DIABETES MGMT
GLYCEMIC CONTROL & NUTRITION:    - Discussed in rounds, known h/o Dm2, controlled, current A1C 6.2%  - noted steroids on board, has been requiring consistent corrective coverage  - recommend initiate conservative basal insulin dose Lantus 6 units every day and continue corrective coverage  - TF to initiate today (Promote with goal rate of 70 ml/hr), anticipate insulin needs to increase significantly with nutrition support, will re-evaluate needs based on overnight trends      Recent Glucose Results:   Lab Results   Component Value Date/Time     (H) 01/12/2017 12:44 PM    GLUCPOC 185 (H) 01/12/2017 11:19 AM    GLUCPOC 182 (H) 01/12/2017 05:22 AM    GLUCPOC 175 (H) 01/12/2017 12:30 AM             Nuria Cole, MPH, RD

## 2017-01-12 NOTE — INTERDISCIPLINARY ROUNDS
CRITICAL CARE INTERDISCIPLINARY ROUNDS    Patient Information:    Name:   Gonzales Guillermo    Age:   70 y.o. Admission Date:   1/3/2017    Critical Care Day: 6 (code blue 1-6)    Surgery Date:      Attending Provider:   Stephy Thomas DO    Surgeon: n/a     Consultant(s):   dedra Dorsey    Critical Care Physician:  nany    Code Status: Full Code    Problem List:     Patient Active Problem List   Diagnosis Code    Cardiac arrest ([facility] I46.9    Anoxic encephalopathy ([address] G93.1    Acute respiratory failure ([address] J96.00    DM (diabetes mellitus) ([address] V41.4    Diastolic congestive heart failure, NYHA class 1 ([address] I50.30    HTN (hypertension) I10    Hypoxia R09.02    COPD (chronic obstructive pulmonary disease) with acute bronchitis ([address] J44.0    TIFFANIE (acute kidney injury) ([address] N17.9    Aspiration pneumonia ([facility] J69.0       Principal Problem:  Acute respiratory failure ([address]    During rounds the following quality care indicators and evidence based practices were addressed :  DVT Prophylaxis and PUD Prophylaxis Ac Day 8 ([facility] yes) ; Central Line Day: na Isolation: na; Antibiotic Stewardship: reviewed; Probiotics Necessary: na      Sepsis Order Set:    Glycemic Control:   No results for input(s): GLU in the last 72 hours.;  Recent Labs      01/12/17   0433  01/11/17   0514  01/10/17   0453   PHI  7.435  7.422  7. 392   PCO2I  35.9  34.9*  35.1   PO2I  81  83  73*    Adjustments     Acute MI/PCI:   Not applicable    Door to Balloon: Admission Time: 0905      Heart Failure:    Not applicable     SCIP Measures for other Surgeries:   Not applicable     Pneumonia:    Not applicable    Interdisciplinary team rounds were held with the following team membersCare Management, Nursing, Nutrition, Pharmacy, Physician and Respiratory Therapy. Plan of care discussed. Goals of Care/ Recommendations:  Palliative care consult. Possible need for PEG - lidocaine patch to knees. Will need nutrition .  Goals of care with family have been addressed will plan on intubating now. Will need trach and peg. Plan for next week. Will discuss with Dr Ricardo Taylor. tweeking lantus. And initiating tube feed. Recommendations in and free water flushes also    See clinical pathway and/or care plan for interventions and desired outcomes.     Critical Care Discharge Status:  Red

## 2017-01-12 NOTE — WOUND CARE
Patient seen during hospital wide pressure injury prevalence. Skin intact patient repositions self in bed. Spoke with patient concerning pressure relieving strategies. Wound care will continue to monitor during admission.

## 2017-01-12 NOTE — PROGRESS NOTES
NUTRITION FOLLOW-UP    RECOMMENDATIONS/PLAN:   - Begin TF's via OGT with Promote at 20 mL/hr, increase 20 mL q6h to reach goal rate 70 mL/hr. This provides total 1540 kcal, 96 g protein, 1280 mL water, 213 g CHO, >100% RDI's for micronutrients   With propofol total kcal= 1894 per day   - Add free water flushes 180 mL q6h    NUTRITION ASSESSMENT:   Client Update: 70 yrs old Male with respiratory insufficiency s/p intubation yesterday. Remains NPO- today is day 7. OGT placed yesterday, plan to begin TF's as discussed in IDR. FOOD/NUTRITION INTAKE   Diet Order:  NPO   Food Allergies: NKFA  Propofol: 13.4 mL/hr = 354 kcal/day  D5: at 75 mL/hr = 306 kcal/day   Pertinent Medications:  [x] Reviewed; solu-medrol, abx, MVI, ppi  Insulin:  [x] SSI     [] Pre-meal:      []  Basal:     [] None   Cultural/M[employer]on[employer] Food Preferences: None Identified    NUTRITION-FOCUSED PHYSICAL ASSESSMENT  Skin: intact. Edema 1+ BUE, 2+ BLE    GI: last BM 1/06 ANTHROPOMETRICS  Height: 5' 10.08\" (178 cm)       Weight: 89 kg (196 lb 3.4 oz)         BMI: 28.1 kg/m^2 overweight (25.0%-29.9% BMI)   Adm Weight: 198 lbs                Weight change: -2 lbs    BIOCHEMICAL DATA & MEDICAL TESTS  Pertinent Labs:  [x] Reviewed; POC -203, Na 153, BUN 57      NUTRITION PRESCRIPTION  Calories: 8023-0159 kcal/day based on [employer] eqn  Protein:  g/day based on 1-1.3 g/kg  CHO: 223 g/day based on 50% of total energy  Fluid: 2122-6392 ml/day based on 1 kcal/ml      NUTRITION DIAGNOSES:   1. Inadequate oral intake related to dysphagia as evidenced by NPO x7 days  - ongoing    NUTRITION INTERVENTIONS:   INTERVENTIONS:        GOALS:  1.  Begin TF's per above 1. >90% goal TF volume infused and no issues tolerating EN by next review 3-5 days     LEARNING NEEDS (Diet, Supplementation, Food/Nutrient-Drug Interaction):   [x] None Identified     [] Education provided & documented        Identified and patient:   [] Declined      [] Was not appropriate/indicated        NUTRITION MONITORING /EVALUATION:   Adjust EN/PN as appropriate  Monitor wt  Monitor renal labs, electrolytes, fluid status    Previous Recommendations Implemented: yes        Previous Goals Met:  yes -progressing      [x] Participated in Interdisciplinary Rounds    [x] Interdisciplinary Care Plan Reviewed  [x] Discharge Nutrition Plan:  TBD    NUTRITION RISK:           [x] At risk                        [] Not currently at risk        Will follow-up per policy.   Salazar Moraes, RD  PAGER:  [phone]

## 2017-01-12 NOTE — PROGRESS NOTES
@8228 pt taken over drowsy in bed ,flacc 0 , on ventilator via et tube. OG tube insitu placement verified remains clamped. Ac remains in situ draining clear yellow urine. Soft wrist restraints on bilateral hands range of motion done. Pt remains ion Diprivan drip . Nursing assessment done and documented in appropriate flow sheets. Nursing management continues. @2100 no new changes fl acc remains 0 Vital signs improved . Nursing observation continues. @2300 no changes care continues. @0100 wife called to check in on pt brief update given no new developments care continues. @0300 pt in no obvious distress observation continues. @0500 no changes care continues. @0700 no change. @0800 Bedside and Verbal shift change report given to Kenneth Fried (oncoming nurse) by Michele Sanders (offgoing nurse). Report included the following information SBAR, Kardex, Intake/Output, MAR, Recent Results and Med Rec Status.

## 2017-01-12 NOTE — PROGRESS NOTES
Hospitalist Progress Note    Patient: Jv Mello MRN: [id number]  CSN: [id number]    YOB: 1945  Age: 70 y.o. Sex: male    DOA: 1/3/2017 LOS:  LOS: 8 days                Assessment/Plan     Patient Active Problem List   Diagnosis Code    Cardiac arrest ([facility] I46.9    Anoxic encephalopathy ([facility] G93.1    Acute respiratory failure ([facility] J96.00    DM (diabetes mellitus) ([facility] W96.2    Diastolic congestive heart failure, NYHA class 1 ([facility]) I50.30    HTN (hypertension) I10    Hypoxia R09.02    COPD (chronic obstructive pulmonary disease) with acute bronchitis ([facility]) J44.0    TIFFANIE (acute kidney injury) ([facility] N17.9    Aspiration pneumonia ([facility] J69.0            69 yo WM with history of prior anoxic brain injury from NH, admitted for COPD exacerbation.      On 01/05/2017, patient had aspiration event and went into respiratory arrest and subsequently cardiac arrest. PEA arrest. CPR initiated and epi given. Patient intubated. He had ROSC. He is transferred to ICU.      Extubated 01/08/2017    Patient had increased oxygen requirement, was started on BiPAP, he was desaturating when tried to wean from BiPAP. Needed intubation 01/11/2017. He is intubated and sedated.          CRITICAL CARE PLAN      Resp - vent management per pulm  COPD - continue solumedrol.      ID - Follow up blood and urine cx. ANTIBIOTICS - levaquin azactam.   Trend temps, wbc, LA in normal range.      CVS - Monitor HD. Stable hemodynamically.       Heme/onc - Follow H&H, plts. INR.     Renal - Trend BUN, Cr, follow I/O, linton in place. Check and replace Mg, K. TIFFANIE - on CKD3, creatine improving  Hypernatremia - free water flushes. Follow sodium levels      Endocrine - Follow FSG      Neuro - sedation.       GI - NG tube and tube feeding.       Prophylaxis - DVT: heparin, GI: protonix.      40 minutes of critical care time spent in the direct evaluation and treatment of this high risk patient.  The reason for providing this level of medical care for this critically ill patient was due a critical illness that impaired one or more vital organ systems such that there was a high probability of imminent or life threatening deterioration in the patients condition. This care involved high complexity decision making to assess, manipulate, and support vital system functions, to treat this degreee vital organ system failure and to prevent further life threatening deterioration of the patients condition.                               Physical Exam:  General: As above.    HEENT: NC, Atraumatic. PERRLA, anicteric sclerae. Lungs: CTA Bilaterally. No Wheezing/Rhonchi/Rales. Heart: Regular rhythm,  No murmur, No Rubs, No Gallops  Abdomen: Soft, Non distended, Non tender.  +Bowel sounds,   Extremities: trace edema bilaterally.           Vital signs/Intake and Output:  Visit Vitals    /75    Pulse 83    Temp 99.5 °F (37.5 °C)    Resp 18    Ht 5' 10.08\" (1.78 m)    Wt 89 kg (196 lb 3.4 oz)    SpO2 97%    BMI 28.09 kg/m2     Current Shift:  01/12 0701 - 01/12 1900  In: 1216.9 [I.V.:1156.9]  Out: 250 [Urine:250]  Last three shifts:  01/10 1901 - 01/12 0700  In: 1564.3 [I.V.:1564.3]  Out: 2250 [Urine:2250]            Labs: Results:       Chemistry Recent Labs      01/12/17   1244   GLU  210*   NA  157*   K  3.6   CL  122*   CO2  25   BUN  43*   CREA  1.21   CA  8.6   AGAP  10   BUCR  36*      CBC w/Diff Recent Labs      01/12/17   1244   WBC  6.9   RBC  3.70*   HGB  10.7*   HCT  33.7*   PLT  178      Cardiac Enzymes No results for input(s): CPK, CKND1, MARCO A in the last 72 hours. No lab exists for component: CKRMB, TROIP   Coagulation No results for input(s): PTP, INR, APTT in the last 72 hours.     No lab exists for component: INREXT    Lipid Panel No results found for: CHOL, CHOLPOCT, CHOLX, CHLST, CHOLV, [id number], HDL, LDL, NLDLCT, DLDL, LDLC, DLDLP, [id number], VLDLC, VLDL, Luis A Sunita, [id number], TRIGP, TGLPOCT, [id number], CHHD, CHHDX BNP No results for input(s): BNPP in the last 72 hours. Liver Enzymes No results for input(s): TP, ALB, TBIL, AP, SGOT, GPT in the last 72 hours.     No lab exists for component: DBIL   Thyroid Studies Lab Results   Component Value Date/Time    TSH 1.37 08/13/2015 04:35 AM        Procedures/imaging: see electronic medical records for all procedures/Xrays and details which were not copied into this note but were reviewed prior to creation of Plan

## 2017-01-12 NOTE — PROGRESS NOTES
Problem: Dysphagia (Adult)  Goal: *Acute Goals and Plan of Care (Insert Text)    Rec: NPO except oral care via oral care swab q 4 hours  Strict aspiration/reflux precautions; HOB >30 at all times   Alternate means of nutrition/hydration    Patient will:  1. Utilize compensatory swallow maneuvers (decrease bolus size, decrease rate of intake, cyclical ingestion, etc.) to increase swallow function/safety with min visual, verbal and/or tactile cues in 4/5 trials. 2. Tolerate PO trials utilizing compensatory swallow techniques (decrease bolus size, decrease rate of intake, cyclical ingestion, etc.) without overt s/sx aspiration/distress in 4/5 trials with min visual, verbal, and/or tactile cues    3. Complete an objective measure of swallow fxn (i.e., MBSS) to assess oropharyngeal anatomy/physiology for determination of safest least-restrictive diet and/or appropriate rehabilitation techniques. Outcome: Not Met  Addendum to chart for 1/11/17:      Goals were ongoing at time of change in status. Will resolve this plan of care as patient with decline in status and is now intubated.      Thank you,        Mohan Brewster M.A., [id number] [facility]

## 2017-01-12 NOTE — PROGRESS NOTES
[facility]  Pulmonary, Critical Care, and Sleep Medicine    Name: Devin Chandler MRN: [id number]   : 1945 Hospital: [facility] [place]   Date: 2017        Critical Care Initial Patient Consult    Requesting MD:                                                  Reason for CC Consult:  IMPRESSION:   · Aspiration Pneumonia  · Acute hypoxic Respiratory Failure from above, pt reintubated     · Previous anoxic head encephalopathy   · Diastolic CHF at baseline  · Sp PEA arrest  · COPD exacerbation  · TIFFANIE      RECOMMENDATIONS:   · Neuro-    Sedated RASS -2  · Cards- hemodynamically stable now. Resp arrest led to cardiac arrest 1 mg epi given and 1 minute of CPR with ROSC and intubation last evening. History of diastolic dysfunction restarted hypertensive medications clonidine and labetalol as needed  · Pulm-  See vent orders, patient may require trach  · Renal-  Worsening renal function with electrolyte replacement. IV fluids start enteral feeds  · Heme- H/H and plt stable  · GI,  I believe pt does aspirate chronically, pt will need PEG. Will ask GI for peg placement  · ID- levaquin,   aspiration pneumonia food seen at [facility]  · DVT prophylaxis  · GI prophylaxis  ·       Subjective/History:     Pt was intubated yesterday du eto worseing respiratory status with bilevel . Currently sedated and intubated. Spoke with wife possibility of trach and peg. She is agreeable. Discussed with Dr. Bozena Napier.   Remain on bilevel. Attempted to wean off this am unsuccessful. Discussed with wife yesterday. Wants everything possible to \"brining him back home\"  Reviewed the GI input. Agree that this may not be time to get PEG until respiratory status improves but will likely need PEG near future as I believe he has been chronically aspirating. Mabeline Sink HPI  This patient has been seen and evaluated at the request of Dr. Aleisha Silva for aspiration pneumonia and resp failure.   The patient is a 70 y.o. male admitted 3 Jan with COPD exacerbation to the medical floor. Last evening vomited noticed to be hypoxic an dactually lost pulse. 1 Mg epi with ROSC and intubated for cyanosis. Moved to ICU discussed with family and wife should be here this afternoon. In SNF prior to this admission and has diastolic dysfunction at baseline. Currently stable on vent    The patient is critically ill and can not provide additional history due to intubation and sedation     Past Medical History   Diagnosis Date    [facility] ([facility]     Bursitis     CAD (coronary artery disease)     Cardiac arrest ([facility]     Cognitive communication deficit     Contracture of muscle     Depression     Diabetes ([facility]     Diarrhea     Disorder of kidney and ureter     Dysphagia     GERD (gastroesophageal reflux disease)     High cholesterol     Hypertension     Hypothyroid     Lack of coordination     Polyarthritis     Sleep apnea     Sleep disorder     Weakness       Past Surgical History   Procedure Laterality Date    Hx gi       peg    Hx amputation      Hx hernia repair        Prior to Admission medications    Medication Sig Start Date End Date Taking? Authorizing Provider   nitroglycerin (NITROBID) 2 % ointment Apply 1 Inch to affected area every six (6) hours. 8/14/15  Yes Baylee Ulrich MD   fentaNYL (DURAGESIC) 50 mcg/hr PATCH 1 Patch by TransDERmal route every seventy-two (72) hours. Phys MD Mary   enalapril (VASOTEC) 5 mg tablet Take 5 mg by mouth daily. Mirna Hernandez MD   Menthol-Zinc Oxide (RISAMINE) 0.44-20.6 % oint Apply  to affected area three (3) times daily. Historical Provider   citalopram (CELEXA) 40 mg tablet Take 40 mg by mouth daily. Historical Provider   melatonin 5 mg cap capsule Take 5 mg by mouth nightly. Historical Provider   isosorbide mononitrate (ISMO, MONOKET) 20 mg tablet Take 20 mg by mouth two (2) times a day.     Historical Provider   loperamide (IMODIUM) 2 mg capsule Take 2 mg by mouth four (4) times daily as needed for Diarrhea. Historical Provider   promethazine (PHENERGAN) 12.5 mg tablet Take 12.5 mg by mouth every six (6) hours as needed for Nausea. Historical Provider   aspirin delayed-release 81 mg tablet Take 81 mg by mouth daily. Historical Provider   guaiFENesin (ROBAFEN) 100 mg/5 mL liquid Take 200 mg by mouth every six (6) hours as needed for Cough. Historical Provider   cloNIDine (CATAPRES) 0.2 mg/24 hr patch 1 Patch by TransDERmal route every Monday. Historical Provider   levothyroxine (SYNTHROID) 175 mcg tablet Take 175 mcg by mouth Daily (before breakfast). Historical Provider   furosemide (LASIX) 40 mg tablet Take 40 mg by mouth daily. Historical Provider   magnesium hydroxide (MCCAULEY MILK OF MAGNESIA) 400 mg/5 mL suspension Take 30 mL by mouth daily as needed for Constipation. Historical Provider   bisacodyl (DULCOLAX, BISACODYL,) 10 mg suppository Insert 10 mg into rectum daily as needed. Historical Provider   multivitamin, tx-iron-ca-min (THERA-M W/ IRON) 9 mg iron-400 mcg tab tablet Take 1 Tab by mouth daily. Historical Provider   mometasone (NASONEX) 50 mcg/actuation nasal spray 2 Sprays by Both Nostrils route daily. Historical Provider   GLUCOSAMINE HCL/CHONDR MAY A NA (GLUCOSAMINE-CHONDROITIN) 750-600 mg tab Take 1 Tab by mouth two (2) times a day. Historical Provider   baclofen (LIORESAL) 10 mg tablet Take 10 mg by mouth three (3) times daily. Historical Provider   pantoprazole (PROTONIX) 20 mg tablet Take 20 mg by mouth daily. Historical Provider   predniSONE (DELTASONE) 10 mg tablet Take 10 mg by mouth daily. Historical Provider   ferrous sulfate (FEROSUL) 220 mg (44 mg iron)/5 mL elix Take 7.4 mL by mouth daily. Historical Provider   potassium chloride (KAON 10%) 20 mEq/15 mL solution Take 30 mEq by mouth daily.  Quantity 22.5 mL     Historical Provider   acetaminophen (TYLENOL) 325 mg tablet Take 650 mg by mouth every six (6) hours as needed for Pain. Historical Provider   diclofenac (VOLTAREN) 1 % gel Apply 4 g to affected area two (2) times a day. For bilateral knee pain- apply thin layer topically to entire joint area    Historical Provider   labetalol (NORMODYNE) 200 mg tablet Take 200 mg by mouth two (2) times a day. Hold for heart rate less than 60 and advise provider. Historical Provider   lidocaine (LIDODERM) 5 % 2 Patches by TransDERmal route every twenty-four (24) hours. Place Two patches to right trapezius region in morning. Remove patches 12 hours later. Apply 7AM Remove 7PM. 11/25/16   Erik Tompkins MD   oxyCODONE-acetaminophen (PERCOCET) 5-325 mg per tablet Take 1 Tab by mouth every four (4) hours as needed for Pain. Max Daily Amount: 6 Tabs. 11/25/16   Erik Tompkins MD   albuterol-ipratropium (DUO-NEB) 2.5 mg-0.5 mg/3 ml nebulizer solution 3 mL by Nebulization route every four (4) hours as needed for Wheezing.  8/14/15   Valentina Fink MD     Current Facility-Administered Medications   Medication Dose Route Frequency    insulin regular (NOVOLIN R, HUMULIN R) 100 unit/mL injection        insulin glargine (LANTUS) injection 6 Units  6 Units SubCUTAneous DAILY    propofol (DIPRIVAN) infusion  5-50 mcg/kg/min IntraVENous TITRATE    methylPREDNISolone (PF) (SOLU-MEDROL) injection 40 mg  40 mg IntraVENous Q6H    lidocaine (LIDODERM) 5 % patch 1 Patch  1 Patch TransDERmal Q24H    dextrose 5 % - 0.45% NaCl infusion  75 mL/hr IntraVENous CONTINUOUS    levothyroxine (SYNTHROID) injection 87.5 mcg  87.5 mcg IntraVENous Q24H    chlorhexidine (PERIDEX) 0.12 % mouthwash 15 mL  15 mL Oral Q12H    cloNIDine (CATAPRES) 0.1 mg/24 hr patch 1 Patch  1 Patch TransDERmal Q7D    pantoprazole (PROTONIX) 40 mg in sodium chloride 0.9 % 10 mL injection  40 mg IntraVENous DAILY    insulin lispro (HUMALOG) injection   SubCUTAneous Q6H    albuterol-ipratropium (DUO-NEB) 2.5 MG-0.5 MG/3 ML  3 mL Nebulization QID RT    aspirin chewable tablet 81 mg  81 mg Per G Tube DAILY    baclofen (LIORESAL) tablet 10 mg  10 mg Oral TID    fluticasone (FLONASE) 50 mcg/actuation nasal spray 2 Spray  2 Spray Both Nostrils DAILY    multivitamin, tx-iron-ca-min (THERA-M w/ IRON) tablet 1 Tab  1 Tab Oral DAILY    levoFLOXacin (LEVAQUIN) 500 mg in D5W IVPB  500 mg IntraVENous Q24H    heparin (porcine) injection 5,000 Units  5,000 Units SubCUTAneous Q8H     Allergies   Allergen Reactions    Penicillins Itching      Social History   Substance Use Topics    Smoking status: Never Smoker    Smokeless tobacco: Not on file    Alcohol use No          Objective:   Vital Signs:    Visit Vitals    /80 (BP 1 Location: Left arm, BP Patient Position: At rest)    Pulse 87    Temp 99.5 °F (37.5 °C)    Resp 25    Ht 5' 10.08\" (1.78 m)    Wt 89 kg (196 lb 3.4 oz)    SpO2 98%    BMI 28.09 kg/m2       O2 Device: Endotracheal tube   O2 Flow Rate (L/min): 6 l/min   Temp (24hrs), Av.2 °F (37.3 °C), Min:98.6 °F (37 °C), Max:99.6 °F (37.6 °C)       Intake/Output:   Last shift:       07 -  1900  In: 1216.9 [I.V.:1156.9]  Out: 250 [Urine:250]  Last 3 shifts: 01/10 190 -  0700  In: 1564.3 [I.V.:1564.3]  Out: 2250 [Urine:2250]    Intake/Output Summary (Last 24 hours) at 17 1247  Last data filed at 17 1200   Gross per 24 hour   Intake          2781.23 ml   Output             1200 ml   Net          1581.23 ml     Physical Exam:    General:   sedated on MV   Head:  Normocephalic, without obvious abnormality,    Eyes:  Conjunctivae/corneas clear. PERRL,   Nose: Nares normal. Septum midline. Mucosa normal. No drainage or sinus tenderness. Throat: ETT    Neck: Supple, symmetrical, trachea midline, no adenopathy, no carotid bruit and no JVD. Lungs:   Clear to auscultation bilaterally. Chest wall:  No tenderness or deformity. Heart:  Regular rate and rhythm, S1, S2 normal, no murmur, click, rub or gallop.    Abdomen: Soft, non-tender. Bowel sounds normal. No masses,  No organomegaly. Extremities: Extremities normal, atraumatic, no cyanosis or edema. Data:     Recent Results (from the past 24 hour(s))   GLUCOSE, POC    Collection Time: 01/11/17  5:27 PM   Result Value Ref Range    Glucose (POC) 203 (H) 70 - 110 mg/dL   GLUCOSE, POC    Collection Time: 01/12/17 12:30 AM   Result Value Ref Range    Glucose (POC) 175 (H) 70 - 110 mg/dL   POC G3    Collection Time: 01/12/17  4:33 AM   Result Value Ref Range    Device: VENT      FIO2 (POC) 40 %    pH (POC) 7.435 7.35 - 7.45      pCO2 (POC) 35.9 35.0 - 45.0 MMHG    pO2 (POC) 81 80 - 100 MMHG    HCO3 (POC) 24.1 22 - 26 MMOL/L    sO2 (POC) 96 92 - 97 %    Base excess (POC) 0 mmol/L    Mode ASSIST CONTROL      Tidal volume 450 ml    Set Rate 16 bpm    PEEP/CPAP (POC) 6 cmH2O    Allens test (POC) YES      Total resp. rate 16      Site LEFT RADIAL      Patient temp. 98.6      Specimen type (POC) ARTERIAL      Performed by [place] [facility]     Volume control YES     MAGNESIUM    Collection Time: 01/12/17  4:50 AM   Result Value Ref Range    Magnesium 2.3 1.8 - 2.4 mg/dL   GLUCOSE, POC    Collection Time: 01/12/17  5:22 AM   Result Value Ref Range    Glucose (POC) 182 (H) 70 - 110 mg/dL   GLUCOSE, POC    Collection Time: 01/12/17 11:19 AM   Result Value Ref Range    Glucose (POC) 185 (H) 70 - 110 mg/dL           Recent Labs      01/12/17   0433  01/11/17   0514  01/10/17   0453   FIO2I  40  35  45   HCO3I  24.1  22.6  21.4*   PCO2I  35.9  34.9*  35.1   PHI  7.435  7.422  7. 392   PO2I  81  83  73*     Telemetry:normal sinus rhythm    Imaging:  I have personally reviewed the patients radiographs and have reviewed the reports:     Best practice :  Core measures; Antibiotic choice:  Severe Sepsis bundles;SIRS screen met? Yes  Fluids  Lactic acid ordered- initial and repeat Q6hrs if elevated till normalized. Cultures drawn.   Antibiotic administered within 1hr-ICU  And 3hrs ED  Large bore IV- 2 sites          Pressors aim MAP >65mmHg  Steriods  Glycemic control  Mech. Ventilated patients- aim to keep peak plateau pressure 29-80IL H2O.   Sress ulcer prophylaxis  DVT prophylaxis            Total critical care time exclusive of procedures: 35 minutes  Vicki Summers MD

## 2017-01-13 ENCOUNTER — APPOINTMENT (OUTPATIENT)
Dept: GENERAL RADIOLOGY | Age: 72
DRG: 207 | End: 2017-01-13
Attending: INTERNAL MEDICINE
Payer: MEDICARE

## 2017-01-13 LAB
ANION GAP BLD CALC-SCNC: 8 MMOL/L (ref 3–18)
ARTERIAL PATENCY WRIST A: YES
ARTERIAL PATENCY WRIST A: YES
BASE DEFICIT BLD-SCNC: 2 MMOL/L
BASE DEFICIT BLD-SCNC: 4 MMOL/L
BDY SITE: ABNORMAL
BDY SITE: ABNORMAL
BODY TEMPERATURE: 98.6
BUN SERPL-MCNC: 44 MG/DL (ref 7–18)
BUN/CREAT SERPL: 34 (ref 12–20)
CALCIUM SERPL-MCNC: 8.7 MG/DL (ref 8.5–10.1)
CHLORIDE SERPL-SCNC: 120 MMOL/L (ref 100–108)
CO2 SERPL-SCNC: 24 MMOL/L (ref 21–32)
CREAT SERPL-MCNC: 1.31 MG/DL (ref 0.6–1.3)
ERYTHROCYTE [DISTWIDTH] IN BLOOD BY AUTOMATED COUNT: 16 % (ref 11.6–14.5)
GAS FLOW.O2 O2 DELIVERY SYS: ABNORMAL L/MIN
GAS FLOW.O2 O2 DELIVERY SYS: ABNORMAL L/MIN
GAS FLOW.O2 SETTING OXYMISER: 16 BPM
GAS FLOW.O2 SETTING OXYMISER: 16 BPM
GLUCOSE BLD STRIP.AUTO-MCNC: 240 MG/DL (ref 70–110)
GLUCOSE BLD STRIP.AUTO-MCNC: 271 MG/DL (ref 70–110)
GLUCOSE BLD STRIP.AUTO-MCNC: 277 MG/DL (ref 70–110)
GLUCOSE BLD STRIP.AUTO-MCNC: 286 MG/DL (ref 70–110)
GLUCOSE SERPL-MCNC: 281 MG/DL (ref 74–99)
HCO3 BLD-SCNC: 21.4 MMOL/L (ref 22–26)
HCO3 BLD-SCNC: 22.1 MMOL/L (ref 22–26)
HCT VFR BLD AUTO: 35.6 % (ref 36–48)
HGB BLD-MCNC: 11.2 G/DL (ref 13–16)
INSPIRATION.DURATION SETTING TIME VENT: 0.9 SEC
MAGNESIUM SERPL-MCNC: 2.2 MG/DL (ref 1.8–2.4)
MCH RBC QN AUTO: 28.5 PG (ref 24–34)
MCHC RBC AUTO-ENTMCNC: 31.5 G/DL (ref 31–37)
MCV RBC AUTO: 90.6 FL (ref 74–97)
O2/TOTAL GAS SETTING VFR VENT: 40 %
O2/TOTAL GAS SETTING VFR VENT: 40 %
PCO2 BLD: 34.2 MMHG (ref 35–45)
PCO2 BLD: 37.3 MMHG (ref 35–45)
PEEP RESPIRATORY: 6 CMH2O
PEEP RESPIRATORY: 6 CMH2O
PH BLD: 7.37 [PH] (ref 7.35–7.45)
PH BLD: 7.42 [PH] (ref 7.35–7.45)
PIP ISTAT,IPIP: 19
PLATELET # BLD AUTO: 183 K/UL (ref 135–420)
PMV BLD AUTO: 10.9 FL (ref 9.2–11.8)
PO2 BLD: 54 MMHG (ref 80–100)
PO2 BLD: 85 MMHG (ref 80–100)
POTASSIUM SERPL-SCNC: 4.3 MMOL/L (ref 3.5–5.5)
RBC # BLD AUTO: 3.93 M/UL (ref 4.7–5.5)
SAO2 % BLD: 87 % (ref 92–97)
SAO2 % BLD: 97 % (ref 92–97)
SERVICE CMNT-IMP: ABNORMAL
SERVICE CMNT-IMP: ABNORMAL
SODIUM SERPL-SCNC: 152 MMOL/L (ref 136–145)
SPECIMEN TYPE: ABNORMAL
SPECIMEN TYPE: ABNORMAL
TOTAL RESP. RATE, ITRR: 16
TOTAL RESP. RATE, ITRR: 17
VENTILATION MODE VENT: ABNORMAL
VENTILATION MODE VENT: ABNORMAL
VOLUME CONTROL IVLC: YES
VT SETTING VENT: 450 ML
VT SETTING VENT: 450 ML
WBC # BLD AUTO: 10 K/UL (ref 4.6–13.2)

## 2017-01-13 PROCEDURE — 83735 ASSAY OF MAGNESIUM: CPT | Performed by: INTERNAL MEDICINE

## 2017-01-13 PROCEDURE — 65610000006 HC RM INTENSIVE CARE

## 2017-01-13 PROCEDURE — 77030018798 HC PMP KT ENTRL FED COVD -A

## 2017-01-13 PROCEDURE — 74011250637 HC RX REV CODE- 250/637: Performed by: INTERNAL MEDICINE

## 2017-01-13 PROCEDURE — 74011250636 HC RX REV CODE- 250/636: Performed by: INTERNAL MEDICINE

## 2017-01-13 PROCEDURE — C9113 INJ PANTOPRAZOLE SODIUM, VIA: HCPCS | Performed by: INTERNAL MEDICINE

## 2017-01-13 PROCEDURE — 94003 VENT MGMT INPAT SUBQ DAY: CPT

## 2017-01-13 PROCEDURE — 36415 COLL VENOUS BLD VENIPUNCTURE: CPT | Performed by: INTERNAL MEDICINE

## 2017-01-13 PROCEDURE — 80048 BASIC METABOLIC PNL TOTAL CA: CPT | Performed by: INTERNAL MEDICINE

## 2017-01-13 PROCEDURE — 85027 COMPLETE CBC AUTOMATED: CPT | Performed by: INTERNAL MEDICINE

## 2017-01-13 PROCEDURE — 74011000250 HC RX REV CODE- 250: Performed by: INTERNAL MEDICINE

## 2017-01-13 PROCEDURE — 74011636637 HC RX REV CODE- 636/637: Performed by: INTERNAL MEDICINE

## 2017-01-13 PROCEDURE — 77030018846 HC SOL IRR STRL H20 ICUM -A

## 2017-01-13 PROCEDURE — 94640 AIRWAY INHALATION TREATMENT: CPT

## 2017-01-13 PROCEDURE — 36600 WITHDRAWAL OF ARTERIAL BLOOD: CPT

## 2017-01-13 PROCEDURE — 71010 XR CHEST PORT: CPT

## 2017-01-13 PROCEDURE — 77010033678 HC OXYGEN DAILY

## 2017-01-13 PROCEDURE — 82962 GLUCOSE BLOOD TEST: CPT

## 2017-01-13 PROCEDURE — 82803 BLOOD GASES ANY COMBINATION: CPT

## 2017-01-13 RX ORDER — PANTOPRAZOLE SODIUM 40 MG/1
40 TABLET, DELAYED RELEASE ORAL
Status: DISCONTINUED | OUTPATIENT
Start: 2017-01-14 | End: 2017-01-13

## 2017-01-13 RX ORDER — INSULIN GLARGINE 100 [IU]/ML
12 INJECTION, SOLUTION SUBCUTANEOUS DAILY
Status: DISCONTINUED | OUTPATIENT
Start: 2017-01-14 | End: 2017-01-21

## 2017-01-13 RX ORDER — INSULIN LISPRO 100 [IU]/ML
4 INJECTION, SOLUTION INTRAVENOUS; SUBCUTANEOUS EVERY 6 HOURS
Status: DISCONTINUED | OUTPATIENT
Start: 2017-01-13 | End: 2017-01-16

## 2017-01-13 RX ORDER — INSULIN GLARGINE 100 [IU]/ML
6 INJECTION, SOLUTION SUBCUTANEOUS ONCE
Status: COMPLETED | OUTPATIENT
Start: 2017-01-13 | End: 2017-01-13

## 2017-01-13 RX ADMIN — ASPIRIN 81 MG 81 MG: 81 TABLET ORAL at 08:00

## 2017-01-13 RX ADMIN — INSULIN LISPRO 4 UNITS: 100 INJECTION, SOLUTION INTRAVENOUS; SUBCUTANEOUS at 12:54

## 2017-01-13 RX ADMIN — PROPOFOL 35 MCG/KG/MIN: 10 INJECTION, EMULSION INTRAVENOUS at 19:08

## 2017-01-13 RX ADMIN — BACLOFEN 10 MG: 10 TABLET ORAL at 16:38

## 2017-01-13 RX ADMIN — HEPARIN SODIUM 5000 UNITS: 5000 INJECTION, SOLUTION INTRAVENOUS; SUBCUTANEOUS at 00:30

## 2017-01-13 RX ADMIN — CHLORHEXIDINE GLUCONATE 15 ML: 1.2 RINSE ORAL at 08:00

## 2017-01-13 RX ADMIN — HEPARIN SODIUM 5000 UNITS: 5000 INJECTION, SOLUTION INTRAVENOUS; SUBCUTANEOUS at 07:50

## 2017-01-13 RX ADMIN — METHYLPREDNISOLONE SODIUM SUCCINATE 40 MG: 40 INJECTION, POWDER, FOR SOLUTION INTRAMUSCULAR; INTRAVENOUS at 17:49

## 2017-01-13 RX ADMIN — BACLOFEN 10 MG: 10 TABLET ORAL at 21:14

## 2017-01-13 RX ADMIN — MULTIPLE VITAMINS W/ MINERALS TAB 1 TABLET: TAB at 08:00

## 2017-01-13 RX ADMIN — INSULIN LISPRO 6 UNITS: 100 INJECTION, SOLUTION INTRAVENOUS; SUBCUTANEOUS at 17:48

## 2017-01-13 RX ADMIN — BACLOFEN 10 MG: 10 TABLET ORAL at 08:00

## 2017-01-13 RX ADMIN — CHLORHEXIDINE GLUCONATE 15 ML: 1.2 RINSE ORAL at 21:13

## 2017-01-13 RX ADMIN — SODIUM CHLORIDE 40 MG: 9 INJECTION, SOLUTION INTRAMUSCULAR; INTRAVENOUS; SUBCUTANEOUS at 08:00

## 2017-01-13 RX ADMIN — METHYLPREDNISOLONE SODIUM SUCCINATE 40 MG: 40 INJECTION, POWDER, FOR SOLUTION INTRAMUSCULAR; INTRAVENOUS at 03:56

## 2017-01-13 RX ADMIN — IPRATROPIUM BROMIDE AND ALBUTEROL SULFATE 3 ML: .5; 3 SOLUTION RESPIRATORY (INHALATION) at 12:09

## 2017-01-13 RX ADMIN — IPRATROPIUM BROMIDE AND ALBUTEROL SULFATE 3 ML: .5; 3 SOLUTION RESPIRATORY (INHALATION) at 20:10

## 2017-01-13 RX ADMIN — IPRATROPIUM BROMIDE AND ALBUTEROL SULFATE 3 ML: .5; 3 SOLUTION RESPIRATORY (INHALATION) at 08:19

## 2017-01-13 RX ADMIN — PROPOFOL 35 MCG/KG/MIN: 10 INJECTION, EMULSION INTRAVENOUS at 07:42

## 2017-01-13 RX ADMIN — INSULIN LISPRO 4 UNITS: 100 INJECTION, SOLUTION INTRAVENOUS; SUBCUTANEOUS at 00:30

## 2017-01-13 RX ADMIN — LEVOTHYROXINE SODIUM ANHYDROUS 87.5 MCG: 100 INJECTION, POWDER, LYOPHILIZED, FOR SOLUTION INTRAVENOUS at 11:27

## 2017-01-13 RX ADMIN — METHYLPREDNISOLONE SODIUM SUCCINATE 40 MG: 125 INJECTION, POWDER, FOR SOLUTION INTRAMUSCULAR; INTRAVENOUS at 02:00

## 2017-01-13 RX ADMIN — INSULIN GLARGINE 6 UNITS: 100 INJECTION, SOLUTION SUBCUTANEOUS at 12:53

## 2017-01-13 RX ADMIN — IPRATROPIUM BROMIDE AND ALBUTEROL SULFATE 3 ML: .5; 3 SOLUTION RESPIRATORY (INHALATION) at 15:47

## 2017-01-13 RX ADMIN — PROPOFOL 35 MCG/KG/MIN: 10 INJECTION, EMULSION INTRAVENOUS at 03:55

## 2017-01-13 RX ADMIN — PROPOFOL 35 MCG/KG/MIN: 10 INJECTION, EMULSION INTRAVENOUS at 12:52

## 2017-01-13 RX ADMIN — INSULIN GLARGINE 6 UNITS: 100 INJECTION, SOLUTION SUBCUTANEOUS at 08:00

## 2017-01-13 RX ADMIN — INSULIN LISPRO 4 UNITS: 100 INJECTION, SOLUTION INTRAVENOUS; SUBCUTANEOUS at 17:48

## 2017-01-13 RX ADMIN — HEPARIN SODIUM 5000 UNITS: 5000 INJECTION, SOLUTION INTRAVENOUS; SUBCUTANEOUS at 16:38

## 2017-01-13 RX ADMIN — INSULIN LISPRO 6 UNITS: 100 INJECTION, SOLUTION INTRAVENOUS; SUBCUTANEOUS at 12:53

## 2017-01-13 RX ADMIN — METHYLPREDNISOLONE SODIUM SUCCINATE 40 MG: 40 INJECTION, POWDER, FOR SOLUTION INTRAMUSCULAR; INTRAVENOUS at 12:54

## 2017-01-13 RX ADMIN — INSULIN LISPRO 6 UNITS: 100 INJECTION, SOLUTION INTRAVENOUS; SUBCUTANEOUS at 06:21

## 2017-01-13 NOTE — INTERDISCIPLINARY ROUNDS
CRITICAL CARE INTERDISCIPLINARY ROUNDS    Patient Information:    Name:   Shaan Peralta    Age:   70 y.o. Admission Date:   1/3/2017    Critical Care Day: 7 (code blue 1-6)    Surgery Date:      Attending Provider:   Jeronimo Anderson DO    Surgeon: n/a     Consultant(s):   dedra Wade    Critical Care Physician:  nany    Code Status: Full Code    Problem List:     Patient Active Problem List   Diagnosis Code    Cardiac arrest ([facility] I46.9    Anoxic encephalopathy ([address] G93.1    Acute respiratory failure ([address] J96.00    DM (diabetes mellitus) ([address] I44.8    Diastolic congestive heart failure, NYHA class 1 ([address] I50.30    HTN (hypertension) I10    Hypoxia R09.02    COPD (chronic obstructive pulmonary disease) with acute bronchitis ([facility] J44.0    TIFFANIE (acute kidney injury) ([facility] N17.9    Aspiration pneumonia ([facility] J69.0       Principal Problem:  Acute respiratory failure ([address]    During rounds the following quality care indicators and evidence based practices were addressed :  DVT Prophylaxis and PUD Prophylaxis Ac Day 8 ([facility] yes) ; Central Line Day: na Isolation: na; Antibiotic Stewardship: reviewed; Probiotics Necessary: na      Sepsis Order Set:    Glycemic Control:   Recent Labs      01/13/17   0447  01/12/17   1244   GLU  281*  210*   ; Recent Labs      01/13/17   0429  01/12/17   0433  01/11/17   0514   PHI  7.366  7.435  7.422   PCO2I  37.3  35.9  34.9*   PO2I  54*  81  83    Adjustments     Acute MI/PCI:   Not applicable    Door to Balloon: Admission Time: 0905      Heart Failure:    Not applicable     SCIP Measures for other Surgeries:   Not applicable     Pneumonia:    Not applicable    Interdisciplinary team rounds were held with the following team membersCare Management, Nursing, Nutrition, Pharmacy, Physician and Respiratory Therapy. Plan of care discussed. Goals of Care/ Recommendations:  Palliative care consult. Possible need for PEG - lidocaine patch to knees.  Increase free water to 300 q4  . Goals of care with family have been addressed will plan on intubating now. Will need trach and peg. Plan for next week. Will discuss with Dr Jone Schmidt. increase lantus and add scheduled lispro. See clinical pathway and/or care plan for interventions and desired outcomes.     Critical Care Discharge Status:  Red

## 2017-01-13 NOTE — PROGRESS NOTES
Hospitalist Progress Note    Patient: Charlotte Foreman MRN: [id number]  CSN: [id number]    YOB: 1945  Age: 70 y.o. Sex: male    DOA: 1/3/2017 LOS:  LOS: 9 days                Assessment/Plan     Patient Active Problem List   Diagnosis Code    Cardiac arrest ([facility] I46.9    Anoxic encephalopathy ([facility] G93.1    Acute respiratory failure ([facility] J96.00    DM (diabetes mellitus) ([facility] O55.9    Diastolic congestive heart failure, NYHA class 1 ([facility]) I50.30    HTN (hypertension) I10    Hypoxia R09.02    COPD (chronic obstructive pulmonary disease) with acute bronchitis ([facility]) J44.0    TIFFANIE (acute kidney injury) ([facility] N17.9    Aspiration pneumonia ([facility] J69.0            71 yo WM with history of prior anoxic brain injury from NH, admitted for COPD exacerbation.      On 01/05/2017, patient had aspiration event and went into respiratory arrest and subsequently cardiac arrest. PEA arrest. CPR initiated and epi given. Patient intubated. He had ROSC. He is transferred to ICU.      Extubated 01/08/2017     Patient had increased oxygen requirement, was started on BiPAP, he was desaturating when tried to wean from BiPAP. Needed intubation 01/11/2017. He is intubated and sedated.          CRITICAL CARE PLAN      Resp - vent management per pulm  COPD - continue solumedrol.      ID - Follow up blood and urine cx. ANTIBIOTICS - levaquin azactam.   Trend temps, wbc, LA in normal range.      CVS - Monitor HD. Stable hemodynamically.       Heme/onc - Follow H&H, plts. INR.     Renal - Trend BUN, Cr, follow I/O, linton in place. Check and replace Mg, K. TIFFANIE - on CKD3, creatine improving  Hypernatremia - free water flushes. Follow sodium levels      Endocrine - Follow FSG      Neuro - sedation.       GI - NG tube and tube feeding. May need PEG placement.      Prophylaxis - DVT: heparin, GI: protonix.         40 minutes of critical care time spent in the direct evaluation and treatment of this high risk patient. The reason for providing this level of medical care for this critically ill patient was due a critical illness that impaired one or more vital organ systems such that there was a high probability of imminent or life threatening deterioration in the patients condition. This care involved high complexity decision making to assess, manipulate, and support vital system functions, to treat this degreee vital organ system failure and to prevent further life threatening deterioration of the patients condition.         Physical Exam:  General: As above.    HEENT: NC, Atraumatic. PERRLA, anicteric sclerae. Lungs: CTA Bilaterally. No Wheezing/Rhonchi/Rales. Heart: Regular rhythm,  No murmur, No Rubs, No Gallops  Abdomen: Soft, Non distended, Non tender.  +Bowel sounds,   Extremities: trace edema bilaterally. Vital signs/Intake and Output:  Visit Vitals    /86    Pulse 100    Temp 98 °F (36.7 °C)    Resp 26    Ht 5' 10.08\" (1.78 m)    Wt 89 kg (196 lb 3.4 oz)    SpO2 98%    BMI 28.09 kg/m2     Current Shift:  01/13 0701 - 01/13 1900  In: 9506 [I.V.:1515]  Out: 500 [Urine:500]  Last three shifts:  01/11 1901 - 01/13 0700  In: 3869.1 [I.V.:2506.1]  Out: 1875 [KDKIQ:3525]            Labs: Results:       Chemistry Recent Labs      01/13/17 0447 01/12/17   1925  01/12/17   1244   GLU  281*   --   210*   NA  152*   --   157*   K  4.3  4.4  3.6   CL  120*   --   122*   CO2  24   --   25   BUN  44*   --   43*   CREA  1.31*   --   1.21   CA  8.7   --   8.6   AGAP  8   --   10   BUCR  34*   --   36*      CBC w/Diff Recent Labs      01/13/17 0447 01/12/17   1244   WBC  10.0  6.9   RBC  3.93*  3.70*   HGB  11.2*  10.7*   HCT  35.6*  33.7*   PLT  183  178      Cardiac Enzymes No results for input(s): CPK, CKND1, MARCO A in the last 72 hours. No lab exists for component: CKRMB, TROIP   Coagulation No results for input(s): PTP, INR, APTT in the last 72 hours.     No lab exists for component: INREXT    Lipid Panel No results found for: CHOL, CHOLPOCT, CHOLX, CHLST, CHOLV, [id number], HDL, LDL, NLDLCT, DLDL, LDLC, DLDLP, [id number], VLDLC, VLDL, TGL, TGLX, TRIGL, [id number], TRIGP, TGLPOCT, [id number], CHHD, CHHDX   BNP No results for input(s): BNPP in the last 72 hours. Liver Enzymes No results for input(s): TP, ALB, TBIL, AP, SGOT, GPT in the last 72 hours.     No lab exists for component: DBIL   Thyroid Studies Lab Results   Component Value Date/Time    TSH 1.37 08/13/2015 04:35 AM        Procedures/imaging: see electronic medical records for all procedures/Xrays and details which were not copied into this note but were reviewed prior to creation of Plan

## 2017-01-13 NOTE — PROGRESS NOTES
[facility]  Pulmonary, Critical Care, and Sleep Medicine    Name: Dami Farrar MRN: [id number]   : 1945 Hospital: [facility] [place]   Date: 2017        Critical Care Initial Patient Consult    Requesting MD:                                                  Reason for CC Consult:  IMPRESSION:   · Aspiration Pneumonia  · Acute hypoxic Respiratory Failure from above, pt reintubated     · Previous anoxic head encephalopathy   · Diastolic CHF at baseline  · Sp PEA arrest  · COPD exacerbation  · TIFFANIE      RECOMMENDATIONS:   · Neuro-    Sedated RASS -2  · Cards- hemodynamically stable now. Resp arrest led to cardiac arrest 1 mg epi given and 1 minute of CPR with ROSC and intubation last evening. History of diastolic dysfunction restarted hypertensive medications clonidine and labetalol as needed  · Pulm-  See vent orders, patient may require trach  · Renal-  Worsening renal function with electrolyte replacement. IV fluids start enteral feeds  · Heme- H/H and plt stable  · GI,  I believe pt does aspirate chronically, pt will need PEG. Will ask GI for peg placement  · ID- levaquin,   aspiration pneumonia food seen at [facility]  · DVT prophylaxis  · GI prophylaxis  ·       Subjective/History:     NO issues. Tolerating the vent support fio2 now 35%. Tolerating tube feed. Discussed with wife and Dr. Francis Fraser,  Will attempt to place peg asap. Then work on extubation possibly   Though good possibility patient may required trach.   Remain on bilevel. Attempted to wean off this am unsuccessful. Discussed with wife yesterday. Wants everything possible to \"brining him back home\"  Reviewed the GI input. Agree that this may not be time to get PEG until respiratory status improves but will likely need PEG near future as I believe he has been chronically aspirating. Grady Means      HPI  This patient has been seen and evaluated at the request of Dr. Tl Segura for aspiration pneumonia and resp failure. The patient is a 70 y.o. male admitted 3 Jan with COPD exacerbation to the medical floor. Last evening vomited noticed to be hypoxic an dactually lost pulse. 1 Mg epi with ROSC and intubated for cyanosis. Moved to ICU discussed with family and wife should be here this afternoon. In SNF prior to this admission and has diastolic dysfunction at baseline. Currently stable on vent    The patient is critically ill and can not provide additional history due to intubation and sedation     Past Medical History   Diagnosis Date    [facility] ([facility] [address] [facility]     Bursitis     CAD (coronary artery disease)     Cardiac arrest ([facility] [facility]     Cognitive communication deficit     Contracture of muscle     Depression     Diabetes ([facility] [address] [facility]     Diarrhea     Disorder of kidney and ureter     Dysphagia     GERD (gastroesophageal reflux disease)     High cholesterol     Hypertension     Hypothyroid     Lack of coordination     Polyarthritis     Sleep apnea     Sleep disorder     Weakness       Past Surgical History   Procedure Laterality Date    Hx gi       peg    Hx amputation      Hx hernia repair        Prior to Admission medications    Medication Sig Start Date End Date Taking? Authorizing Provider   nitroglycerin (NITROBID) 2 % ointment Apply 1 Inch to affected area every six (6) hours. 8/14/15  Yes Michell Pineda MD   fentaNYL (DURAGESIC) 50 mcg/hr PATCH 1 Patch by TransDERmal route every seventy-two (72) hours. Mirna Hernandez MD   enalapril (VASOTEC) 5 mg tablet Take 5 mg by mouth daily. Mirna Hernandez MD   Menthol-Zinc Oxide (RISAMINE) 0.44-20.6 % oint Apply  to affected area three (3) times daily. Historical Provider   citalopram (CELEXA) 40 mg tablet Take 40 mg by mouth daily. Historical Provider   melatonin 5 mg cap capsule Take 5 mg by mouth nightly. Historical Provider   isosorbide mononitrate (ISMO, MONOKET) 20 mg tablet Take 20 mg by mouth two (2) times a day.     Historical Provider loperamide (IMODIUM) 2 mg capsule Take 2 mg by mouth four (4) times daily as needed for Diarrhea. Historical Provider   promethazine (PHENERGAN) 12.5 mg tablet Take 12.5 mg by mouth every six (6) hours as needed for Nausea. Historical Provider   aspirin delayed-release 81 mg tablet Take 81 mg by mouth daily. Historical Provider   guaiFENesin (ROBAFEN) 100 mg/5 mL liquid Take 200 mg by mouth every six (6) hours as needed for Cough. Historical Provider   cloNIDine (CATAPRES) 0.2 mg/24 hr patch 1 Patch by TransDERmal route every Monday. Historical Provider   levothyroxine (SYNTHROID) 175 mcg tablet Take 175 mcg by mouth Daily (before breakfast). Historical Provider   furosemide (LASIX) 40 mg tablet Take 40 mg by mouth daily. Historical Provider   magnesium hydroxide (MCCAULEY MILK OF MAGNESIA) 400 mg/5 mL suspension Take 30 mL by mouth daily as needed for Constipation. Historical Provider   bisacodyl (DULCOLAX, BISACODYL,) 10 mg suppository Insert 10 mg into rectum daily as needed. Historical Provider   multivitamin, tx-iron-ca-min (THERA-M W/ IRON) 9 mg iron-400 mcg tab tablet Take 1 Tab by mouth daily. Historical Provider   mometasone (NASONEX) 50 mcg/actuation nasal spray 2 Sprays by Both Nostrils route daily. Historical Provider   GLUCOSAMINE HCL/CHONDR MAY A NA (GLUCOSAMINE-CHONDROITIN) 750-600 mg tab Take 1 Tab by mouth two (2) times a day. Historical Provider   baclofen (LIORESAL) 10 mg tablet Take 10 mg by mouth three (3) times daily. Historical Provider   pantoprazole (PROTONIX) 20 mg tablet Take 20 mg by mouth daily. Historical Provider   predniSONE (DELTASONE) 10 mg tablet Take 10 mg by mouth daily. Historical Provider   ferrous sulfate (FEROSUL) 220 mg (44 mg iron)/5 mL elix Take 7.4 mL by mouth daily. Historical Provider   potassium chloride (KAON 10%) 20 mEq/15 mL solution Take 30 mEq by mouth daily.  Quantity 22.5 mL     Historical Provider   acetaminophen (TYLENOL) 325 mg tablet Take 650 mg by mouth every six (6) hours as needed for Pain. Historical Provider   diclofenac (VOLTAREN) 1 % gel Apply 4 g to affected area two (2) times a day. For bilateral knee pain- apply thin layer topically to entire joint area    Historical Provider   labetalol (NORMODYNE) 200 mg tablet Take 200 mg by mouth two (2) times a day. Hold for heart rate less than 60 and advise provider. Historical Provider   lidocaine (LIDODERM) 5 % 2 Patches by TransDERmal route every twenty-four (24) hours. Place Two patches to right trapezius region in morning. Remove patches 12 hours later. Apply 7AM Remove 7PM. 11/25/16   Hosea Orozco MD   oxyCODONE-acetaminophen (PERCOCET) 5-325 mg per tablet Take 1 Tab by mouth every four (4) hours as needed for Pain. Max Daily Amount: 6 Tabs. 11/25/16   Hosea Orozco MD   albuterol-ipratropium (DUO-NEB) 2.5 mg-0.5 mg/3 ml nebulizer solution 3 mL by Nebulization route every four (4) hours as needed for Wheezing.  8/14/15   Suri Romero MD     Current Facility-Administered Medications   Medication Dose Route Frequency    methylPREDNISolone (PF) (SOLU-MEDROL) injection 40 mg  40 mg IntraVENous Q6H    [START ON 1/14/2017] insulin glargine (LANTUS) injection 12 Units  12 Units SubCUTAneous DAILY    insulin lispro (HUMALOG) injection 4 Units  4 Units SubCUTAneous Q6H    pantoprazole (PROTONIX) 40 mg in sodium chloride 0.9 % 10 mL injection  40 mg IntraVENous DAILY    lidocaine (LIDODERM) 5 % patch 2 Patch  2 Patch TransDERmal Q24H    propofol (DIPRIVAN) infusion  5-50 mcg/kg/min IntraVENous TITRATE    levothyroxine (SYNTHROID) injection 87.5 mcg  87.5 mcg IntraVENous Q24H    chlorhexidine (PERIDEX) 0.12 % mouthwash 15 mL  15 mL Oral Q12H    cloNIDine (CATAPRES) 0.1 mg/24 hr patch 1 Patch  1 Patch TransDERmal Q7D    insulin lispro (HUMALOG) injection   SubCUTAneous Q6H    albuterol-ipratropium (DUO-NEB) 2.5 MG-0.5 MG/3 ML  3 mL Nebulization QID RT    aspirin chewable tablet 81 mg  81 mg Per G Tube DAILY    baclofen (LIORESAL) tablet 10 mg  10 mg Oral TID    multivitamin, tx-iron-ca-min (THERA-M w/ IRON) tablet 1 Tab  1 Tab Oral DAILY    heparin (porcine) injection 5,000 Units  5,000 Units SubCUTAneous Q8H     Allergies   Allergen Reactions    Penicillins Itching      Social History   Substance Use Topics    Smoking status: Never Smoker    Smokeless tobacco: Not on file    Alcohol use No          Objective:   Vital Signs:    Visit Vitals    /85    Pulse 92    Temp 98.1 °F (36.7 °C)    Resp 24    Ht 5' 10.08\" (1.78 m)    Wt 89 kg (196 lb 3.4 oz)    SpO2 96%    BMI 28.09 kg/m2       O2 Device: Endotracheal tube   O2 Flow Rate (L/min): 6 l/min   Temp (24hrs), Av.8 °F (37.1 °C), Min:98.1 °F (36.7 °C), Max:99.2 °F (37.3 °C)       Intake/Output:   Last shift:       07 -  1900  In: 1500.2 [I.V.:1440.2]  Out: 500 [Urine:500]  Last 3 shifts:  190 -  0700  In: 3869.1 [I.V.:2506.1]  Out: 1875 [Urine:1875]    Intake/Output Summary (Last 24 hours) at 17 1508  Last data filed at 17 1349   Gross per 24 hour   Intake          3316.98 ml   Output             1475 ml   Net          1841.98 ml     Physical Exam:    General:   sedated on MV   Head:  Normocephalic, without obvious abnormality,    Eyes:  Conjunctivae/corneas clear. PERRL,   Nose: Nares normal. Septum midline. Mucosa normal. No drainage or sinus tenderness. Throat: ETT    Neck: Supple, symmetrical, trachea midline, no adenopathy, no carotid bruit and no JVD. Lungs:   Clear to auscultation bilaterally. Chest wall:  No tenderness or deformity. Heart:  Regular rate and rhythm, S1, S2 normal, no murmur, click, rub or gallop. Abdomen:   Soft, non-tender. Bowel sounds normal. No masses,  No organomegaly. Extremities: Extremities normal, atraumatic, no cyanosis or edema.                        Data:     Recent Results (from the past 24 hour(s))   GLUCOSE, POC Collection Time: 01/12/17  5:36 PM   Result Value Ref Range    Glucose (POC) 233 (H) 70 - 110 mg/dL   POTASSIUM    Collection Time: 01/12/17  7:25 PM   Result Value Ref Range    Potassium 4.4 3.5 - 5.5 mmol/L   GLUCOSE, POC    Collection Time: 01/13/17 12:01 AM   Result Value Ref Range    Glucose (POC) 240 (H) 70 - 110 mg/dL   POC G3    Collection Time: 01/13/17  4:29 AM   Result Value Ref Range    Device: VENT      FIO2 (POC) 40 %    pH (POC) 7.366 7.35 - 7.45      pCO2 (POC) 37.3 35.0 - 45.0 MMHG    pO2 (POC) 54 (L) 80 - 100 MMHG    HCO3 (POC) 21.4 (L) 22 - 26 MMOL/L    sO2 (POC) 87 (L) 92 - 97 %    Base deficit (POC) 4 mmol/L    Mode ASSIST CONTROL      Tidal volume 450 ml    Set Rate 16 bpm    PEEP/CPAP (POC) 6 cmH2O    PIP (POC) 19      Allens test (POC) YES      Inspiratory Time 0.9 sec    Total resp.  rate 17      Site RIGHT RADIAL      Specimen type (POC) ARTERIAL      Performed by Alessia Shah    MAGNESIUM    Collection Time: 01/13/17  4:47 AM   Result Value Ref Range    Magnesium 2.2 1.8 - 2.4 mg/dL   METABOLIC PANEL, BASIC    Collection Time: 01/13/17  4:47 AM   Result Value Ref Range    Sodium 152 (H) 136 - 145 mmol/L    Potassium 4.3 3.5 - 5.5 mmol/L    Chloride 120 (H) 100 - 108 mmol/L    CO2 24 21 - 32 mmol/L    Anion gap 8 3.0 - 18 mmol/L    Glucose 281 (H) 74 - 99 mg/dL    BUN 44 (H) 7.0 - 18 MG/DL    Creatinine 1.31 (H) 0.6 - 1.3 MG/DL    BUN/Creatinine ratio 34 (H) 12 - 20      GFR est AA >60 >60 ml/min/1.73m2    GFR est non-AA 54 (L) >60 ml/min/1.73m2    Calcium 8.7 8.5 - 10.1 MG/DL   CBC W/O DIFF    Collection Time: 01/13/17  4:47 AM   Result Value Ref Range    WBC 10.0 4.6 - 13.2 K/uL    RBC 3.93 (L) 4.70 - 5.50 M/uL    HGB 11.2 (L) 13.0 - 16.0 g/dL    HCT 35.6 (L) 36.0 - 48.0 %    MCV 90.6 74.0 - 97.0 FL    MCH 28.5 24.0 - 34.0 PG    MCHC 31.5 31.0 - 37.0 g/dL    RDW 16.0 (H) 11.6 - 14.5 %    PLATELET 590 666 - 699 K/uL    MPV 10.9 9.2 - 11.8 FL   GLUCOSE, POC    Collection Time: 01/13/17 6:14 AM   Result Value Ref Range    Glucose (POC) 286 (H) 70 - 110 mg/dL   GLUCOSE, POC    Collection Time: 01/13/17 11:50 AM   Result Value Ref Range    Glucose (POC) 277 (H) 70 - 110 mg/dL   POC G3    Collection Time: 01/13/17 12:19 PM   Result Value Ref Range    Device: VENT      FIO2 (POC) 40 %    pH (POC) 7.419 7.35 - 7.45      pCO2 (POC) 34.2 (L) 35.0 - 45.0 MMHG    pO2 (POC) 85 80 - 100 MMHG    HCO3 (POC) 22.1 22 - 26 MMOL/L    sO2 (POC) 97 92 - 97 %    Base deficit (POC) 2 mmol/L    Mode ASSIST CONTROL      Tidal volume 450 ml    Set Rate 16 bpm    PEEP/CPAP (POC) 6.0 cmH2O    Allens test (POC) YES      Total resp. rate 16      Site LEFT RADIAL      Patient temp. 98.6      Specimen type (POC) ARTERIAL      Performed by Muna Reyes     Volume control YES             Recent Labs      01/13/17   1219  01/13/17   0429  01/12/17   0433   FIO2I  40  40  40   HCO3I  22.1  21.4*  24.1   PCO2I  34.2*  37.3  35.9   PHI  7.419  7.366  7.435   PO2I  85  54*  81     Telemetry:normal sinus rhythm    Imaging:  I have personally reviewed the patients radiographs and have reviewed the reports:     Best practice :  Core measures; Antibiotic choice:  Severe Sepsis bundles;SIRS screen met? Yes  Fluids  Lactic acid ordered- initial and repeat Q6hrs if elevated till normalized. Cultures drawn. Antibiotic administered within 1hr-ICU  And 3hrs ED  Large bore IV- 2 sites          Pressors aim MAP >65mmHg  Steriods  Glycemic control  Mech. Ventilated patients- aim to keep peak plateau pressure 73-97HV H2O.   Sress ulcer prophylaxis  DVT prophylaxis            Total critical care time exclusive of procedures: 35 minutes  John Sharma MD

## 2017-01-13 NOTE — PROGRESS NOTES
@1900 Pt taken over drowsy in bed flacc-2 on diprivan drip on ventilator via et tube. Tube feeds continues via OG tube well tolerated. Ac remains insitu draining clear yellow urine . Vital signs fluctuates . Assessment done and documented in relevant flow sheets . Nursing management continues. @2100 no changes care continues. @2300 no new developments observation continues. @0100 flacc 0 no changes care continues. @0300 no new changes. @0500 no changes . @1178 Bedside and Verbal shift change report given to Lizette Nelson (oncoming nurse) by Jake Travis (offgoing nurse). Report included the following information SBAR, Kardex, Intake/Output, MAR, Recent Results and Med Rec Status.

## 2017-01-13 NOTE — PROGRESS NOTES
No changes this shift. Pt SR on monitor, SBPs 160s at times. On propofol, arousable, On vent, thick secretions noted. TF at goal, tolerating well. No BM this shift. Ac in place. Pt turned Q2 hours, Wife by bedside, updated on POC. Restraints on bilat wrists to prevent pulling lines/tubes.

## 2017-01-13 NOTE — PROGRESS NOTES
Chart reviewed, noted pt cont in ICU with NGT in place; noted plan for PICC insertion and and possible initiation of TPN. Will cont to follow for transition of care needs.

## 2017-01-14 ENCOUNTER — APPOINTMENT (OUTPATIENT)
Dept: GENERAL RADIOLOGY | Age: 72
DRG: 207 | End: 2017-01-14
Attending: INTERNAL MEDICINE
Payer: MEDICARE

## 2017-01-14 LAB
ANION GAP BLD CALC-SCNC: 8 MMOL/L (ref 3–18)
ARTERIAL PATENCY WRIST A: YES
ARTERIAL PATENCY WRIST A: YES
BACTERIA SPEC CULT: NORMAL
BACTERIA SPEC CULT: NORMAL
BASE DEFICIT BLD-SCNC: 3 MMOL/L
BASE DEFICIT BLD-SCNC: 4 MMOL/L
BDY SITE: ABNORMAL
BDY SITE: ABNORMAL
BUN SERPL-MCNC: 43 MG/DL (ref 7–18)
BUN/CREAT SERPL: 37 (ref 12–20)
CALCIUM SERPL-MCNC: 8.4 MG/DL (ref 8.5–10.1)
CHLORIDE SERPL-SCNC: 116 MMOL/L (ref 100–108)
CO2 SERPL-SCNC: 25 MMOL/L (ref 21–32)
CREAT SERPL-MCNC: 1.16 MG/DL (ref 0.6–1.3)
GAS FLOW.O2 O2 DELIVERY SYS: ABNORMAL L/MIN
GAS FLOW.O2 O2 DELIVERY SYS: ABNORMAL L/MIN
GAS FLOW.O2 SETTING OXYMISER: 16 BPM
GAS FLOW.O2 SETTING OXYMISER: 16 BPM
GLUCOSE BLD STRIP.AUTO-MCNC: 212 MG/DL (ref 70–110)
GLUCOSE BLD STRIP.AUTO-MCNC: 225 MG/DL (ref 70–110)
GLUCOSE BLD STRIP.AUTO-MCNC: 229 MG/DL (ref 70–110)
GLUCOSE BLD STRIP.AUTO-MCNC: 245 MG/DL (ref 70–110)
GLUCOSE SERPL-MCNC: 260 MG/DL (ref 74–99)
GRAM STN SPEC: NORMAL
HCO3 BLD-SCNC: 21.5 MMOL/L (ref 22–26)
HCO3 BLD-SCNC: 22.4 MMOL/L (ref 22–26)
INSPIRATION.DURATION SETTING TIME VENT: 1 SEC
INSPIRATION.DURATION SETTING TIME VENT: 1 SEC
MAGNESIUM SERPL-MCNC: 2.1 MG/DL (ref 1.8–2.4)
O2/TOTAL GAS SETTING VFR VENT: 40 %
O2/TOTAL GAS SETTING VFR VENT: 40 %
PCO2 BLD: 36.3 MMHG (ref 35–45)
PCO2 BLD: 39.3 MMHG (ref 35–45)
PEEP RESPIRATORY: 6 CMH2O
PEEP RESPIRATORY: 6 CMH2O
PH BLD: 7.36 [PH] (ref 7.35–7.45)
PH BLD: 7.38 [PH] (ref 7.35–7.45)
PIP ISTAT,IPIP: 22
PIP ISTAT,IPIP: 22
PO2 BLD: 45 MMHG (ref 80–100)
PO2 BLD: 91 MMHG (ref 80–100)
POTASSIUM SERPL-SCNC: 4.5 MMOL/L (ref 3.5–5.5)
SAO2 % BLD: 79 % (ref 92–97)
SAO2 % BLD: 97 % (ref 92–97)
SERVICE CMNT-IMP: ABNORMAL
SERVICE CMNT-IMP: ABNORMAL
SERVICE CMNT-IMP: NORMAL
SODIUM SERPL-SCNC: 149 MMOL/L (ref 136–145)
SPECIMEN TYPE: ABNORMAL
SPECIMEN TYPE: ABNORMAL
TOTAL RESP. RATE, ITRR: 20
TOTAL RESP. RATE, ITRR: 22
VENTILATION MODE VENT: ABNORMAL
VENTILATION MODE VENT: ABNORMAL
VT SETTING VENT: 450 ML
VT SETTING VENT: 450 ML

## 2017-01-14 PROCEDURE — 74011000250 HC RX REV CODE- 250: Performed by: INTERNAL MEDICINE

## 2017-01-14 PROCEDURE — 80048 BASIC METABOLIC PNL TOTAL CA: CPT | Performed by: INTERNAL MEDICINE

## 2017-01-14 PROCEDURE — 94640 AIRWAY INHALATION TREATMENT: CPT

## 2017-01-14 PROCEDURE — 82803 BLOOD GASES ANY COMBINATION: CPT

## 2017-01-14 PROCEDURE — 74011250636 HC RX REV CODE- 250/636: Performed by: FAMILY MEDICINE

## 2017-01-14 PROCEDURE — 36415 COLL VENOUS BLD VENIPUNCTURE: CPT | Performed by: INTERNAL MEDICINE

## 2017-01-14 PROCEDURE — 74011636637 HC RX REV CODE- 636/637: Performed by: INTERNAL MEDICINE

## 2017-01-14 PROCEDURE — C9113 INJ PANTOPRAZOLE SODIUM, VIA: HCPCS | Performed by: INTERNAL MEDICINE

## 2017-01-14 PROCEDURE — 74011250637 HC RX REV CODE- 250/637: Performed by: INTERNAL MEDICINE

## 2017-01-14 PROCEDURE — 82962 GLUCOSE BLOOD TEST: CPT

## 2017-01-14 PROCEDURE — 74011250636 HC RX REV CODE- 250/636: Performed by: INTERNAL MEDICINE

## 2017-01-14 PROCEDURE — 83735 ASSAY OF MAGNESIUM: CPT | Performed by: INTERNAL MEDICINE

## 2017-01-14 PROCEDURE — 94003 VENT MGMT INPAT SUBQ DAY: CPT

## 2017-01-14 PROCEDURE — 71010 XR CHEST PORT: CPT

## 2017-01-14 PROCEDURE — 65610000006 HC RM INTENSIVE CARE

## 2017-01-14 PROCEDURE — 77010033678 HC OXYGEN DAILY

## 2017-01-14 PROCEDURE — 36600 WITHDRAWAL OF ARTERIAL BLOOD: CPT

## 2017-01-14 PROCEDURE — 77030018798 HC PMP KT ENTRL FED COVD -A

## 2017-01-14 RX ADMIN — INSULIN GLARGINE 12 UNITS: 100 INJECTION, SOLUTION SUBCUTANEOUS at 12:54

## 2017-01-14 RX ADMIN — INSULIN LISPRO 4 UNITS: 100 INJECTION, SOLUTION INTRAVENOUS; SUBCUTANEOUS at 06:21

## 2017-01-14 RX ADMIN — BACLOFEN 10 MG: 10 TABLET ORAL at 10:14

## 2017-01-14 RX ADMIN — PROPOFOL 35 MCG/KG/MIN: 10 INJECTION, EMULSION INTRAVENOUS at 20:50

## 2017-01-14 RX ADMIN — HEPARIN SODIUM 5000 UNITS: 5000 INJECTION, SOLUTION INTRAVENOUS; SUBCUTANEOUS at 00:15

## 2017-01-14 RX ADMIN — ASPIRIN 81 MG 81 MG: 81 TABLET ORAL at 10:14

## 2017-01-14 RX ADMIN — HEPARIN SODIUM 5000 UNITS: 5000 INJECTION, SOLUTION INTRAVENOUS; SUBCUTANEOUS at 10:14

## 2017-01-14 RX ADMIN — BACLOFEN 10 MG: 10 TABLET ORAL at 16:36

## 2017-01-14 RX ADMIN — INSULIN LISPRO 4 UNITS: 100 INJECTION, SOLUTION INTRAVENOUS; SUBCUTANEOUS at 12:54

## 2017-01-14 RX ADMIN — SODIUM CHLORIDE 40 MG: 9 INJECTION INTRAMUSCULAR; INTRAVENOUS; SUBCUTANEOUS at 10:14

## 2017-01-14 RX ADMIN — INSULIN LISPRO 4 UNITS: 100 INJECTION, SOLUTION INTRAVENOUS; SUBCUTANEOUS at 00:15

## 2017-01-14 RX ADMIN — PROPOFOL 35 MCG/KG/MIN: 10 INJECTION, EMULSION INTRAVENOUS at 16:36

## 2017-01-14 RX ADMIN — HEPARIN SODIUM 5000 UNITS: 5000 INJECTION, SOLUTION INTRAVENOUS; SUBCUTANEOUS at 17:17

## 2017-01-14 RX ADMIN — IPRATROPIUM BROMIDE AND ALBUTEROL SULFATE 3 ML: .5; 3 SOLUTION RESPIRATORY (INHALATION) at 20:02

## 2017-01-14 RX ADMIN — INSULIN LISPRO 4 UNITS: 100 INJECTION, SOLUTION INTRAVENOUS; SUBCUTANEOUS at 00:14

## 2017-01-14 RX ADMIN — BACLOFEN 10 MG: 10 TABLET ORAL at 21:15

## 2017-01-14 RX ADMIN — INSULIN LISPRO 4 UNITS: 100 INJECTION, SOLUTION INTRAVENOUS; SUBCUTANEOUS at 19:17

## 2017-01-14 RX ADMIN — METHYLPREDNISOLONE SODIUM SUCCINATE 40 MG: 40 INJECTION, POWDER, FOR SOLUTION INTRAMUSCULAR; INTRAVENOUS at 06:21

## 2017-01-14 RX ADMIN — METHYLPREDNISOLONE SODIUM SUCCINATE 40 MG: 40 INJECTION, POWDER, FOR SOLUTION INTRAMUSCULAR; INTRAVENOUS at 12:53

## 2017-01-14 RX ADMIN — LEVOTHYROXINE SODIUM ANHYDROUS 87.5 MCG: 100 INJECTION, POWDER, LYOPHILIZED, FOR SOLUTION INTRAVENOUS at 12:53

## 2017-01-14 RX ADMIN — PROPOFOL 35 MCG/KG/MIN: 10 INJECTION, EMULSION INTRAVENOUS at 00:14

## 2017-01-14 RX ADMIN — MULTIPLE VITAMINS W/ MINERALS TAB 1 TABLET: TAB at 10:14

## 2017-01-14 RX ADMIN — IPRATROPIUM BROMIDE AND ALBUTEROL SULFATE 3 ML: .5; 3 SOLUTION RESPIRATORY (INHALATION) at 15:28

## 2017-01-14 RX ADMIN — HYDRALAZINE HYDROCHLORIDE 20 MG: 20 INJECTION INTRAMUSCULAR; INTRAVENOUS at 10:13

## 2017-01-14 RX ADMIN — PROPOFOL 35 MCG/KG/MIN: 10 INJECTION, EMULSION INTRAVENOUS at 11:08

## 2017-01-14 RX ADMIN — CHLORHEXIDINE GLUCONATE 15 ML: 1.2 RINSE ORAL at 21:15

## 2017-01-14 RX ADMIN — METHYLPREDNISOLONE SODIUM SUCCINATE 40 MG: 40 INJECTION, POWDER, FOR SOLUTION INTRAMUSCULAR; INTRAVENOUS at 00:15

## 2017-01-14 RX ADMIN — METHYLPREDNISOLONE SODIUM SUCCINATE 40 MG: 40 INJECTION, POWDER, FOR SOLUTION INTRAMUSCULAR; INTRAVENOUS at 19:17

## 2017-01-14 RX ADMIN — IPRATROPIUM BROMIDE AND ALBUTEROL SULFATE 3 ML: .5; 3 SOLUTION RESPIRATORY (INHALATION) at 07:58

## 2017-01-14 RX ADMIN — CHLORHEXIDINE GLUCONATE 15 ML: 1.2 RINSE ORAL at 10:14

## 2017-01-14 RX ADMIN — IPRATROPIUM BROMIDE AND ALBUTEROL SULFATE 3 ML: .5; 3 SOLUTION RESPIRATORY (INHALATION) at 12:06

## 2017-01-14 RX ADMIN — PROPOFOL 35 MCG/KG/MIN: 10 INJECTION, EMULSION INTRAVENOUS at 05:39

## 2017-01-14 NOTE — PROGRESS NOTES
2000- Report and care received, assessment completed per flow sheet. Intubated and sedated, opens eyes to verbal, follows some commands/not all, MAEE=rigid and weak. ETT secured 22 at the lip, ventilator settings noted. Propofol infusing via IV pump without difficulty. Soft bilateral wrist restraints in place, flow sheet in progress. 0000- Reassessment completed and without change. 0300- Reassessment completed and without change.

## 2017-01-14 NOTE — PROGRESS NOTES
[facility]  Pulmonary, Critical Care, and Sleep Medicine    Name: Jv Mello MRN: [id number]   : 1945 Hospital: [facility] [place]   Date: 2017         t    Requesting MD:                                                  Reason for CC Consult:  IMPRESSION:   · Aspiration Pneumonia  · Acute hypoxic Respiratory Failure from above, pt reintubated     · Previous anoxic head encephalopathy   · Diastolic CHF at baseline  · Sp PEA arrest  · COPD exacerbation  · TIFFANIE      RECOMMENDATIONS:   · Neuro-    Sedated RASS -2  · Cards- hemodynamically stable now. Resp arrest led to cardiac arrest 1 mg epi given and 1 minute of CPR with ROSC and intubation last evening. History of diastolic dysfunction restarted hypertensive medications clonidine and labetalol as needed  · Pulm-  See vent orders, patient may require trach  · Renal-  Worsening renal function with electrolyte replacement. IV fluids start enteral feeds  · Heme- H/H and plt stable  · GI,  I believe pt does aspirate chronically, pt will need PEG. Will ask GI for peg placement  · ID- levaquin,   aspiration pneumonia food seen at cords  · DVT prophylaxis  · GI prophylaxis  ·       Subjective/History:     Remain intubated. Tolerating the tube feed. Waiting to hear from GI about PEG. Discussed with wife in length yesterday. She does wants to proceed with peg. Also acknowledged possibility of Verónica Hires if fails attempted wean      Remain on bilevel. Attempted to wean off this am unsuccessful. Discussed with wife yesterday. Wants everything possible to \"brining him back home\"  Reviewed the GI input. Agree that this may not be time to get PEG until respiratory status improves but will likely need PEG near future as I believe he has been chronically aspirating. Ariel Diaz HPI  This patient has been seen and evaluated at the request of Dr. Donna Mccullough for aspiration pneumonia and resp failure.   The patient is a 70 y.o. male admitted 4 Jan with COPD exacerbation to the medical floor. Last evening vomited noticed to be hypoxic an dactually lost pulse. 1 Mg epi with ROSC and intubated for cyanosis. Moved to ICU discussed with family and wife should be here this afternoon. In SNF prior to this admission and has diastolic dysfunction at baseline. Currently stable on vent    The patient is critically ill and can not provide additional history due to intubation and sedation     Past Medical History   Diagnosis Date    [facility] ([facility]     Bursitis     CAD (coronary artery disease)     Cardiac arrest ([facility]     Cognitive communication deficit     Contracture of muscle     Depression     Diabetes ([facility]     Diarrhea     Disorder of kidney and ureter     Dysphagia     GERD (gastroesophageal reflux disease)     High cholesterol     Hypertension     Hypothyroid     Lack of coordination     Polyarthritis     Sleep apnea     Sleep disorder     Weakness       Past Surgical History   Procedure Laterality Date    Hx gi       peg    Hx amputation      Hx hernia repair        Prior to Admission medications    Medication Sig Start Date End Date Taking? Authorizing Provider   nitroglycerin (NITROBID) 2 % ointment Apply 1 Inch to affected area every six (6) hours. 8/14/15  Yes Ferny Quezada MD   fentaNYL (DURAGESIC) 50 mcg/hr PATCH 1 Patch by TransDERmal route every seventy-two (72) hours. Mirna Hernandez MD   enalapril (VASOTEC) 5 mg tablet Take 5 mg by mouth daily. Mirna Hernandez MD   Menthol-Zinc Oxide (RISAMINE) 0.44-20.6 % oint Apply  to affected area three (3) times daily. Historical Provider   citalopram (CELEXA) 40 mg tablet Take 40 mg by mouth daily. Historical Provider   melatonin 5 mg cap capsule Take 5 mg by mouth nightly. Historical Provider   isosorbide mononitrate (ISMO, MONOKET) 20 mg tablet Take 20 mg by mouth two (2) times a day.     Historical Provider   loperamide (IMODIUM) 2 mg capsule Take 2 mg by mouth four (4) times daily as needed for Diarrhea. Historical Provider   promethazine (PHENERGAN) 12.5 mg tablet Take 12.5 mg by mouth every six (6) hours as needed for Nausea. Historical Provider   aspirin delayed-release 81 mg tablet Take 81 mg by mouth daily. Historical Provider   guaiFENesin (ROBAFEN) 100 mg/5 mL liquid Take 200 mg by mouth every six (6) hours as needed for Cough. Historical Provider   cloNIDine (CATAPRES) 0.2 mg/24 hr patch 1 Patch by TransDERmal route every Monday. Historical Provider   levothyroxine (SYNTHROID) 175 mcg tablet Take 175 mcg by mouth Daily (before breakfast). Historical Provider   furosemide (LASIX) 40 mg tablet Take 40 mg by mouth daily. Historical Provider   magnesium hydroxide (MCCAULEY MILK OF MAGNESIA) 400 mg/5 mL suspension Take 30 mL by mouth daily as needed for Constipation. Historical Provider   bisacodyl (DULCOLAX, BISACODYL,) 10 mg suppository Insert 10 mg into rectum daily as needed. Historical Provider   multivitamin, tx-iron-ca-min (THERA-M W/ IRON) 9 mg iron-400 mcg tab tablet Take 1 Tab by mouth daily. Historical Provider   mometasone (NASONEX) 50 mcg/actuation nasal spray 2 Sprays by Both Nostrils route daily. Historical Provider   GLUCOSAMINE HCL/CHONDR MAY A NA (GLUCOSAMINE-CHONDROITIN) 750-600 mg tab Take 1 Tab by mouth two (2) times a day. Historical Provider   baclofen (LIORESAL) 10 mg tablet Take 10 mg by mouth three (3) times daily. Historical Provider   pantoprazole (PROTONIX) 20 mg tablet Take 20 mg by mouth daily. Historical Provider   predniSONE (DELTASONE) 10 mg tablet Take 10 mg by mouth daily. Historical Provider   ferrous sulfate (FEROSUL) 220 mg (44 mg iron)/5 mL elix Take 7.4 mL by mouth daily. Historical Provider   potassium chloride (KAON 10%) 20 mEq/15 mL solution Take 30 mEq by mouth daily.  Quantity 22.5 mL     Historical Provider   acetaminophen (TYLENOL) 325 mg tablet Take 650 mg by mouth every six (6) hours as needed for Pain. Historical Provider   diclofenac (VOLTAREN) 1 % gel Apply 4 g to affected area two (2) times a day. For bilateral knee pain- apply thin layer topically to entire joint area    Historical Provider   labetalol (NORMODYNE) 200 mg tablet Take 200 mg by mouth two (2) times a day. Hold for heart rate less than 60 and advise provider. Historical Provider   lidocaine (LIDODERM) 5 % 2 Patches by TransDERmal route every twenty-four (24) hours. Place Two patches to right trapezius region in morning. Remove patches 12 hours later. Apply 7AM Remove 7PM. 11/25/16   Israel Villatoro MD   oxyCODONE-acetaminophen (PERCOCET) 5-325 mg per tablet Take 1 Tab by mouth every four (4) hours as needed for Pain. Max Daily Amount: 6 Tabs. 11/25/16   Israel Villatoro MD   albuterol-ipratropium (DUO-NEB) 2.5 mg-0.5 mg/3 ml nebulizer solution 3 mL by Nebulization route every four (4) hours as needed for Wheezing.  8/14/15   Keke Turner MD     Current Facility-Administered Medications   Medication Dose Route Frequency    methylPREDNISolone (PF) (SOLU-MEDROL) injection 40 mg  40 mg IntraVENous Q6H    insulin glargine (LANTUS) injection 12 Units  12 Units SubCUTAneous DAILY    insulin lispro (HUMALOG) injection 4 Units  4 Units SubCUTAneous Q6H    pantoprazole (PROTONIX) 40 mg in sodium chloride 0.9 % 10 mL injection  40 mg IntraVENous DAILY    lidocaine (LIDODERM) 5 % patch 2 Patch  2 Patch TransDERmal Q24H    propofol (DIPRIVAN) infusion  5-50 mcg/kg/min IntraVENous TITRATE    levothyroxine (SYNTHROID) injection 87.5 mcg  87.5 mcg IntraVENous Q24H    chlorhexidine (PERIDEX) 0.12 % mouthwash 15 mL  15 mL Oral Q12H    cloNIDine (CATAPRES) 0.1 mg/24 hr patch 1 Patch  1 Patch TransDERmal Q7D    insulin lispro (HUMALOG) injection   SubCUTAneous Q6H    albuterol-ipratropium (DUO-NEB) 2.5 MG-0.5 MG/3 ML  3 mL Nebulization QID RT    aspirin chewable tablet 81 mg  81 mg Per G Tube DAILY    baclofen (LIORESAL) tablet 10 mg  10 mg Oral TID    multivitamin, tx-iron-ca-min (THERA-M w/ IRON) tablet 1 Tab  1 Tab Oral DAILY    heparin (porcine) injection 5,000 Units  5,000 Units SubCUTAneous Q8H     Allergies   Allergen Reactions    Penicillins Itching      Social History   Substance Use Topics    Smoking status: Never Smoker    Smokeless tobacco: Not on file    Alcohol use No          Objective:   Vital Signs:    Visit Vitals    /75 (BP 1 Location: Right arm, BP Patient Position: At rest)    Pulse 100    Temp 98.7 °F (37.1 °C)    Resp 19    Ht 5' 10.08\" (1.78 m)    Wt 89 kg (196 lb 3.4 oz)    SpO2 100%    BMI 28.09 kg/m2       O2 Device: Endotracheal tube   O2 Flow Rate (L/min): 6 l/min   Temp (24hrs), Av °F (37.2 °C), Min:98 °F (36.7 °C), Max:100.2 °F (37.9 °C)       Intake/Output:   Last shift:       07 -  1900  In: -   Out: 450 [Urine:450]  Last 3 shifts:  190 -  0700  In: 5628.4 [I.V.:1778.4]  Out: 2325 [Urine:2325]    Intake/Output Summary (Last 24 hours) at 17 1225  Last data filed at 17 1109   Gross per 24 hour   Intake          3128.16 ml   Output             2125 ml   Net          1003.16 ml     Physical Exam:    General:   sedated on MV   Head:  Normocephalic, without obvious abnormality,    Eyes:  Conjunctivae/corneas clear. PERRL,   Nose: Nares normal. Septum midline. Mucosa normal. No drainage or sinus tenderness. Throat: ETT    Neck: Supple, symmetrical, trachea midline, no adenopathy, no carotid bruit and no JVD. Lungs:   Clear to auscultation bilaterally. Chest wall:  No tenderness or deformity. Heart:  Regular rate and rhythm, S1, S2 normal, no murmur, click, rub or gallop. Abdomen:   Soft, non-tender. Bowel sounds normal. No masses,  No organomegaly. Extremities: Extremities normal, atraumatic, no cyanosis or edema.                        Data:     Recent Results (from the past 24 hour(s))   GLUCOSE, POC    Collection Time: 17 5:43 PM   Result Value Ref Range    Glucose (POC) 271 (H) 70 - 110 mg/dL   GLUCOSE, POC    Collection Time: 01/14/17 12:06 AM   Result Value Ref Range    Glucose (POC) 245 (H) 70 - 110 mg/dL   MAGNESIUM    Collection Time: 01/14/17  4:50 AM   Result Value Ref Range    Magnesium 2.1 1.8 - 2.4 mg/dL   METABOLIC PANEL, BASIC    Collection Time: 01/14/17  4:50 AM   Result Value Ref Range    Sodium 149 (H) 136 - 145 mmol/L    Potassium 4.5 3.5 - 5.5 mmol/L    Chloride 116 (H) 100 - 108 mmol/L    CO2 25 21 - 32 mmol/L    Anion gap 8 3.0 - 18 mmol/L    Glucose 260 (H) 74 - 99 mg/dL    BUN 43 (H) 7.0 - 18 MG/DL    Creatinine 1.16 0.6 - 1.3 MG/DL    BUN/Creatinine ratio 37 (H) 12 - 20      GFR est AA >60 >60 ml/min/1.73m2    GFR est non-AA >60 >60 ml/min/1.73m2    Calcium 8.4 (L) 8.5 - 10.1 MG/DL   POC G3    Collection Time: 01/14/17  5:34 AM   Result Value Ref Range    Device: VENT      FIO2 (POC) 40 %    pH (POC) 7.364 7.35 - 7.45      pCO2 (POC) 39.3 35.0 - 45.0 MMHG    pO2 (POC) 45 (LL) 80 - 100 MMHG    HCO3 (POC) 22.4 22 - 26 MMOL/L    sO2 (POC) 79 (L) 92 - 97 %    Base deficit (POC) 3 mmol/L    Mode ASSIST CONTROL      Tidal volume 450 ml    Set Rate 16 bpm    PEEP/CPAP (POC) 6 cmH2O    PIP (POC) 22      Allens test (POC) YES      Inspiratory Time 1 sec    Total resp. rate 20      Site RIGHT RADIAL      Specimen type (POC) ARTERIAL      Performed by Lenin Lima    POC G3    Collection Time: 01/14/17  5:50 AM   Result Value Ref Range    Device: VENT      FIO2 (POC) 40 %    pH (POC) 7.381 7.35 - 7.45      pCO2 (POC) 36.3 35.0 - 45.0 MMHG    pO2 (POC) 91 80 - 100 MMHG    HCO3 (POC) 21.5 (L) 22 - 26 MMOL/L    sO2 (POC) 97 92 - 97 %    Base deficit (POC) 4 mmol/L    Mode ASSIST CONTROL      Tidal volume 450 ml    Set Rate 16 bpm    PEEP/CPAP (POC) 6 cmH2O    PIP (POC) 22      Allens test (POC) YES      Inspiratory Time 1 sec    Total resp.  rate 22      Site LEFT RADIAL      Specimen type (POC) ARTERIAL      Performed by Berry Jimenez    GLUCOSE, POC    Collection Time: 01/14/17  6:14 AM   Result Value Ref Range    Glucose (POC) 229 (H) 70 - 110 mg/dL   GLUCOSE, POC    Collection Time: 01/14/17 11:40 AM   Result Value Ref Range    Glucose (POC) 212 (H) 70 - 110 mg/dL           Recent Labs      01/14/17   0550  01/14/17   0534  01/13/17   1219   FIO2I  40  40  40   HCO3I  21.5*  22.4  22.1   PCO2I  36.3  39.3  34.2*   PHI  7.381  7.364  7.419   PO2I  91  45*  85     Telemetry:normal sinus rhythm    Imaging:  I have personally reviewed the patients radiographs and have reviewed the reports:     Best practice :  Core measures; Antibiotic choice:  Severe Sepsis bundles;SIRS screen met? Yes  Fluids  Lactic acid ordered- initial and repeat Q6hrs if elevated till normalized. Cultures drawn. Antibiotic administered within 1hr-ICU  And 3hrs ED  Large bore IV- 2 sites          Pressors aim MAP >65mmHg  Steriods  Glycemic control  [facility]. Vent[facility] patients- aim to keep peak plateau pressure 96-81ZP H2O.   Sress ulcer prophylaxis  DVT prophylaxis            Total critical care time exclusive of procedures: 35 minutes  Georgia Mejía MD

## 2017-01-14 NOTE — ROUTINE PROCESS
Bedside and Verbal shift change report given to Bao Zuñiga RN (oncoming nurse) by Lan Bain RN (offgoing nurse).  Report included the following information SBAR, Kardex, Intake/Output, Recent Results, Med Rec Status and Cardiac Rhythm SR-ST.

## 2017-01-15 ENCOUNTER — APPOINTMENT (OUTPATIENT)
Dept: GENERAL RADIOLOGY | Age: 72
DRG: 207 | End: 2017-01-15
Attending: INTERNAL MEDICINE
Payer: MEDICARE

## 2017-01-15 PROBLEM — R74.01 TRANSAMINITIS: Status: ACTIVE | Noted: 2017-01-15

## 2017-01-15 PROBLEM — D72.825 BANDEMIA WITHOUT DIAGNOSIS OF SPECIFIC INFECTION: Status: ACTIVE | Noted: 2017-01-15

## 2017-01-15 LAB
ALBUMIN SERPL BCP-MCNC: 2.2 G/DL (ref 3.4–5)
ALBUMIN/GLOB SERPL: 0.8 {RATIO} (ref 0.8–1.7)
ALP SERPL-CCNC: 90 U/L (ref 45–117)
ALT SERPL-CCNC: 468 U/L (ref 16–61)
ANION GAP BLD CALC-SCNC: 7 MMOL/L (ref 3–18)
ARTERIAL PATENCY WRIST A: YES
AST SERPL W P-5'-P-CCNC: 117 U/L (ref 15–37)
BASE DEFICIT BLD-SCNC: 3 MMOL/L
BASOPHILS # BLD AUTO: 0 K/UL (ref 0–0.1)
BASOPHILS # BLD: 0 % (ref 0–3)
BDY SITE: ABNORMAL
BILIRUB SERPL-MCNC: 0.8 MG/DL (ref 0.2–1)
BODY TEMPERATURE: 98.5
BUN SERPL-MCNC: 43 MG/DL (ref 7–18)
BUN/CREAT SERPL: 41 (ref 12–20)
CALCIUM SERPL-MCNC: 8.1 MG/DL (ref 8.5–10.1)
CHLORIDE SERPL-SCNC: 111 MMOL/L (ref 100–108)
CK MB CFR SERPL CALC: 3.8 % (ref 0–4)
CK MB CFR SERPL CALC: 4 % (ref 0–4)
CK MB CFR SERPL CALC: 4.3 % (ref 0–4)
CK MB SERPL-MCNC: 2.1 NG/ML (ref 0.5–3.6)
CK MB SERPL-MCNC: 2.3 NG/ML (ref 0.5–3.6)
CK MB SERPL-MCNC: 2.5 NG/ML (ref 0.5–3.6)
CK SERPL-CCNC: 56 U/L (ref 39–308)
CK SERPL-CCNC: 58 U/L (ref 39–308)
CK SERPL-CCNC: 58 U/L (ref 39–308)
CO2 SERPL-SCNC: 25 MMOL/L (ref 21–32)
CREAT SERPL-MCNC: 1.04 MG/DL (ref 0.6–1.3)
CRP SERPL-MCNC: <0.3 MG/DL (ref 0–0.3)
DIFFERENTIAL METHOD BLD: ABNORMAL
EOSINOPHIL # BLD: 0 K/UL (ref 0–0.4)
EOSINOPHIL NFR BLD: 0 % (ref 0–5)
ERYTHROCYTE [DISTWIDTH] IN BLOOD BY AUTOMATED COUNT: 15.9 % (ref 11.6–14.5)
GAS FLOW.O2 O2 DELIVERY SYS: ABNORMAL L/MIN
GAS FLOW.O2 SETTING OXYMISER: 16 BPM
GLOBULIN SER CALC-MCNC: 2.8 G/DL (ref 2–4)
GLUCOSE BLD STRIP.AUTO-MCNC: 186 MG/DL (ref 70–110)
GLUCOSE BLD STRIP.AUTO-MCNC: 203 MG/DL (ref 70–110)
GLUCOSE BLD STRIP.AUTO-MCNC: 232 MG/DL (ref 70–110)
GLUCOSE BLD STRIP.AUTO-MCNC: 254 MG/DL (ref 70–110)
GLUCOSE SERPL-MCNC: 257 MG/DL (ref 74–99)
HCO3 BLD-SCNC: 22.1 MMOL/L (ref 22–26)
HCT VFR BLD AUTO: 34 % (ref 36–48)
HGB BLD-MCNC: 10.9 G/DL (ref 13–16)
LYMPHOCYTES # BLD AUTO: 4 % (ref 20–51)
LYMPHOCYTES # BLD: 0.5 K/UL (ref 0.8–3.5)
MAGNESIUM SERPL-MCNC: 2.2 MG/DL (ref 1.8–2.4)
MCH RBC QN AUTO: 28.7 PG (ref 24–34)
MCHC RBC AUTO-ENTMCNC: 32.1 G/DL (ref 31–37)
MCV RBC AUTO: 89.5 FL (ref 74–97)
MONOCYTES # BLD: 0.3 K/UL (ref 0–1)
MONOCYTES NFR BLD AUTO: 2 % (ref 2–9)
NEUTS BAND NFR BLD MANUAL: 2 % (ref 0–5)
NEUTS SEG # BLD: 12.1 K/UL (ref 1.8–8)
NEUTS SEG NFR BLD AUTO: 92 % (ref 42–75)
O2/TOTAL GAS SETTING VFR VENT: 0.35 %
PCO2 BLD: 34.9 MMHG (ref 35–45)
PEEP RESPIRATORY: 6 CMH2O
PH BLD: 7.41 [PH] (ref 7.35–7.45)
PLATELET # BLD AUTO: 192 K/UL (ref 135–420)
PMV BLD AUTO: 12 FL (ref 9.2–11.8)
PO2 BLD: 78 MMHG (ref 80–100)
POTASSIUM SERPL-SCNC: 4.5 MMOL/L (ref 3.5–5.5)
PROT SERPL-MCNC: 5 G/DL (ref 6.4–8.2)
RBC # BLD AUTO: 3.8 M/UL (ref 4.7–5.5)
RBC MORPH BLD: ABNORMAL
SAO2 % BLD: 96 % (ref 92–97)
SERVICE CMNT-IMP: ABNORMAL
SODIUM SERPL-SCNC: 143 MMOL/L (ref 136–145)
SPECIMEN TYPE: ABNORMAL
TOTAL RESP. RATE, ITRR: 19
TROPONIN I SERPL-MCNC: 3.73 NG/ML (ref 0–0.06)
TROPONIN I SERPL-MCNC: 3.93 NG/ML (ref 0–0.06)
TROPONIN I SERPL-MCNC: 4.21 NG/ML (ref 0–0.06)
VENTILATION MODE VENT: ABNORMAL
VT SETTING VENT: 450 ML
WBC # BLD AUTO: 13.2 K/UL (ref 4.6–13.2)

## 2017-01-15 PROCEDURE — 94003 VENT MGMT INPAT SUBQ DAY: CPT

## 2017-01-15 PROCEDURE — 85025 COMPLETE CBC W/AUTO DIFF WBC: CPT | Performed by: INTERNAL MEDICINE

## 2017-01-15 PROCEDURE — C9113 INJ PANTOPRAZOLE SODIUM, VIA: HCPCS | Performed by: INTERNAL MEDICINE

## 2017-01-15 PROCEDURE — 74011000250 HC RX REV CODE- 250: Performed by: INTERNAL MEDICINE

## 2017-01-15 PROCEDURE — 81003 URINALYSIS AUTO W/O SCOPE: CPT | Performed by: FAMILY MEDICINE

## 2017-01-15 PROCEDURE — 80053 COMPREHEN METABOLIC PANEL: CPT | Performed by: INTERNAL MEDICINE

## 2017-01-15 PROCEDURE — 84145 PROCALCITONIN (PCT): CPT | Performed by: FAMILY MEDICINE

## 2017-01-15 PROCEDURE — 74011250637 HC RX REV CODE- 250/637: Performed by: INTERNAL MEDICINE

## 2017-01-15 PROCEDURE — 94640 AIRWAY INHALATION TREATMENT: CPT

## 2017-01-15 PROCEDURE — 86140 C-REACTIVE PROTEIN: CPT | Performed by: FAMILY MEDICINE

## 2017-01-15 PROCEDURE — 93005 ELECTROCARDIOGRAM TRACING: CPT

## 2017-01-15 PROCEDURE — 74011636637 HC RX REV CODE- 636/637: Performed by: INTERNAL MEDICINE

## 2017-01-15 PROCEDURE — 36600 WITHDRAWAL OF ARTERIAL BLOOD: CPT

## 2017-01-15 PROCEDURE — 82803 BLOOD GASES ANY COMBINATION: CPT

## 2017-01-15 PROCEDURE — 65610000006 HC RM INTENSIVE CARE

## 2017-01-15 PROCEDURE — 87086 URINE CULTURE/COLONY COUNT: CPT | Performed by: FAMILY MEDICINE

## 2017-01-15 PROCEDURE — 74011000250 HC RX REV CODE- 250: Performed by: FAMILY MEDICINE

## 2017-01-15 PROCEDURE — 83735 ASSAY OF MAGNESIUM: CPT | Performed by: INTERNAL MEDICINE

## 2017-01-15 PROCEDURE — 74011250636 HC RX REV CODE- 250/636: Performed by: INTERNAL MEDICINE

## 2017-01-15 PROCEDURE — 82550 ASSAY OF CK (CPK): CPT | Performed by: FAMILY MEDICINE

## 2017-01-15 PROCEDURE — 81001 URINALYSIS AUTO W/SCOPE: CPT | Performed by: FAMILY MEDICINE

## 2017-01-15 PROCEDURE — 74011250637 HC RX REV CODE- 250/637: Performed by: FAMILY MEDICINE

## 2017-01-15 PROCEDURE — 77010033678 HC OXYGEN DAILY

## 2017-01-15 PROCEDURE — 82962 GLUCOSE BLOOD TEST: CPT

## 2017-01-15 PROCEDURE — 71010 XR CHEST PORT: CPT

## 2017-01-15 PROCEDURE — 36415 COLL VENOUS BLD VENIPUNCTURE: CPT | Performed by: INTERNAL MEDICINE

## 2017-01-15 PROCEDURE — 87040 BLOOD CULTURE FOR BACTERIA: CPT | Performed by: FAMILY MEDICINE

## 2017-01-15 RX ORDER — CLOPIDOGREL 300 MG/1
600 TABLET, FILM COATED ORAL ONCE
Status: COMPLETED | OUTPATIENT
Start: 2017-01-15 | End: 2017-01-15

## 2017-01-15 RX ORDER — IPRATROPIUM BROMIDE AND ALBUTEROL SULFATE 2.5; .5 MG/3ML; MG/3ML
3 SOLUTION RESPIRATORY (INHALATION)
Status: DISCONTINUED | OUTPATIENT
Start: 2017-01-15 | End: 2017-01-16

## 2017-01-15 RX ORDER — ATORVASTATIN CALCIUM 20 MG/1
80 TABLET, FILM COATED ORAL DAILY
Status: DISCONTINUED | OUTPATIENT
Start: 2017-01-15 | End: 2017-01-27 | Stop reason: HOSPADM

## 2017-01-15 RX ORDER — ARFORMOTEROL TARTRATE 15 UG/2ML
15 SOLUTION RESPIRATORY (INHALATION)
Status: DISCONTINUED | OUTPATIENT
Start: 2017-01-15 | End: 2017-01-27 | Stop reason: HOSPADM

## 2017-01-15 RX ORDER — BUDESONIDE 0.5 MG/2ML
500 INHALANT ORAL
Status: DISCONTINUED | OUTPATIENT
Start: 2017-01-15 | End: 2017-01-27 | Stop reason: HOSPADM

## 2017-01-15 RX ORDER — GUAIFENESIN 100 MG/5ML
243 LIQUID (ML) ORAL
Status: COMPLETED | OUTPATIENT
Start: 2017-01-15 | End: 2017-01-15

## 2017-01-15 RX ORDER — METOPROLOL TARTRATE 5 MG/5ML
5 INJECTION INTRAVENOUS ONCE
Status: COMPLETED | OUTPATIENT
Start: 2017-01-15 | End: 2017-01-15

## 2017-01-15 RX ADMIN — SODIUM CHLORIDE 40 MG: 9 INJECTION INTRAMUSCULAR; INTRAVENOUS; SUBCUTANEOUS at 08:55

## 2017-01-15 RX ADMIN — INSULIN LISPRO 4 UNITS: 100 INJECTION, SOLUTION INTRAVENOUS; SUBCUTANEOUS at 18:29

## 2017-01-15 RX ADMIN — PROPOFOL 35 MCG/KG/MIN: 10 INJECTION, EMULSION INTRAVENOUS at 03:09

## 2017-01-15 RX ADMIN — INSULIN LISPRO 4 UNITS: 100 INJECTION, SOLUTION INTRAVENOUS; SUBCUTANEOUS at 05:53

## 2017-01-15 RX ADMIN — INSULIN LISPRO 6 UNITS: 100 INJECTION, SOLUTION INTRAVENOUS; SUBCUTANEOUS at 00:28

## 2017-01-15 RX ADMIN — BACLOFEN 10 MG: 10 TABLET ORAL at 16:47

## 2017-01-15 RX ADMIN — INSULIN GLARGINE 12 UNITS: 100 INJECTION, SOLUTION SUBCUTANEOUS at 12:56

## 2017-01-15 RX ADMIN — ASPIRIN 81 MG 81 MG: 81 TABLET ORAL at 08:56

## 2017-01-15 RX ADMIN — BACLOFEN 10 MG: 10 TABLET ORAL at 22:36

## 2017-01-15 RX ADMIN — METHYLPREDNISOLONE SODIUM SUCCINATE 20 MG: 40 INJECTION, POWDER, FOR SOLUTION INTRAMUSCULAR; INTRAVENOUS at 12:57

## 2017-01-15 RX ADMIN — BUDESONIDE 500 MCG: 0.5 INHALANT RESPIRATORY (INHALATION) at 10:31

## 2017-01-15 RX ADMIN — INSULIN LISPRO 2 UNITS: 100 INJECTION, SOLUTION INTRAVENOUS; SUBCUTANEOUS at 18:30

## 2017-01-15 RX ADMIN — METHYLPREDNISOLONE SODIUM SUCCINATE 40 MG: 40 INJECTION, POWDER, FOR SOLUTION INTRAMUSCULAR; INTRAVENOUS at 05:52

## 2017-01-15 RX ADMIN — ATORVASTATIN CALCIUM 80 MG: 20 TABLET, FILM COATED ORAL at 22:00

## 2017-01-15 RX ADMIN — MULTIPLE VITAMINS W/ MINERALS TAB 1 TABLET: TAB at 08:57

## 2017-01-15 RX ADMIN — METHYLPREDNISOLONE SODIUM SUCCINATE 20 MG: 40 INJECTION, POWDER, FOR SOLUTION INTRAMUSCULAR; INTRAVENOUS at 18:30

## 2017-01-15 RX ADMIN — INSULIN LISPRO 4 UNITS: 100 INJECTION, SOLUTION INTRAVENOUS; SUBCUTANEOUS at 00:26

## 2017-01-15 RX ADMIN — INSULIN LISPRO 4 UNITS: 100 INJECTION, SOLUTION INTRAVENOUS; SUBCUTANEOUS at 12:58

## 2017-01-15 RX ADMIN — LEVOTHYROXINE SODIUM ANHYDROUS 87.5 MCG: 100 INJECTION, POWDER, LYOPHILIZED, FOR SOLUTION INTRAVENOUS at 10:48

## 2017-01-15 RX ADMIN — BUDESONIDE 500 MCG: 0.5 INHALANT RESPIRATORY (INHALATION) at 20:21

## 2017-01-15 RX ADMIN — BACLOFEN 10 MG: 10 TABLET ORAL at 08:57

## 2017-01-15 RX ADMIN — PROPOFOL 35 MCG/KG/MIN: 10 INJECTION, EMULSION INTRAVENOUS at 13:02

## 2017-01-15 RX ADMIN — PROPOFOL 35 MCG/KG/MIN: 10 INJECTION, EMULSION INTRAVENOUS at 07:05

## 2017-01-15 RX ADMIN — HEPARIN SODIUM 5000 UNITS: 5000 INJECTION, SOLUTION INTRAVENOUS; SUBCUTANEOUS at 08:56

## 2017-01-15 RX ADMIN — HEPARIN SODIUM 5000 UNITS: 5000 INJECTION, SOLUTION INTRAVENOUS; SUBCUTANEOUS at 16:47

## 2017-01-15 RX ADMIN — PROPOFOL 35 MCG/KG/MIN: 10 INJECTION, EMULSION INTRAVENOUS at 22:38

## 2017-01-15 RX ADMIN — CLOPIDOGREL BISULFATE 600 MG: 300 TABLET, FILM COATED ORAL at 12:56

## 2017-01-15 RX ADMIN — ARFORMOTEROL TARTRATE 15 MCG: 15 SOLUTION RESPIRATORY (INHALATION) at 10:31

## 2017-01-15 RX ADMIN — METHYLPREDNISOLONE SODIUM SUCCINATE 40 MG: 40 INJECTION, POWDER, FOR SOLUTION INTRAMUSCULAR; INTRAVENOUS at 00:27

## 2017-01-15 RX ADMIN — HEPARIN SODIUM 5000 UNITS: 5000 INJECTION, SOLUTION INTRAVENOUS; SUBCUTANEOUS at 00:27

## 2017-01-15 RX ADMIN — ARFORMOTEROL TARTRATE 15 MCG: 15 SOLUTION RESPIRATORY (INHALATION) at 20:21

## 2017-01-15 RX ADMIN — CHLORHEXIDINE GLUCONATE 15 ML: 1.2 RINSE ORAL at 08:56

## 2017-01-15 RX ADMIN — PROPOFOL 35 MCG/KG/MIN: 10 INJECTION, EMULSION INTRAVENOUS at 17:49

## 2017-01-15 RX ADMIN — ASPIRIN 81 MG 243 MG: 81 TABLET ORAL at 12:58

## 2017-01-15 RX ADMIN — IPRATROPIUM BROMIDE AND ALBUTEROL SULFATE 3 ML: .5; 3 SOLUTION RESPIRATORY (INHALATION) at 07:41

## 2017-01-15 RX ADMIN — INSULIN LISPRO 4 UNITS: 100 INJECTION, SOLUTION INTRAVENOUS; SUBCUTANEOUS at 12:56

## 2017-01-15 RX ADMIN — CHLORHEXIDINE GLUCONATE 15 ML: 1.2 RINSE ORAL at 22:35

## 2017-01-15 RX ADMIN — METOROPROLOL TARTRATE 5 MG: 5 INJECTION, SOLUTION INTRAVENOUS at 12:57

## 2017-01-15 NOTE — PROGRESS NOTES
Hospitalist Progress Note    Patient: Jayson Cheney MRN: [id number]  CSN: [id number]    YOB: 1945  Age: 70 y.o. Sex: male    DOA: 1/3/2017 LOS:  LOS: 10 days          Chief Complaint:    Cardiac arrest      Assessment/Plan       Patient Active Problem List   Diagnosis Code    Cardiac arrest ([facility] I46.9    Anoxic encephalopathy ([facility] G93.1    Acute respiratory failure ([facility] J96.00    DM [facility]diabetes mellitus) [facility] C86.8    Diastolic congestive heart failure, NYHA class 1 ([facility]) I50.30    HTN (hypertension) I10    Hypoxia R09.02    COPD (chronic obstructive pulmonary disease) with acute bronchitis ([facility]) J44.0    TIFFANIE (acute kidney injury) ([facility] N17.9    Aspiration pneumonia ([facility] J69.0     Antibiotics stopped as of 1/12/17 Final Cx from 1/11/17 Negative. Monitoring fluid balance at this point. Will need trach if not able to wean from vent in next few days. GI following w/ recs for PEG. I am concerned that this will lead to more aspiration events. Subjective:    No acute events; unable to wean.      Review of systems:    Constitutional: denies fevers, chills, myalgias  Respiratory: denies SOB, cough  Cardiovascular: denies chest pain, palpitations  Gastrointestinal: denies nausea, vomiting, diarrhea      Vital signs/Intake and Output:  Visit Vitals    /79    Pulse 96    Temp 98.4 °F (36.9 °C)    Resp 21    Ht 5' 10.08\" (1.78 m)    Wt 89 kg (196 lb 3.4 oz)    SpO2 97%    BMI 28.09 kg/m2     Current Shift:  01/14 1901 - 01/15 0700  In: 510   Out: -   Last three shifts:  01/13 0701 - 01/14 1900  In: 6596.8 [I.V.:2015.8]  Out: 1654 [Urine:2575]    Exam:    General: Sedated on vent=  Head/Neck: ET tube in place  CVS:Regular rate and rhythm, no M/R/G, S1/S2 heard, no thrill  Lungs:Coarse bilaterally, no wheezes  Abdomen: Soft, Nontender, No distention, Normal Bowel sounds, No hepatomegaly  Extremities: 3+ bilateral edema No C/C, pulses palpable 2+  Skin:normal texture and turgor, no rashes, no lesions                  Labs: Results:       Chemistry Recent Labs      01/14/17   0450  01/13/17   0447  01/12/17   1925  01/12/17   1244   GLU  260*  281*   --   210*   NA  149*  152*   --   157*   K  4.5  4.3  4.4  3.6   CL  116*  120*   --   122*   CO2  25  24   --   25   BUN  43*  44*   --   43*   CREA  1.16  1.31*   --   1.21   CA  8.4*  8.7   --   8.6   AGAP  8  8   --   10   BUCR  37*  34*   --   36*      CBC w/Diff Recent Labs      01/13/17   0447  01/12/17   1244   WBC  10.0  6.9   RBC  3.93*  3.70*   HGB  11.2*  10.7*   HCT  35.6*  33.7*   PLT  183  178      Cardiac Enzymes No results for input(s): CPK, CKND1, MARCO A in the last 72 hours. No lab exists for component: CKRMB, TROIP   Coagulation No results for input(s): PTP, INR, APTT in the last 72 hours. No lab exists for component: INREXT    Lipid Panel No results found for: CHOL, CHOLPOCT, CHOLX, CHLST, CHOLV, [id number], HDL, LDL, NLDLCT, DLDL, LDLC, DLDLP, [id number], VLDLC, VLDL, TGL, TGLX, TRIGL, [id number], TRIGP, TGLPOCT, [id number], CHHD, CHHDX   BNP No results for input(s): BNPP in the last 72 hours. Liver Enzymes No results for input(s): TP, ALB, TBIL, AP, SGOT, GPT in the last 72 hours. No lab exists for component: DBIL   Thyroid Studies Lab Results   Component Value Date/Time    TSH 1.37 08/13/2015 04:35 AM        Procedures/imaging: see electronic medical records for all procedures/Xrays and details which were not copied into this note but were reviewed prior to creation of Plan    40 minutes of critical care time spent in the direct evaluation and treatment of this high risk patient. The reason for providing this level of medical care for this critically ill patient was due a critical illness that impaired one or more vital organ systems such that there was a high probability of imminent or life threatening deterioration in the patients condition.  This care involved high complexity decision making to assess, manipulate, and support vital system functions, to treat this degreee vital organ system failure and to prevent further life threatening deterioration of the patients condition.       Octavio Aguirre, DO

## 2017-01-15 NOTE — PROGRESS NOTES
1013--Gave PRN dose of Hydralazine 20mg IVP for BP of 183/89(114). 1100--BP is now 155/75(94).     **SHIFT SUMMARY**    Shift was pretty much uneventful. Patient was turned q2hrs. Only required on PRN hydralazine dose for BP control. Remained afebrile. B soft restraints continued. Tolerating TF appropriately. Bedside and Verbal shift change report given to Efren Jaffe RN (oncoming nurse) by Valeria Pimentel RN (offgoing nurse). Report included the following information SBAR, Kardex, Intake/Output, MAR and Recent Results.

## 2017-01-15 NOTE — PROGRESS NOTES
1930- Report and care received, assessment completed per flow sheet. Intubated and sedated, opens eyes to verbal, follows commands, nods head no when asked if having pain. Soft bilateral wrist restraints in place, flow sheet in progress. Propofol infusing via IV pump without difficulty. 2300- Reassessment completed and without change. 0330- Reassessment completed and without change.

## 2017-01-15 NOTE — ROUTINE PROCESS
Bedside and Verbal shift change report given to Olga Shepard RN (oncoming nurse) by Roberto Blackwood RN (offgoing nurse).  Report included the following information SBAR, Kardex, Intake/Output, MAR, Recent Results, Med Rec Status and Cardiac Rhythm SR.

## 2017-01-15 NOTE — PROGRESS NOTES
Hospitalist Progress Note    Patient: Bella Naranjo MRN: [id number]  CSN: [id number]    YOB: 1945  Age: 70 y.o. Sex: male    DOA: 1/3/2017 LOS:  LOS: 11 days          Chief Complaint:    Cardiac arrest      Assessment/Plan     Principal Problem:    [facility] ([facility] (8/3/2015): Pulm following. Wean vent as able. SBT daily. Will need trach in next day or so if unable to wean. Active Problems:    Cardiac arrest ([facility] (8/1/2015): Thought to be due to aspiration. ST elevation noted on monitor today. STAT EKG, CEx3. EKG w/ anterolateral changes concerning for ACS. Cardiology stat consulted. I personally spoke w/ Dr. Lorraine Jacobson who has agreed to see patient. DM (diabetes mellitus) ([facility] (8/3/2015): Goal 140-180mg/dL. Will adjust dosing. Aspiration pneumonia ([facility] (1/7/2017): Peg placement will not improve his aspiration risk. It will make it worse. Unfortunately, this has already been offered to the family and they would like to pursue this. Will speak with them if able. Antibiotics have been completed and discontinued. Unfortunately, he has new bandemia and elevated LFTs. Will reculture and draw pro-inflammatory markers. Could be a product of ACS but cannot exclude another infection at this point given the myriad of risks he has for this. Subjective:    No acute events; unable to wean. Review of systems:    Sedated. Unable to obtain.      Vital signs/Intake and Output:  Visit Vitals    BP (!) 160/92    Pulse 96    Temp 99.1 °F (37.3 °C)    Resp 21    Ht 5' 10.08\" (1.78 m)    Wt 89 kg (196 lb 3.4 oz)    SpO2 100%    BMI 28.09 kg/m2     Current Shift:  01/15 0701 - 01/15 1900  In: -   Out: 850 [Urine:850]  Last three shifts:  01/13 1901 - 01/15 0700  In: 5286.1 [I.V.:445.1]  Out: 2500 [Urine:2500]    Exam:    General: Sedated on vent  Head/Neck: ET tube in place  CVS:Regular rate and rhythm, no M/R/G, S1/S2 heard, no thrill  Lungs:Coarse bilaterally, no wheezes  Abdomen: Soft, Nontender, No distention, Normal Bowel sounds, No hepatomegaly  Extremities: 3+ bilateral edema No C/C, pulses palpable 2+  Skin:normal texture and turgor, no rashes, no lesions                  Labs: Results:       Chemistry Recent Labs      01/15/17   0535  01/14/17   0450  01/13/17 0447   GLU  257*  260*  281*   NA  143  149*  152*   K  4.5  4.5  4.3   CL  111*  116*  120*   CO2  25  25  24   BUN  43*  43*  44*   CREA  1.04  1.16  1.31*   CA  8.1*  8.4*  8.7   AGAP  7  8  8   BUCR  41*  37*  34*   AP  90   --    --    TP  5.0*   --    --    ALB  2.2*   --    --    GLOB  2.8   --    --    AGRAT  0.8   --    --       CBC w/Diff Recent Labs      01/15/17   0535  01/13/17   0447  01/12/17   1244   WBC  13.2  10.0  6.9   RBC  3.80*  3.93*  3.70*   HGB  10.9*  11.2*  10.7*   HCT  34.0*  35.6*  33.7*   PLT  192  183  178   GRANS  92*   --    --    LYMPH  4*   --    --    EOS  0   --    --       Cardiac Enzymes No results for input(s): CPK, CKND1, MARCO A in the last 72 hours. No lab exists for component: CKRMB, TROIP   Coagulation No results for input(s): PTP, INR, APTT in the last 72 hours. No lab exists for component: INREXT, INREXT    Lipid Panel No results found for: CHOL, CHOLPOCT, CHOLX, CHLST, CHOLV, [id number], HDL, LDL, NLDLCT, DLDL, LDLC, DLDLP, [id number], VLDLC, VLDL, TGL, TGLX, TRIGL, [id number], TRIGP, TGLPOCT, [id number], CHHD, CHHDX   BNP No results for input(s): BNPP in the last 72 hours. Liver Enzymes Recent Labs      01/15/17   0535   TP  5.0*   ALB  2.2*   AP  90   SGOT  117*      Thyroid Studies Lab Results   Component Value Date/Time    TSH 1.37 08/13/2015 04:35 AM        Procedures/imaging: see electronic medical records for all procedures/Xrays and details which were not copied into this note but were reviewed prior to creation of Plan    90 minutes of critical care time spent in the direct evaluation and treatment of this high risk patient. The reason for providing this level of medical care for this critically ill patient was due a critical illness that impaired one or more vital organ systems such that there was a high probability of imminent or life threatening deterioration in the patients condition. This care involved high complexity decision making to assess, manipulate, and support vital system functions, to treat this degreee vital organ system failure and to prevent further life threatening deterioration of the patients condition.       Horald Loss Holly Cease, DO

## 2017-01-15 NOTE — PROGRESS NOTES
[facility]  Pulmonary, Critical Care, and Sleep Medicine    Name: Shaan Peralta MRN: [id number]   : 1945 Hospital: [facility] [place]   Date: 1/15/2017         t    Requesting MD:                                                  Reason for CC Consult:  IMPRESSION:   · Aspiration Pneumonia  · Acute hypoxic Respiratory Failure from above, pt reintubated     · Previous anoxic head encephalopathy   · Diastolic CHF at baseline  · Sp PEA arrest  · COPD exacerbation  · TIFFANIE      RECOMMENDATIONS:   · Neuro-    Sedated RASS -2  · Cards- hemodynamically stable now. Resp arrest led to cardiac arrest 1 mg epi given and 1 minute of CPR with ROSC   · Pulm-  See vent orders, patient may require trach , daily trial of SBT. Will schedule brovana and pulmicort. Decrease the solumedrol to 20mg, convert duoneb to prn  · Renal-  Worsening renal function with electrolyte replacement. IV fluids start enteral feeds  · Heme- H/H and plt stable  · GI,  I believe pt does aspirate chronically, pt will need PEG. Will ask GI for peg placement  · ID- levaquin,   aspiration pneumonia food seen at [facility]  · DVT prophylaxis  · GI prophylaxis  ·       Subjective/History:   1/15  Sig secretions. Waiting for possible PEG. Family aware. Unable to tolerate the SBT this am  Tolerating the tube feed.   Remain on bilevel. Attempted to wean off this am unsuccessful. Discussed with wife yesterday. Wants everything possible to \"brining him back home\"  Reviewed the GI input. Agree that this may not be time to get PEG until respiratory status improves but will likely need PEG near future as I believe he has been chronically aspirating. Michael Perez HPI  This patient has been seen and evaluated at the request of Dr. Beatrice Altamirano for aspiration pneumonia and resp failure. The patient is a 70 y.o. male admitted 3  with COPD exacerbation to the medical floor.   Last evening vomited noticed to be hypoxic an dactually lost pulse. 1 Mg epi with ROSC and intubated for cyanosis. Moved to ICU discussed with family and wife should be here this afternoon. In SNF prior to this admission and has diastolic dysfunction at baseline. Currently stable on vent    The patient is critically ill and can not provide additional history due to intubation and sedation     Past Medical History   Diagnosis Date    Anoxic brain damage ([address]     Bursitis     CAD (coronary artery disease)     Cardiac arrest ([address]     Cognitive communication deficit     Contracture of muscle     Depression     [facility]     Diarrhea     Disorder of kidney and ureter     Dysphagia     GERD (gastroesophageal reflux disease)     High cholesterol     Hypertension     Hypothyroid     Lack of coordination     Polyarthritis     Sleep apnea     Sleep disorder     Weakness       Past Surgical History   Procedure Laterality Date    Hx gi       peg    Hx amputation      Hx hernia repair        Prior to Admission medications    Medication Sig Start Date End Date Taking? Authorizing Provider   nitroglycerin (NITROBID) 2 % ointment Apply 1 Inch to affected area every six (6) hours. 8/14/15  Yes Promise Sewet MD   fentaNYL (DURAGESIC) 50 mcg/hr PATCH 1 Patch by TransDERmal route every seventy-two (72) hours. Mirna Hernandez MD   enalapril (VASOTEC) 5 mg tablet Take 5 mg by mouth daily. Mirna Hernandez MD   Menthol-Zinc Oxide (RISAMINE) 0.44-20.6 % oint Apply  to affected area three (3) times daily. Historical Provider   citalopram (CELEXA) 40 mg tablet Take 40 mg by mouth daily. Historical Provider   melatonin 5 mg cap capsule Take 5 mg by mouth nightly. Historical Provider   isosorbide mononitrate (ISMO, MONOKET) 20 mg tablet Take 20 mg by mouth two (2) times a day. Historical Provider   loperamide (IMODIUM) 2 mg capsule Take 2 mg by mouth four (4) times daily as needed for Diarrhea.     Historical Provider   promethazine (PHENERGAN) 12.5 mg tablet Take 12.5 mg by mouth every six (6) hours as needed for Nausea. Historical Provider   aspirin delayed-release 81 mg tablet Take 81 mg by mouth daily. Historical Provider   guaiFENesin (ROBAFEN) 100 mg/5 mL liquid Take 200 mg by mouth every six (6) hours as needed for Cough. Historical Provider   cloNIDine (CATAPRES) 0.2 mg/24 hr patch 1 Patch by TransDERmal route every Monday. Historical Provider   levothyroxine (SYNTHROID) 175 mcg tablet Take 175 mcg by mouth Daily (before breakfast). Historical Provider   furosemide (LASIX) 40 mg tablet Take 40 mg by mouth daily. Historical Provider   magnesium hydroxide ([place] MILK OF MAGNESIA) 400 mg/5 mL suspension Take 30 mL by mouth daily as needed for Constipation. Historical Provider   bisacodyl (DULCOLAX, BISACODYL,) 10 mg suppository Insert 10 mg into rectum daily as needed. Historical Provider   multivitamin, tx-iron-ca-min (THERA-M W/ IRON) 9 mg iron-400 mcg tab tablet Take 1 Tab by mouth daily. Historical Provider   mometasone (NASONEX) 50 mcg/actuation nasal spray 2 Sprays by Both Nostrils route daily. Historical Provider   GLUCOSAMINE HCL/CHONDR MAY A NA (GLUCOSAMINE-CHONDROITIN) 750-600 mg tab Take 1 Tab by mouth two (2) times a day. Historical Provider   baclofen (LIORESAL) 10 mg tablet Take 10 mg by mouth three (3) times daily. Historical Provider   pantoprazole (PROTONIX) 20 mg tablet Take 20 mg by mouth daily. Historical Provider   predniSONE (DELTASONE) 10 mg tablet Take 10 mg by mouth daily. Historical Provider   ferrous sulfate (FEROSUL) 220 mg (44 mg iron)/5 mL elix Take 7.4 mL by mouth daily. Historical Provider   potassium chloride (KAON 10%) 20 mEq/15 mL solution Take 30 mEq by mouth daily. Quantity 22.5 mL     Historical Provider   acetaminophen (TYLENOL) 325 mg tablet Take 650 mg by mouth every six (6) hours as needed for Pain.     Historical Provider   diclofenac (VOLTAREN) 1 % gel Apply 4 g to affected area two (2) times a day. For bilateral knee pain- apply thin layer topically to entire joint area    Historical Provider   labetalol (NORMODYNE) 200 mg tablet Take 200 mg by mouth two (2) times a day. Hold for heart rate less than 60 and advise provider. Historical Provider   lidocaine (LIDODERM) 5 % 2 Patches by TransDERmal route every twenty-four (24) hours. Place Two patches to right trapezius region in morning. Remove patches 12 hours later. Apply 7AM Remove 7PM. 11/25/16   Turner Arnold MD   oxyCODONE-acetaminophen (PERCOCET) 5-325 mg per tablet Take 1 Tab by mouth every four (4) hours as needed for Pain. Max Daily Amount: 6 Tabs. 11/25/16   Turner Arnold MD   albuterol-ipratropium (DUO-NEB) 2.5 mg-0.5 mg/3 ml nebulizer solution 3 mL by Nebulization route every four (4) hours as needed for Wheezing.  8/14/15   Gagan Stockton MD     Current Facility-Administered Medications   Medication Dose Route Frequency    methylPREDNISolone (PF) (SOLU-MEDROL) injection 40 mg  40 mg IntraVENous Q6H    insulin glargine (LANTUS) injection 12 Units  12 Units SubCUTAneous DAILY    insulin lispro (HUMALOG) injection 4 Units  4 Units SubCUTAneous Q6H    pantoprazole (PROTONIX) 40 mg in sodium chloride 0.9 % 10 mL injection  40 mg IntraVENous DAILY    lidocaine (LIDODERM) 5 % patch 2 Patch  2 Patch TransDERmal Q24H    propofol (DIPRIVAN) infusion  5-50 mcg/kg/min IntraVENous TITRATE    levothyroxine (SYNTHROID) injection 87.5 mcg  87.5 mcg IntraVENous Q24H    chlorhexidine (PERIDEX) 0.12 % mouthwash 15 mL  15 mL Oral Q12H    cloNIDine (CATAPRES) 0.1 mg/24 hr patch 1 Patch  1 Patch TransDERmal Q7D    insulin lispro (HUMALOG) injection   SubCUTAneous Q6H    albuterol-ipratropium (DUO-NEB) 2.5 MG-0.5 MG/3 ML  3 mL Nebulization QID RT    aspirin chewable tablet 81 mg  81 mg Per G Tube DAILY    baclofen (LIORESAL) tablet 10 mg  10 mg Oral TID    multivitamin, tx-iron-ca-min (THERA-M w/ IRON) tablet 1 Tab  1 Tab Oral DAILY  heparin (porcine) injection 5,000 Units  5,000 Units SubCUTAneous Q8H     Allergies   Allergen Reactions    Penicillins Itching      Social History   Substance Use Topics    Smoking status: Never Smoker    Smokeless tobacco: Not on file    Alcohol use No          Objective:   Vital Signs:    Visit Vitals    BP (!) 160/92    Pulse 93    Temp 98.6 °F (37 °C)    Resp 20    Ht 5' 10.08\" (1.78 m)    Wt 89 kg (196 lb 3.4 oz)    SpO2 97%    BMI 28.09 kg/m2       O2 Device: Endotracheal tube, Ventilator   O2 Flow Rate (L/min): 6 l/min   Temp (24hrs), Av.6 °F (37 °C), Min:98.4 °F (36.9 °C), Max:99 °F (37.2 °C)       Intake/Output:   Last shift:         Last 3 shifts:  1901 - 01/15 0700  In: 5286.1 [I.V.:445.1]  Out: 2500 [Urine:2500]    Intake/Output Summary (Last 24 hours) at 01/15/17 0916  Last data filed at 01/15/17 0547   Gross per 24 hour   Intake          3338.49 ml   Output             1625 ml   Net          1713.49 ml     Physical Exam:    General:   sedated on MV   Head:  Normocephalic, without obvious abnormality,    Eyes:  Conjunctivae/corneas clear. PERRL,   Nose: Nares normal. Septum midline. Mucosa normal. No drainage or sinus tenderness. Throat: ETT    Neck: Supple, symmetrical, trachea midline, no adenopathy, no carotid bruit and no JVD. Lungs:   Clear to auscultation bilaterally. Chest wall:  No tenderness or deformity. Heart:  Regular rate and rhythm, S1, S2 normal, no murmur, click, rub or gallop. Abdomen:   Soft, non-tender. Bowel sounds normal. No masses,  No organomegaly. Extremities: Extremities normal, atraumatic, no cyanosis or edema.                        Data:     Recent Results (from the past 24 hour(s))   GLUCOSE, POC    Collection Time: 17 11:40 AM   Result Value Ref Range    Glucose (POC) 212 (H) 70 - 110 mg/dL   GLUCOSE, POC    Collection Time: 17  6:30 PM   Result Value Ref Range    Glucose (POC) 225 (H) 70 - 110 mg/dL   GLUCOSE, POC Collection Time: 01/15/17 12:16 AM   Result Value Ref Range    Glucose (POC) 254 (H) 70 - 110 mg/dL   POC G3    Collection Time: 01/15/17  4:52 AM   Result Value Ref Range    Device: VENT      FIO2 (POC) 0.35 %    pH (POC) 7.409 7.35 - 7.45      pCO2 (POC) 34.9 (L) 35.0 - 45.0 MMHG    pO2 (POC) 78 (L) 80 - 100 MMHG    HCO3 (POC) 22.1 22 - 26 MMOL/L    sO2 (POC) 96 92 - 97 %    Base deficit (POC) 3 mmol/L    Mode ASSIST CONTROL      Tidal volume 450 ml    Set Rate 16 bpm    PEEP/CPAP (POC) 6 cmH2O    Allens test (POC) YES      Total resp. rate 19      Site RIGHT RADIAL      Patient temp. 98.5      Specimen type (POC) ARTERIAL      Performed by Slade Sinclair    MAGNESIUM    Collection Time: 01/15/17  5:35 AM   Result Value Ref Range    Magnesium 2.2 1.8 - 2.4 mg/dL   METABOLIC PANEL, COMPREHENSIVE    Collection Time: 01/15/17  5:35 AM   Result Value Ref Range    Sodium 143 136 - 145 mmol/L    Potassium 4.5 3.5 - 5.5 mmol/L    Chloride 111 (H) 100 - 108 mmol/L    CO2 25 21 - 32 mmol/L    Anion gap 7 3.0 - 18 mmol/L    Glucose 257 (H) 74 - 99 mg/dL    BUN 43 (H) 7.0 - 18 MG/DL    Creatinine 1.04 0.6 - 1.3 MG/DL    BUN/Creatinine ratio 41 (H) 12 - 20      GFR est AA >60 >60 ml/min/1.73m2    GFR est non-AA >60 >60 ml/min/1.73m2    Calcium 8.1 (L) 8.5 - 10.1 MG/DL    Bilirubin, total 0.8 0.2 - 1.0 MG/DL     (H) 16 - 61 U/L     (H) 15 - 37 U/L    Alk.  phosphatase 90 45 - 117 U/L    Protein, total 5.0 (L) 6.4 - 8.2 g/dL    Albumin 2.2 (L) 3.4 - 5.0 g/dL    Globulin 2.8 2.0 - 4.0 g/dL    A-G Ratio 0.8 0.8 - 1.7     CBC WITH AUTOMATED DIFF    Collection Time: 01/15/17  5:35 AM   Result Value Ref Range    WBC 13.2 4.6 - 13.2 K/uL    RBC 3.80 (L) 4.70 - 5.50 M/uL    HGB 10.9 (L) 13.0 - 16.0 g/dL    HCT 34.0 (L) 36.0 - 48.0 %    MCV 89.5 74.0 - 97.0 FL    MCH 28.7 24.0 - 34.0 PG    MCHC 32.1 31.0 - 37.0 g/dL    RDW 15.9 (H) 11.6 - 14.5 %    PLATELET 705 589 - 081 K/uL    MPV 12.0 (H) 9.2 - 11.8 FL    NEUTROPHILS 92 (H) 42 - 75 %    BANDS 2 0 - 5 %    LYMPHOCYTES 4 (L) 20 - 51 %    MONOCYTES 2 2 - 9 %    EOSINOPHILS 0 0 - 5 %    BASOPHILS 0 0 - 3 %    ABS. NEUTROPHILS 12.1 (H) 1.8 - 8.0 K/UL    ABS. LYMPHOCYTES 0.5 (L) 0.8 - 3.5 K/UL    ABS. MONOCYTES 0.3 0 - 1.0 K/UL    ABS. EOSINOPHILS 0.0 0.0 - 0.4 K/UL    ABS. BASOPHILS 0.0 0.0 - 0.1 K/UL    RBC COMMENTS ANISOCYTOSIS  1+        DF MANUAL     GLUCOSE, POC    Collection Time: 01/15/17  5:47 AM   Result Value Ref Range    Glucose (POC) 232 (H) 70 - 110 mg/dL           Recent Labs      01/15/17   0452  01/14/17   0550  01/14/17   0534   FIO2I  0.35  40  40   HCO3I  22.1  21.5*  22.4   PCO2I  34.9*  36.3  39.3   PHI  7.409  7. 381  7.364   PO2I  78*  91  45*     Telemetry:normal sinus rhythm    Imaging:  I have personally reviewed the patients radiographs and have reviewed the reports:     Best practice :  Core measures; Antibiotic choice:  Severe Sepsis bundles;SIRS screen met? Yes  Fluids  Lactic acid ordered- initial and repeat Q6hrs if elevated till normalized. Cultures drawn. Antibiotic administered within 1hr-ICU  And 3hrs ED  Large bore IV- 2 sites          Pressors aim MAP >65mmHg  Steriods  Glycemic control  Mech. Ventilated patients- aim to keep peak plateau pressure 37-12HM H2O.   Sress ulcer prophylaxis  DVT prophylaxis            Total critical care time exclusive of procedures: 35 minutes  Rafael Clark MD

## 2017-01-16 ENCOUNTER — APPOINTMENT (OUTPATIENT)
Dept: GENERAL RADIOLOGY | Age: 72
DRG: 207 | End: 2017-01-16
Attending: INTERNAL MEDICINE
Payer: MEDICARE

## 2017-01-16 LAB
ANION GAP BLD CALC-SCNC: 7 MMOL/L (ref 3–18)
APPEARANCE UR: ABNORMAL
APTT PPP: 22.6 SEC (ref 23–36.4)
ATRIAL RATE: 96 BPM
BACTERIA URNS QL MICRO: ABNORMAL /HPF
BASOPHILS # BLD AUTO: 0 K/UL (ref 0–0.06)
BASOPHILS # BLD: 0 % (ref 0–2)
BILIRUB UR QL: NEGATIVE
BUN SERPL-MCNC: 44 MG/DL (ref 7–18)
BUN/CREAT SERPL: 47 (ref 12–20)
CALCIUM SERPL-MCNC: 7.9 MG/DL (ref 8.5–10.1)
CALCULATED P AXIS, ECG09: 24 DEGREES
CALCULATED R AXIS, ECG10: -6 DEGREES
CALCULATED T AXIS, ECG11: 116 DEGREES
CHLORIDE SERPL-SCNC: 109 MMOL/L (ref 100–108)
CO2 SERPL-SCNC: 26 MMOL/L (ref 21–32)
COLOR UR: YELLOW
CREAT SERPL-MCNC: 0.93 MG/DL (ref 0.6–1.3)
DIAGNOSIS, 93000: NORMAL
DIFFERENTIAL METHOD BLD: ABNORMAL
EOSINOPHIL # BLD: 0 K/UL (ref 0–0.4)
EOSINOPHIL NFR BLD: 0 % (ref 0–5)
EPITH CASTS URNS QL MICRO: ABNORMAL /LPF (ref 0–5)
ERYTHROCYTE [DISTWIDTH] IN BLOOD BY AUTOMATED COUNT: 15.9 % (ref 11.6–14.5)
GLUCOSE BLD STRIP.AUTO-MCNC: 137 MG/DL (ref 70–110)
GLUCOSE BLD STRIP.AUTO-MCNC: 178 MG/DL (ref 70–110)
GLUCOSE BLD STRIP.AUTO-MCNC: 183 MG/DL (ref 70–110)
GLUCOSE BLD STRIP.AUTO-MCNC: 211 MG/DL (ref 70–110)
GLUCOSE SERPL-MCNC: 204 MG/DL (ref 74–99)
GLUCOSE UR STRIP.AUTO-MCNC: NEGATIVE MG/DL
HCT VFR BLD AUTO: 34.8 % (ref 36–48)
HGB BLD-MCNC: 10.9 G/DL (ref 13–16)
HGB UR QL STRIP: ABNORMAL
INR PPP: 1 (ref 0.8–1.2)
KETONES UR QL STRIP.AUTO: NEGATIVE MG/DL
LEUKOCYTE ESTERASE UR QL STRIP.AUTO: NEGATIVE
LYMPHOCYTES # BLD AUTO: 3 % (ref 21–52)
LYMPHOCYTES # BLD: 0.5 K/UL (ref 0.9–3.6)
MAGNESIUM SERPL-MCNC: 2.2 MG/DL (ref 1.8–2.4)
MCH RBC QN AUTO: 27.7 PG (ref 24–34)
MCHC RBC AUTO-ENTMCNC: 31.3 G/DL (ref 31–37)
MCV RBC AUTO: 88.5 FL (ref 74–97)
MONOCYTES # BLD: 1 K/UL (ref 0.05–1.2)
MONOCYTES NFR BLD AUTO: 7 % (ref 3–10)
MUCOUS THREADS URNS QL MICRO: ABNORMAL /LPF
NEUTS SEG # BLD: 13.1 K/UL (ref 1.8–8)
NEUTS SEG NFR BLD AUTO: 90 % (ref 40–73)
NITRITE UR QL STRIP.AUTO: NEGATIVE
P-R INTERVAL, ECG05: 136 MS
PH UR STRIP: 5.5 [PH] (ref 5–8)
PLATELET # BLD AUTO: 179 K/UL (ref 135–420)
PMV BLD AUTO: 11.7 FL (ref 9.2–11.8)
POTASSIUM SERPL-SCNC: 4 MMOL/L (ref 3.5–5.5)
PROT UR STRIP-MCNC: NEGATIVE MG/DL
PROTHROMBIN TIME: 13.2 SEC (ref 11.5–15.2)
Q-T INTERVAL, ECG07: 378 MS
QRS DURATION, ECG06: 86 MS
QTC CALCULATION (BEZET), ECG08: 477 MS
RBC # BLD AUTO: 3.93 M/UL (ref 4.7–5.5)
RBC #/AREA URNS HPF: NEGATIVE /HPF (ref 0–5)
SODIUM SERPL-SCNC: 142 MMOL/L (ref 136–145)
SP GR UR REFRACTOMETRY: 1.01 (ref 1–1.03)
UROBILINOGEN UR QL STRIP.AUTO: 0.2 EU/DL (ref 0.2–1)
VENTRICULAR RATE, ECG03: 96 BPM
WBC # BLD AUTO: 14.5 K/UL (ref 4.6–13.2)
WBC URNS QL MICRO: NEGATIVE /HPF (ref 0–5)

## 2017-01-16 PROCEDURE — 74011000258 HC RX REV CODE- 258: Performed by: INTERNAL MEDICINE

## 2017-01-16 PROCEDURE — 74011000250 HC RX REV CODE- 250: Performed by: INTERNAL MEDICINE

## 2017-01-16 PROCEDURE — 74011250637 HC RX REV CODE- 250/637: Performed by: INTERNAL MEDICINE

## 2017-01-16 PROCEDURE — 74011250636 HC RX REV CODE- 250/636: Performed by: INTERNAL MEDICINE

## 2017-01-16 PROCEDURE — 80048 BASIC METABOLIC PNL TOTAL CA: CPT | Performed by: INTERNAL MEDICINE

## 2017-01-16 PROCEDURE — 36415 COLL VENOUS BLD VENIPUNCTURE: CPT | Performed by: INTERNAL MEDICINE

## 2017-01-16 PROCEDURE — 82962 GLUCOSE BLOOD TEST: CPT

## 2017-01-16 PROCEDURE — 83735 ASSAY OF MAGNESIUM: CPT | Performed by: INTERNAL MEDICINE

## 2017-01-16 PROCEDURE — 74011636637 HC RX REV CODE- 636/637: Performed by: INTERNAL MEDICINE

## 2017-01-16 PROCEDURE — 93306 TTE W/DOPPLER COMPLETE: CPT

## 2017-01-16 PROCEDURE — 77030018846 HC SOL IRR STRL H20 ICUM -A

## 2017-01-16 PROCEDURE — 65610000006 HC RM INTENSIVE CARE

## 2017-01-16 PROCEDURE — 94640 AIRWAY INHALATION TREATMENT: CPT

## 2017-01-16 PROCEDURE — 77010033678 HC OXYGEN DAILY

## 2017-01-16 PROCEDURE — 71010 XR CHEST PORT: CPT

## 2017-01-16 PROCEDURE — 87070 CULTURE OTHR SPECIMN AEROBIC: CPT | Performed by: FAMILY MEDICINE

## 2017-01-16 PROCEDURE — 94003 VENT MGMT INPAT SUBQ DAY: CPT

## 2017-01-16 PROCEDURE — 85610 PROTHROMBIN TIME: CPT | Performed by: INTERNAL MEDICINE

## 2017-01-16 PROCEDURE — C9113 INJ PANTOPRAZOLE SODIUM, VIA: HCPCS | Performed by: INTERNAL MEDICINE

## 2017-01-16 PROCEDURE — 85025 COMPLETE CBC W/AUTO DIFF WBC: CPT | Performed by: INTERNAL MEDICINE

## 2017-01-16 PROCEDURE — 74011250637 HC RX REV CODE- 250/637: Performed by: FAMILY MEDICINE

## 2017-01-16 PROCEDURE — 77030011256 HC DRSG MEPILEX <16IN NO BORD MOLN -A

## 2017-01-16 PROCEDURE — 85730 THROMBOPLASTIN TIME PARTIAL: CPT | Performed by: INTERNAL MEDICINE

## 2017-01-16 RX ORDER — ATROPINE SULFATE 0.1 MG/ML
0.5 INJECTION INTRAVENOUS
Status: CANCELLED | OUTPATIENT
Start: 2017-01-16 | End: 2017-01-16

## 2017-01-16 RX ORDER — DEXTROMETHORPHAN/PSEUDOEPHED 2.5-7.5/.8
1.2 DROPS ORAL
Status: CANCELLED | OUTPATIENT
Start: 2017-01-16

## 2017-01-16 RX ORDER — INSULIN LISPRO 100 [IU]/ML
6 INJECTION, SOLUTION INTRAVENOUS; SUBCUTANEOUS EVERY 6 HOURS
Status: DISCONTINUED | OUTPATIENT
Start: 2017-01-16 | End: 2017-01-17

## 2017-01-16 RX ORDER — MIDAZOLAM HYDROCHLORIDE 1 MG/ML
1 INJECTION, SOLUTION INTRAMUSCULAR; INTRAVENOUS
Status: DISCONTINUED | OUTPATIENT
Start: 2017-01-16 | End: 2017-01-19

## 2017-01-16 RX ORDER — IPRATROPIUM BROMIDE AND ALBUTEROL SULFATE 2.5; .5 MG/3ML; MG/3ML
3 SOLUTION RESPIRATORY (INHALATION)
Status: DISCONTINUED | OUTPATIENT
Start: 2017-01-16 | End: 2017-01-27 | Stop reason: HOSPADM

## 2017-01-16 RX ORDER — MIDAZOLAM HYDROCHLORIDE 1 MG/ML
1 INJECTION, SOLUTION INTRAMUSCULAR; INTRAVENOUS
Status: DISCONTINUED | OUTPATIENT
Start: 2017-01-16 | End: 2017-01-16

## 2017-01-16 RX ORDER — FENTANYL CITRATE 50 UG/ML
25 INJECTION, SOLUTION INTRAMUSCULAR; INTRAVENOUS
Status: DISCONTINUED | OUTPATIENT
Start: 2017-01-16 | End: 2017-01-19

## 2017-01-16 RX ORDER — EPINEPHRINE 0.1 MG/ML
1 INJECTION INTRACARDIAC; INTRAVENOUS
Status: CANCELLED | OUTPATIENT
Start: 2017-01-16 | End: 2017-01-16

## 2017-01-16 RX ADMIN — BACLOFEN 10 MG: 10 TABLET ORAL at 08:39

## 2017-01-16 RX ADMIN — IPRATROPIUM BROMIDE AND ALBUTEROL SULFATE 3 ML: .5; 3 SOLUTION RESPIRATORY (INHALATION) at 15:47

## 2017-01-16 RX ADMIN — FENTANYL CITRATE 25 MCG/HR: 50 INJECTION, SOLUTION INTRAMUSCULAR; INTRAVENOUS at 11:04

## 2017-01-16 RX ADMIN — MIDAZOLAM HYDROCHLORIDE 1 MG: 1 INJECTION, SOLUTION INTRAMUSCULAR; INTRAVENOUS at 11:36

## 2017-01-16 RX ADMIN — MIDAZOLAM HYDROCHLORIDE 1 MG: 1 INJECTION, SOLUTION INTRAMUSCULAR; INTRAVENOUS at 19:26

## 2017-01-16 RX ADMIN — INSULIN LISPRO 6 UNITS: 100 INJECTION, SOLUTION INTRAVENOUS; SUBCUTANEOUS at 12:24

## 2017-01-16 RX ADMIN — METHYLPREDNISOLONE SODIUM SUCCINATE 20 MG: 40 INJECTION, POWDER, FOR SOLUTION INTRAMUSCULAR; INTRAVENOUS at 05:59

## 2017-01-16 RX ADMIN — PROPOFOL 35 MCG/KG/MIN: 10 INJECTION, EMULSION INTRAVENOUS at 08:51

## 2017-01-16 RX ADMIN — ASPIRIN 81 MG 81 MG: 81 TABLET ORAL at 08:39

## 2017-01-16 RX ADMIN — MULTIPLE VITAMINS W/ MINERALS TAB 1 TABLET: TAB at 08:39

## 2017-01-16 RX ADMIN — CHLORHEXIDINE GLUCONATE 15 ML: 1.2 RINSE ORAL at 08:39

## 2017-01-16 RX ADMIN — CHLORHEXIDINE GLUCONATE 15 ML: 1.2 RINSE ORAL at 22:01

## 2017-01-16 RX ADMIN — ATORVASTATIN CALCIUM 80 MG: 20 TABLET, FILM COATED ORAL at 08:39

## 2017-01-16 RX ADMIN — INSULIN LISPRO 6 UNITS: 100 INJECTION, SOLUTION INTRAVENOUS; SUBCUTANEOUS at 17:30

## 2017-01-16 RX ADMIN — BACLOFEN 10 MG: 10 TABLET ORAL at 16:23

## 2017-01-16 RX ADMIN — INSULIN LISPRO 2 UNITS: 100 INJECTION, SOLUTION INTRAVENOUS; SUBCUTANEOUS at 05:59

## 2017-01-16 RX ADMIN — BUDESONIDE 500 MCG: 0.5 INHALANT RESPIRATORY (INHALATION) at 19:50

## 2017-01-16 RX ADMIN — METHYLPREDNISOLONE SODIUM SUCCINATE 20 MG: 40 INJECTION, POWDER, FOR SOLUTION INTRAMUSCULAR; INTRAVENOUS at 00:44

## 2017-01-16 RX ADMIN — ARFORMOTEROL TARTRATE 15 MCG: 15 SOLUTION RESPIRATORY (INHALATION) at 09:13

## 2017-01-16 RX ADMIN — IPRATROPIUM BROMIDE AND ALBUTEROL SULFATE 3 ML: .5; 3 SOLUTION RESPIRATORY (INHALATION) at 19:50

## 2017-01-16 RX ADMIN — SODIUM CHLORIDE 40 MG: 9 INJECTION INTRAMUSCULAR; INTRAVENOUS; SUBCUTANEOUS at 08:39

## 2017-01-16 RX ADMIN — INSULIN LISPRO 2 UNITS: 100 INJECTION, SOLUTION INTRAVENOUS; SUBCUTANEOUS at 12:24

## 2017-01-16 RX ADMIN — ARFORMOTEROL TARTRATE 15 MCG: 15 SOLUTION RESPIRATORY (INHALATION) at 19:50

## 2017-01-16 RX ADMIN — HEPARIN SODIUM 5000 UNITS: 5000 INJECTION, SOLUTION INTRAVENOUS; SUBCUTANEOUS at 08:39

## 2017-01-16 RX ADMIN — INSULIN LISPRO 4 UNITS: 100 INJECTION, SOLUTION INTRAVENOUS; SUBCUTANEOUS at 06:00

## 2017-01-16 RX ADMIN — BUDESONIDE 500 MCG: 0.5 INHALANT RESPIRATORY (INHALATION) at 09:13

## 2017-01-16 RX ADMIN — INSULIN LISPRO 4 UNITS: 100 INJECTION, SOLUTION INTRAVENOUS; SUBCUTANEOUS at 00:43

## 2017-01-16 RX ADMIN — MIDAZOLAM HYDROCHLORIDE 1 MG: 1 INJECTION, SOLUTION INTRAMUSCULAR; INTRAVENOUS at 14:58

## 2017-01-16 RX ADMIN — LEVOTHYROXINE SODIUM ANHYDROUS 87.5 MCG: 100 INJECTION, POWDER, LYOPHILIZED, FOR SOLUTION INTRAVENOUS at 11:03

## 2017-01-16 RX ADMIN — IPRATROPIUM BROMIDE AND ALBUTEROL SULFATE 3 ML: .5; 3 SOLUTION RESPIRATORY (INHALATION) at 11:58

## 2017-01-16 RX ADMIN — BACLOFEN 10 MG: 10 TABLET ORAL at 22:02

## 2017-01-16 RX ADMIN — HEPARIN SODIUM 5000 UNITS: 5000 INJECTION, SOLUTION INTRAVENOUS; SUBCUTANEOUS at 00:45

## 2017-01-16 RX ADMIN — INSULIN GLARGINE 12 UNITS: 100 INJECTION, SOLUTION SUBCUTANEOUS at 12:23

## 2017-01-16 RX ADMIN — HEPARIN SODIUM 5000 UNITS: 5000 INJECTION, SOLUTION INTRAVENOUS; SUBCUTANEOUS at 16:23

## 2017-01-16 RX ADMIN — METHYLPREDNISOLONE SODIUM SUCCINATE 20 MG: 40 INJECTION, POWDER, FOR SOLUTION INTRAMUSCULAR; INTRAVENOUS at 22:02

## 2017-01-16 RX ADMIN — INSULIN LISPRO 4 UNITS: 100 INJECTION, SOLUTION INTRAVENOUS; SUBCUTANEOUS at 00:44

## 2017-01-16 NOTE — PROGRESS NOTES
conducted a Follow up consultation and Spiritual Assessment for Lorraine Faria, who is a 70 y.o.,male. Patient continues on vent. Wife was at the bedside. They are active at [facility] in [facility]. They are aware he is here. Patient had tears coming down his face as his wife talked about their sheltie dog Mercy. The  provided the following Interventions:  Continued the relationship of care and support. Listened empathically. Offered prayer and assurance of continued prayer on patients behalf. Chart reviewed. The following outcomes were achieved:  Patient and wife expressed gratitude for Spiritual Care visit. Patient was reviewed in ICU Interdisciplinary Rounds. Assessment:  There are no further spiritual or Episcopal issues which require Spiritual Care Services intervention at this time. Plan:  Chaplains will continue to follow and will provide pastoral care as needed or requested.  recommends bedside caregivers page the  on duty if patient shows signs of acute spiritual or emotional distress. [address] [facility], [facility].    Board Certified   [phone] - Office

## 2017-01-16 NOTE — PROGRESS NOTES
NUTRITION FOLLOW-UP    RECOMMENDATIONS/PLAN:   - Suggest pursuing long term tube feeding access with post-pyloric feeding tube tip to decrease future aspiration risk  - Continue current TF's with Promote at 70 mL/hr and free water 300 mL q4h via OGT  - Keep HOB at 45 degrees to minimize risk of aspiration  - Please update current weight    NUTRITION ASSESSMENT:   Client Update: 70 yrs old Male with acute respiratory failure, cardiac arrest, and possible aspiration pneumonia. Possible plan to receive trach and PEG. FOOD/NUTRITION INTAKE   Diet Order:  NPO   Food Allergies: NKFA  TF access: [x] OGT       [] NGT      [] PEG      [] J tube  TF Order: Promote at 70 ml/hr  Modulars: none   Free Water Flushes: 300 ml q4h   Provides: 1540 kcal, 96 g protein, 1280 ml H2O, 213 g CHO, >100% RDI's for micronutrients  %TF volume received: 98% of goal x48 hours   Pertinent Medications:  [x] Reviewed; fentanyl, versed, hydralazine, MVI, ppi  Electrolyte Replacement Protocol: [x]K []Mg []PO4 []Ca   Insulin:  [x] SSI     [x] Pre-meal:6u      [x]  Basal: 12u    [] None     Cultural/Spiritism Food Preferences: None Identified ANTHROPOMETRICS  Height: 5' 10.08\" (178 cm)       Weight: 89 kg (196 lb 3.4 oz)         BMI: 28.1 kg/m^2 overweight (25.0%-29.9% BMI)   Adm Weight: 196 lbs                Weight change: N/A    NUTRITION-FOCUSED PHYSICAL ASSESSMENT  Skin: intact, 2+ generalized edema      GI: last BM 1/15- loose    BIOCHEMICAL DATA & MEDICAL TESTS  Pertinent Labs:  [x] Reviewed; POC -254, BUN 44, Ca 7.9,   Alb 2.2    NUTRITION PRESCRIPTION  Calories: 7074-2642 kcal/day based on [employer] eqn  Protein:  g/day based on 1-1.3 g/kg  CHO: 223 g/day based on 50% of total energy  Fluid: 5125-1441 ml/day based on 1 kcal/ml      NUTRITION DIAGNOSES:   1. Inadequate oral intake related to respiratory failure as evidenced by NPO and intubated, sedated  - ongoing    NUTRITION INTERVENTIONS:   INTERVENTIONS:        GOALS:  1. Continue current TF's maintain HOB 45 degrees 1. >90% goal EN volume provided, no s/s of aspiration by next review 1-3 days     LEARNING NEEDS (Diet, Supplementation, Food/Nutrient-Drug Interaction):   [x] None Identified     [] Education provided & documented        Identified and patient:   [] Declined      [] Was not appropriate/indicated        NUTRITION MONITORING /EVALUATION:   Adjust EN/PN as appropriate  Monitor wt  Monitor renal labs, electrolytes, fluid status    Previous Recommendations Implemented: yes        Previous Goals Met:  yes       [x] Participated in Interdisciplinary Rounds    [x] [address] [place] [address] Reviewed  [x] Discharge Nutrition Plan:  PEG? NUTRITION RISK:           [x] At risk                        [] Not currently at risk        Will follow-up per policy.   Azalea Albright RD  PAGER:  [phone]

## 2017-01-16 NOTE — PROGRESS NOTES
Gastrointestinal Progress Note    Patient Name: Renee Figueroa    FWBUZ'D Date: 1/16/2017    Admit Date: 1/3/2017    Subjective:   aspiration pneumonia on the ventillator since Jan 4 to 10 and reintubated on 11 on the vent and on sedation Fentanyl IV drip Versed PRN  Diet: Patient is on tube feeding (Promote) at 70 cc/ h through orogastric tube inserted on Jan 11 and is tolerating. Nausea is not present. Vomiting is not present. Pain: Patient does not complain of abdominal pain.       Bowel Movements: Diarrhea x 2 not foul smelling    Bleeding:  None    Current Facility-Administered Medications   Medication Dose Route Frequency    fentaNYL citrate (PF) injection 25 mcg  25 mcg IntraVENous Q4H PRN    albuterol-ipratropium (DUO-NEB) 2.5 MG-0.5 MG/3 ML  3 mL Nebulization Q4H RT    fentaNYL 10 mcg/mL IV infusion  25-50 mcg/hr IntraVENous TITRATE    methylPREDNISolone (PF) (SOLU-MEDROL) injection 20 mg  20 mg IntraVENous Q12H    insulin lispro (HUMALOG) injection 6 Units  6 Units SubCUTAneous Q6H    midazolam (VERSED) injection 1 mg  1 mg IntraVENous Q3H PRN    arformoterol (BROVANA) neb solution 15 mcg  15 mcg Nebulization BID RT    budesonide (PULMICORT) 500 mcg/2 ml nebulizer suspension  500 mcg Nebulization BID RT    atorvastatin (LIPITOR) tablet 80 mg  80 mg Per NG tube DAILY    insulin glargine (LANTUS) injection 12 Units  12 Units SubCUTAneous DAILY    pantoprazole (PROTONIX) 40 mg in sodium chloride 0.9 % 10 mL injection  40 mg IntraVENous DAILY    lidocaine (LIDODERM) 5 % patch 2 Patch  2 Patch TransDERmal Q24H    hydrALAZINE (APRESOLINE) 20 mg/mL injection 20 mg  20 mg IntraVENous Q6H PRN    levothyroxine (SYNTHROID) injection 87.5 mcg  87.5 mcg IntraVENous Q24H    chlorhexidine (PERIDEX) 0.12 % mouthwash 15 mL  15 mL Oral Q12H    ELECTROLYTE REPLACEMENT PROTOCOL  1 Each Other PRN    cloNIDine (CATAPRES) 0.1 mg/24 hr patch 1 Patch  1 Patch TransDERmal Q7D    insulin lispro (HUMALOG) injection   SubCUTAneous Q6H    sodium chloride (NS) flush 5-10 mL  5-10 mL IntraVENous PRN    acetaminophen (TYLENOL) tablet 650 mg  650 mg Oral Q6H PRN    aspirin chewable tablet 81 mg  81 mg Per G Tube DAILY    baclofen (LIORESAL) tablet 10 mg  10 mg Oral TID    bisacodyl (DULCOLAX) suppository 10 mg  10 mg Rectal DAILY PRN    guaiFENesin (ROBITUSSIN) 100 mg/5 mL oral liquid 200 mg  200 mg Oral Q6H PRN    multivitamin, tx-iron-ca-min (THERA-M w/ IRON) tablet 1 Tab  1 Tab Oral DAILY    heparin (porcine) injection 5,000 Units  5,000 Units SubCUTAneous Q8H    glucose chewable tablet 16 g  4 Tab Oral PRN    glucagon (GLUCAGEN) injection 1 mg  1 mg IntraMUSCular PRN    dextrose (D50W) injection syrg 12.5-25 g  25-50 mL IntraVENous PRN          Objective:     Visit Vitals    /79    Pulse (P) 88    Temp 99 °F (37.2 °C)    Resp (P) 18    Ht 5' 10.08\" (1.78 m)    Wt 89 kg (196 lb 3.4 oz)    SpO2 (P) 98%    BMI 28.09 kg/m2       01/14 1901 - 01/16 0700  In: 4593.5 [I.V.:452.5]  Out: 1174 [Urine:3465]  On Fio2 35% PEEP 6 AC 16/ minute, 450 cc/ cycle  General appearance: semi-alert, cooperative, no distress, appears older than stated age, moderately obese, appears to understand and follow commands. Abdomen:obese rounded, soft, non-tender. Bowel sounds normal. No masses,  no organomegaly. Ac catheter good urin outpu.  Incontinent of feces  Extremities: edema 2+  Neurologic: sedated but arousable    Data Review:    Labs: Results:       Chemistry Recent Labs      01/16/17   1111  01/15/17   0535  01/14/17   0450   GLU  204*  257*  260*   NA  142  143  149*   K  4.0  4.5  4.5   CL  109*  111*  116*   CO2  26  25  25   BUN  44*  43*  43*   CREA  0.93  1.04  1.16   CA  7.9*  8.1*  8.4*   AGAP  7  7  8   BUCR  47*  41*  37*   AP   --   90   --    TP   --   5.0*   --    ALB   --   2.2*   --    GLOB   --   2.8   --    AGRAT   --   0.8   --       CBC w/Diff Recent Labs      01/16/17   1111  01/15/17   0535 WBC  14.5*  13.2   RBC  3.93*  3.80*   HGB  10.9*  10.9*   HCT  34.8*  34.0*   PLT  179  192   GRANS  90*  92*   LYMPH  3*  4*   EOS  0  0      Coagulation Recent Labs      01/16/17   1111   PTP  13.2   INR  1.0   APTT  22.6*       Liver Enzymes Recent Labs      01/15/17   0535   TP  5.0*   ALB  2.2*   AP  90   SGOT  117*          Assessment:     Principal Problem:    Acute respiratory failure ([address] (8/3/2015)    Active Problems:    Cardiac arrest ([address] (8/1/2015)      Anoxic encephalopathy ([facility] (8/3/2015)      DM (diabetes mellitus) ([facility] (8/1/3816)      Diastolic congestive heart failure, NYHA class 1 ([facility] (11/23/2016)      HTN (hypertension) (11/23/2016)      COPD (chronic obstructive pulmonary disease) with acute bronchitis ([facility] (1/5/2017)      TIFFANIE (acute kidney injury) ([facility] (1/5/2017)      Aspiration pneumonia ([facility] (1/7/2017)      Transaminitis (1/15/2017)      Bandemia without diagnosis of specific infection (1/15/2017)        Plan:     Aspiration pneumonia on the ventillator since Jan 4 to 10 and reintubated on 11 PEG tube inserted in Dec 2015 to April 2016 and then was removed but still has serious aspiration with respiratory arrest on Jan 4th, the decision was finally taken to reinsert the PEG tube. This time the wife agreed to have it inserted. Would try to put it tomorrow while he is still on the Vent. We need to hold the heparin for 12 hours before the procedure. Would give him Ancef for prophylaxis as he has simple itching on PNC. Wife could not confirm this as she has been with him only for the past 17 years. I explained to her the Procedure of EGD, PEG insertion and the risk involved of infection, bleeding or perforation. She agreed to proceed. Aspiration pneumonia presently on Vent but no longer on Antibiotics. Doing well. COPD  CHF on Lasix moderate peripheral edema. Acute on CKD BUN 44, Creat . 94  DM  LFT elevation appears to be related to Right sided CHF      M Parvin Larry, MD  January 16, 20172

## 2017-01-16 NOTE — PROGRESS NOTES
1930 hrs. Report received ,initial assessment completed. Temp 100.1F. Monitoring in SR HR 80-90. Sedated with propofol @ 35 mcg/kg/min. Razz score -2. TF tolerated. MV continues. Lung sounds coarse with diminished bases on Auscultation. DENNYS size 2mm. 0000 hrs VSS temp 99.8 F. Condition unchange. 0400 hrs. Afebrile VSS.    0740 hrs Bedside and Verbal shift change report given to JD Slater (oncoming nurse) by BETTE alvarez (offgoing nurse). Report included the following information SBAR, Kardex, MAR and Accordion. Jay Anderson

## 2017-01-16 NOTE — PROGRESS NOTES
Problem: AMI: Day 1  Goal: Diagnostic Test/Procedures  Outcome: Progressing Towards Goal  2D Echo Ordered. Goal: *Eligible patient receives reperfusion therapy thrombolytics/PCI  Outcome: Progressing Towards Goal  Patient received 600mg dose of plavix. Goal: *Received aspirin and beta-blocker within 24 hours of arrival unless contraindicated  Outcome: Progressing Towards Goal  Patient received 243mg ASA and 5mg IV Metoprolol.

## 2017-01-16 NOTE — PROGRESS NOTES
Hospitalist Progress Note    Patient: Devin Chandler MRN: [id number]  CSN: [id number]    YOB: 1945  Age: 70 y.o. Sex: male    DOA: 1/3/2017 LOS:  LOS: 12 days                Assessment/Plan     Patient Active Problem List   Diagnosis Code    Cardiac arrest ([facility] I46.9    Anoxic encephalopathy ([facility] G93.1    Acute respiratory failure ([facility] J96.00    DM (diabetes mellitus) ([facility] W97.0    Diastolic congestive heart failure, NYHA class 1 ([facility]) I50.30    HTN (hypertension) I10    Hypoxia R09.02    COPD (chronic obstructive pulmonary disease) with acute bronchitis ([facility]) J44.0    TIFFANIE (acute kidney injury) ([facility] N17.9    Aspiration pneumonia ([facility] J69.0    Transaminitis R74.0    Bandemia without diagnosis of specific infection D72.825               71 yo WM with history of prior anoxic brain injury from NH, admitted for COPD exacerbation.      On 01/05/2017, patient had aspiration event and went into respiratory arrest and subsequently cardiac arrest. PEA arrest. CPR initiated and epi given. Patient intubated. He had ROSC. He is transferred to ICU.      Extubated 01/08/2017      Patient had increased oxygen requirement, was started on BiPAP, he was desaturating when tried to wean from BiPAP. Needed intubation 01/11/2017. He is intubated and sedated.          CRITICAL CARE PLAN      Resp - vent management per pulm  COPD - continue solumedrol.      ID - Follow up blood and urine cx. ANTIBIOTICS - levaquin azactam.   Trend temps, wbc, LA in normal range.      CVS - Monitor HD. Stable hemodynamically.       Heme/onc - Follow H&H, plts. INR.     Renal - Trend BUN, Cr, follow I/O, linton in place. Check and replace Mg, K. TIFFANIE - on CKD3, creatine improving  Hypernatremia - free water flushes. Follow sodium levels      Endocrine - Follow FSG      Neuro - sedation, on propofol.       GI - NG tube and tube feeding.  May need PEG placement.      Prophylaxis - DVT: heparin, GI: protonix.            40 minutes of critical care time spent in the direct evaluation and treatment of this high risk patient. The reason for providing this level of medical care for this critically ill patient was due a critical illness that impaired one or more vital organ systems such that there was a high probability of imminent or life threatening deterioration in the patients condition. This care involved high complexity decision making to assess, manipulate, and support vital system functions, to treat this degreee vital organ system failure and to prevent further life threatening deterioration of the patients condition.          Physical Exam:  General: As above.    HEENT: NC, Atraumatic. PERRLA, anicteric sclerae. Lungs: CTA Bilaterally. No Wheezing/Rhonchi/Rales.   Heart: Regular rhythm,  No murmur, No Rubs, No Gallops  Abdomen: Soft, Non distended, Non tender.  +Bowel sounds,   Extremities: trace edema bilaterally.         Vital signs/Intake and Output:  Visit Vitals    /79    Pulse 93    Temp 98.7 °F (37.1 °C)    Resp 18    Ht 5' 10.08\" (1.78 m)    Wt 89 kg (196 lb 3.4 oz)    SpO2 99%    BMI 28.09 kg/m2     Current Shift:  01/16 0701 - 01/16 1900  In: 1416.5 [I.V.:321.5]  Out: 850 [Urine:850]  Last three shifts:  01/14 1901 - 01/16 0700  In: 4593.5 [I.V.:452.5]  Out: 0209 [Urine:3465]            Labs: Results:       Chemistry Recent Labs      01/16/17   1111  01/15/17   0535  01/14/17   0450   GLU  204*  257*  260*   NA  142  143  149*   K  4.0  4.5  4.5   CL  109*  111*  116*   CO2  26  25  25   BUN  44*  43*  43*   CREA  0.93  1.04  1.16   CA  7.9*  8.1*  8.4*   AGAP  7  7  8   BUCR  47*  41*  37*   AP   --   90   --    TP   --   5.0*   --    ALB   --   2.2*   --    GLOB   --   2.8   --    AGRAT   --   0.8   --       CBC w/Diff Recent Labs      01/16/17   1111  01/15/17   0535   WBC  14.5*  13.2   RBC  3.93*  3.80*   HGB  10.9*  10.9*   HCT  34.8*  34.0*   PLT  179  192   GRANS  90*  92* LYMPH  3*  4*   EOS  0  0      Cardiac Enzymes Recent Labs      01/15/17   2215  01/15/17   1630   CPK  56  58   CKND1  3.8  4.0      Coagulation Recent Labs      01/16/17   1111   PTP  13.2   INR  1.0   APTT  22.6*       Lipid Panel No results found for: CHOL, CHOLPOCT, CHOLX, CHLST, CHOLV, [id number], HDL, LDL, NLDLCT, DLDL, LDLC, DLDLP, [id number], VLDLC, VLDL, TGL, TGLX, TRIGL, [id number], TRIGP, TGLPOCT, [id number], CHHD, CHHDX   BNP No results for input(s): BNPP in the last 72 hours.    Liver Enzymes Recent Labs      01/15/17   0535   TP  5.0*   ALB  2.2*   AP  90   SGOT  117*      Thyroid Studies Lab Results   Component Value Date/Time    TSH 1.37 08/13/2015 04:35 AM        Procedures/imaging: see electronic medical records for all procedures/Xrays and details which were not copied into this note but were reviewed prior to creation of Plan

## 2017-01-16 NOTE — PROGRESS NOTES
See flowsheet for full assessment. Pt on ventilator, thick secretions being suctioned. Sedation changed to fentanyl gtt and PRN versed. Family by bedside, updated on POC all questions answered.

## 2017-01-16 NOTE — PROGRESS NOTES
[facility]  Pulmonary, Critical Care, and Sleep Medicine    Name: Sharmin Cabrera MRN: [id number]   : 1945 Hospital: [facility] [place]   Date: 2017        IMPRESSION:   · Aspiration Pneumonia with acute respiratory failure  · Acute hypoxic Respiratory Failure from above, pt reintubated     · Previous anoxic head encephalopathy   · Diastolic CHF at baseline  · Sp PEA arrest  · COPD exacerbation  · TIFFANIE      RECOMMENDATIONS:   · Neuro-    Sedated RASS -2. Will d/c propofol and start fentanyl, versed  · Cards- hemodynamically stable now. Resp arrest led to cardiac arrest 1 mg epi given and 1 minute of CPR with ROSC   · Pulm-  See vent orders, patient may require trach , daily trial of SBT. Will schedule brovana and pulmicort. Decrease the solumedrol to 20mg, convert duoneb to prn  · Renal-  Worsening renal function with electrolyte replacement. IV fluids start enteral feeds  · Heme- H/H and plt stable  · GI,  I believe pt does aspirate chronically, pt will need PEG. Awaiting GI -for peg placement  · ID- levaquin,   aspiration pneumonia food seen at [facility]  · DVT prophylaxis  · GI prophylaxis     Subjective/History:   17  Sedated but more interactive when propofol changed to fentanyl and versed. No overnight issues. On ventilator. Awaiting PEG placement. HPI  This patient has been seen and evaluated at the request of Dr. Jesse Estes for aspiration pneumonia and resp failure. The patient is a 70 y.o. male admitted 3  with COPD exacerbation to the medical floor. Last evening vomited noticed to be hypoxic an dactually lost pulse. 1 Mg epi with ROSC and intubated for cyanosis. Moved to ICU discussed with family and wife should be here this afternoon. In SNF prior to this admission and has diastolic dysfunction at baseline.   Currently stable on vent    The patient is critically ill and can not provide additional history due to intubation and sedation    Current Facility-Administered Medications   Medication Dose Route Frequency    albuterol-ipratropium (DUO-NEB) 2.5 MG-0.5 MG/3 ML  3 mL Nebulization Q4H RT    fentaNYL 10 mcg/mL IV infusion  25-50 mcg/hr IntraVENous TITRATE    methylPREDNISolone (PF) (SOLU-MEDROL) injection 20 mg  20 mg IntraVENous Q12H    insulin lispro (HUMALOG) injection 6 Units  6 Units SubCUTAneous Q6H    arformoterol (BROVANA) neb solution 15 mcg  15 mcg Nebulization BID RT    budesonide (PULMICORT) 500 mcg/2 ml nebulizer suspension  500 mcg Nebulization BID RT    atorvastatin (LIPITOR) tablet 80 mg  80 mg Per NG tube DAILY    insulin glargine (LANTUS) injection 12 Units  12 Units SubCUTAneous DAILY    pantoprazole (PROTONIX) 40 mg in sodium chloride 0.9 % 10 mL injection  40 mg IntraVENous DAILY    lidocaine (LIDODERM) 5 % patch 2 Patch  2 Patch TransDERmal Q24H    levothyroxine (SYNTHROID) injection 87.5 mcg  87.5 mcg IntraVENous Q24H    chlorhexidine (PERIDEX) 0.12 % mouthwash 15 mL  15 mL Oral Q12H    cloNIDine (CATAPRES) 0.1 mg/24 hr patch 1 Patch  1 Patch TransDERmal Q7D    insulin lispro (HUMALOG) injection   SubCUTAneous Q6H    aspirin chewable tablet 81 mg  81 mg Per G Tube DAILY    baclofen (LIORESAL) tablet 10 mg  10 mg Oral TID    multivitamin, tx-iron-ca-min (THERA-M w/ IRON) tablet 1 Tab  1 Tab Oral DAILY    heparin (porcine) injection 5,000 Units  5,000 Units SubCUTAneous Q8H     Allergies   Allergen Reactions    Penicillins Itching      Objective:   Vital Signs:    Visit Vitals    /79    Pulse 93    Temp 99 °F (37.2 °C)    Resp 20    Ht 5' 10.08\" (1.78 m)    Wt 89 kg (196 lb 3.4 oz)    SpO2 97%    BMI 28.09 kg/m2       O2 Device: Endotracheal tube   O2 Flow Rate (L/min): 6 l/min   Temp (24hrs), Av.2 °F (37.3 °C), Min:98.2 °F (36.8 °C), Max:100.1 °F (37.8 °C)       Intake/Output:   Last shift:       07 - 01/16 1900  In: 321.5 [I.V.:321.5]  Out: -   Last 3 shifts: 1901 -  07  In: 4593.5 [I.V.:452.5]  Out: 3465 [Urine:3465]    Intake/Output Summary (Last 24 hours) at 01/16/17 1450  Last data filed at 01/16/17 1200   Gross per 24 hour   Intake           3196.9 ml   Output             1540 ml   Net           1656.9 ml     Physical Exam:    General:   sedated on MV   Head:  Normocephalic, without obvious abnormality,    Eyes:  Conjunctivae/corneas clear. PERRL,   Nose: Nares normal. Septum midline. Mucosa normal. No drainage or sinus tenderness. Throat: ETT    Neck: Supple, symmetrical, trachea midline, no adenopathy, no carotid bruit and no JVD. Lungs:   Clear to auscultation bilaterally. Chest wall:  No tenderness or deformity. Heart:  Regular rate and rhythm, S1, S2 normal, no murmur, click, rub or gallop. Abdomen:   Soft, non-tender. Bowel sounds normal. No masses,  No organomegaly. Extremities: Extremities normal, atraumatic, no cyanosis , trace edema.              Data:     Recent Results (from the past 24 hour(s))   CARDIAC PANEL,(CK, CKMB & TROPONIN)    Collection Time: 01/15/17  4:30 PM   Result Value Ref Range    CK 58 39 - 308 U/L    CK - MB 2.3 0.5 - 3.6 ng/ml    CK-MB Index 4.0 0.0 - 4.0 %    Troponin-I, Qt. 3.93 (HH) 0.00 - 0.06 NG/ML   CULTURE, BLOOD    Collection Time: 01/15/17  4:30 PM   Result Value Ref Range    Special Requests: left hand     Culture result: NO GROWTH AFTER 19 HOURS     GLUCOSE, POC    Collection Time: 01/15/17  5:55 PM   Result Value Ref Range    Glucose (POC) 186 (H) 70 - 110 mg/dL   URINALYSIS W/ RFLX MICROSCOPIC    Collection Time: 01/15/17 10:15 PM   Result Value Ref Range    Color YELLOW      Appearance CLOUDY      Specific gravity 1.012 1.003 - 1.030      pH (UA) 5.5 5.0 - 8.0      Protein NEGATIVE  NEG mg/dL    Glucose NEGATIVE  NEG mg/dL    Ketone NEGATIVE  NEG mg/dL    Bilirubin NEGATIVE  NEG      Blood SMALL (A) NEG      Urobilinogen 0.2 0.2 - 1.0 EU/dL    Nitrites NEGATIVE  NEG      Leukocyte Esterase NEGATIVE  NEG     CARDIAC PANEL,(CK, CKMB & TROPONIN)    Collection Time: 01/15/17 10:15 PM   Result Value Ref Range    CK 56 39 - 308 U/L    CK - MB 2.1 0.5 - 3.6 ng/ml    CK-MB Index 3.8 0.0 - 4.0 %    Troponin-I, Qt. 3.73 (HH) 0.00 - 0.06 NG/ML   URINE MICROSCOPIC ONLY    Collection Time: 01/15/17 10:15 PM   Result Value Ref Range    WBC NEGATIVE  0 - 5 /hpf    RBC NEGATIVE  0 - 5 /hpf    Epithelial cells FEW 0 - 5 /lpf    Bacteria FEW (A) NEG /hpf    Mucus FEW (A) NEG /lpf   GLUCOSE, POC    Collection Time: 01/16/17 12:30 AM   Result Value Ref Range    Glucose (POC) 211 (H) 70 - 110 mg/dL   MAGNESIUM    Collection Time: 01/16/17  5:10 AM   Result Value Ref Range    Magnesium 2.2 1.8 - 2.4 mg/dL   GLUCOSE, POC    Collection Time: 01/16/17  5:57 AM   Result Value Ref Range    Glucose (POC) 183 (H) 70 - 110 mg/dL   CBC WITH AUTOMATED DIFF    Collection Time: 01/16/17 11:11 AM   Result Value Ref Range    WBC 14.5 (H) 4.6 - 13.2 K/uL    RBC 3.93 (L) 4.70 - 5.50 M/uL    HGB 10.9 (L) 13.0 - 16.0 g/dL    HCT 34.8 (L) 36.0 - 48.0 %    MCV 88.5 74.0 - 97.0 FL    MCH 27.7 24.0 - 34.0 PG    MCHC 31.3 31.0 - 37.0 g/dL    RDW 15.9 (H) 11.6 - 14.5 %    PLATELET 144 122 - 552 K/uL    MPV 11.7 9.2 - 11.8 FL    NEUTROPHILS 90 (H) 40 - 73 %    LYMPHOCYTES 3 (L) 21 - 52 %    MONOCYTES 7 3 - 10 %    EOSINOPHILS 0 0 - 5 %    BASOPHILS 0 0 - 2 %    ABS. NEUTROPHILS 13.1 (H) 1.8 - 8.0 K/UL    ABS. LYMPHOCYTES 0.5 (L) 0.9 - 3.6 K/UL    ABS. MONOCYTES 1.0 0.05 - 1.2 K/UL    ABS. EOSINOPHILS 0.0 0.0 - 0.4 K/UL    ABS.  BASOPHILS 0.0 0.0 - 0.06 K/UL    DF AUTOMATED     METABOLIC PANEL, BASIC    Collection Time: 01/16/17 11:11 AM   Result Value Ref Range    Sodium 142 136 - 145 mmol/L    Potassium 4.0 3.5 - 5.5 mmol/L    Chloride 109 (H) 100 - 108 mmol/L    CO2 26 21 - 32 mmol/L    Anion gap 7 3.0 - 18 mmol/L    Glucose 204 (H) 74 - 99 mg/dL    BUN 44 (H) 7.0 - 18 MG/DL    Creatinine 0.93 0.6 - 1.3 MG/DL    BUN/Creatinine ratio 47 (H) 12 - 20      GFR est AA >60 >60 ml/min/1.73m2    GFR est non-AA >60 >60 ml/min/1.73m2    Calcium 7.9 (L) 8.5 - 10.1 MG/DL   PROTHROMBIN TIME + INR    Collection Time: 01/16/17 11:11 AM   Result Value Ref Range    Prothrombin time 13.2 11.5 - 15.2 sec    INR 1.0 0.8 - 1.2     PTT    Collection Time: 01/16/17 11:11 AM   Result Value Ref Range    aPTT 22.6 (L) 23.0 - 36.4 SEC   GLUCOSE, POC    Collection Time: 01/16/17 11:30 AM   Result Value Ref Range    Glucose (POC) 178 (H) 70 - 110 mg/dL           Recent Labs      01/15/17   0452  01/14/17   0550  01/14/17   0534   FIO2I  0.35  40  40   HCO3I  22.1  21.5*  22.4   PCO2I  34.9*  36.3  39.3   PHI  7.409  7. 381  7.364   PO2I  78*  91  45*     Telemetry:normal sinus rhythm    Imaging:  I have personally reviewed the patients radiographs and have reviewed the reports:  CXR Results  (Last 48 hours)               01/16/17 0546  XR CHEST PORT Final result    Impression:  IMPRESSION:       1. Stable lines and tubes as above. 2. Continued progressive left basilar opacity and left upper lung interstitial   edema when compared to the prior examination       3. Otherwise, no significant change in findings of   hypoinflation/edema/parenchymal opacity and bilateral effusions. 4. Nonspecific unchanged bilateral upper abdominal colonic gaseous distention,   potentially ileus. Narrative:  EXAM:Chest X-Ray         History: Intubated       Technique:  Portable Frontal View       Comparison: 01/15/2017, 01/14/2017, 01/13/2017       _______________       FINDINGS:   -Endotracheal tube tip is 3.2 cm above the lilli.   -Unchanged position of the orogastric tube with the tip projecting inferiorly   and collimated of the inferior margin of the film. Multiple EKG leads and lines   overlie the chest.            Pulmonary hypoinflation, slightly increased compared to the prior study.    Persistent cardiomegaly with diffuse hazy and indistinct interstitial markings   in both lungs suggesting slight progression in the left upper lung. Mild   increased confluence in the lateral left lower lung compared to the prior   examination. No interval change of the confluent/consolidative opacity in the medial right   hemithorax to the right diaphragmatic surface. No convincing foraminal change of   suspected bilateral pleural effusions. No plain film evidence for pneumothorax. Stable osseous structures. Upper abdomen: Colonic gaseous distention in the left greater the right upper   abdomen.   _______________           01/15/17 0537  XR CHEST PORT Final result    Impression:  IMPRESSION:       1. Support lines and tubes as above. 2. Interval worsening left lower lung opacity that may represent pleural   effusion/edema/atelectasis. 3. Interval decreased right hemithoracic opacity with new appearing right   infrahilar lower lung small area of consolidation. Persistent right greater than   left effusions. Narrative:  EXAM:Chest X-Ray         History: Acute respiratory failure, intubated       Technique:  Portable Frontal View       Comparison: 01/14/2017, 01/13/2017       _______________       FINDINGS:   -Endotracheal tube tip is 3.7 cm above the lilli.   -Orogastric tube tip projects to the right upper quadrant, the tip is collimated   off the inferior margin of the film. Persistent enlarged appearance of the cardiac silhouette which is possibly   accentuated by persisting low lung volumes. Increased hazy confluent left hilar   and lateral lower lung opacity with obscured diaphragmatic margin. Mild   decreased hazy confluent right hemithoracic opacity with persistent perihilar   and lateral lower lung confluent opacity with obscured diaphragmatic margin and   blunted right costophrenic angle. Right lower lung small consolidative opacity, not convincingly present on the   prior examination. No pneumothorax. Intact osseous structures.    Persistent prominent upper abdominal bowel gas.   _______________                Best practice :  Core measures; Antibiotic choice:  Severe Sepsis bundles;SIRS screen met? Yes  Fluids  Lactic acid ordered- initial and repeat Q6hrs if elevated till normalized. Cultures drawn. Antibiotic administered within 1hr-ICU  And 3hrs ED  Large bore IV- 2 sites          Pressors aim MAP >65mmHg  Steriods  Glycemic control  Mech. Ventilated patients- aim to keep peak plateau pressure 58-23SP H2O.   Sress ulcer prophylaxis  DVT prophylaxis        Total critical care time exclusive of procedures: 35 minutes  Yudith Haney MD

## 2017-01-16 NOTE — INTERDISCIPLINARY ROUNDS
CRITICAL CARE INTERDISCIPLINARY ROUNDS    Patient Information:    Name:   Jodi Randall    Age:   70 y.o. Admission Date:   1/3/2017    Critical Care Day: 10 (code blue 1-6)    Surgery Date:      Attending Provider:   Bobbi Judge DO    Surgeon: n/a     Consultant(s):   dedra Alejandro    Critical Care Physician:  Theresa Beltran    Code Status: Full Code    Problem List:     Patient Active Problem List   Diagnosis Code    Cardiac arrest ([facility] I46.9    Anoxic encephalopathy ([address] G93.1    Acute respiratory failure ([address] J96.00    DM (diabetes mellitus) ([address] B63.5    Diastolic congestive heart failure, NYHA class 1 ([address] I50.30    HTN (hypertension) I10    Hypoxia R09.02    COPD (chronic obstructive pulmonary disease) with acute bronchitis ([facility] J44.0    TIFFANIE (acute kidney injury) ([facility] N17.9    Aspiration pneumonia ([facility] J69.0    Transaminitis R74.0    Bandemia without diagnosis of specific infection D72.825       Principal Problem:  Acute respiratory failure ([address]    During rounds the following quality care indicators and evidence based practices were addressed :  DVT Prophylaxis and PUD Prophylaxis Ac Day 11 ([facility] yes) ; Central Line Day: na Isolation: na; Antibiotic Stewardship: reviewed; Probiotics Necessary: na      Sepsis Order Set:    Glycemic Control:   Recent Labs      01/15/17   0535  01/14/17   0450   GLU  257*  260*   ; Recent Labs      01/15/17   0452  01/14/17   0550  01/14/17   0534   PHI  7.409  7. 381  7.364   PCO2I  34.9*  36.3  39.3   PO2I  78*  91  45*    Adjustments     Acute MI/PCI:   Not applicable    Door to Balloon: Admission Time: 0905      Heart Failure:    Not applicable     SCIP Measures for other Surgeries:   Not applicable     Pneumonia:    Not applicable    Interdisciplinary team rounds were held with the following team members Che Pradhan Management, Nursing, Nutrition, Pharmacy, Physician and Respiratory Therapy. Plan of care discussed.       Goals of Care/ Recommendations:  Palliative care consult. Possible need for PEG - lidocaine patch to knees. FMS placed 1/16. Change sedation from Diprivan to Fentanyl. Spiked fever this morning and pan cultured. Daily SBT. Next 24 hr goal to extubated. Scheduled jorge nebs. If possible get PEG before extubated. Hx of aspiration HOB closer to 45 degree. Steroids decreased today and stop tomorrow. Schedule does insulin changed. Restraints renewed      See clinical pathway and/or care plan for interventions and desired outcomes.     Critical Care Discharge Status:  Red

## 2017-01-16 NOTE — PROGRESS NOTES
1027--Dr. Holly Hollis informed me he noticed some ST Elevation on patient's bedside monitored and ordered cardiac enzymes and 12 lead EKG. I have not noticed nor was reported any new EKG changes for this patient. I am currently in contact isolation and will obtain this once I exit. Patient is otherwise stable. 1056--12 Lead EKG completed. Showed \"lateral infarct; age undetermined\". Dr. Holly Hollis to be contacted. 1135--Spoke c Dr. Holly Hlolis, cardio consult ordered. 1148--CRITICAL RESULT: Troponin 4.21.     1200--Spoke c Dr. Holly Hollis and gave troponin results. No new orders given. 1206--Spoke c Dr. Marito Perez to confirm consults and give troponin results. No new orders given. States he will be by to see patient today. 1647--Spoke c wife about patients abdominal distension and lack of BM x last 5 days. I told her I will administer the PRN suppository on my next rounds. 1815--Patient had loose BM this evening without intervention of suppository. 1915--Bedside and Verbal shift change report given to Murrel Dancer RN (oncoming nurse) by Tex Ba RN (offgoing nurse). Report included the following information SBAR, Kardex, Intake/Output, MAR, Recent Results, Med Rec Status and Cardiac Rhythm SR.     **SHIFT SUMMARY**   New MI Found. Elevated Troponin x2 and are trending down. Also CRP, Blood Cx, and Urine Cx were sent. Patient otherwise stable. Turned and repositioned. VSS. Wife was updated on plan of care and was at bedside this afternoon.

## 2017-01-16 NOTE — CONSULTS
[address] [place] [address] Rd    Name:  Chava Chaney  MR#:  [id number]  :  1945  Account #:  [de-identified]  Date of Adm:  2017  Date of Consultation:  01/15/2017      CONCLUSIONS  1. Aspiration pneumonia. 2. Acute hypoxic respiratory failure. The patient reintubated on  2017. 3. Previous anoxic encephalopathy. 4. Diagnosis of diastolic heart failure, at baseline. 5. Post cardiac arrest.  6. Chronic obstructive pulmonary disease exacerbation. 7. Acute kidney injury. 8. ST-segment changes noted on the monitor with no evidence of ST-  elevation myocardial infarction on EKG. 9. Elevated troponin. RECOMMENDATIONS  1. Continue to follow cardiac markers. Echocardiogram. I do not think  this represents a STEMI and certainly not enough indication to take  him to the cath lab acutely. 2. Shall follow and make further recommendations as appropriate. DISCUSSION: The patient's records were reviewed. The patient was  examined. The patient is a 80-year-old man who has been in the  hospital for quite some time. He actually was admitted on 2017  for acute exacerbation of COPD, bronchitis, CAD with cardiac arrest in  the past, anoxic brain injury, bed and chair bound state, diabetes, and  hypertension. He again subsequently has been in the hospital and then  treated and was reintubated as noted above. He was noted to have ST  changes on his monitor and EKG was obtained which showed no  evidence of STEMI. His troponin was elevated at 4 and markers are  negative serial markers are pending. HISTORY OF PRESENT ILLNESS: The patient is status post cardiac  arrest and subsequently had a brain injury. He has diabetes,  asbestosis, debilitated bed, chair existence. He initially presented to  the ER with shortness of breath and coughing for 2-3 days. He was  admitted, treated with antibiotics, which subsequently had been just  discontinued.  He had another episode of what was felt to be aspiration  and was transferred to the ICU and intubated and apparently he had  food in and around his cords. He subsequently has been  hemodynamically stable. His rhythm has been basically sinus. PAST MEDICAL HISTORY: Positive for anoxic brain damage, bursitis,  coronary artery disease, cardiac arrest, cognitive dysfunction,  contracture, depression, diabetes, diarrhea, dysphagia, GERD, high  cholesterol, hypertension, hypothyroidism, polyarthritis, sleep apnea,  sleep disorder, and weakness. PAST SURGICAL HISTORY: Includes prior PEG, amputation, and  hernia repair. SOCIAL HISTORY: The patient was never a smoker. No alcohol use. FAMILY HISTORY: Noncontributory. MEDICATIONS PRIOR TO ADMISSION  1. Synthroid. 2. Catapres. 3. Lasix. 4. Nitro-Dur.  5. Baclofen. 6. Protonix. 7.    8. Guaifenesin. 9. Normodyne. 10. Celexa. 11. Percocet. 12. Aspirin. 13. Nitro-Bid. 14. Milk of magnesia. 15. Dulcolax. 16. Multivitamins. 17. Glucosamine. 18. Prednisone. 19. Iron. 20. Tylenol. 21. Voltaren gel. 22. Lidoderm. 23. DuoNebs. DRUG ALLERGIES: PENICILLIN. REVIEW OF SYSTEMS  System review is unobtainable at this time. PHYSICAL EXAMINATION  GENERAL: Reveals a chronically ill-appearing man who is intubated  on the ventilator. He is noted to be edematous. HEENT: Pupils are equal and reactive to light. The patient is not  responsive. No obvious trauma. Nose: No significant drainage is  noted. NECK: Supple. Trachea is in midline. No masses are appreciated. CHEST: Lungs are clear anteriorly. CARDIAC: Revealed normal S1, S2. No murmurs or rubs appreciated. ABDOMEN: Soft. No masses appreciated. EXTREMITIES: There is edema bilaterally. Peripheral pulses are  present. SKIN: Edematous. NEUROLOGIC: The patient is unresponsive and sedated on the vent. No obvious asymmetries in cranial nerve distributions.  Motor and  sensation are not tested because of the patient's mental status. Electrocardiogram done today shows sinus rhythm with premature  atrial beats with repolarization abnormality, but nothing to suggest  STEMI. ASSESSMENT AND PLAN: At this juncture, will get an  echocardiogram. Serial markers will be ordered if they have not been  ordered already. Shall follow and make further recommendations as  appropriate. Thank you for asking me to see him. Thank you for asking me to see him.         Wilhelmina Halsted, MD Micki Dyer / Gage Garcia  D:  01/15/2017   15:37  T:  01/15/2017   21:55  Job #:  [id number]

## 2017-01-16 NOTE — WOUND CARE
Pt turned and assessed by wound care. No skin issues noted at this time. Pt noted to have poor rectal tone. Pt turned and repositioned. Wound care will continue to follow pt daily while in ICU.

## 2017-01-16 NOTE — PROGRESS NOTES
Chart reviewed. Pt currently intubated in ICU, plan for pt to return to [place] [id number] upon discharge. Pt may need PEG tube for feedings, this will not decrease aspiration risk per notes, family wishes to move forward. Pt may benefit from Palliative Medicine consult to assist with goals of care. Care Management Interventions  PCP Verified by CM:  Yes  Transition of Care Consult (CM Consult): SNF, Long Term Care, Discharge Planning  Speech Therapy Consult: Yes  Current Support Network: Joceline discussed with Pt/Family/Caregiver: Yes  Freedom of Choice Offered: Yes  Discharge Location  Discharge Placement: Skilled nursing facility

## 2017-01-17 ENCOUNTER — APPOINTMENT (OUTPATIENT)
Dept: GENERAL RADIOLOGY | Age: 72
DRG: 207 | End: 2017-01-17
Attending: INTERNAL MEDICINE
Payer: MEDICARE

## 2017-01-17 LAB
ARTERIAL PATENCY WRIST A: YES
BASE DEFICIT BLD-SCNC: 1 MMOL/L
BDY SITE: NORMAL
GAS FLOW.O2 O2 DELIVERY SYS: NORMAL L/MIN
GAS FLOW.O2 SETTING OXYMISER: 16 BPM
GLUCOSE BLD STRIP.AUTO-MCNC: 106 MG/DL (ref 70–110)
GLUCOSE BLD STRIP.AUTO-MCNC: 115 MG/DL (ref 70–110)
GLUCOSE BLD STRIP.AUTO-MCNC: 132 MG/DL (ref 70–110)
GLUCOSE BLD STRIP.AUTO-MCNC: 156 MG/DL (ref 70–110)
GLUCOSE BLD STRIP.AUTO-MCNC: 175 MG/DL (ref 70–110)
HCO3 BLD-SCNC: 24.2 MMOL/L (ref 22–26)
INSPIRATION.DURATION SETTING TIME VENT: 0.9 SEC
MAGNESIUM SERPL-MCNC: 2 MG/DL (ref 1.8–2.4)
O2/TOTAL GAS SETTING VFR VENT: 35 %
PCO2 BLD: 40.6 MMHG (ref 35–45)
PEEP RESPIRATORY: 6 CMH2O
PH BLD: 7.38 [PH] (ref 7.35–7.45)
PIP ISTAT,IPIP: 23
PO2 BLD: 81 MMHG (ref 80–100)
PROCALCITONIN SERPL-MCNC: 0.14 NG/ML (ref 0–0.5)
SAO2 % BLD: 96 % (ref 92–97)
SERVICE CMNT-IMP: NORMAL
SPECIMEN TYPE: NORMAL
TOTAL RESP. RATE, ITRR: 25
VENTILATION MODE VENT: NORMAL
VT SETTING VENT: 450 ML

## 2017-01-17 PROCEDURE — 74011636637 HC RX REV CODE- 636/637: Performed by: INTERNAL MEDICINE

## 2017-01-17 PROCEDURE — 94003 VENT MGMT INPAT SUBQ DAY: CPT

## 2017-01-17 PROCEDURE — 74011250637 HC RX REV CODE- 250/637: Performed by: INTERNAL MEDICINE

## 2017-01-17 PROCEDURE — 74011250636 HC RX REV CODE- 250/636: Performed by: INTERNAL MEDICINE

## 2017-01-17 PROCEDURE — 76040000008: Performed by: INTERNAL MEDICINE

## 2017-01-17 PROCEDURE — 71010 XR CHEST PORT: CPT

## 2017-01-17 PROCEDURE — 0DH63UZ INSERTION OF FEEDING DEVICE INTO STOMACH, PERCUTANEOUS APPROACH: ICD-10-PCS | Performed by: INTERNAL MEDICINE

## 2017-01-17 PROCEDURE — 94640 AIRWAY INHALATION TREATMENT: CPT

## 2017-01-17 PROCEDURE — 36600 WITHDRAWAL OF ARTERIAL BLOOD: CPT

## 2017-01-17 PROCEDURE — 36415 COLL VENOUS BLD VENIPUNCTURE: CPT | Performed by: INTERNAL MEDICINE

## 2017-01-17 PROCEDURE — 74011000250 HC RX REV CODE- 250: Performed by: INTERNAL MEDICINE

## 2017-01-17 PROCEDURE — 3E0H76Z INTRODUCTION OF NUTRITIONAL SUBSTANCE INTO LOWER GI, VIA NATURAL OR ARTIFICIAL OPENING: ICD-10-PCS | Performed by: INTERNAL MEDICINE

## 2017-01-17 PROCEDURE — 77010033678 HC OXYGEN DAILY

## 2017-01-17 PROCEDURE — 74011250637 HC RX REV CODE- 250/637: Performed by: FAMILY MEDICINE

## 2017-01-17 PROCEDURE — 65610000006 HC RM INTENSIVE CARE

## 2017-01-17 PROCEDURE — 83735 ASSAY OF MAGNESIUM: CPT | Performed by: INTERNAL MEDICINE

## 2017-01-17 PROCEDURE — 82962 GLUCOSE BLOOD TEST: CPT

## 2017-01-17 PROCEDURE — C9113 INJ PANTOPRAZOLE SODIUM, VIA: HCPCS | Performed by: INTERNAL MEDICINE

## 2017-01-17 PROCEDURE — 77030005122 HC CATH GASTMY PEG BSC -B: Performed by: INTERNAL MEDICINE

## 2017-01-17 PROCEDURE — 82803 BLOOD GASES ANY COMBINATION: CPT

## 2017-01-17 RX ORDER — FLUMAZENIL 0.1 MG/ML
0.2 INJECTION INTRAVENOUS
Status: ACTIVE | OUTPATIENT
Start: 2017-01-17 | End: 2017-01-17

## 2017-01-17 RX ORDER — INSULIN LISPRO 100 [IU]/ML
3 INJECTION, SOLUTION INTRAVENOUS; SUBCUTANEOUS EVERY 6 HOURS
Status: DISCONTINUED | OUTPATIENT
Start: 2017-01-17 | End: 2017-01-20

## 2017-01-17 RX ORDER — CEFAZOLIN SODIUM 2 G/50ML
SOLUTION INTRAVENOUS
Status: DISPENSED
Start: 2017-01-17 | End: 2017-01-18

## 2017-01-17 RX ORDER — MIDAZOLAM HYDROCHLORIDE 1 MG/ML
.5-5 INJECTION, SOLUTION INTRAMUSCULAR; INTRAVENOUS
Status: ACTIVE | OUTPATIENT
Start: 2017-01-17 | End: 2017-01-17

## 2017-01-17 RX ORDER — NALOXONE HYDROCHLORIDE 0.4 MG/ML
0.4 INJECTION, SOLUTION INTRAMUSCULAR; INTRAVENOUS; SUBCUTANEOUS
Status: ACTIVE | OUTPATIENT
Start: 2017-01-17 | End: 2017-01-17

## 2017-01-17 RX ORDER — FENTANYL CITRATE 50 UG/ML
100 INJECTION, SOLUTION INTRAMUSCULAR; INTRAVENOUS
Status: ACTIVE | OUTPATIENT
Start: 2017-01-17 | End: 2017-01-17

## 2017-01-17 RX ORDER — SODIUM CHLORIDE 9 MG/ML
100 INJECTION, SOLUTION INTRAVENOUS CONTINUOUS
Status: DISPENSED | OUTPATIENT
Start: 2017-01-17 | End: 2017-01-17

## 2017-01-17 RX ORDER — CEFAZOLIN SODIUM 2 G/50ML
2 SOLUTION INTRAVENOUS ONCE
Status: COMPLETED | OUTPATIENT
Start: 2017-01-17 | End: 2017-01-17

## 2017-01-17 RX ADMIN — INSULIN GLARGINE 12 UNITS: 100 INJECTION, SOLUTION SUBCUTANEOUS at 13:02

## 2017-01-17 RX ADMIN — BUDESONIDE 500 MCG: 0.5 INHALANT RESPIRATORY (INHALATION) at 08:38

## 2017-01-17 RX ADMIN — MULTIPLE VITAMINS W/ MINERALS TAB 1 TABLET: TAB at 09:28

## 2017-01-17 RX ADMIN — IPRATROPIUM BROMIDE AND ALBUTEROL SULFATE 3 ML: .5; 3 SOLUTION RESPIRATORY (INHALATION) at 04:17

## 2017-01-17 RX ADMIN — IPRATROPIUM BROMIDE AND ALBUTEROL SULFATE 3 ML: .5; 3 SOLUTION RESPIRATORY (INHALATION) at 16:29

## 2017-01-17 RX ADMIN — INSULIN LISPRO 2 UNITS: 100 INJECTION, SOLUTION INTRAVENOUS; SUBCUTANEOUS at 00:29

## 2017-01-17 RX ADMIN — FENTANYL CITRATE 25 MCG: 50 INJECTION, SOLUTION INTRAMUSCULAR; INTRAVENOUS at 22:28

## 2017-01-17 RX ADMIN — IPRATROPIUM BROMIDE AND ALBUTEROL SULFATE 3 ML: .5; 3 SOLUTION RESPIRATORY (INHALATION) at 08:38

## 2017-01-17 RX ADMIN — IPRATROPIUM BROMIDE AND ALBUTEROL SULFATE 3 ML: .5; 3 SOLUTION RESPIRATORY (INHALATION) at 19:57

## 2017-01-17 RX ADMIN — IPRATROPIUM BROMIDE AND ALBUTEROL SULFATE 3 ML: .5; 3 SOLUTION RESPIRATORY (INHALATION) at 23:08

## 2017-01-17 RX ADMIN — METHYLPREDNISOLONE SODIUM SUCCINATE 20 MG: 40 INJECTION, POWDER, FOR SOLUTION INTRAMUSCULAR; INTRAVENOUS at 09:27

## 2017-01-17 RX ADMIN — CHLORHEXIDINE GLUCONATE 15 ML: 1.2 RINSE ORAL at 09:29

## 2017-01-17 RX ADMIN — ARFORMOTEROL TARTRATE 15 MCG: 15 SOLUTION RESPIRATORY (INHALATION) at 08:38

## 2017-01-17 RX ADMIN — BACLOFEN 10 MG: 10 TABLET ORAL at 17:19

## 2017-01-17 RX ADMIN — BUDESONIDE 500 MCG: 0.5 INHALANT RESPIRATORY (INHALATION) at 19:57

## 2017-01-17 RX ADMIN — IPRATROPIUM BROMIDE AND ALBUTEROL SULFATE 3 ML: .5; 3 SOLUTION RESPIRATORY (INHALATION) at 12:33

## 2017-01-17 RX ADMIN — INSULIN LISPRO 3 UNITS: 100 INJECTION, SOLUTION INTRAVENOUS; SUBCUTANEOUS at 23:23

## 2017-01-17 RX ADMIN — INSULIN LISPRO 6 UNITS: 100 INJECTION, SOLUTION INTRAVENOUS; SUBCUTANEOUS at 00:28

## 2017-01-17 RX ADMIN — BACLOFEN 10 MG: 10 TABLET ORAL at 09:29

## 2017-01-17 RX ADMIN — INSULIN LISPRO 2 UNITS: 100 INJECTION, SOLUTION INTRAVENOUS; SUBCUTANEOUS at 06:06

## 2017-01-17 RX ADMIN — BACLOFEN 10 MG: 10 TABLET ORAL at 22:28

## 2017-01-17 RX ADMIN — HEPARIN SODIUM 5000 UNITS: 5000 INJECTION, SOLUTION INTRAVENOUS; SUBCUTANEOUS at 17:18

## 2017-01-17 RX ADMIN — CHLORHEXIDINE GLUCONATE 15 ML: 1.2 RINSE ORAL at 21:00

## 2017-01-17 RX ADMIN — HEPARIN SODIUM 5000 UNITS: 5000 INJECTION, SOLUTION INTRAVENOUS; SUBCUTANEOUS at 01:28

## 2017-01-17 RX ADMIN — ATORVASTATIN CALCIUM 80 MG: 20 TABLET, FILM COATED ORAL at 09:28

## 2017-01-17 RX ADMIN — LEVOTHYROXINE SODIUM ANHYDROUS 87.5 MCG: 100 INJECTION, POWDER, LYOPHILIZED, FOR SOLUTION INTRAVENOUS at 11:55

## 2017-01-17 RX ADMIN — INSULIN LISPRO 6 UNITS: 100 INJECTION, SOLUTION INTRAVENOUS; SUBCUTANEOUS at 06:05

## 2017-01-17 RX ADMIN — ARFORMOTEROL TARTRATE 15 MCG: 15 SOLUTION RESPIRATORY (INHALATION) at 19:57

## 2017-01-17 RX ADMIN — IPRATROPIUM BROMIDE AND ALBUTEROL SULFATE 3 ML: .5; 3 SOLUTION RESPIRATORY (INHALATION) at 00:07

## 2017-01-17 RX ADMIN — SODIUM CHLORIDE 40 MG: 9 INJECTION INTRAMUSCULAR; INTRAVENOUS; SUBCUTANEOUS at 09:26

## 2017-01-17 RX ADMIN — HEPARIN SODIUM 5000 UNITS: 5000 INJECTION, SOLUTION INTRAVENOUS; SUBCUTANEOUS at 23:23

## 2017-01-17 NOTE — PROGRESS NOTES
Gastrointestinal Progress Note    Patient Name: Vidhi Orourke    ZGWJU'B Date: 1/17/2017    Admit Date: 1/3/2017    Subjective:   aspiration pneumonia on the ventillator since Jan 4 to 10 and reintubated on 11 on the vent and on sedation Fentanyl IV drip Versed PRN  Diet: Patient is on tube feeding (Promote) at 70 cc/ h through orogastric tube inserted on Jan 11 and is tolerating on hold since 10 am for the procedure. Nausea is not present. Vomiting is not present. Pain: Patient does not complain of abdominal pain.       Bowel Movements: Diarrhea x yesterday not foul smelling but smear today    Bleeding:  None    Current Facility-Administered Medications   Medication Dose Route Frequency    0.9% sodium chloride infusion  100 mL/hr IntraVENous CONTINUOUS    midazolam (VERSED) injection 0.5-5 mg  0.5-5 mg IntraVENous Multiple    fentaNYL citrate (PF) injection 100 mcg  100 mcg IntraVENous Multiple    naloxone (NARCAN) injection 0.4 mg  0.4 mg IntraVENous Multiple    flumazenil (ROMAZICON) 0.1 mg/mL injection 0.2 mg  0.2 mg IntraVENous Multiple    benzocaine (HURRICANE) 20 % spray   Mucous Membrane ONCE    ceFAZolin (ANCEF) 2g IVPB in 50 mL D5W  2 g IntraVENous ONCE    insulin lispro (HUMALOG) injection 3 Units  3 Units SubCUTAneous Q6H    fentaNYL citrate (PF) injection 25 mcg  25 mcg IntraVENous Q4H PRN    albuterol-ipratropium (DUO-NEB) 2.5 MG-0.5 MG/3 ML  3 mL Nebulization Q4H RT    fentaNYL 10 mcg/mL IV infusion  25-50 mcg/hr IntraVENous TITRATE    midazolam (VERSED) injection 1 mg  1 mg IntraVENous Q3H PRN    arformoterol (BROVANA) neb solution 15 mcg  15 mcg Nebulization BID RT    budesonide (PULMICORT) 500 mcg/2 ml nebulizer suspension  500 mcg Nebulization BID RT    atorvastatin (LIPITOR) tablet 80 mg  80 mg Per NG tube DAILY    insulin glargine (LANTUS) injection 12 Units  12 Units SubCUTAneous DAILY    pantoprazole (PROTONIX) 40 mg in sodium chloride 0.9 % 10 mL injection  40 mg IntraVENous DAILY    lidocaine (LIDODERM) 5 % patch 2 Patch  2 Patch TransDERmal Q24H    hydrALAZINE (APRESOLINE) 20 mg/mL injection 20 mg  20 mg IntraVENous Q6H PRN    levothyroxine (SYNTHROID) injection 87.5 mcg  87.5 mcg IntraVENous Q24H    chlorhexidine (PERIDEX) 0.12 % mouthwash 15 mL  15 mL Oral Q12H    ELECTROLYTE REPLACEMENT PROTOCOL  1 Each Other PRN    cloNIDine (CATAPRES) 0.1 mg/24 hr patch 1 Patch  1 Patch TransDERmal Q7D    insulin lispro (HUMALOG) injection   SubCUTAneous Q6H    sodium chloride (NS) flush 5-10 mL  5-10 mL IntraVENous PRN    acetaminophen (TYLENOL) tablet 650 mg  650 mg Oral Q6H PRN    aspirin chewable tablet 81 mg  81 mg Per G Tube DAILY    baclofen (LIORESAL) tablet 10 mg  10 mg Oral TID    bisacodyl (DULCOLAX) suppository 10 mg  10 mg Rectal DAILY PRN    guaiFENesin (ROBITUSSIN) 100 mg/5 mL oral liquid 200 mg  200 mg Oral Q6H PRN    multivitamin, tx-iron-ca-min (THERA-M w/ IRON) tablet 1 Tab  1 Tab Oral DAILY    heparin (porcine) injection 5,000 Units  5,000 Units SubCUTAneous Q8H    glucose chewable tablet 16 g  4 Tab Oral PRN    glucagon (GLUCAGEN) injection 1 mg  1 mg IntraMUSCular PRN    dextrose (D50W) injection syrg 12.5-25 g  25-50 mL IntraVENous PRN          Objective:     Visit Vitals    /75    Pulse 95    Temp 98.5 °F (36.9 °C)    Resp 18    Ht 5' 10.08\" (1.78 m)    Wt 89 kg (196 lb 3.4 oz)    SpO2 98%    BMI 28.09 kg/m2       01/15 1901 - 01/17 0700  In: 3416.5 [I.V.:321.5]  Out: 2840 [Urine:2840]  On Fio2 35% PEEP 6 AC 16/ minute, 450 cc/ cycle  General appearance: Alert, cooperative, no distress, appears older than stated age, moderately obese, appears to understand and follow commands. Abdomen:obese rounded, tympanic but soft, non-tender. Bowel sounds normal. No masses,  no organomegaly. Ac catheter good urine output.    Extremities: edema 2+ up to the knee and skin ecchymosis on the hands  Neurologic: sedated but arousable    Data Review:    Labs: Results:       Chemistry Recent Labs      01/16/17   1111  01/15/17   0535   GLU  204*  257*   NA  142  143   K  4.0  4.5   CL  109*  111*   CO2  26  25   BUN  44*  43*   CREA  0.93  1.04   CA  7.9*  8.1*   AGAP  7  7   BUCR  47*  41*   AP   --   90   TP   --   5.0*   ALB   --   2.2*   GLOB   --   2.8   AGRAT   --   0.8      CBC w/Diff Recent Labs      01/16/17   1111  01/15/17   0535   WBC  14.5*  13.2   RBC  3.93*  3.80*   HGB  10.9*  10.9*   HCT  34.8*  34.0*   PLT  179  192   GRANS  90*  92*   LYMPH  3*  4*   EOS  0  0      Coagulation Recent Labs      01/16/17   1111   PTP  13.2   INR  1.0   APTT  22.6*       Liver Enzymes Recent Labs      01/15/17   0535   TP  5.0*   ALB  2.2*   AP  90   SGOT  117*          Assessment:     Principal Problem:    Acute respiratory failure ([address] (8/3/2015)    Active Problems:    Cardiac arrest ([address] (8/1/2015)      Anoxic encephalopathy ([address] (8/3/2015)      DM (diabetes mellitus) ([address] (6/3/4825)      Diastolic congestive heart failure, NYHA class 1 ([address] (11/23/2016)      HTN (hypertension) (11/23/2016)      COPD (chronic obstructive pulmonary disease) with acute bronchitis ([address] (1/5/2017)      TIFFANIE (acute kidney injury) ([address] (1/5/2017)      Aspiration pneumonia ([address] (1/7/2017)      Transaminitis (1/15/2017)      Bandemia without diagnosis of specific infection (1/15/2017)        Plan:     Aspiration pneumonia on the ventillator since Jan 4 to 10 and reintubated on 11 PEG tube inserted in Dec 2015 to April 2016 and then was removed but still has serious aspiration with respiratory arrest on Jan 4th, the decision was finally taken to reinsert the PEG tube. This time the wife agreed to have it inserted. Would do it today while he is still on the Vent, he is ready to be extubated after he tolerated the weaning very well. Heparin has been on hold for 12 hours before the procedure. Would give him Ancef for prophylaxis as he has simple itching on PNC.  Wife could not confirm this as she has been with him only for the past 17 years. I explained to the patient the Procedure of EGD, PEG insertion and the risk involved of infection, bleeding or perforation. He agreed to proceed. Aspiration pneumonia presently on Vent but no longer on Antibiotics. Doing well. COPD  CHF on Lasix moderate peripheral edema. Albumin 2.2   Acute on CKD BUN 44, Creat . 93  DM  LFT elevation appears to be related to Right sided CHF      Edi Quezada MD  January 17, [id number]

## 2017-01-17 NOTE — INTERDISCIPLINARY ROUNDS
CRITICAL CARE INTERDISCIPLINARY ROUNDS    Patient Information:    Name:   Dami Farrar    Age:   70 y.o. Admission Date:   1/3/2017    Critical Care Day: 11 (code blue 1-6)    Attending Provider:   Jian Moran DO    Surgeon: n/a     Consultant(s):   dedra Barrientos    Critical Care Physician:  Amaya Lynch    Code Status: Full Code    Problem List:     Patient Active Problem List   Diagnosis Code    Cardiac arrest ([address] I46.9    Anoxic encephalopathy ([address] G93.1    Acute respiratory failure ([address] J96.00    DM (diabetes mellitus) ([address] W91.9    Diastolic congestive heart failure, NYHA class 1 ([address] I50.30    HTN (hypertension) I10    Hypoxia R09.02    COPD (chronic obstructive pulmonary disease) with acute bronchitis ([address] J44.0    TIFFANIE (acute kidney injury) ([facility] N17.9    Aspiration pneumonia ([facility] J69.0    Transaminitis R74.0    Bandemia without diagnosis of specific infection D72.825       Principal Problem:  Acute respiratory failure ([address]    During rounds the following quality care indicators and evidence based practices were addressed :  DVT Prophylaxis and PUD Prophylaxis Ac Day 12 ([facility] yes) ; Central Line Day: na Isolation: na; Antibiotic Stewardship: reviewed; Probiotics Necessary: na    Glycemic Control:   Recent Labs      01/16/17   1111  01/15/17   0535   GLU  204*  257*   ;  Recent Labs      01/17/17   0515  01/15/17   0452   PHI  7.384  7.409   PCO2I  40.6  34.9*   PO2I  81  78*    Adjustments     Acute MI/PCI:   Not applicable    Door to Balloon: Admission Time: 0905      Heart Failure:    Not applicable     SCIP Measures for other Surgeries:   Not applicable     Pneumonia:    Not applicable    Interdisciplinary team rounds were held with the following team members Diabetes, Care Management, Nursing, Nutrition, Pharmacy, Physician and Respiratory Therapy. Plan of care discussed. Goals of Care/ Recommendations:  Palliative care consult. PEG placement today.  Continue Fentanyl. SBT today. Plan to SBT in the am and extubate tomorrow. Hx of aspiration HOB closer to 45 degree. Steroids decreased today and stop tomorrow. Schedule does insulin changed. Restraints renewed      See clinical pathway and/or care plan for interventions and desired outcomes.     Critical Care Discharge Status:  Red

## 2017-01-17 NOTE — PROGRESS NOTES
PLACED ON SBT @ 0840 WITH SETTINGS: CPAP 6/PS 7/35%. TOLERATING WELL. RSBI 49.  TV'S 370'S 'S. RR 20-24.   PATIENT NODS THAT HE IS COMFORTABLE

## 2017-01-17 NOTE — PROGRESS NOTES
PATIENT TOLERATED SBT X 4 HOURS. DR. Sridhar Fierro REQUESTED THAT HE BE  RETURNED TO A/C SETTINGS AS HE IS GOING TO HAVE A PEG TUBE PLACED THIS AFTERNOON.

## 2017-01-17 NOTE — PROGRESS NOTES
[facility]  Pulmonary, Critical Care, and Sleep Medicine    Name: Magdaleno Yao MRN: [id number]   : 1945 Hospital: [facility] [place]   Date: 2017        IMPRESSION:   · Aspiration Pneumonia with acute respiratory failure  · Acute hypoxic Respiratory Failure from above, pt reintubated     · Previous anoxic head encephalopathy   · Diastolic CHF at baseline  · Sp PEA arrest  · COPD exacerbation  · TIFFANIE      RECOMMENDATIONS:   · Neuro-    Sedate to RASS goal -2 on fentanyl, versed  · Cards- hemodynamically stable now. Resp arrest led to cardiac arrest 1 mg epi given and 1 minute of CPR with ROSC   · Pulm-  SBT to extubation after PEG. Will d/c steroids, continue bronchodilators  · Renal- monitor. · Heme- H/H and plt stable  · GI- Pt does aspirate chronically therefore PEG planned. · ID- levaquin,   aspiration pneumonia food seen at cords  · DVT prophylaxis  · GI prophylaxis     Subjective/History:   17  Awake, interactive. Tolerated SBT but not extubated as PEG planned for afternoon  No overnight issues. On ventilator. Awaiting PEG placement. HPI  This patient has been seen and evaluated at the request of Dr. Bre Younger for aspiration pneumonia and resp failure. The patient is a 70 y.o. male admitted 3  with COPD exacerbation to the medical floor. Last evening vomited noticed to be hypoxic an dactually lost pulse. 1 Mg epi with ROSC and intubated for cyanosis. Moved to ICU discussed with family and wife should be here this afternoon. In SNF prior to this admission and has diastolic dysfunction at baseline.   Currently stable on vent    The patient is critically ill and can not provide additional history due to intubation and sedation    Current Facility-Administered Medications   Medication Dose Route Frequency    insulin lispro (HUMALOG) injection 3 Units  3 Units SubCUTAneous Q6H    albuterol-ipratropium (DUO-NEB) 2.5 MG-0.5 MG/3 ML  3 mL Nebulization Q4H RT    fentaNYL 10 mcg/mL IV infusion  25-50 mcg/hr IntraVENous TITRATE    arformoterol (BROVANA) neb solution 15 mcg  15 mcg Nebulization BID RT    budesonide (PULMICORT) 500 mcg/2 ml nebulizer suspension  500 mcg Nebulization BID RT    atorvastatin (LIPITOR) tablet 80 mg  80 mg Per NG tube DAILY    insulin glargine (LANTUS) injection 12 Units  12 Units SubCUTAneous DAILY    pantoprazole (PROTONIX) 40 mg in sodium chloride 0.9 % 10 mL injection  40 mg IntraVENous DAILY    lidocaine (LIDODERM) 5 % patch 2 Patch  2 Patch TransDERmal Q24H    levothyroxine (SYNTHROID) injection 87.5 mcg  87.5 mcg IntraVENous Q24H    chlorhexidine (PERIDEX) 0.12 % mouthwash 15 mL  15 mL Oral Q12H    cloNIDine (CATAPRES) 0.1 mg/24 hr patch 1 Patch  1 Patch TransDERmal Q7D    insulin lispro (HUMALOG) injection   SubCUTAneous Q6H    aspirin chewable tablet 81 mg  81 mg Per G Tube DAILY    baclofen (LIORESAL) tablet 10 mg  10 mg Oral TID    multivitamin, tx-iron-ca-min (THERA-M w/ IRON) tablet 1 Tab  1 Tab Oral DAILY    heparin (porcine) injection 5,000 Units  5,000 Units SubCUTAneous Q8H     Allergies   Allergen Reactions    Penicillins Itching      Objective:   Vital Signs:    Visit Vitals    /76    Pulse 92    Temp 98.5 °F (36.9 °C)    Resp 18    Ht 5' 10.08\" (1.78 m)    Wt 89 kg (196 lb 3.4 oz)    SpO2 100%    BMI 28.09 kg/m2       O2 Device: Endotracheal tube   O2 Flow Rate (L/min): 6 l/min   Temp (24hrs), Av.6 °F (37 °C), Min:98.4 °F (36.9 °C), Max:99 °F (37.2 °C)       Intake/Output:   Last shift:         Last 3 shifts: 01/15 1901 -  0700  In: 3416.5 [I.V.:321.5]  Out: 0381 [Urine:2840]    Intake/Output Summary (Last 24 hours) at 17 1457  Last data filed at 17 0607   Gross per 24 hour   Intake             2095 ml   Output             1600 ml   Net              495 ml     Physical Exam:    General:   awake and interactive   Head:  Normocephalic, without obvious abnormality, Eyes:  Conjunctivae/corneas clear. PERRL,   Nose: Nares normal. Septum midline. Mucosa normal. No drainage or sinus tenderness. Throat: ETT    Neck: Supple, symmetrical, trachea midline, no adenopathy, no carotid bruit and no JVD. Lungs:   Clear to auscultation bilaterally. Chest wall:  No tenderness or deformity. Heart:  Regular rate and rhythm, S1, S2 normal, no murmur, click, rub or gallop. Abdomen:   Soft, non-tender. Bowel sounds normal. No masses,  No organomegaly. Extremities: Extremities normal, atraumatic, no cyanosis , trace edema. Data:     Recent Results (from the past 24 hour(s))   GLUCOSE, POC    Collection Time: 01/16/17  5:13 PM   Result Value Ref Range    Glucose (POC) 137 (H) 70 - 110 mg/dL   GLUCOSE, POC    Collection Time: 01/17/17 12:16 AM   Result Value Ref Range    Glucose (POC) 156 (H) 70 - 110 mg/dL   POC G3    Collection Time: 01/17/17  5:15 AM   Result Value Ref Range    Device: VENT      FIO2 (POC) 35 %    pH (POC) 7.384 7.35 - 7.45      pCO2 (POC) 40.6 35.0 - 45.0 MMHG    pO2 (POC) 81 80 - 100 MMHG    HCO3 (POC) 24.2 22 - 26 MMOL/L    sO2 (POC) 96 92 - 97 %    Base deficit (POC) 1 mmol/L    Mode ASSIST CONTROL      Tidal volume 450 ml    Set Rate 16 bpm    PEEP/CPAP (POC) 6 cmH2O    PIP (POC) 23      Allens test (POC) YES      Inspiratory Time 0.9 sec    Total resp.  rate 25      Site LEFT RADIAL      Specimen type (POC) ARTERIAL      Performed by Doreen Richmond    MAGNESIUM    Collection Time: 01/17/17  5:40 AM   Result Value Ref Range    Magnesium 2.0 1.8 - 2.4 mg/dL   GLUCOSE, POC    Collection Time: 01/17/17  6:05 AM   Result Value Ref Range    Glucose (POC) 175 (H) 70 - 110 mg/dL   GLUCOSE, POC    Collection Time: 01/17/17 11:40 AM   Result Value Ref Range    Glucose (POC) 106 70 - 110 mg/dL           Recent Labs      01/17/17   0515  01/15/17   0452   FIO2I  35  0.35   HCO3I  24.2  22.1   PCO2I  40.6  34.9*   PHI  7.384  7.409   PO2I  81  78* Telemetry:normal sinus rhythm    Imaging:  I have personally reviewed the patients radiographs and have reviewed the reports:  CXR Results  (Last 48 hours)               01/17/17 0508  XR CHEST PORT Final result    Impression:  IMPRESSION:       1. Support lines and tubes appear in adequate position. 2. Cardiomegaly, vascular congestion and likely pulmonary edema with small   bilateral pleural effusions, not appreciably changed. Narrative:  Chest AP portable       INDICATION: Endotracheal tube. COMPARISON: 01/16/2017. FINDINGS: Endotracheal tube tip is 3.2 cm from the lilli. NG tube extends into   the stomach out of field-of-view. Monitoring leads overlie the chest.       Low lung volume with persistent enlarged cardiac silhouette and vascular   congestion. Diffuse haziness more confluent in the lower lung zone slightly   obscuring the diaphragm and blunting of the costophrenic angles. No   pneumothorax.           01/16/17 0546  XR CHEST PORT Final result    Impression:  IMPRESSION:       1. Stable lines and tubes as above. 2. Continued progressive left basilar opacity and left upper lung interstitial   edema when compared to the prior examination       3. Otherwise, no significant change in findings of   hypoinflation/edema/parenchymal opacity and bilateral effusions. 4. Nonspecific unchanged bilateral upper abdominal colonic gaseous distention,   potentially ileus. Narrative:  EXAM:Chest X-Ray         History: Intubated       Technique:  Portable Frontal View       Comparison: 01/15/2017, 01/14/2017, 01/13/2017       _______________       FINDINGS:   -Endotracheal tube tip is 3.2 cm above the lilli.   -Unchanged position of the orogastric tube with the tip projecting inferiorly   and collimated of the inferior margin of the film. Multiple EKG leads and lines   overlie the chest.            Pulmonary hypoinflation, slightly increased compared to the prior study. Persistent cardiomegaly with diffuse hazy and indistinct interstitial markings   in both lungs suggesting slight progression in the left upper lung. Mild   increased confluence in the lateral left lower lung compared to the prior   examination. No interval change of the confluent/consolidative opacity in the medial right   hemithorax to the right diaphragmatic surface. No convincing foraminal change of   suspected bilateral pleural effusions. No plain film evidence for pneumothorax. Stable osseous structures. Upper abdomen: Colonic gaseous distention in the left greater the right upper   abdomen.   _______________                Best practice :  Core measures; Antibiotic choice:  Severe Sepsis bundles;SIRS screen met? Yes  Fluids  Lactic acid ordered- initial and repeat Q6hrs if elevated till normalized. Cultures drawn. Antibiotic administered within 1hr-ICU  And 3hrs ED  Large bore IV- 2 sites          Pressors aim MAP >65mmHg  Steriods  Glycemic control  Mech. Ventilated patients- aim to keep peak plateau pressure 52-07FL H2O.   Sress ulcer prophylaxis  DVT prophylaxis        Total critical care time exclusive of procedures: 35 minutes  Jacqueline Matthews MD

## 2017-01-17 NOTE — PROGRESS NOTES
0710: Received report from Wojciech Anne, [facility]. Pt awake and alert in bed. On vent, no s/s of respiratory distress noted. Pt on Fentanyl 50 mcgs. Denies pain with head nod. Follows commands. Updated on plan of care for the day. 0800: Assessment completed - see flowsheet. Pt repositioned in bed. Oral care provided. 7927: SBT performed by respiratory. Pt tolerated well.    1000: Tube feed on hold for PEG placement. Oral care provided. Scant amount of tan secretions suctioned from ETT. 1215: Pt reassessed. Wife given update via telephone. 1333: Visitor at bedside. 1445: Endo and Dr. Rose Al at bedside to begin PEG tube placement. 1615: PEG tube placement completed. Pt tolerated well. Wife at bedside.      1645: Report to Julian Flor

## 2017-01-17 NOTE — PROGRESS NOTES
Hospitalist Progress Note    Patient: Federico Velazquez MRN: [id number]  CSN: [id number]    YOB: 1945  Age: 70 y.o. Sex: male    DOA: 1/3/2017 LOS:  LOS: 13 days                Assessment/Plan     Patient Active Problem List   Diagnosis Code    Cardiac arrest ([facility] I46.9    Anoxic encephalopathy ([facility] G93.1    Acute respiratory failure ([facility] J96.00    DM (diabetes mellitus) ([facility] E93.0    Diastolic congestive heart failure, NYHA class 1 ([facility]) I50.30    HTN (hypertension) I10    Hypoxia R09.02    COPD (chronic obstructive pulmonary disease) with acute bronchitis ([facility]) J44.0    TIFFANIE (acute kidney injury) ([facility] N17.9    Aspiration pneumonia ([facility] J69.0    Transaminitis R74.0    Bandemia without diagnosis of specific infection D72.825            71 yo WM with history of prior anoxic brain injury from NH, admitted for COPD exacerbation.      On 01/05/2017, patient had aspiration event and went into respiratory arrest and subsequently cardiac arrest. PEA arrest. CPR initiated and epi given. Patient intubated. He had ROSC. He is transferred to ICU.      Extubated 01/08/2017      Patient had increased oxygen requirement, was started on BiPAP, he was desaturating when tried to wean from BiPAP. Needed intubation 01/11/2017. He is intubated and sedated.          CRITICAL CARE PLAN      Resp - vent management per pulm  COPD - continue solumedrol.      ID - aspiration pneumonia, blood and urine cx no growth to date. resp cultures with normal resp juan. ANTIBIOTICS - levaquin azactam.   Trend temps, wbc, LA in normal range.      CVS - Monitor HD. Stable hemodynamically.       Heme/onc - Follow H&H, plts. INR.     Renal - Trend BUN, Cr, follow I/O, linton in place. Check and replace Mg, K. TIFFANIE - on CKD3, creatine improving  Hypernatremia - free water flushes. Follow sodium levels      Endocrine - Follow FSG      Neuro - sedation, on propofol.       GI - NG tube and tube feeding.  Given chronic aspiration he is going For PEG placement.      Prophylaxis - DVT: heparin, GI: protonix.             40 minutes of critical care time spent in the direct evaluation and treatment of this high risk patient. The reason for providing this level of medical care for this critically ill patient was due a critical illness that impaired one or more vital organ systems such that there was a high probability of imminent or life threatening deterioration in the patients condition. This care involved high complexity decision making to assess, manipulate, and support vital system functions, to treat this degreee vital organ system failure and to prevent further life threatening deterioration of the patients condition.          Physical Exam:  General: As above.    HEENT: NC, Atraumatic. PERRLA, anicteric sclerae. Lungs: CTA Bilaterally. No Wheezing/Rhonchi/Rales.   Heart: Regular rhythm,  No murmur, No Rubs, No Gallops  Abdomen: Soft, Non distended, Non tender.  +Bowel sounds,   Extremities: trace edema bilaterally.         Vital signs/Intake and Output:  Visit Vitals    /75    Pulse 95    Temp 98.5 °F (36.9 °C)    Resp 18    Ht 5' 10.08\" (1.78 m)    Wt 89 kg (196 lb 3.4 oz)    SpO2 98%    BMI 28.09 kg/m2     Current Shift:     Last three shifts:  01/15 1901 - 01/17 0700  In: 3416.5 [I.V.:321.5]  Out: 1470 [Urine:2840]            Labs: Results:       Chemistry Recent Labs      01/16/17   1111  01/15/17   0535   GLU  204*  257*   NA  142  143   K  4.0  4.5   CL  109*  111*   CO2  26  25   BUN  44*  43*   CREA  0.93  1.04   CA  7.9*  8.1*   AGAP  7  7   BUCR  47*  41*   AP   --   90   TP   --   5.0*   ALB   --   2.2*   GLOB   --   2.8   AGRAT   --   0.8      CBC w/Diff Recent Labs      01/16/17   1111  01/15/17   0535   WBC  14.5*  13.2   RBC  3.93*  3.80*   HGB  10.9*  10.9*   HCT  34.8*  34.0*   PLT  179  192   GRANS  90*  92*   LYMPH  3*  4*   EOS  0  0      Cardiac Enzymes Recent Labs      01/15/17   5951 01/15/17   1630   CPK  56  58   CKND1  3.8  4.0      Coagulation Recent Labs      01/16/17   1111   PTP  13.2   INR  1.0   APTT  22.6*       Lipid Panel No results found for: CHOL, CHOLPOCT, CHOLX, CHLST, CHOLV, [id number], HDL, LDL, NLDLCT, DLDL, LDLC, DLDLP, [id number], VLDLC, VLDL, TGL, TGLX, TRIGL, [id number], TRIGP, TGLPOCT, [id number], CHHD, CHHDX   BNP No results for input(s): BNPP in the last 72 hours.    Liver Enzymes Recent Labs      01/15/17   0535   TP  5.0*   ALB  2.2*   AP  90   SGOT  117*      Thyroid Studies Lab Results   Component Value Date/Time    TSH 1.37 08/13/2015 04:35 AM        Procedures/imaging: see electronic medical records for all procedures/Xrays and details which were not copied into this note but were reviewed prior to creation of Plan

## 2017-01-17 NOTE — PROGRESS NOTES
1920 hrs Report received. Fentayl drip checked without going RN. Pt awake and nodding appropriately. Coughing spontaneously on ET tube. Ativan 1mg given with settling effect. Monitoring in ST HR 80-90 with Occasional ectopic beats. Afebrile. Flacc score 0. Tube feeds well tolerated. 0000 hrs. Afebrile,VSS.    0400 hrs. No significant changes overnight. For TF to be suspended @ 1000 hrs for peg tube insertion. 0740 hrs Bedside and Verbal shift change report given to MARIZOL Pike (oncoming nurse) by Ching Starkusing nurse). Report included the following information SBAR, Kardex, Intake/Output, MAR and Recent Results.

## 2017-01-17 NOTE — PROGRESS NOTES
Cardiology Progress Note      1/17/2017 9:46 AM    Admit Date: 1/3/2017    Admit Diagnosis: Hypoxia  peg tube insertion      Subjective:     Sharmin Cabrera has multiple issues.     Visit Vitals    /79 (BP 1 Location: Right arm, BP Patient Position: At rest;Head of bed elevated (Comment degrees))    Pulse 99    Temp 98.6 °F (37 °C)    Resp 26    Ht 5' 10.08\" (1.78 m)    Wt 89 kg (196 lb 3.4 oz)    SpO2 100%    BMI 28.09 kg/m2     Current Facility-Administered Medications   Medication Dose Route Frequency    fentaNYL citrate (PF) injection 25 mcg  25 mcg IntraVENous Q4H PRN    albuterol-ipratropium (DUO-NEB) 2.5 MG-0.5 MG/3 ML  3 mL Nebulization Q4H RT    fentaNYL 10 mcg/mL IV infusion  25-50 mcg/hr IntraVENous TITRATE    methylPREDNISolone (PF) (SOLU-MEDROL) injection 20 mg  20 mg IntraVENous Q12H    insulin lispro (HUMALOG) injection 6 Units  6 Units SubCUTAneous Q6H    midazolam (VERSED) injection 1 mg  1 mg IntraVENous Q3H PRN    arformoterol (BROVANA) neb solution 15 mcg  15 mcg Nebulization BID RT    budesonide (PULMICORT) 500 mcg/2 ml nebulizer suspension  500 mcg Nebulization BID RT    atorvastatin (LIPITOR) tablet 80 mg  80 mg Per NG tube DAILY    insulin glargine (LANTUS) injection 12 Units  12 Units SubCUTAneous DAILY    pantoprazole (PROTONIX) 40 mg in sodium chloride 0.9 % 10 mL injection  40 mg IntraVENous DAILY    lidocaine (LIDODERM) 5 % patch 2 Patch  2 Patch TransDERmal Q24H    hydrALAZINE (APRESOLINE) 20 mg/mL injection 20 mg  20 mg IntraVENous Q6H PRN    levothyroxine (SYNTHROID) injection 87.5 mcg  87.5 mcg IntraVENous Q24H    chlorhexidine (PERIDEX) 0.12 % mouthwash 15 mL  15 mL Oral Q12H    ELECTROLYTE REPLACEMENT PROTOCOL  1 Each Other PRN    cloNIDine (CATAPRES) 0.1 mg/24 hr patch 1 Patch  1 Patch TransDERmal Q7D    insulin lispro (HUMALOG) injection   SubCUTAneous Q6H    sodium chloride (NS) flush 5-10 mL  5-10 mL IntraVENous PRN    acetaminophen (TYLENOL) tablet 650 mg  650 mg Oral Q6H PRN    aspirin chewable tablet 81 mg  81 mg Per G Tube DAILY    baclofen (LIORESAL) tablet 10 mg  10 mg Oral TID    bisacodyl (DULCOLAX) suppository 10 mg  10 mg Rectal DAILY PRN    guaiFENesin (ROBITUSSIN) 100 mg/5 mL oral liquid 200 mg  200 mg Oral Q6H PRN    multivitamin, tx-iron-ca-min (THERA-M w/ IRON) tablet 1 Tab  1 Tab Oral DAILY    heparin (porcine) injection 5,000 Units  5,000 Units SubCUTAneous Q8H    glucose chewable tablet 16 g  4 Tab Oral PRN    glucagon (GLUCAGEN) injection 1 mg  1 mg IntraMUSCular PRN    dextrose (D50W) injection syrg 12.5-25 g  25-50 mL IntraVENous PRN         Objective:      Physical Exam:  Visit Vitals    /79 (BP 1 Location: Right arm, BP Patient Position: At rest;Head of bed elevated (Comment degrees))    Pulse 99    Temp 98.6 °F (37 °C)    Resp 26    Ht 5' 10.08\" (1.78 m)    Wt 89 kg (196 lb 3.4 oz)    SpO2 100%    BMI 28.09 kg/m2     General Appearance:  Well developed, well nourished man intubated on Vent and awake this AM.   Ears/Nose/Mouth/Throat:   Hearing grossly normal.         Neck: Supple. Chest:   Lungs clear to auscultation bilaterally. Cardiovascular:  Regular rate and rhythm, S1, S2 normal, no murmur. Abdomen:   Soft, non-tender, bowel sounds are active. Extremities: No edema bilaterally. Skin: Warm and dry.                Data Review:   Labs:    Recent Results (from the past 24 hour(s))   CBC WITH AUTOMATED DIFF    Collection Time: 01/16/17 11:11 AM   Result Value Ref Range    WBC 14.5 (H) 4.6 - 13.2 K/uL    RBC 3.93 (L) 4.70 - 5.50 M/uL    HGB 10.9 (L) 13.0 - 16.0 g/dL    HCT 34.8 (L) 36.0 - 48.0 %    MCV 88.5 74.0 - 97.0 FL    MCH 27.7 24.0 - 34.0 PG    MCHC 31.3 31.0 - 37.0 g/dL    RDW 15.9 (H) 11.6 - 14.5 %    PLATELET 984 618 - 940 K/uL    MPV 11.7 9.2 - 11.8 FL    NEUTROPHILS 90 (H) 40 - 73 %    LYMPHOCYTES 3 (L) 21 - 52 %    MONOCYTES 7 3 - 10 %    EOSINOPHILS 0 0 - 5 %    BASOPHILS 0 0 - 2 % ABS. NEUTROPHILS 13.1 (H) 1.8 - 8.0 K/UL    ABS. LYMPHOCYTES 0.5 (L) 0.9 - 3.6 K/UL    ABS. MONOCYTES 1.0 0.05 - 1.2 K/UL    ABS. EOSINOPHILS 0.0 0.0 - 0.4 K/UL    ABS.  BASOPHILS 0.0 0.0 - 0.06 K/UL    DF AUTOMATED     METABOLIC PANEL, BASIC    Collection Time: 01/16/17 11:11 AM   Result Value Ref Range    Sodium 142 136 - 145 mmol/L    Potassium 4.0 3.5 - 5.5 mmol/L    Chloride 109 (H) 100 - 108 mmol/L    CO2 26 21 - 32 mmol/L    Anion gap 7 3.0 - 18 mmol/L    Glucose 204 (H) 74 - 99 mg/dL    BUN 44 (H) 7.0 - 18 MG/DL    Creatinine 0.93 0.6 - 1.3 MG/DL    BUN/Creatinine ratio 47 (H) 12 - 20      GFR est AA >60 >60 ml/min/1.73m2    GFR est non-AA >60 >60 ml/min/1.73m2    Calcium 7.9 (L) 8.5 - 10.1 MG/DL   PROTHROMBIN TIME + INR    Collection Time: 01/16/17 11:11 AM   Result Value Ref Range    Prothrombin time 13.2 11.5 - 15.2 sec    INR 1.0 0.8 - 1.2     PTT    Collection Time: 01/16/17 11:11 AM   Result Value Ref Range    aPTT 22.6 (L) 23.0 - 36.4 SEC   CULTURE, RESPIRATORY/SPUTUM/BRONCH W GRAM STAIN    Collection Time: 01/16/17 11:15 AM   Result Value Ref Range    Special Requests: NO SPECIAL REQUESTS      GRAM STAIN MODERATE  WBC'S        GRAM STAIN MODERATE  GRAM POSITIVE RODS        GRAM STAIN RARE  GRAM POSITIVE COCCI        Culture result: PENDING    GLUCOSE, POC    Collection Time: 01/16/17 11:30 AM   Result Value Ref Range    Glucose (POC) 178 (H) 70 - 110 mg/dL   GLUCOSE, POC    Collection Time: 01/16/17  5:13 PM   Result Value Ref Range    Glucose (POC) 137 (H) 70 - 110 mg/dL   GLUCOSE, POC    Collection Time: 01/17/17 12:16 AM   Result Value Ref Range    Glucose (POC) 156 (H) 70 - 110 mg/dL   POC G3    Collection Time: 01/17/17  5:15 AM   Result Value Ref Range    Device: VENT      FIO2 (POC) 35 %    pH (POC) 7.384 7.35 - 7.45      pCO2 (POC) 40.6 35.0 - 45.0 MMHG    pO2 (POC) 81 80 - 100 MMHG    HCO3 (POC) 24.2 22 - 26 MMOL/L    sO2 (POC) 96 92 - 97 %    Base deficit (POC) 1 mmol/L    Mode ASSIST CONTROL      Tidal volume 450 ml    Set Rate 16 bpm    PEEP/CPAP (POC) 6 cmH2O    PIP (POC) 23      Allens test (POC) YES      Inspiratory Time 0.9 sec    Total resp. rate 25      Site LEFT RADIAL      Specimen type (POC) ARTERIAL      Performed by Miriam Chauhan    MAGNESIUM    Collection Time: 01/17/17  5:40 AM   Result Value Ref Range    Magnesium 2.0 1.8 - 2.4 mg/dL   GLUCOSE, POC    Collection Time: 01/17/17  6:05 AM   Result Value Ref Range    Glucose (POC) 175 (H) 70 - 110 mg/dL       Telemetry: normal sinus rhythm      Assessment:     Principal Problem:    Acute respiratory failure ([address] (8/3/2015)    Active Problems:    Cardiac arrest ([facility] (8/1/2015)      Anoxic encephalopathy ([facility] (8/3/2015)      DM (diabetes mellitus) ([facility] (5/6/7148)      Diastolic congestive heart failure, NYHA class 1 ([address] (11/23/2016)      HTN (hypertension) (11/23/2016)      COPD (chronic obstructive pulmonary disease) with acute bronchitis ([address] (1/5/2017)      TIFFANIE (acute kidney injury) ([facility] (1/5/2017)      Aspiration pneumonia ([facility] (1/7/2017)      Transaminitis (1/15/2017)      Bandemia without diagnosis of specific infection (1/15/2017)        Plan:     Cardiac status is stable. There is no evidence of ACS. Echo shows normal systolic LV Fx with no wall motion abnormalities. Troponin curve is flat. Continue to follow.      Santa Karimi MD

## 2017-01-17 NOTE — PROCEDURES
Inpatient Esophagogastroduodenoscopy Procedure Note with PEG keshav Chávez  1/24/1943  [id number]    Indication: severe Anoxic encephalopathy after cardiac arrest on August, 2015 improved where the first PEG tube was pulled out in April 2016 but he has been getting aspiration pneumonias  and has failed swallow evaluation. He has been on the Ventilator since January the 4th, about to be extubated. : Steffi Hale MD    Referring Provider:  Mirna Hernandez MD    Sedation:  Versed 4 mg IV, Fentanyl 100 mcg IV and Topical Hurricaine spray. Cefazolin 2 g IV given just because of the procedure. Procedure Details:  After infomed consent was obtained for the procedure, with all risks and benefits of procedure explained to the family the patient was i his bed in the ICU and placed in the supine position. Following sequential administration of sedation as per above, the endoscope was inserted into the mouth and advanced under direct vision to third portion of the duodenum. A careful inspection was made as the gastroscope was withdrawn, including a retroflexed view of the proximal stomach; findings and interventions are described below. OROPHARYNX: The paula- gastric tube was pulled out. The tongue was very dry. The vocal Cords and the larynx could not seen. pyriform recesses normal.   ESOPHAGUS: The esophagus is normal except for a traumatic ulceration 1 cm in the mid esophagus at 35 cm. The proximal anddistal portions are normal. The Z-Line is intact. No hiatal hernia. Diaphragmatic pinch located at 42 cm. STOMACH: The fundus on antegrade and retroflex views is normal. The body, antrum, and pylorus are normal except for a traumatic ulcer on the greater curvature of the distal gastric body 12 mm in diameter caused most likely by the orogastric tube.  A discrete scar was found on the anterior side of the mid gastric body corresponding to the previous PEG that had been removed we decided to find a location next to it. A 20 Fr pull type PEG tube from [address] [place] [address] [place] is placed on the anterior wall of the gastric body at 56 cm using sterile technique after subcutaneous Xylocaine 1% without epinephrine 5 cc. Repeat endoscopy is done to check the position of the PEG tube from inside the stomach which was adequate at 55 cm. Then the stomach deflated and the scope was pulled out. DUODENUM: The bulb, second and third portions are normal except for mild duodenitis on the top of the folds of the second and third portion. Biopsies not taken. Therapies:  20 Fr [employer] PEG tube placement according to the usual technique    Specimen: none           Complications:   None    EBL:  Nil  IMPRESSION: esophageal and gastric ulcer are related to oropharyngeal tube trauma during suction. Mild non specific duodenitis possibly related to stress and sepsis. PEG placement. Recommendations: -The PEG tube may be used for feedings as soon as bowel sounds are confirmed post-procedure, follow the recommendation of the dietetian. The head of the bed should be kept elevated to 30 degrees during tube feeds, and the tube should be flushed with 120 mL of water every 6 hours and after giving medications through the tube. Monitor the PEG site for bleeding and infection. , -To replace if defective or within one year. Avoid clogging by frequently flushing the tube with 120 cc of tap or sterile water minimum 4 times a day and as needed such after medications. Give PPI daily.      Ivelisse Macias MD  7/25/2016  6:08 PM

## 2017-01-18 ENCOUNTER — APPOINTMENT (OUTPATIENT)
Dept: GENERAL RADIOLOGY | Age: 72
DRG: 207 | End: 2017-01-18
Attending: INTERNAL MEDICINE
Payer: MEDICARE

## 2017-01-18 LAB
ANION GAP BLD CALC-SCNC: 9 MMOL/L (ref 3–18)
ARTERIAL PATENCY WRIST A: YES
BACTERIA SPEC CULT: NORMAL
BASE EXCESS BLD CALC-SCNC: 1 MMOL/L
BASE EXCESS BLD CALC-SCNC: 1 MMOL/L
BASE EXCESS BLD CALC-SCNC: 4 MMOL/L
BDY SITE: ABNORMAL
BODY TEMPERATURE: 98.7
BUN SERPL-MCNC: 38 MG/DL (ref 7–18)
BUN/CREAT SERPL: 43 (ref 12–20)
CALCIUM SERPL-MCNC: 7.7 MG/DL (ref 8.5–10.1)
CHLORIDE SERPL-SCNC: 109 MMOL/L (ref 100–108)
CO2 SERPL-SCNC: 25 MMOL/L (ref 21–32)
CREAT SERPL-MCNC: 0.88 MG/DL (ref 0.6–1.3)
ERYTHROCYTE [DISTWIDTH] IN BLOOD BY AUTOMATED COUNT: 16.1 % (ref 11.6–14.5)
GAS FLOW.O2 O2 DELIVERY SYS: ABNORMAL L/MIN
GAS FLOW.O2 SETTING OXYMISER: 16 BPM
GLUCOSE BLD STRIP.AUTO-MCNC: 101 MG/DL (ref 70–110)
GLUCOSE BLD STRIP.AUTO-MCNC: 116 MG/DL (ref 70–110)
GLUCOSE BLD STRIP.AUTO-MCNC: 119 MG/DL (ref 70–110)
GLUCOSE BLD STRIP.AUTO-MCNC: 123 MG/DL (ref 70–110)
GLUCOSE SERPL-MCNC: 112 MG/DL (ref 74–99)
GRAM STN SPEC: NORMAL
HCO3 BLD-SCNC: 24.7 MMOL/L (ref 22–26)
HCO3 BLD-SCNC: 25.6 MMOL/L (ref 22–26)
HCO3 BLD-SCNC: 27.3 MMOL/L (ref 22–26)
HCT VFR BLD AUTO: 30.8 % (ref 36–48)
HGB BLD-MCNC: 9.7 G/DL (ref 13–16)
MAGNESIUM SERPL-MCNC: 2.2 MG/DL (ref 1.8–2.4)
MCH RBC QN AUTO: 28.3 PG (ref 24–34)
MCHC RBC AUTO-ENTMCNC: 31.5 G/DL (ref 31–37)
MCV RBC AUTO: 89.8 FL (ref 74–97)
O2/TOTAL GAS SETTING VFR VENT: 35 %
PCO2 BLD: 36.3 MMHG (ref 35–45)
PCO2 BLD: 38 MMHG (ref 35–45)
PCO2 BLD: 43 MMHG (ref 35–45)
PEEP RESPIRATORY: 6 CMH2O
PH BLD: 7.38 [PH] (ref 7.35–7.45)
PH BLD: 7.44 [PH] (ref 7.35–7.45)
PH BLD: 7.46 [PH] (ref 7.35–7.45)
PLATELET # BLD AUTO: 153 K/UL (ref 135–420)
PMV BLD AUTO: 12.8 FL (ref 9.2–11.8)
PO2 BLD: 69 MMHG (ref 80–100)
PO2 BLD: 70 MMHG (ref 80–100)
PO2 BLD: 75 MMHG (ref 80–100)
POTASSIUM SERPL-SCNC: 4.2 MMOL/L (ref 3.5–5.5)
PRESSURE SUPPORT SETTING VENT: 7 CMH2O
PRESSURE SUPPORT SETTING VENT: 7 CMH2O
RBC # BLD AUTO: 3.43 M/UL (ref 4.7–5.5)
SAO2 % BLD: 93 % (ref 92–97)
SAO2 % BLD: 95 % (ref 92–97)
SAO2 % BLD: 96 % (ref 92–97)
SERVICE CMNT-IMP: ABNORMAL
SERVICE CMNT-IMP: NORMAL
SERVICE CMNT-IMP: NORMAL
SODIUM SERPL-SCNC: 143 MMOL/L (ref 136–145)
SPECIMEN TYPE: ABNORMAL
TOTAL RESP. RATE, ITRR: 16
TOTAL RESP. RATE, ITRR: 17
TOTAL RESP. RATE, ITRR: 22
VENTILATION MODE VENT: ABNORMAL
VOLUME CONTROL IVLC: YES
VT SETTING VENT: 450 ML
WBC # BLD AUTO: 12 K/UL (ref 4.6–13.2)

## 2017-01-18 PROCEDURE — 36415 COLL VENOUS BLD VENIPUNCTURE: CPT | Performed by: INTERNAL MEDICINE

## 2017-01-18 PROCEDURE — 74011250636 HC RX REV CODE- 250/636: Performed by: INTERNAL MEDICINE

## 2017-01-18 PROCEDURE — 77010033678 HC OXYGEN DAILY

## 2017-01-18 PROCEDURE — 74011000250 HC RX REV CODE- 250: Performed by: INTERNAL MEDICINE

## 2017-01-18 PROCEDURE — 94003 VENT MGMT INPAT SUBQ DAY: CPT

## 2017-01-18 PROCEDURE — C9113 INJ PANTOPRAZOLE SODIUM, VIA: HCPCS | Performed by: INTERNAL MEDICINE

## 2017-01-18 PROCEDURE — 65610000006 HC RM INTENSIVE CARE

## 2017-01-18 PROCEDURE — 74011250636 HC RX REV CODE- 250/636

## 2017-01-18 PROCEDURE — 74011250637 HC RX REV CODE- 250/637: Performed by: INTERNAL MEDICINE

## 2017-01-18 PROCEDURE — 82803 BLOOD GASES ANY COMBINATION: CPT

## 2017-01-18 PROCEDURE — 80048 BASIC METABOLIC PNL TOTAL CA: CPT | Performed by: HOSPITALIST

## 2017-01-18 PROCEDURE — 83735 ASSAY OF MAGNESIUM: CPT | Performed by: INTERNAL MEDICINE

## 2017-01-18 PROCEDURE — 74011636637 HC RX REV CODE- 636/637: Performed by: INTERNAL MEDICINE

## 2017-01-18 PROCEDURE — 36600 WITHDRAWAL OF ARTERIAL BLOOD: CPT

## 2017-01-18 PROCEDURE — 85027 COMPLETE CBC AUTOMATED: CPT | Performed by: HOSPITALIST

## 2017-01-18 PROCEDURE — P9047 ALBUMIN (HUMAN), 25%, 50ML: HCPCS | Performed by: INTERNAL MEDICINE

## 2017-01-18 PROCEDURE — 82962 GLUCOSE BLOOD TEST: CPT

## 2017-01-18 PROCEDURE — 94640 AIRWAY INHALATION TREATMENT: CPT

## 2017-01-18 PROCEDURE — P9047 ALBUMIN (HUMAN), 25%, 50ML: HCPCS

## 2017-01-18 PROCEDURE — 74011250637 HC RX REV CODE- 250/637: Performed by: FAMILY MEDICINE

## 2017-01-18 PROCEDURE — 71010 XR CHEST PORT: CPT

## 2017-01-18 RX ORDER — ALBUMIN HUMAN 250 G/1000ML
12.5 SOLUTION INTRAVENOUS EVERY 6 HOURS
Status: DISCONTINUED | OUTPATIENT
Start: 2017-01-18 | End: 2017-01-18

## 2017-01-18 RX ORDER — FUROSEMIDE 10 MG/ML
40 INJECTION INTRAMUSCULAR; INTRAVENOUS ONCE
Status: COMPLETED | OUTPATIENT
Start: 2017-01-18 | End: 2017-01-18

## 2017-01-18 RX ORDER — ALBUMIN HUMAN 250 G/1000ML
SOLUTION INTRAVENOUS
Status: COMPLETED
Start: 2017-01-18 | End: 2017-01-18

## 2017-01-18 RX ORDER — ALBUMIN HUMAN 250 G/1000ML
12.5 SOLUTION INTRAVENOUS EVERY 6 HOURS
Status: COMPLETED | OUTPATIENT
Start: 2017-01-18 | End: 2017-01-19

## 2017-01-18 RX ADMIN — IPRATROPIUM BROMIDE AND ALBUTEROL SULFATE 3 ML: .5; 3 SOLUTION RESPIRATORY (INHALATION) at 04:59

## 2017-01-18 RX ADMIN — INSULIN GLARGINE 12 UNITS: 100 INJECTION, SOLUTION SUBCUTANEOUS at 14:24

## 2017-01-18 RX ADMIN — IPRATROPIUM BROMIDE AND ALBUTEROL SULFATE 3 ML: .5; 3 SOLUTION RESPIRATORY (INHALATION) at 15:22

## 2017-01-18 RX ADMIN — IPRATROPIUM BROMIDE AND ALBUTEROL SULFATE 3 ML: .5; 3 SOLUTION RESPIRATORY (INHALATION) at 23:44

## 2017-01-18 RX ADMIN — INSULIN LISPRO 3 UNITS: 100 INJECTION, SOLUTION INTRAVENOUS; SUBCUTANEOUS at 06:40

## 2017-01-18 RX ADMIN — LEVOTHYROXINE SODIUM ANHYDROUS 87.5 MCG: 100 INJECTION, POWDER, LYOPHILIZED, FOR SOLUTION INTRAVENOUS at 09:28

## 2017-01-18 RX ADMIN — HEPARIN SODIUM 5000 UNITS: 5000 INJECTION, SOLUTION INTRAVENOUS; SUBCUTANEOUS at 18:12

## 2017-01-18 RX ADMIN — BACLOFEN 10 MG: 10 TABLET ORAL at 08:55

## 2017-01-18 RX ADMIN — IPRATROPIUM BROMIDE AND ALBUTEROL SULFATE 3 ML: .5; 3 SOLUTION RESPIRATORY (INHALATION) at 11:20

## 2017-01-18 RX ADMIN — SODIUM CHLORIDE 40 MG: 9 INJECTION INTRAMUSCULAR; INTRAVENOUS; SUBCUTANEOUS at 08:55

## 2017-01-18 RX ADMIN — BACLOFEN 10 MG: 10 TABLET ORAL at 18:12

## 2017-01-18 RX ADMIN — IPRATROPIUM BROMIDE AND ALBUTEROL SULFATE 3 ML: .5; 3 SOLUTION RESPIRATORY (INHALATION) at 07:36

## 2017-01-18 RX ADMIN — ARFORMOTEROL TARTRATE 15 MCG: 15 SOLUTION RESPIRATORY (INHALATION) at 07:37

## 2017-01-18 RX ADMIN — INSULIN LISPRO 3 UNITS: 100 INJECTION, SOLUTION INTRAVENOUS; SUBCUTANEOUS at 23:41

## 2017-01-18 RX ADMIN — HEPARIN SODIUM 5000 UNITS: 5000 INJECTION, SOLUTION INTRAVENOUS; SUBCUTANEOUS at 23:41

## 2017-01-18 RX ADMIN — CHLORHEXIDINE GLUCONATE 15 ML: 1.2 RINSE ORAL at 08:55

## 2017-01-18 RX ADMIN — FUROSEMIDE 40 MG: 10 INJECTION, SOLUTION INTRAMUSCULAR; INTRAVENOUS at 14:24

## 2017-01-18 RX ADMIN — BUDESONIDE 500 MCG: 0.5 INHALANT RESPIRATORY (INHALATION) at 07:36

## 2017-01-18 RX ADMIN — INSULIN LISPRO 3 UNITS: 100 INJECTION, SOLUTION INTRAVENOUS; SUBCUTANEOUS at 18:11

## 2017-01-18 RX ADMIN — ARFORMOTEROL TARTRATE 15 MCG: 15 SOLUTION RESPIRATORY (INHALATION) at 00:18

## 2017-01-18 RX ADMIN — ALBUMIN HUMAN 12.5 G: 250 SOLUTION INTRAVENOUS at 14:28

## 2017-01-18 RX ADMIN — BUDESONIDE 500 MCG: 0.5 INHALANT RESPIRATORY (INHALATION) at 20:08

## 2017-01-18 RX ADMIN — ALBUMIN (HUMAN) 12.5 G: 0.25 INJECTION, SOLUTION INTRAVENOUS at 14:28

## 2017-01-18 RX ADMIN — HEPARIN SODIUM 5000 UNITS: 5000 INJECTION, SOLUTION INTRAVENOUS; SUBCUTANEOUS at 08:55

## 2017-01-18 RX ADMIN — ALBUMIN (HUMAN) 12.5 G: 0.25 INJECTION, SOLUTION INTRAVENOUS at 21:53

## 2017-01-18 RX ADMIN — ASPIRIN 81 MG 81 MG: 81 TABLET ORAL at 08:55

## 2017-01-18 RX ADMIN — IPRATROPIUM BROMIDE AND ALBUTEROL SULFATE 3 ML: .5; 3 SOLUTION RESPIRATORY (INHALATION) at 20:08

## 2017-01-18 RX ADMIN — ATORVASTATIN CALCIUM 80 MG: 20 TABLET, FILM COATED ORAL at 08:55

## 2017-01-18 RX ADMIN — MULTIPLE VITAMINS W/ MINERALS TAB 1 TABLET: TAB at 08:55

## 2017-01-18 RX ADMIN — BACLOFEN 10 MG: 10 TABLET ORAL at 21:53

## 2017-01-18 NOTE — PROGRESS NOTES
Gastrointestinal Progress Note    Patient Name: Marisa Dewitt    TQVAL'W Date: 1/18/2017    Admit Date: 1/3/2017    Subjective:     Diet: Patient is on tube feeding diet at 30 cc/ h via PEG and is tolerating. Nausea is not present. Vomiting is not present. Pain: Patient does not complain of abdominal pain.     Bowel Movements: None    Bleeding:  None    Current Facility-Administered Medications   Medication Dose Route Frequency    albumin human 25% (BUMINATE) solution 12.5 g  12.5 g IntraVENous Q6H    insulin lispro (HUMALOG) injection 3 Units  3 Units SubCUTAneous Q6H    fentaNYL citrate (PF) injection 25 mcg  25 mcg IntraVENous Q4H PRN    albuterol-ipratropium (DUO-NEB) 2.5 MG-0.5 MG/3 ML  3 mL Nebulization Q4H RT    fentaNYL 10 mcg/mL IV infusion  25-50 mcg/hr IntraVENous TITRATE    midazolam (VERSED) injection 1 mg  1 mg IntraVENous Q3H PRN    arformoterol (BROVANA) neb solution 15 mcg  15 mcg Nebulization BID RT    budesonide (PULMICORT) 500 mcg/2 ml nebulizer suspension  500 mcg Nebulization BID RT    atorvastatin (LIPITOR) tablet 80 mg  80 mg Per NG tube DAILY    insulin glargine (LANTUS) injection 12 Units  12 Units SubCUTAneous DAILY    pantoprazole (PROTONIX) 40 mg in sodium chloride 0.9 % 10 mL injection  40 mg IntraVENous DAILY    lidocaine (LIDODERM) 5 % patch 2 Patch  2 Patch TransDERmal Q24H    hydrALAZINE (APRESOLINE) 20 mg/mL injection 20 mg  20 mg IntraVENous Q6H PRN    levothyroxine (SYNTHROID) injection 87.5 mcg  87.5 mcg IntraVENous Q24H    chlorhexidine (PERIDEX) 0.12 % mouthwash 15 mL  15 mL Oral Q12H    ELECTROLYTE REPLACEMENT PROTOCOL  1 Each Other PRN    cloNIDine (CATAPRES) 0.1 mg/24 hr patch 1 Patch  1 Patch TransDERmal Q7D    insulin lispro (HUMALOG) injection   SubCUTAneous Q6H    sodium chloride (NS) flush 5-10 mL  5-10 mL IntraVENous PRN    acetaminophen (TYLENOL) tablet 650 mg  650 mg Oral Q6H PRN    aspirin chewable tablet 81 mg  81 mg Per G Tube DAILY    baclofen (LIORESAL) tablet 10 mg  10 mg Oral TID    bisacodyl (DULCOLAX) suppository 10 mg  10 mg Rectal DAILY PRN    guaiFENesin (ROBITUSSIN) 100 mg/5 mL oral liquid 200 mg  200 mg Oral Q6H PRN    multivitamin, tx-iron-ca-min (THERA-M w/ IRON) tablet 1 Tab  1 Tab Oral DAILY    heparin (porcine) injection 5,000 Units  5,000 Units SubCUTAneous Q8H    glucose chewable tablet 16 g  4 Tab Oral PRN    glucagon (GLUCAGEN) injection 1 mg  1 mg IntraMUSCular PRN    dextrose (D50W) injection syrg 12.5-25 g  25-50 mL IntraVENous PRN          Objective:     Visit Vitals    /70    Pulse 97    Temp 100.1 °F (37.8 °C)    Resp 30    Ht 5' 10.08\" (1.78 m)    Wt 89 kg (196 lb 3.4 oz)    SpO2 93%    BMI 28.09 kg/m2       01/16 1901 - 01/18 0700  In: 1887.1 [I.V.:214.1]  Out: 1150 [Urine:1150]    General appearance: alert, comfortable, no longer on the Ventilator cooperative, no distress, appears stated age, moderately obese, alert and oriented  Abdomen: rounded large tympanic. soft, non-tender. Bowel sounds normal. No masses,  no organomegaly. PEG tube in good position no bleeding or inflammation. Extremities: moderate bilateral leg pitting edema 2+    Data Review:    Labs: Results:       Chemistry Recent Labs      01/18/17   0545  01/16/17   1111   GLU  112*  204*   NA  143  142   K  4.2  4.0   CL  109*  109*   CO2  25  26   BUN  38*  44*   CREA  0.88  0.93   CA  7.7*  7.9*   AGAP  9  7   BUCR  43*  47*      CBC w/Diff Recent Labs      01/18/17   0545  01/16/17   1111   WBC  12.0  14.5*   RBC  3.43*  3.93*   HGB  9.7*  10.9*   HCT  30.8*  34.8*   PLT  153  179   GRANS   --   90*   LYMPH   --   3*   EOS   --   0      Coagulation Recent Labs      01/16/17   1111   PTP  13.2   INR  1.0   APTT  22.6*       Liver Enzymes No results for input(s): TP, ALB, TBIL, AP, SGOT, GPT in the last 72 hours.     No lab exists for component: DBIL       Assessment:     Principal Problem:    Acute respiratory failure ([facility] (8/3/2015)    Active Problems:    Cardiac arrest ([facility] (8/1/2015)      Anoxic encephalopathy ([facility] (8/3/2015)      DM (diabetes mellitus) ([facility] (5/7/9957)      Diastolic congestive heart failure, NYHA class 1 ([facility] (11/23/2016)      HTN (hypertension) (11/23/2016)      COPD (chronic obstructive pulmonary disease) with acute bronchitis ([facility] (1/5/2017)      TIFFANIE (acute kidney injury) ([facility] (1/5/2017)      Aspiration pneumonia ([facility] (1/7/2017)      Transaminitis (1/15/2017)      Bandemia without diagnosis of specific infection (1/15/2017)      Plan:     Aspiration pneumonia from neurogenic swallowing problem and aspiration extubated today doing well. PEG tube inserted in Dec 2015 to April 2016 and reinserted yesterday Jan 17, 2017. Increase tube feeding to 60 cc/ h  Have to make sure he does not get constipated. His abdomen remained rounded and tympanic. From my side I would see him as needed. COPD  CHF on Lasix moderate peripheral edema. Albumin 2.2   Acute on CKD BUN 43, Creat . 93  DM  Acute LFT elevation on Gold 15, it appears to be related to Right sided CHF.  To repeat LFT       BOOM Colmenares MD  January 18, 2017

## 2017-01-18 NOTE — PROGRESS NOTES
[facility]  Pulmonary, Critical Care, and Sleep Medicine    Name: Lorraine Faria MRN: [id number]   : 1945 Hospital: [facility]   Date: 2017        IMPRESSION:   · Aspiration Pneumonia with acute respiratory failure  · Acute hypoxic Respiratory Failure from above, pt reintubated     · Previous anoxic head encephalopathy   · Diastolic CHF at baseline, improved  · S/p PEA arrest  · COPD exacerbation, better  · TIFFANIE resolved      RECOMMENDATIONS:   · Neuro-    Sedated RASS -2. Will d/c propofol and start fentanyl, versed  · Cards- hemodynamically stable now. Resp arrest led to cardiac arrest 1 mg epi given and 1 minute of CPR with ROSC   · Pulm-  See vent orders, possible extubation today, patient may require trach for long term if gets reintubated , daily trial of SBT. Will schedule brovana and pulmicort. Taper Solumedrol, convert duoneb to prn  · Renal-continue PEG feedings, electrolyte replacement per protocol  · Heme- H/H and plt stable  · GI,  I believe pt does aspirate chronically, pt now has PEG. · ID- levaquin,   aspiration pneumonia food seen at cords  · DVT prophylaxis  · GI prophylaxis  · Discussed in ICU interdisciplinary rounds     Subjective/History:   17  Sedated but more interactive when propofol changed to fentanyl and versed. Tolerating SBT well however initial pO2 was borderline. ABG repeated after extending SBT  No overnight issues. S/p PEG placement     HPI  This patient has been seen and evaluated at the request of Dr. Maximo Childers for aspiration pneumonia and resp failure. The patient is a 70 y.o. male admitted 3  with COPD exacerbation to the medical floor. Last evening vomited noticed to be hypoxic an dactually lost pulse. 1 Mg epi with ROSC and intubated for cyanosis. Moved to ICU discussed with family and wife should be here this afternoon. In SNF prior to this admission and has diastolic dysfunction at baseline.   Currently stable on vent    The patient is critically ill and can not provide additional history due to intubation and sedation    Current Facility-Administered Medications   Medication Dose Route Frequency    albumin human 25% (BUMINATE) solution 12.5 g  12.5 g IntraVENous Q6H    albumin human 25% (BUMINATE) 25 % solution        insulin lispro (HUMALOG) injection 3 Units  3 Units SubCUTAneous Q6H    albuterol-ipratropium (DUO-NEB) 2.5 MG-0.5 MG/3 ML  3 mL Nebulization Q4H RT    fentaNYL 10 mcg/mL IV infusion  25-50 mcg/hr IntraVENous TITRATE    arformoterol (BROVANA) neb solution 15 mcg  15 mcg Nebulization BID RT    budesonide (PULMICORT) 500 mcg/2 ml nebulizer suspension  500 mcg Nebulization BID RT    atorvastatin (LIPITOR) tablet 80 mg  80 mg Per NG tube DAILY    insulin glargine (LANTUS) injection 12 Units  12 Units SubCUTAneous DAILY    pantoprazole (PROTONIX) 40 mg in sodium chloride 0.9 % 10 mL injection  40 mg IntraVENous DAILY    lidocaine (LIDODERM) 5 % patch 2 Patch  2 Patch TransDERmal Q24H    levothyroxine (SYNTHROID) injection 87.5 mcg  87.5 mcg IntraVENous Q24H    chlorhexidine (PERIDEX) 0.12 % mouthwash 15 mL  15 mL Oral Q12H    cloNIDine (CATAPRES) 0.1 mg/24 hr patch 1 Patch  1 Patch TransDERmal Q7D    insulin lispro (HUMALOG) injection   SubCUTAneous Q6H    aspirin chewable tablet 81 mg  81 mg Per G Tube DAILY    baclofen (LIORESAL) tablet 10 mg  10 mg Oral TID    multivitamin, tx-iron-ca-min (THERA-M w/ IRON) tablet 1 Tab  1 Tab Oral DAILY    heparin (porcine) injection 5,000 Units  5,000 Units SubCUTAneous Q8H     Allergies   Allergen Reactions    Penicillins Itching      Objective:   Vital Signs:    Visit Vitals    /69    Pulse 93    Temp 100.2 °F (37.9 °C)    Resp 11    Ht 5' 10.08\" (1.78 m)    Wt 89 kg (196 lb 3.4 oz)    SpO2 94%    BMI 28.09 kg/m2       O2 Device: Endotracheal tube   O2 Flow Rate (L/min): 6 l/min   Temp (24hrs), Av.6 °F (37.6 °C), Min:98.7 °F (37.1 °C), Max:100.2 °F (37.9 °C)       Intake/Output:   Last shift:      01/18 0701 - 01/18 1900  In: -   Out: 425 [Urine:425]  Last 3 shifts: 01/16 1901 - 01/18 0700  In: 1887.1 [I.V.:214.1]  Out: 1150 [Urine:1150]    Intake/Output Summary (Last 24 hours) at 01/18/17 1424  Last data filed at 01/18/17 1136   Gross per 24 hour   Intake           887.08 ml   Output              825 ml   Net            62.08 ml     Physical Exam:    General:   awake and nods appropriately   Head:  Normocephalic, without obvious abnormality,    Eyes:  Conjunctivae/corneas clear. PERRL,   Nose: Nares normal.   Mucosa normal. No drainage or sinus tenderness. Throat: ETT    Neck: Supple, symmetrical, trachea midline, no adenopathy, no carotid bruit and no JVD. Lungs:   Clear to auscultation bilaterally. Chest wall:  No tenderness or deformity. Heart:  Regular rate and rhythm, S1, S2 normal, no murmur, click, rub or gallop. Abdomen:   Soft, non-tender. Bowel sounds normal. No masses,  No organomegaly. Extremities: Extremities normal, atraumatic, no cyanosis , 2+ edema.              Data:     Recent Results (from the past 24 hour(s))   GLUCOSE, POC    Collection Time: 01/17/17  5:33 PM   Result Value Ref Range    Glucose (POC) 132 (H) 70 - 110 mg/dL   GLUCOSE, POC    Collection Time: 01/17/17 11:14 PM   Result Value Ref Range    Glucose (POC) 115 (H) 70 - 110 mg/dL   MAGNESIUM    Collection Time: 01/18/17  5:45 AM   Result Value Ref Range    Magnesium 2.2 1.8 - 2.4 mg/dL   GLUCOSE, POC    Collection Time: 01/18/17  5:45 AM   Result Value Ref Range    Glucose (POC) 119 (H) 70 - 110 mg/dL   CBC W/O DIFF    Collection Time: 01/18/17  5:45 AM   Result Value Ref Range    WBC 12.0 4.6 - 13.2 K/uL    RBC 3.43 (L) 4.70 - 5.50 M/uL    HGB 9.7 (L) 13.0 - 16.0 g/dL    HCT 30.8 (L) 36.0 - 48.0 %    MCV 89.8 74.0 - 97.0 FL    MCH 28.3 24.0 - 34.0 PG    MCHC 31.5 31.0 - 37.0 g/dL    RDW 16.1 (H) 11.6 - 14.5 %    PLATELET 182 264 - 680 K/uL MPV 12.8 (H) 9.2 - 11.8 FL   POC G3    Collection Time: 01/18/17  5:50 AM   Result Value Ref Range    Device: VENT      FIO2 (POC) 35 %    pH (POC) 7.465 (H) 7.35 - 7.45      pCO2 (POC) 38.0 35.0 - 45.0 MMHG    pO2 (POC) 75 (L) 80 - 100 MMHG    HCO3 (POC) 27.3 (H) 22 - 26 MMOL/L    sO2 (POC) 96 92 - 97 %    Base excess (POC) 4 mmol/L    Mode ASSIST CONTROL      Tidal volume 450 ml    Set Rate 16 bpm    PEEP/CPAP (POC) 6 cmH2O    Allens test (POC) YES      Total resp. rate 16      Site RIGHT RADIAL      Patient temp. 98.7      Specimen type (POC) ARTERIAL      Performed by Ganga Alfaro     Volume control YES     POC G3    Collection Time: 01/18/17  9:56 AM   Result Value Ref Range    Device: VENT      FIO2 (POC) 35 %    pH (POC) 7.384 7.35 - 7.45      pCO2 (POC) 43.0 35.0 - 45.0 MMHG    pO2 (POC) 69 (L) 80 - 100 MMHG    HCO3 (POC) 25.6 22 - 26 MMOL/L    sO2 (POC) 93 92 - 97 %    Base excess (POC) 1 mmol/L    Mode CPAP/SPON      PEEP/CPAP (POC) 6 cmH2O    Pressure support 7 cmH2O    Allens test (POC) YES      Total resp. rate 17      Site LEFT RADIAL      Specimen type (POC) ARTERIAL      Performed by Maulik Jones    GLUCOSE, POC    Collection Time: 01/18/17 11:35 AM   Result Value Ref Range    Glucose (POC) 101 70 - 110 mg/dL   POC G3    Collection Time: 01/18/17  1:54 PM   Result Value Ref Range    Device: VENT      FIO2 (POC) 35 %    pH (POC) 7.441 7.35 - 7.45      pCO2 (POC) 36.3 35.0 - 45.0 MMHG    pO2 (POC) 70 (L) 80 - 100 MMHG    HCO3 (POC) 24.7 22 - 26 MMOL/L    sO2 (POC) 95 92 - 97 %    Base excess (POC) 1 mmol/L    Mode CPAP/SPON      PEEP/CPAP (POC) 6 cmH2O    Pressure support 7 cmH2O    Allens test (POC) YES      Total resp.  rate 22      Site LEFT RADIAL      Specimen type (POC) ARTERIAL      Performed by Maulik Jones            Recent Labs      01/18/17   1354  01/18/17   0956  01/18/17   0550   FIO2I  35  35  35   HCO3I  24.7  25.6  27.3*   PCO2I  36.3  43.0  38.0   PHI  7.441  7.384  7.465*   PO2I  70* 69*  75*     Telemetry:normal sinus rhythm    Imaging:  I have personally reviewed the patients radiographs and have reviewed the reports:  CXR Results  (Last 48 hours)               01/18/17 0635  XR CHEST PORT Final result    Impression:  IMPRESSION:       1. Support lines and tubes, as above. Nonvisualized nasogastric/orogastric tube   tip projecting the left upper quadrant. 2. Hypoinflation with unchanged findings of congestion/cephalization and   findings of interstitial and alveolar edema. Nonspecific right infrahilar   confluent opacity could represent focal atelectasis/pneumonia. Suspected small   right greater than left pleural effusions. 3. Nonspecific left upper quadrant colonic gaseous distention. This could be   further evaluated with a upright/decubitus and supine views of the abdomen based   on clinical findings and concerns. Narrative:  EXAM:Chest X-Ray         History: Intubated, acute respiratory failure, follow-up airspace disease       Technique:  Portable Frontal View       Comparison: 01/17/2017, 01/16/2017, 01/15/2017       _______________       FINDINGS:   -Endotracheal tube tip is 4 cm above the lilli.   -Nasogastric tube tip is collimated off the inferior margin of the film in the   left upper quadrant. Persistent hypoinflation, unchanged enlarged configuration of the cardiac   silhouette. Persistent bronchovascular congestion with mild cephalized   appearance. No significant interval change in the diffuse hazy coarse and interstitial   pattern. Persistent confluent retrocardiac and left diaphragmatic opacity with   blunting of the left costophrenic angle. More apparent consolidative right infrahilar parenchymal opacity, similar   appearance to the 01/15/2017 exam. Unchanged right lower lung hazy confluent and   diaphragmatic consolidative opacity with blunted right costophrenic angle. Intact osseous structures.    Other: Gaseous distended colon left upper quadrant.   _______________           01/17/17 0508  XR CHEST PORT Final result    Impression:  IMPRESSION:       1. Support lines and tubes appear in adequate position. 2. Cardiomegaly, vascular congestion and likely pulmonary edema with small   bilateral pleural effusions, not appreciably changed. Narrative:  Chest AP portable       INDICATION: Endotracheal tube. COMPARISON: 01/16/2017. FINDINGS: Endotracheal tube tip is 3.2 cm from the lilli. NG tube extends into   the stomach out of field-of-view. Monitoring leads overlie the chest.       Low lung volume with persistent enlarged cardiac silhouette and vascular   congestion. Diffuse haziness more confluent in the lower lung zone slightly   obscuring the diaphragm and blunting of the costophrenic angles. No   pneumothorax. Best practice :  Core measures; Antibiotic choice:  Severe Sepsis bundles;SIRS screen met? Yes  Fluids  Lactic acid ordered- initial and repeat Q6hrs if elevated till normalized. Cultures drawn. Antibiotic administered within 1hr-ICU  And 3hrs ED  Large bore IV- 2 sites          Pressors aim MAP >65mmHg  Steriods  Glycemic control  Mech. Ventilated patients- aim to keep peak plateau pressure 72-38JG H2O.   Sress ulcer prophylaxis  DVT prophylaxis        Total critical care time exclusive of procedures: 33 minutes  Jena Hardy MD

## 2017-01-18 NOTE — PROGRESS NOTES
Chart reviewed, noted pt with hx anoxic brain injury admitted from [facility]; noted both pt and his wife had reported the plan was for pt to return to [place]; noted pt extubated this afternoon. Attempted to meet with pt;  visitor at bedside; no family available. T/C pt's wife at home: left voice message that I was f/u on their plans for pt to return to [place] [id number] when stable; left  office [phone] for callback.

## 2017-01-18 NOTE — PROGRESS NOTES
Received report and assumed care of pt. Assessment completed per flowsheet. Pt awake and alert, follow commands despite being intubated. Abd distended but soft, PEG tube noted and infusing per order, no residuals at this time. Extremities rigid. VSS, will continue to monitor.

## 2017-01-18 NOTE — PROGRESS NOTES
1630  -Assumed care of pt. Pt just had PEG tube placed at bedside by Dr Nancy Gandara.    1700 - Promote w fiber started via PEG. Per Dr Shannan Vaughn change goal to 30ml/hr and water flush to 120 q 6 hours.     6023 - Shift report given to Jb Shirley RN

## 2017-01-18 NOTE — PROGRESS NOTES
conducted a Follow up consultation and Spiritual Assessment for Georgina Walker, who is a 70 y.o.,male. Patient continues on vent. Friend from work at the bedside. Patient was awake and alert. Teary through the conversation. The  provided the following Interventions:  Continued the relationship of care and support. Offered prayer and assurance of continued prayer on patients behalf. Chart reviewed. The following outcomes were achieved:  Patient was reviewed in ICU Interdisciplinary Rounds. Assessment:  There are no further spiritual or Holiness issues which require Spiritual Care Services intervention at this time. Plan:  Chaplains will continue to follow and will provide pastoral care as needed or requested.  recommends bedside caregivers page the  on duty if patient shows signs of acute spiritual or emotional distress. [address] [place], [place].    Board Certified   [phone] - Office

## 2017-01-18 NOTE — PROGRESS NOTES
Pt awake and alert when I assumed care of him at 0730 hours this am. He was placed of SBT, but his ABG PO2 resulted at 69. IDR completed. Plan is to retry SBT this afternoon. SBT repeated and pt's oxygen improved. Pt was successfully extubated to [facility]. He tolerated well. Fentanyl gtt stopped. Through mouth care preformed. Lidocaine patches applied to pt's knees per order. Pt continues to tolerate nasal canula.       1910 - shift report given to Blanche Tran RN

## 2017-01-18 NOTE — INTERDISCIPLINARY ROUNDS
CRITICAL CARE INTERDISCIPLINARY ROUNDS    Patient Information:    Name:   Jayson Cheney    Age:   70 y.o. Admission Date:   1/3/2017    Critical Care Day: 12 (code blue 1-6)    Attending Provider:   Sheryl Tan DO    Surgeon: n/a     Consultant(s):   Guy Romano    Critical Care Physician:  Kelley Gr    Code Status: Full Code    Problem List:     Patient Active Problem List   Diagnosis Code    Cardiac arrest ([address] I46.9    Anoxic encephalopathy ([address] G93.1    Acute respiratory failure ([address] J96.00    DM (diabetes mellitus) ([address] G43.3    Diastolic congestive heart failure, NYHA class 1 ([address] I50.30    HTN (hypertension) I10    Hypoxia R09.02    COPD (chronic obstructive pulmonary disease) with acute bronchitis ([address] J44.0    TIFFANIE (acute kidney injury) ([address] N17.9    Aspiration pneumonia ([facility] J69.0    Transaminitis R74.0    Bandemia without diagnosis of specific infection D72.825       Principal Problem:  Acute respiratory failure ([address]    During rounds the following quality care indicators and evidence based practices were addressed :  DVT Prophylaxis and PUD Prophylaxis Ac Day 13 (M-Care yes) ; Central Line Day: na Isolation: na; Antibiotic Stewardship: reviewed; Probiotics Necessary: na    Glycemic Control:   Recent Labs      01/16/17   1111   GLU  204*   ;  Recent Labs      01/18/17   0956  01/18/17   0550  01/17/17   0515   PHI  7.384  7.465*  7.384   PCO2I  43.0  38.0  40.6   PO2I  69*  75*  81    Adjustments     Acute MI/PCI:   Not applicable    Door to Balloon: Admission Time: 0905      Heart Failure:    Not applicable     SCIP Measures for other Surgeries:   Not applicable     Pneumonia:    Not applicable    Interdisciplinary team rounds were held with the following team members Diabetes, Care Management, Nursing, Nutrition, Pharmacy, Physician and Respiratory Therapy. Plan of care discussed. Goals of Care/ Recommendations: Hx of aspiration HOB closer to 45 degree.  SBT currently, will clarify TF and recommendation by GI. Re-eval ABG's extubation. See clinical pathway and/or care plan for interventions and desired outcomes.     Critical Care Discharge Status:  Red

## 2017-01-18 NOTE — PROGRESS NOTES
Cardiology Progress Note      1/18/2017 2:48 PM    Admit Date: 1/3/2017    Admit Diagnosis: Hypoxia  peg tube insertion      Subjective:     Flora Meeks has multiple issues.     Visit Vitals    /69    Pulse 93    Temp 100.2 °F (37.9 °C)    Resp 11    Ht 5' 10.08\" (1.78 m)    Wt 89 kg (196 lb 3.4 oz)    SpO2 94%    BMI 28.09 kg/m2     Current Facility-Administered Medications   Medication Dose Route Frequency    albumin human 25% (BUMINATE) solution 12.5 g  12.5 g IntraVENous Q6H    insulin lispro (HUMALOG) injection 3 Units  3 Units SubCUTAneous Q6H    fentaNYL citrate (PF) injection 25 mcg  25 mcg IntraVENous Q4H PRN    albuterol-ipratropium (DUO-NEB) 2.5 MG-0.5 MG/3 ML  3 mL Nebulization Q4H RT    fentaNYL 10 mcg/mL IV infusion  25-50 mcg/hr IntraVENous TITRATE    midazolam (VERSED) injection 1 mg  1 mg IntraVENous Q3H PRN    arformoterol (BROVANA) neb solution 15 mcg  15 mcg Nebulization BID RT    budesonide (PULMICORT) 500 mcg/2 ml nebulizer suspension  500 mcg Nebulization BID RT    atorvastatin (LIPITOR) tablet 80 mg  80 mg Per NG tube DAILY    insulin glargine (LANTUS) injection 12 Units  12 Units SubCUTAneous DAILY    pantoprazole (PROTONIX) 40 mg in sodium chloride 0.9 % 10 mL injection  40 mg IntraVENous DAILY    lidocaine (LIDODERM) 5 % patch 2 Patch  2 Patch TransDERmal Q24H    hydrALAZINE (APRESOLINE) 20 mg/mL injection 20 mg  20 mg IntraVENous Q6H PRN    levothyroxine (SYNTHROID) injection 87.5 mcg  87.5 mcg IntraVENous Q24H    chlorhexidine (PERIDEX) 0.12 % mouthwash 15 mL  15 mL Oral Q12H    ELECTROLYTE REPLACEMENT PROTOCOL  1 Each Other PRN    cloNIDine (CATAPRES) 0.1 mg/24 hr patch 1 Patch  1 Patch TransDERmal Q7D    insulin lispro (HUMALOG) injection   SubCUTAneous Q6H    sodium chloride (NS) flush 5-10 mL  5-10 mL IntraVENous PRN    acetaminophen (TYLENOL) tablet 650 mg  650 mg Oral Q6H PRN    aspirin chewable tablet 81 mg  81 mg Per G Tube DAILY    baclofen (LIORESAL) tablet 10 mg  10 mg Oral TID    bisacodyl (DULCOLAX) suppository 10 mg  10 mg Rectal DAILY PRN    guaiFENesin (ROBITUSSIN) 100 mg/5 mL oral liquid 200 mg  200 mg Oral Q6H PRN    multivitamin, tx-iron-ca-min (THERA-M w/ IRON) tablet 1 Tab  1 Tab Oral DAILY    heparin (porcine) injection 5,000 Units  5,000 Units SubCUTAneous Q8H    glucose chewable tablet 16 g  4 Tab Oral PRN    glucagon (GLUCAGEN) injection 1 mg  1 mg IntraMUSCular PRN    dextrose (D50W) injection syrg 12.5-25 g  25-50 mL IntraVENous PRN         Objective:      Physical Exam:  Visit Vitals    /69    Pulse 93    Temp 100.2 °F (37.9 °C)    Resp 11    Ht 5' 10.08\" (1.78 m)    Wt 89 kg (196 lb 3.4 oz)    SpO2 94%    BMI 28.09 kg/m2     General Appearance:  Well developed, well nourished,alert and oriented x 3, and individual in no acute distress. Ears/Nose/Mouth/Throat:   Hearing grossly normal.         Neck: Supple. Chest:   Lungs clear to auscultation bilaterally. Cardiovascular:  Regular rate and rhythm, S1, S2 normal, no murmur. Abdomen:   Soft, non-tender, bowel sounds are active. Extremities: No edema bilaterally. Skin: Warm and dry.                Data Review:   Labs:    Recent Results (from the past 24 hour(s))   GLUCOSE, POC    Collection Time: 01/17/17  5:33 PM   Result Value Ref Range    Glucose (POC) 132 (H) 70 - 110 mg/dL   GLUCOSE, POC    Collection Time: 01/17/17 11:14 PM   Result Value Ref Range    Glucose (POC) 115 (H) 70 - 110 mg/dL   MAGNESIUM    Collection Time: 01/18/17  5:45 AM   Result Value Ref Range    Magnesium 2.2 1.8 - 2.4 mg/dL   GLUCOSE, POC    Collection Time: 01/18/17  5:45 AM   Result Value Ref Range    Glucose (POC) 119 (H) 70 - 110 mg/dL   CBC W/O DIFF    Collection Time: 01/18/17  5:45 AM   Result Value Ref Range    WBC 12.0 4.6 - 13.2 K/uL    RBC 3.43 (L) 4.70 - 5.50 M/uL    HGB 9.7 (L) 13.0 - 16.0 g/dL    HCT 30.8 (L) 36.0 - 48.0 %    MCV 89.8 74.0 - 97.0 FL    MCH 28.3 24.0 - 34.0 PG    MCHC 31.5 31.0 - 37.0 g/dL    RDW 16.1 (H) 11.6 - 14.5 %    PLATELET 419 755 - 740 K/uL    MPV 12.8 (H) 9.2 - 11.8 FL   POC G3    Collection Time: 01/18/17  5:50 AM   Result Value Ref Range    Device: VENT      FIO2 (POC) 35 %    pH (POC) 7.465 (H) 7.35 - 7.45      pCO2 (POC) 38.0 35.0 - 45.0 MMHG    pO2 (POC) 75 (L) 80 - 100 MMHG    HCO3 (POC) 27.3 (H) 22 - 26 MMOL/L    sO2 (POC) 96 92 - 97 %    Base excess (POC) 4 mmol/L    Mode ASSIST CONTROL      Tidal volume 450 ml    Set Rate 16 bpm    PEEP/CPAP (POC) 6 cmH2O    Allens test (POC) YES      Total resp. rate 16      Site RIGHT RADIAL      Patient temp. 98.7      Specimen type (POC) ARTERIAL      Performed by Alejandro Williamson     Volume control YES     POC G3    Collection Time: 01/18/17  9:56 AM   Result Value Ref Range    Device: VENT      FIO2 (POC) 35 %    pH (POC) 7.384 7.35 - 7.45      pCO2 (POC) 43.0 35.0 - 45.0 MMHG    pO2 (POC) 69 (L) 80 - 100 MMHG    HCO3 (POC) 25.6 22 - 26 MMOL/L    sO2 (POC) 93 92 - 97 %    Base excess (POC) 1 mmol/L    Mode CPAP/SPON      PEEP/CPAP (POC) 6 cmH2O    Pressure support 7 cmH2O    Allens test (POC) YES      Total resp. rate 17      Site LEFT RADIAL      Specimen type (POC) ARTERIAL      Performed by Vicky DELUCA, POC    Collection Time: 01/18/17 11:35 AM   Result Value Ref Range    Glucose (POC) 101 70 - 110 mg/dL   POC G3    Collection Time: 01/18/17  1:54 PM   Result Value Ref Range    Device: VENT      FIO2 (POC) 35 %    pH (POC) 7.441 7.35 - 7.45      pCO2 (POC) 36.3 35.0 - 45.0 MMHG    pO2 (POC) 70 (L) 80 - 100 MMHG    HCO3 (POC) 24.7 22 - 26 MMOL/L    sO2 (POC) 95 92 - 97 %    Base excess (POC) 1 mmol/L    Mode CPAP/SPON      PEEP/CPAP (POC) 6 cmH2O    Pressure support 7 cmH2O    Allens test (POC) YES      Total resp.  rate 22      Site LEFT RADIAL      Specimen type (POC) ARTERIAL      Performed by Vicky Interiano        Telemetry: normal sinus rhythm      Assessment:     Principal Problem: Acute respiratory failure ([facility] [address] [address] (8/3/2015)    Active Problems:    Cardiac arrest ([facility] [address] (8/1/2015)      Anoxic encephalopathy ([address] (8/3/2015)      DM (diabetes mellitus) ([facility] (3/7/4443)      Diastolic congestive heart failure, NYHA class 1 ([facility] [address] (11/23/2016)      HTN (hypertension) (11/23/2016)      COPD (chronic obstructive pulmonary disease) with acute bronchitis ([facility] [address] (1/5/2017)      TIFFANIE (acute kidney injury) ([facility] (1/5/2017)      Aspiration pneumonia ([facility] [address] (1/7/2017)      Transaminitis (1/15/2017)      Bandemia without diagnosis of specific infection (1/15/2017)        Plan:     Awake. Cardiac status is stable. Follow.     Lala Swanson MD

## 2017-01-19 ENCOUNTER — APPOINTMENT (OUTPATIENT)
Dept: GENERAL RADIOLOGY | Age: 72
DRG: 207 | End: 2017-01-19
Attending: PHYSICIAN ASSISTANT
Payer: MEDICARE

## 2017-01-19 ENCOUNTER — APPOINTMENT (OUTPATIENT)
Dept: GENERAL RADIOLOGY | Age: 72
DRG: 207 | End: 2017-01-19
Attending: INTERNAL MEDICINE
Payer: MEDICARE

## 2017-01-19 PROBLEM — E87.6 HYPOKALEMIA: Status: ACTIVE | Noted: 2017-01-19

## 2017-01-19 LAB
ANION GAP BLD CALC-SCNC: 6 MMOL/L (ref 3–18)
ANION GAP BLD CALC-SCNC: 7 MMOL/L (ref 3–18)
APPEARANCE UR: ABNORMAL
APTT PPP: 37.7 SEC (ref 23–36.4)
ARTERIAL PATENCY WRIST A: YES
BACTERIA URNS QL MICRO: ABNORMAL /HPF
BASE EXCESS BLD CALC-SCNC: 3 MMOL/L
BDY SITE: ABNORMAL
BILIRUB UR QL: ABNORMAL
BODY TEMPERATURE: 98.8
BUN SERPL-MCNC: 29 MG/DL (ref 7–18)
BUN SERPL-MCNC: 30 MG/DL (ref 7–18)
BUN/CREAT SERPL: 35 (ref 12–20)
BUN/CREAT SERPL: 35 (ref 12–20)
CALCIUM SERPL-MCNC: 7.7 MG/DL (ref 8.5–10.1)
CALCIUM SERPL-MCNC: 7.9 MG/DL (ref 8.5–10.1)
CHLORIDE SERPL-SCNC: 105 MMOL/L (ref 100–108)
CHLORIDE SERPL-SCNC: 106 MMOL/L (ref 100–108)
CK SERPL-CCNC: 64 U/L (ref 39–308)
CO2 SERPL-SCNC: 30 MMOL/L (ref 21–32)
CO2 SERPL-SCNC: 31 MMOL/L (ref 21–32)
COLOR UR: ABNORMAL
CREAT SERPL-MCNC: 0.84 MG/DL (ref 0.6–1.3)
CREAT SERPL-MCNC: 0.85 MG/DL (ref 0.6–1.3)
EPITH CASTS URNS QL MICRO: 0 /LPF (ref 0–5)
ERYTHROCYTE [DISTWIDTH] IN BLOOD BY AUTOMATED COUNT: 15.2 % (ref 11.6–14.5)
GAS FLOW.O2 O2 DELIVERY SYS: ABNORMAL L/MIN
GAS FLOW.O2 SETTING OXYMISER: 3 L/M
GLUCOSE BLD STRIP.AUTO-MCNC: 105 MG/DL (ref 70–110)
GLUCOSE BLD STRIP.AUTO-MCNC: 106 MG/DL (ref 70–110)
GLUCOSE BLD STRIP.AUTO-MCNC: 113 MG/DL (ref 70–110)
GLUCOSE SERPL-MCNC: 102 MG/DL (ref 74–99)
GLUCOSE SERPL-MCNC: 99 MG/DL (ref 74–99)
GLUCOSE UR STRIP.AUTO-MCNC: NEGATIVE MG/DL
GRAN CASTS URNS QL MICRO: ABNORMAL /LPF
HCO3 BLD-SCNC: 28.1 MMOL/L (ref 22–26)
HCT VFR BLD AUTO: 31.6 % (ref 36–48)
HGB BLD-MCNC: 10.2 G/DL (ref 13–16)
HGB UR QL STRIP: ABNORMAL
INR PPP: 1.2 (ref 0.8–1.2)
KETONES UR QL STRIP.AUTO: ABNORMAL MG/DL
LEUKOCYTE ESTERASE UR QL STRIP.AUTO: NEGATIVE
MAGNESIUM SERPL-MCNC: 2 MG/DL (ref 1.8–2.4)
MCH RBC QN AUTO: 28.7 PG (ref 24–34)
MCHC RBC AUTO-ENTMCNC: 32.3 G/DL (ref 31–37)
MCV RBC AUTO: 88.8 FL (ref 74–97)
NITRITE UR QL STRIP.AUTO: NEGATIVE
O2/TOTAL GAS SETTING VFR VENT: 32 %
PCO2 BLD: 44.7 MMHG (ref 35–45)
PH BLD: 7.41 [PH] (ref 7.35–7.45)
PH UR STRIP: 5 [PH] (ref 5–8)
PLATELET # BLD AUTO: 179 K/UL (ref 135–420)
PMV BLD AUTO: 11.4 FL (ref 9.2–11.8)
PO2 BLD: 66 MMHG (ref 80–100)
POTASSIUM SERPL-SCNC: 2.8 MMOL/L (ref 3.5–5.5)
POTASSIUM SERPL-SCNC: 3 MMOL/L (ref 3.5–5.5)
PROT UR STRIP-MCNC: 100 MG/DL
PROTHROMBIN TIME: 15.1 SEC (ref 11.5–15.2)
RBC # BLD AUTO: 3.56 M/UL (ref 4.7–5.5)
RBC #/AREA URNS HPF: ABNORMAL /HPF (ref 0–5)
SAO2 % BLD: 93 % (ref 92–97)
SERVICE CMNT-IMP: ABNORMAL
SODIUM SERPL-SCNC: 142 MMOL/L (ref 136–145)
SODIUM SERPL-SCNC: 143 MMOL/L (ref 136–145)
SP GR UR REFRACTOMETRY: 1.02 (ref 1–1.03)
SPECIMEN TYPE: ABNORMAL
TOTAL RESP. RATE, ITRR: 34
UROBILINOGEN UR QL STRIP.AUTO: 1 EU/DL (ref 0.2–1)
WBC # BLD AUTO: 22.4 K/UL (ref 4.6–13.2)
WBC URNS QL MICRO: ABNORMAL /HPF (ref 0–5)

## 2017-01-19 PROCEDURE — 74011250636 HC RX REV CODE- 250/636: Performed by: INTERNAL MEDICINE

## 2017-01-19 PROCEDURE — P9047 ALBUMIN (HUMAN), 25%, 50ML: HCPCS | Performed by: INTERNAL MEDICINE

## 2017-01-19 PROCEDURE — 80048 BASIC METABOLIC PNL TOTAL CA: CPT | Performed by: INTERNAL MEDICINE

## 2017-01-19 PROCEDURE — 82085 ASSAY OF ALDOLASE: CPT | Performed by: INTERNAL MEDICINE

## 2017-01-19 PROCEDURE — 82550 ASSAY OF CK (CPK): CPT | Performed by: INTERNAL MEDICINE

## 2017-01-19 PROCEDURE — 82803 BLOOD GASES ANY COMBINATION: CPT

## 2017-01-19 PROCEDURE — 94660 CPAP INITIATION&MGMT: CPT

## 2017-01-19 PROCEDURE — 74011250637 HC RX REV CODE- 250/637: Performed by: FAMILY MEDICINE

## 2017-01-19 PROCEDURE — 83735 ASSAY OF MAGNESIUM: CPT | Performed by: INTERNAL MEDICINE

## 2017-01-19 PROCEDURE — 74011000250 HC RX REV CODE- 250: Performed by: INTERNAL MEDICINE

## 2017-01-19 PROCEDURE — 71010 XR CHEST PORT: CPT

## 2017-01-19 PROCEDURE — 94640 AIRWAY INHALATION TREATMENT: CPT

## 2017-01-19 PROCEDURE — 74011250636 HC RX REV CODE- 250/636: Performed by: PHYSICIAN ASSISTANT

## 2017-01-19 PROCEDURE — 82962 GLUCOSE BLOOD TEST: CPT

## 2017-01-19 PROCEDURE — 87040 BLOOD CULTURE FOR BACTERIA: CPT | Performed by: INTERNAL MEDICINE

## 2017-01-19 PROCEDURE — 77030018846 HC SOL IRR STRL H20 ICUM -A

## 2017-01-19 PROCEDURE — 77030011989 HC AIRWY NP ROBTZ RUSC -A

## 2017-01-19 PROCEDURE — C9113 INJ PANTOPRAZOLE SODIUM, VIA: HCPCS | Performed by: INTERNAL MEDICINE

## 2017-01-19 PROCEDURE — 83874 ASSAY OF MYOGLOBIN: CPT | Performed by: INTERNAL MEDICINE

## 2017-01-19 PROCEDURE — 77010033678 HC OXYGEN DAILY

## 2017-01-19 PROCEDURE — 74011250637 HC RX REV CODE- 250/637: Performed by: INTERNAL MEDICINE

## 2017-01-19 PROCEDURE — 36600 WITHDRAWAL OF ARTERIAL BLOOD: CPT

## 2017-01-19 PROCEDURE — 74011636637 HC RX REV CODE- 636/637: Performed by: INTERNAL MEDICINE

## 2017-01-19 PROCEDURE — 65610000006 HC RM INTENSIVE CARE

## 2017-01-19 PROCEDURE — 87086 URINE CULTURE/COLONY COUNT: CPT | Performed by: INTERNAL MEDICINE

## 2017-01-19 PROCEDURE — 85730 THROMBOPLASTIN TIME PARTIAL: CPT | Performed by: INTERNAL MEDICINE

## 2017-01-19 PROCEDURE — 85610 PROTHROMBIN TIME: CPT | Performed by: INTERNAL MEDICINE

## 2017-01-19 PROCEDURE — 74011250637 HC RX REV CODE- 250/637: Performed by: HOSPITALIST

## 2017-01-19 PROCEDURE — 85027 COMPLETE CBC AUTOMATED: CPT | Performed by: INTERNAL MEDICINE

## 2017-01-19 PROCEDURE — 87070 CULTURE OTHR SPECIMN AEROBIC: CPT | Performed by: INTERNAL MEDICINE

## 2017-01-19 PROCEDURE — 81001 URINALYSIS AUTO W/SCOPE: CPT | Performed by: INTERNAL MEDICINE

## 2017-01-19 PROCEDURE — 36415 COLL VENOUS BLD VENIPUNCTURE: CPT | Performed by: INTERNAL MEDICINE

## 2017-01-19 RX ORDER — POTASSIUM CHLORIDE 20MEQ/15ML
40 LIQUID (ML) ORAL
Status: COMPLETED | OUTPATIENT
Start: 2017-01-19 | End: 2017-01-19

## 2017-01-19 RX ORDER — POTASSIUM CHLORIDE 7.45 MG/ML
10 INJECTION INTRAVENOUS
Status: DISCONTINUED | OUTPATIENT
Start: 2017-01-19 | End: 2017-01-19

## 2017-01-19 RX ORDER — FUROSEMIDE 10 MG/ML
40 INJECTION INTRAMUSCULAR; INTRAVENOUS ONCE
Status: COMPLETED | OUTPATIENT
Start: 2017-01-19 | End: 2017-01-19

## 2017-01-19 RX ORDER — ALBUMIN HUMAN 250 G/1000ML
12.5 SOLUTION INTRAVENOUS EVERY 6 HOURS
Status: COMPLETED | OUTPATIENT
Start: 2017-01-19 | End: 2017-01-20

## 2017-01-19 RX ORDER — FUROSEMIDE 10 MG/ML
20 INJECTION INTRAMUSCULAR; INTRAVENOUS ONCE
Status: COMPLETED | OUTPATIENT
Start: 2017-01-19 | End: 2017-01-19

## 2017-01-19 RX ORDER — FUROSEMIDE 40 MG/5ML
20 SOLUTION ORAL ONCE
Status: DISCONTINUED | OUTPATIENT
Start: 2017-01-19 | End: 2017-01-19

## 2017-01-19 RX ADMIN — ASPIRIN 81 MG 81 MG: 81 TABLET ORAL at 08:52

## 2017-01-19 RX ADMIN — ALBUMIN (HUMAN) 12.5 G: 0.25 INJECTION, SOLUTION INTRAVENOUS at 12:03

## 2017-01-19 RX ADMIN — HEPARIN SODIUM 5000 UNITS: 5000 INJECTION, SOLUTION INTRAVENOUS; SUBCUTANEOUS at 16:14

## 2017-01-19 RX ADMIN — POTASSIUM CHLORIDE 40 MEQ: 20 SOLUTION ORAL at 14:14

## 2017-01-19 RX ADMIN — MULTIPLE VITAMINS W/ MINERALS TAB 1 TABLET: TAB at 08:54

## 2017-01-19 RX ADMIN — IPRATROPIUM BROMIDE AND ALBUTEROL SULFATE 3 ML: .5; 3 SOLUTION RESPIRATORY (INHALATION) at 23:49

## 2017-01-19 RX ADMIN — FUROSEMIDE 20 MG: 10 INJECTION, SOLUTION INTRAMUSCULAR; INTRAVENOUS at 01:50

## 2017-01-19 RX ADMIN — INSULIN LISPRO 3 UNITS: 100 INJECTION, SOLUTION INTRAVENOUS; SUBCUTANEOUS at 18:18

## 2017-01-19 RX ADMIN — ARFORMOTEROL TARTRATE 15 MCG: 15 SOLUTION RESPIRATORY (INHALATION) at 20:31

## 2017-01-19 RX ADMIN — ALBUMIN (HUMAN) 12.5 G: 0.25 INJECTION, SOLUTION INTRAVENOUS at 18:19

## 2017-01-19 RX ADMIN — IPRATROPIUM BROMIDE AND ALBUTEROL SULFATE 3 ML: .5; 3 SOLUTION RESPIRATORY (INHALATION) at 03:57

## 2017-01-19 RX ADMIN — ALBUMIN (HUMAN) 12.5 G: 0.25 INJECTION, SOLUTION INTRAVENOUS at 03:34

## 2017-01-19 RX ADMIN — ARFORMOTEROL TARTRATE 15 MCG: 15 SOLUTION RESPIRATORY (INHALATION) at 07:55

## 2017-01-19 RX ADMIN — SODIUM CHLORIDE 40 MG: 9 INJECTION INTRAMUSCULAR; INTRAVENOUS; SUBCUTANEOUS at 10:22

## 2017-01-19 RX ADMIN — IPRATROPIUM BROMIDE AND ALBUTEROL SULFATE 3 ML: .5; 3 SOLUTION RESPIRATORY (INHALATION) at 07:55

## 2017-01-19 RX ADMIN — INSULIN GLARGINE 12 UNITS: 100 INJECTION, SOLUTION SUBCUTANEOUS at 12:03

## 2017-01-19 RX ADMIN — BUDESONIDE 500 MCG: 0.5 INHALANT RESPIRATORY (INHALATION) at 20:31

## 2017-01-19 RX ADMIN — FUROSEMIDE 40 MG: 10 INJECTION, SOLUTION INTRAMUSCULAR; INTRAVENOUS at 12:01

## 2017-01-19 RX ADMIN — BUDESONIDE 500 MCG: 0.5 INHALANT RESPIRATORY (INHALATION) at 07:55

## 2017-01-19 RX ADMIN — BACLOFEN 10 MG: 10 TABLET ORAL at 16:14

## 2017-01-19 RX ADMIN — POTASSIUM CHLORIDE 40 MEQ: 20 SOLUTION ORAL at 09:20

## 2017-01-19 RX ADMIN — HEPARIN SODIUM 5000 UNITS: 5000 INJECTION, SOLUTION INTRAVENOUS; SUBCUTANEOUS at 08:53

## 2017-01-19 RX ADMIN — IPRATROPIUM BROMIDE AND ALBUTEROL SULFATE 3 ML: .5; 3 SOLUTION RESPIRATORY (INHALATION) at 15:41

## 2017-01-19 RX ADMIN — BACLOFEN 10 MG: 10 TABLET ORAL at 22:13

## 2017-01-19 RX ADMIN — LEVOTHYROXINE SODIUM ANHYDROUS 87.5 MCG: 100 INJECTION, POWDER, LYOPHILIZED, FOR SOLUTION INTRAVENOUS at 10:51

## 2017-01-19 RX ADMIN — IPRATROPIUM BROMIDE AND ALBUTEROL SULFATE 3 ML: .5; 3 SOLUTION RESPIRATORY (INHALATION) at 20:31

## 2017-01-19 RX ADMIN — BACLOFEN 10 MG: 10 TABLET ORAL at 08:54

## 2017-01-19 RX ADMIN — ATORVASTATIN CALCIUM 80 MG: 20 TABLET, FILM COATED ORAL at 08:53

## 2017-01-19 RX ADMIN — IPRATROPIUM BROMIDE AND ALBUTEROL SULFATE 3 ML: .5; 3 SOLUTION RESPIRATORY (INHALATION) at 11:42

## 2017-01-19 NOTE — PROGRESS NOTES
Nasopharyngeal airway (32 FR) placed in left nare for NT suctioning to help with removal of airway secreations due to ineffective cough.

## 2017-01-19 NOTE — PROGRESS NOTES
[facility]  Pulmonary, Critical Care, and Sleep Medicine    Name: Federico Velazquez MRN: [id number]   : 1945 Hospital: [facility] [place]   Date: 2017        IMPRESSION:   · Aspiration Pneumonia with acute respiratory failure improved  · Acute hypoxic Respiratory Failure from above, pt reintubated   . Extubated 2017  · Previous anoxic head encephalopathy, now at baseline   · Diastolic CHF at baseline, improved  · S/p PEA arrest  · COPD exacerbation, better  · TIFFANIE resolved      RECOMMENDATIONS:   · Continue Pulmonary toilette, airway clearance maneuvers. · Cards- hemodynamically stable now. Resp arrest led to cardiac arrest 1 mg epi given and 1 minute of CPR with ROSC   · Pulm-  Continue  Brovana and Pulmicort. Taper Solumedrol, convert Duoneb to prn  · Renal- electrolyte replacement per protocol  · Heme- H/H and plt stable  · GI,  I believe pt does aspirate chronically, pt now has PEG. Continue PEG feeds   · ID- levaquin,   aspiration pneumonia food seen at cords  · DVT prophylaxis  · GI prophylaxis  · Discussed in ICU interdisciplinary rounds     Subjective/History:   17  Remains off vent support. Overnight events noted  Pt now back to baseline level of comfort  S/p PEG placement     HPI  This patient has been seen and evaluated at the request of Dr. Marielos Mackay for aspiration pneumonia and resp failure. The patient is a 70 y.o. male admitted 3  with COPD exacerbation to the medical floor. Last evening vomited noticed to be hypoxic an dactually lost pulse. 1 Mg epi with ROSC and intubated for cyanosis. Moved to ICU discussed with family and wife should be here this afternoon. In SNF prior to this admission and has diastolic dysfunction at baseline. Currently stable off vent    Pt denies chest pain.  Afebrile   Current Facility-Administered Medications   Medication Dose Route Frequency    albumin human 25% (BUMINATE) solution 12.5 g  12.5 g IntraVENous Q6H  potassium chloride (KAON 10%) 20 mEq/15 mL oral liquid 40 mEq  40 mEq Oral NOW    insulin lispro (HUMALOG) injection 3 Units  3 Units SubCUTAneous Q6H    albuterol-ipratropium (DUO-NEB) 2.5 MG-0.5 MG/3 ML  3 mL Nebulization Q4H RT    arformoterol (BROVANA) neb solution 15 mcg  15 mcg Nebulization BID RT    budesonide (PULMICORT) 500 mcg/2 ml nebulizer suspension  500 mcg Nebulization BID RT    atorvastatin (LIPITOR) tablet 80 mg  80 mg Per NG tube DAILY    insulin glargine (LANTUS) injection 12 Units  12 Units SubCUTAneous DAILY    pantoprazole (PROTONIX) 40 mg in sodium chloride 0.9 % 10 mL injection  40 mg IntraVENous DAILY    lidocaine (LIDODERM) 5 % patch 2 Patch  2 Patch TransDERmal Q24H    levothyroxine (SYNTHROID) injection 87.5 mcg  87.5 mcg IntraVENous Q24H    cloNIDine (CATAPRES) 0.1 mg/24 hr patch 1 Patch  1 Patch TransDERmal Q7D    insulin lispro (HUMALOG) injection   SubCUTAneous Q6H    aspirin chewable tablet 81 mg  81 mg Per G Tube DAILY    baclofen (LIORESAL) tablet 10 mg  10 mg Oral TID    multivitamin, tx-iron-ca-min (THERA-M w/ IRON) tablet 1 Tab  1 Tab Oral DAILY    heparin (porcine) injection 5,000 Units  5,000 Units SubCUTAneous Q8H     Allergies   Allergen Reactions    Penicillins Itching      Objective:   Vital Signs:    Visit Vitals    /71    Pulse 98    Temp 100.3 °F (37.9 °C)    Resp 27    Ht 5' 10.08\" (1.78 m)    Wt 89 kg (196 lb 3.4 oz)    SpO2 96%    BMI 28.09 kg/m2       O2 Device: Nasal cannula   O2 Flow Rate (L/min): 3 l/min   Temp (24hrs), Av.8 °F (37.7 °C), Min:98.8 °F (37.1 °C), Max:100.5 °F (38.1 °C)       Intake/Output:   Last shift:       07 - 1900  In: -   Out: 500 [Urine:500]  Last 3 shifts: 1901 -  07  In: 1645.9 [I.V.:102.9]  Out: 4575 [Urine:4575]    Intake/Output Summary (Last 24 hours) at 17 1406  Last data filed at 17 1110   Gross per 24 hour   Intake            597.5 ml   Output 4650 ml   Net          -4052.5 ml     Physical Exam:    General:   awake and nods appropriately   Head:  Normocephalic, without obvious abnormality,    Eyes:  Conjunctivae/corneas clear. PERRL,   Nose: Nares normal.   Mucosa normal. No drainage or sinus tenderness. Throat: No stridor    Neck: Supple, symmetrical, trachea midline, no adenopathy, no carotid bruit and no JVD. Lungs:   Clear to auscultation bilaterally. Chest wall:  No tenderness or deformity. Heart:  Regular rate and rhythm, S1, S2 normal, no murmur, click, rub or gallop. Abdomen:   Soft, non-tender. Bowel sounds normal. No masses,  No organomegaly. Extremities: Extremities normal, atraumatic, no cyanosis , 2+ edema slightly improved. Data:     Recent Results (from the past 24 hour(s))   GLUCOSE, POC    Collection Time: 01/18/17  6:02 PM   Result Value Ref Range    Glucose (POC) 116 (H) 70 - 110 mg/dL   GLUCOSE, POC    Collection Time: 01/18/17 11:39 PM   Result Value Ref Range    Glucose (POC) 123 (H) 70 - 110 mg/dL   POC G3    Collection Time: 01/19/17  1:15 AM   Result Value Ref Range    Device: NASAL CANNULA      Flow rate (POC) 3.0 L/M    FIO2 (POC) 32 %    pH (POC) 7.406 7.35 - 7.45      pCO2 (POC) 44.7 35.0 - 45.0 MMHG    pO2 (POC) 66 (L) 80 - 100 MMHG    HCO3 (POC) 28.1 (H) 22 - 26 MMOL/L    sO2 (POC) 93 92 - 97 %    Base excess (POC) 3 mmol/L    Allens test (POC) YES      Total resp.  rate 34      Site LEFT RADIAL      Patient temp. 98.8      Specimen type (POC) ARTERIAL      Performed by Alejandro Williamson    MAGNESIUM    Collection Time: 01/19/17  5:20 AM   Result Value Ref Range    Magnesium 2.0 1.8 - 2.4 mg/dL   METABOLIC PANEL, BASIC    Collection Time: 01/19/17  5:20 AM   Result Value Ref Range    Sodium 142 136 - 145 mmol/L    Potassium 2.8 (LL) 3.5 - 5.5 mmol/L    Chloride 105 100 - 108 mmol/L    CO2 31 21 - 32 mmol/L    Anion gap 6 3.0 - 18 mmol/L    Glucose 99 74 - 99 mg/dL    BUN 29 (H) 7.0 - 18 MG/DL Creatinine 0.84 0.6 - 1.3 MG/DL    BUN/Creatinine ratio 35 (H) 12 - 20      GFR est AA >60 >60 ml/min/1.73m2    GFR est non-AA >60 >60 ml/min/1.73m2    Calcium 7.7 (L) 8.5 - 10.1 MG/DL   CBC W/O DIFF    Collection Time: 01/19/17  5:20 AM   Result Value Ref Range    WBC 22.4 (H) 4.6 - 13.2 K/uL    RBC 3.56 (L) 4.70 - 5.50 M/uL    HGB 10.2 (L) 13.0 - 16.0 g/dL    HCT 31.6 (L) 36.0 - 48.0 %    MCV 88.8 74.0 - 97.0 FL    MCH 28.7 24.0 - 34.0 PG    MCHC 32.3 31.0 - 37.0 g/dL    RDW 15.2 (H) 11.6 - 14.5 %    PLATELET 133 192 - 731 K/uL    MPV 11.4 9.2 - 11.8 FL   GLUCOSE, POC    Collection Time: 01/19/17  5:45 AM   Result Value Ref Range    Glucose (POC) 113 (H) 70 - 260 mg/dL   METABOLIC PANEL, BASIC    Collection Time: 01/19/17  6:30 AM   Result Value Ref Range    Sodium 143 136 - 145 mmol/L    Potassium 3.0 (L) 3.5 - 5.5 mmol/L    Chloride 106 100 - 108 mmol/L    CO2 30 21 - 32 mmol/L    Anion gap 7 3.0 - 18 mmol/L    Glucose 102 (H) 74 - 99 mg/dL    BUN 30 (H) 7.0 - 18 MG/DL    Creatinine 0.85 0.6 - 1.3 MG/DL    BUN/Creatinine ratio 35 (H) 12 - 20      GFR est AA >60 >60 ml/min/1.73m2    GFR est non-AA >60 >60 ml/min/1.73m2    Calcium 7.9 (L) 8.5 - 10.1 MG/DL   GLUCOSE, POC    Collection Time: 01/19/17 11:52 AM   Result Value Ref Range    Glucose (POC) 105 70 - 110 mg/dL   URINALYSIS W/MICROSCOPIC    Collection Time: 01/19/17 12:15 PM   Result Value Ref Range    Color DARK YELLOW      Appearance TURBID      Specific gravity 1.021 1.005 - 1.030      pH (UA) 5.0 5.0 - 8.0      Protein 100 (A) NEG mg/dL    Glucose NEGATIVE  NEG mg/dL    Ketone TRACE (A) NEG mg/dL    Bilirubin SMALL (A) NEG      Blood LARGE (A) NEG      Urobilinogen 1.0 0.2 - 1.0 EU/dL    Nitrites NEGATIVE  NEG      Leukocyte Esterase NEGATIVE  NEG      WBC PENDING /hpf    RBC PENDING /hpf    Epithelial cells PENDING /lpf    Bacteria PENDING /hpf           Recent Labs      01/19/17   0115  01/18/17   1354  01/18/17   0956   FIO2I  32  35  35   HCO3I 28.1*  24.7  25.6   PCO2I  44.7  36.3  43.0   PHI  7.406  7.441  7.384   PO2I  66*  70*  69*     Telemetry:normal sinus rhythm    Imaging:  I have personally reviewed the patients radiographs and have reviewed the reports:  CXR Results  (Last 48 hours)               01/19/17 0140  XR CHEST PORT Final result    Impression:  Impression:       Support devices have been removed. Hypoventilatory exam with mild central vascular congestion, right basilar and   retrocardiac opacities present which may be atelectasis or infiltrate with   probable right pleural effusion. Pattern is not significantly changed. No evidence of pneumothorax. Gaseous distention of upper abdomen bowel loops. No acute pulmonary disease. Narrative:  Portable chest xray        Reason for exam:respir distress       Images: 1       Comparison:  1/18 and 1/17/2017 chest x-ray. .       Findings: The support devices have been removed. The cardiomediastinal contours are   stable. Lung volumes remain low. There is small central vascular congestion not significantly changed. The right   costophrenic angle remains obscured most likely secondary to pleural effusion   with associated atelectasis versus infiltrate. Retrocardiac region is dense but   unchanged. No pneumothorax. No free air. Gaseous distention of upper abdomen bowel loops. 01/18/17 0635  XR CHEST PORT Final result    Impression:  IMPRESSION:       1. Support lines and tubes, as above. Nonvisualized nasogastric/orogastric tube   tip projecting the left upper quadrant. 2. Hypoinflation with unchanged findings of congestion/cephalization and   findings of interstitial and alveolar edema. Nonspecific right infrahilar   confluent opacity could represent focal atelectasis/pneumonia. Suspected small   right greater than left pleural effusions.        3. Nonspecific left upper quadrant colonic gaseous distention. This could be   further evaluated with a upright/decubitus and supine views of the abdomen based   on clinical findings and concerns. Narrative:  EXAM:Chest X-Ray         History: Intubated, acute respiratory failure, follow-up airspace disease       Technique:  Portable Frontal View       Comparison: 01/17/2017, 01/16/2017, 01/15/2017       _______________       FINDINGS:   -Endotracheal tube tip is 4 cm above the lilli.   -Nasogastric tube tip is collimated off the inferior margin of the film in the   left upper quadrant. Persistent hypoinflation, unchanged enlarged configuration of the cardiac   silhouette. Persistent bronchovascular congestion with mild cephalized   appearance. No significant interval change in the diffuse hazy coarse and interstitial   pattern. Persistent confluent retrocardiac and left diaphragmatic opacity with   blunting of the left costophrenic angle. More apparent consolidative right infrahilar parenchymal opacity, similar   appearance to the 01/15/2017 exam. Unchanged right lower lung hazy confluent and   diaphragmatic consolidative opacity with blunted right costophrenic angle. Intact osseous structures. Other: Gaseous distended colon left upper quadrant.   _______________                Best practice :  Core measures; Antibiotic choice:  Severe Sepsis bundles;SIRS screen met? Yes  Fluids  Lactic acid ordered- initial and repeat Q6hrs if elevated till normalized. Cultures drawn. Antibiotic administered within 1hr-ICU  And 3hrs ED  Large bore IV- 2 sites          Pressors aim MAP >65mmHg  Steriods  Glycemic control  Mech. Ventilated patients- aim to keep peak plateau pressure 70-95DT H2O.   Sress ulcer prophylaxis  DVT prophylaxis        Total critical care time exclusive of procedures: 31 minutes  Leena Becerril MD

## 2017-01-19 NOTE — PROGRESS NOTES
Hospitalist Progress Note-critical care note     Patient: Dami Farrar MRN: [id number]  CSN: [id number]    YOB: 1945  Age: 70 y.o. Sex: male    DOA: 1/3/2017 LOS:  LOS: 15 days            Chief complaint: acute respiratory failure , DM,  HTN, cardiac arrest, aspiration pneumonia     Assessment/Plan         Patient Active Problem List   Diagnosis Code    Cardiac arrest ([facility] [address] I46.9    Anoxic encephalopathy ([facility] [address] G93.1    Acute respiratory failure ([address] J96.00    DM (diabetes mellitus) ([facility] [address] Q83.8    Diastolic congestive heart failure, NYHA class 1 ([facility]) I50.30    HTN (hypertension) I10    Hypoxia R09.02    COPD (chronic obstructive pulmonary disease) with acute bronchitis ([facility]) J44.0    TIFFANIE (acute kidney injury) ([facility] [address] N17.9    Aspiration pneumonia ([facility] [address] J69.0    Transaminitis R74.0    Bandemia without diagnosis of specific infection D72.825     1. Acute respiratory failure with hypoxia  Extubated yesterday , need bipap last night, will have chest PT   2. Aspiration pneumonia   Continue breathing tx and abx. 3. Copd  Exacerbation   Breathing tx and steroid , no wheezing now  4. Diastolic dysfunction -chf with PEA  On lasix, and aspirin ,card was on board   5. DM type II   continue insulin ,  6. Hypokalemia   K replacement   Nurse: respiratory distress last night, need bipap     Pt remains ill and still has high mortality and morbility. reamin in ICU   Review of systems:  Unable to obtained due to lethargic   Vital signs/Intake and Output:  Visit Vitals    /72    Pulse 97    Temp 100.3 °F (37.9 °C)    Resp 30    Ht 5' 10.08\" (1.78 m)    Wt 89 kg (196 lb 3.4 oz)    SpO2 100%    BMI 28.09 kg/m2     Current Shift:  01/19 0701 - 01/19 1900  In: -   Out: 500 [Urine:500]  Last three shifts:  01/17 1901 - 01/19 0700  In: 1645.9 [I.V.:102.9]  Out: 4575 [Urine:4575]    Physical Exam:  General:  Alert, not cooperative, no acute distress   HEENT: NC, Atraumatic.   PERRLA, anicteric sclerae. Lungs: + Wheezing/Rhonchi/Rales. Heart:  Regular  rhythm,  + murmur, No Rubs, No Gallops  Abdomen: Soft, Non distended, Non tender.  +Bowel sounds, ,Peg tube noted   Extremities: No c/c, edema and ecchymosis   Psych:   Not anxious or agitated. Neurologic:  Unable to detect due to lethargic           Labs: Results:       Chemistry Recent Labs      01/19/17   0630  01/19/17   0520  01/18/17   0545   GLU  102*  99  112*   NA  143  142  143   K  3.0*  2.8*  4.2   CL  106  105  109*   CO2  30  31  25   BUN  30*  29*  38*   CREA  0.85  0.84  0.88   CA  7.9*  7.7*  7.7*   AGAP  7  6  9   BUCR  35*  35*  43*      CBC w/Diff Recent Labs      01/19/17   0520  01/18/17   0545   WBC  22.4*  12.0   RBC  3.56*  3.43*   HGB  10.2*  9.7*   HCT  31.6*  30.8*   PLT  179  153      Cardiac Enzymes No results for input(s): CPK, CKND1, MARCO A in the last 72 hours. No lab exists for component: CKRMB, TROIP   Coagulation No results for input(s): PTP, INR, APTT in the last 72 hours. No lab exists for component: INREXT    Lipid Panel No results found for: CHOL, CHOLPOCT, CHOLX, CHLST, CHOLV, [id number], HDL, LDL, NLDLCT, DLDL, LDLC, DLDLP, 276684, VLDLC, VLDL, TGL, TGLX, TRIGL, [id number], TRIGP, TGLPOCT, [id number], CHHD, CHHDX   BNP No results for input(s): BNPP in the last 72 hours. Liver Enzymes No results for input(s): TP, ALB, TBIL, AP, SGOT, GPT in the last 72 hours.     No lab exists for component: DBIL   Thyroid Studies Lab Results   Component Value Date/Time    TSH 1.37 08/13/2015 04:35 AM        Procedures/imaging: see electronic medical records for all procedures/Xrays and details which were not copied into this note but were reviewed prior to creation of María Bustamante MD

## 2017-01-19 NOTE — PROGRESS NOTES
NUTRITION UPDATE    - Discussed in IDR, TF held and PEG placed to suction overnight due to concerns for GI intolerance (distended abdomen)  - Plan to resume TF's with Promote at low rate, gradually increase to goal 70 mL/hr via PEG  - TF's to be held at midnight for PICC placement tomorrow  - Keep HOB at least 30 degrees while TF infusing    Continue to follow per policy, pt remains at nutritional risk  Missy Fontaine, RD  PAGER:  [phone]

## 2017-01-19 NOTE — PROGRESS NOTES
Speech-Language Pathology:    SLP evaluation orders received; however, upon completion of chart review, pt not appropriate for SLP evaluation this day.  Currently, patient is:    []  Nausea/vomiting  [x]  RN Communication/ suggestion  []  Extreme Pain  []  Dialysis treatment in progress  []  Tolerating current po diet (per RN report)  []  Tolerating current po diet; however, poor po intake (per Rn report)    [x]  Receiving nutrition via tube feeding  []  Lethargic, solomnent, or difficult to arouse for safe po trials   []  Unable to manage secretions  []  Receiving intervention for respiratory distress  []  <6 hours s/p extubation   []   Other:     Will follow up as patient's schedule allows. Thank you.     Jennifer Weldon M.A., [id number] [facility]

## 2017-01-19 NOTE — INTERDISCIPLINARY ROUNDS
CRITICAL CARE INTERDISCIPLINARY ROUNDS    Patient Information:    Name:   Go Brown    Age:   70 y.o. Admission Date:   1/3/2017    Critical Care Day: 13 (code blue 1-6)    Attending Provider:   Tyson Galindo DO    Surgeon: n/a     Consultant(s):   Thomas Walker    Critical Care Physician:  Lizzy Og    Code Status: Full Code    Problem List:     Patient Active Problem List   Diagnosis Code    Cardiac arrest ([facility] [address] I46.9    Anoxic encephalopathy ([facility] [address] G93.1    Acute respiratory failure ([address] J96.00    DM (diabetes mellitus) ([facility] [address] J17.8    Diastolic congestive heart failure, NYHA class 1 ([facility] [address] I50.30    HTN (hypertension) I10    Hypoxia R09.02    COPD (chronic obstructive pulmonary disease) with acute bronchitis ([facility] [address] J44.0    TIFFANIE (acute kidney injury) ([facility] [address] N17.9    Aspiration pneumonia ([facility] [address] J69.0    Transaminitis R74.0    Bandemia without diagnosis of specific infection D72.825       Principal Problem:  Acute respiratory failure ([facility] [address]    During rounds the following quality care indicators and evidence based practices were addressed :  DVT Prophylaxis and PUD Prophylaxis Ac Day 14 ([facility] yes) ; Central Line Day: na Isolation: na; Antibiotic Stewardship: reviewed; Probiotics Necessary: na    Glycemic Control:   Recent Labs      01/19/17   0630  01/19/17   0520  01/18/17   0545   GLU  102*  99  112*   ;  Recent Labs      01/19/17   0115  01/18/17   1354  01/18/17   0956   PHI  7.406  7.441  7.384   PCO2I  44.7  36.3  43.0   PO2I  66*  70*  69*    Adjustments     Acute MI/PCI:   Not applicable    Door to Balloon: Admission Time: 0905      Heart Failure:    Not applicable     SCIP Measures for other Surgeries:   Not applicable     Pneumonia:    Not applicable    Interdisciplinary team rounds were held with the following team members , Care Management, Nursing, Nutrition, Physician and Respiratory Therapy. Plan of care discussed.       Goals of Care/ Recommendations: Hx of aspiration HOB closer to 45 degree. SBT currently, will . Samy Gregorio Extubation yesterday - needing Bipap. Restart TF. Additional dose Lasix and Bumex today. Pan culture r/t WBC. PICC line will need BIpap during insertion, if needed. Additional potassium 40 meq. See clinical pathway and/or care plan for interventions and desired outcomes.     Critical Care Discharge Status:  Red

## 2017-01-19 NOTE — PROGRESS NOTES
1930- Report and care received, assessment completed per flow sheet. Alert, nods head yes/no to questions, denies pain, does not speak, however, did attempt 2 times to move mouth when asked to say name. BUE rigid, straight, with some bilateral hand contractures. CTF infusing via PEG without difficulty. 0015- Reassessment completed and without change. 0100- HR in the 120's, RR high 30's to low 40's, oxygen saturation 91%. Work of breathing increased, utilizing some abdominal accessory muscles, lungs coarse with scattered rhonchi, BLL diminished. NT suctioned x 2 for a large amount of thick tan. RT called. 3631- RT in to see. 0116- ABG results noted, oxygen increased to 5 liters. Remains tachycardic, RR 30-40's, oxygen saturation maintaining 95-97%. RT to NT suction again, still has a wet cough. 0Chai CONCEPCION for [place] pulmonary updated, orders received for a stat CXR.    0135-CXR completed. Luther CONCEPCION on telephone, orders received for 20 mg lasix, to stop tube feeding and place PEG to LIS, and BiPAP.     0145- CTF stopped, PEG placed to LIWS, lasix administered per orders, placed on BiPAP per RT.    0200- HR slightly lower= 110, RR in the upper 30's, however, no longer using accessory muscles, oxygen saturation 98%, resting with eyes closed, appears comfortable. [id number] [place] updated, minimal output (only what is in the suction tubing), orders received to clamp PEG tube. 0300- NAD, reassessment completed and without change. Respirations unlabored on BiPAP. Bath and linen change completed.

## 2017-01-19 NOTE — DIABETES MGMT
GLYCEMIC CONTROL & NUTRITION:    - Discussed in rounds, known h/o Dm2, controlled, current A1C 6.2%  - recommend continue current insulin regimen, BG well controlled  - TF continues, was decreased after PEG placement, currently at 30 ml/hr (Promote with goal rate of 70 ml/hr)    Recent Glucose Results:   Lab Results   Component Value Date/Time     (H) 01/18/2017 05:45 AM    GLUCPOC 116 (H) 01/18/2017 06:02 PM    GLUCPOC 101 01/18/2017 11:35 AM    GLUCPOC 119 (H) 01/18/2017 05:45 AM            PETER Dupont, MPH, RD

## 2017-01-19 NOTE — PROGRESS NOTES
[facility] Follow up    Notified by RN that pt had a change in respiratory status - now tachypneic in 30s with some increased work of breathing; O2 sat remaining around 98% on 3L. RT and RN able to NT suction some thick secretions and pt is able to expectorate when asked to. Pt was extubated yesterday with good oxygen saturation and VS post-extubation. Pt previously was intubated 1/4 following brief cardiac arrest and extubated on 1/8; again  reintubated on 1/11 for decreased mentation. +PEG and tolerating tube feeds; no vomiting.  + BM, flatus. Review of I/O -pt appears + 8L. Echo shows Gr 1 diastolic dysfunction. Since 6p-1am, UO approximately 450ml. CXR imaging showed increasing pleural effusion b/l, R>L. ABG obtained (0115): 7.406/ 44.7/ 66/ 28/ 93 - 3L increased to 5L per RT - maintaining O2 sats however continue to have tachypnea and wob    · Stat CXR now - appears to have slight worsening on right pleural effusion; stable L colonic distention  · Trial of bipap for increased work of breathing however with low threshold for intubation if with continued increased wob or change in mentation  · Low dose lasix 20 mg x 1 IV  · Hold TF; PEG to LIWS  · Monitor closely    January-Cassidy MATA PA-C  2:00 AM    Addendum 0255  Called to unit and spoke with RN who described pt to be comfortable now although still tachypneic however no longer has work of breathing. O2 sat 100% on 15/8/40%. UO post lasix around 400ml x 1 hour. PEG tube dry on LIWS.   · Continue bipap for now; may try off bipap later in am  · Monitor closely for changes    January-Cassidy MATA PA-C  2:58 AM

## 2017-01-19 NOTE — WOUND CARE
Patient seen as a part of ICU rounds. Patients skin is intact at this time. Patient is repositioned frequently. Wound care will continue to monitor during admission.

## 2017-01-19 NOTE — PROGRESS NOTES
Cardiology Progress Note      1/19/2017 12:52 PM    Admit Date: 1/3/2017    Admit Diagnosis: Hypoxia  peg tube insertion      Subjective:     Jodi Randall has multiple issues.     Visit Vitals    /72    Pulse 97    Temp 100.3 °F (37.9 °C)    Resp 30    Ht 5' 10.08\" (1.78 m)    Wt 89 kg (196 lb 3.4 oz)    SpO2 100%    BMI 28.09 kg/m2     Current Facility-Administered Medications   Medication Dose Route Frequency    albumin human 25% (BUMINATE) solution 12.5 g  12.5 g IntraVENous Q6H    potassium chloride (KAON 10%) 20 mEq/15 mL oral liquid 40 mEq  40 mEq Oral NOW    insulin lispro (HUMALOG) injection 3 Units  3 Units SubCUTAneous Q6H    albuterol-ipratropium (DUO-NEB) 2.5 MG-0.5 MG/3 ML  3 mL Nebulization Q4H RT    arformoterol (BROVANA) neb solution 15 mcg  15 mcg Nebulization BID RT    budesonide (PULMICORT) 500 mcg/2 ml nebulizer suspension  500 mcg Nebulization BID RT    atorvastatin (LIPITOR) tablet 80 mg  80 mg Per NG tube DAILY    insulin glargine (LANTUS) injection 12 Units  12 Units SubCUTAneous DAILY    pantoprazole (PROTONIX) 40 mg in sodium chloride 0.9 % 10 mL injection  40 mg IntraVENous DAILY    lidocaine (LIDODERM) 5 % patch 2 Patch  2 Patch TransDERmal Q24H    hydrALAZINE (APRESOLINE) 20 mg/mL injection 20 mg  20 mg IntraVENous Q6H PRN    levothyroxine (SYNTHROID) injection 87.5 mcg  87.5 mcg IntraVENous Q24H    ELECTROLYTE REPLACEMENT PROTOCOL  1 Each Other PRN    cloNIDine (CATAPRES) 0.1 mg/24 hr patch 1 Patch  1 Patch TransDERmal Q7D    insulin lispro (HUMALOG) injection   SubCUTAneous Q6H    sodium chloride (NS) flush 5-10 mL  5-10 mL IntraVENous PRN    acetaminophen (TYLENOL) tablet 650 mg  650 mg Oral Q6H PRN    aspirin chewable tablet 81 mg  81 mg Per G Tube DAILY    baclofen (LIORESAL) tablet 10 mg  10 mg Oral TID    bisacodyl (DULCOLAX) suppository 10 mg  10 mg Rectal DAILY PRN    guaiFENesin (ROBITUSSIN) 100 mg/5 mL oral liquid 200 mg  200 mg Oral Q6H PRN    multivitamin, tx-iron-ca-min (THERA-M w/ IRON) tablet 1 Tab  1 Tab Oral DAILY    heparin (porcine) injection 5,000 Units  5,000 Units SubCUTAneous Q8H    glucose chewable tablet 16 g  4 Tab Oral PRN    glucagon (GLUCAGEN) injection 1 mg  1 mg IntraMUSCular PRN    dextrose (D50W) injection syrg 12.5-25 g  25-50 mL IntraVENous PRN         Objective:      Physical Exam:  Visit Vitals    /72    Pulse 97    Temp 100.3 °F (37.9 °C)    Resp 30    Ht 5' 10.08\" (1.78 m)    Wt 89 kg (196 lb 3.4 oz)    SpO2 100%    BMI 28.09 kg/m2     General Appearance:  Well developed, well nourished,alert and oriented x 3, and individual in no acute distress. Ears/Nose/Mouth/Throat:   Hearing grossly normal.         Neck: Supple. Chest:   Lungs clear to auscultation bilaterally. Cardiovascular:  Regular rate and rhythm, S1, S2 normal, no murmur. Abdomen:   Soft, non-tender, bowel sounds are active. Extremities: No edema bilaterally. Skin: Warm and dry. Data Review:   Labs:    Recent Results (from the past 24 hour(s))   POC G3    Collection Time: 01/18/17  1:54 PM   Result Value Ref Range    Device: VENT      FIO2 (POC) 35 %    pH (POC) 7.441 7.35 - 7.45      pCO2 (POC) 36.3 35.0 - 45.0 MMHG    pO2 (POC) 70 (L) 80 - 100 MMHG    HCO3 (POC) 24.7 22 - 26 MMOL/L    sO2 (POC) 95 92 - 97 %    Base excess (POC) 1 mmol/L    Mode CPAP/SPON      PEEP/CPAP (POC) 6 cmH2O    Pressure support 7 cmH2O    Allens test (POC) YES      Total resp.  rate 22      Site LEFT RADIAL      Specimen type (POC) ARTERIAL      Performed by Arlene Ramírez    GLUCOSE, POC    Collection Time: 01/18/17  6:02 PM   Result Value Ref Range    Glucose (POC) 116 (H) 70 - 110 mg/dL   GLUCOSE, POC    Collection Time: 01/18/17 11:39 PM   Result Value Ref Range    Glucose (POC) 123 (H) 70 - 110 mg/dL   POC G3    Collection Time: 01/19/17  1:15 AM   Result Value Ref Range    Device: NASAL CANNULA      Flow rate (POC) 3.0 L/M    FIO2 (POC) 32 %    pH (POC) 7.406 7.35 - 7.45      pCO2 (POC) 44.7 35.0 - 45.0 MMHG    pO2 (POC) 66 (L) 80 - 100 MMHG    HCO3 (POC) 28.1 (H) 22 - 26 MMOL/L    sO2 (POC) 93 92 - 97 %    Base excess (POC) 3 mmol/L    Allens test (POC) YES      Total resp.  rate 34      Site LEFT RADIAL      Patient temp. 98.8      Specimen type (POC) ARTERIAL      Performed by Lakshmi Oglesby    MAGNESIUM    Collection Time: 01/19/17  5:20 AM   Result Value Ref Range    Magnesium 2.0 1.8 - 2.4 mg/dL   METABOLIC PANEL, BASIC    Collection Time: 01/19/17  5:20 AM   Result Value Ref Range    Sodium 142 136 - 145 mmol/L    Potassium 2.8 (LL) 3.5 - 5.5 mmol/L    Chloride 105 100 - 108 mmol/L    CO2 31 21 - 32 mmol/L    Anion gap 6 3.0 - 18 mmol/L    Glucose 99 74 - 99 mg/dL    BUN 29 (H) 7.0 - 18 MG/DL    Creatinine 0.84 0.6 - 1.3 MG/DL    BUN/Creatinine ratio 35 (H) 12 - 20      GFR est AA >60 >60 ml/min/1.73m2    GFR est non-AA >60 >60 ml/min/1.73m2    Calcium 7.7 (L) 8.5 - 10.1 MG/DL   CBC W/O DIFF    Collection Time: 01/19/17  5:20 AM   Result Value Ref Range    WBC 22.4 (H) 4.6 - 13.2 K/uL    RBC 3.56 (L) 4.70 - 5.50 M/uL    HGB 10.2 (L) 13.0 - 16.0 g/dL    HCT 31.6 (L) 36.0 - 48.0 %    MCV 88.8 74.0 - 97.0 FL    MCH 28.7 24.0 - 34.0 PG    MCHC 32.3 31.0 - 37.0 g/dL    RDW 15.2 (H) 11.6 - 14.5 %    PLATELET 240 662 - 344 K/uL    MPV 11.4 9.2 - 11.8 FL   GLUCOSE, POC    Collection Time: 01/19/17  5:45 AM   Result Value Ref Range    Glucose (POC) 113 (H) 70 - 500 mg/dL   METABOLIC PANEL, BASIC    Collection Time: 01/19/17  6:30 AM   Result Value Ref Range    Sodium 143 136 - 145 mmol/L    Potassium 3.0 (L) 3.5 - 5.5 mmol/L    Chloride 106 100 - 108 mmol/L    CO2 30 21 - 32 mmol/L    Anion gap 7 3.0 - 18 mmol/L    Glucose 102 (H) 74 - 99 mg/dL    BUN 30 (H) 7.0 - 18 MG/DL    Creatinine 0.85 0.6 - 1.3 MG/DL    BUN/Creatinine ratio 35 (H) 12 - 20      GFR est AA >60 >60 ml/min/1.73m2    GFR est non-AA >60 >60 ml/min/1.73m2    Calcium 7.9 (L) 8.5 - 10.1 MG/DL   GLUCOSE, POC    Collection Time: 01/19/17 11:52 AM   Result Value Ref Range    Glucose (POC) 105 70 - 110 mg/dL       Telemetry: normal sinus rhythm      Assessment:     Principal Problem:    Acute respiratory failure ([facility] (8/3/2015)    Active Problems:    Cardiac arrest ([facility] (8/1/2015)      Anoxic encephalopathy ([facility] (8/3/2015)      DM (diabetes mellitus) ([facility] (8/0/5873)      Diastolic congestive heart failure, NYHA class 1 ([facility] (11/23/2016)      HTN (hypertension) (11/23/2016)      COPD (chronic obstructive pulmonary disease) with acute bronchitis ([facility] (1/5/2017)      TIFFANIE (acute kidney injury) ([facility] (1/5/2017)      Aspiration pneumonia ([facility] (1/7/2017)      Transaminitis (1/15/2017)      Bandemia without diagnosis of specific infection (1/15/2017)        Plan:     The patient is off the ventilator. He remains hemodynamically stable. His rhythm is sinus. Continue to follow.     Meron Coreas MD

## 2017-01-20 ENCOUNTER — APPOINTMENT (OUTPATIENT)
Dept: GENERAL RADIOLOGY | Age: 72
DRG: 207 | End: 2017-01-20
Attending: INTERNAL MEDICINE
Payer: MEDICARE

## 2017-01-20 LAB
ALDOLASE SERPL-CCNC: 7.3 U/L (ref 3.3–10.3)
ANION GAP BLD CALC-SCNC: 7 MMOL/L (ref 3–18)
BUN SERPL-MCNC: 29 MG/DL (ref 7–18)
BUN/CREAT SERPL: 35 (ref 12–20)
CALCIUM SERPL-MCNC: 8.3 MG/DL (ref 8.5–10.1)
CHLORIDE SERPL-SCNC: 107 MMOL/L (ref 100–108)
CO2 SERPL-SCNC: 31 MMOL/L (ref 21–32)
CREAT SERPL-MCNC: 0.82 MG/DL (ref 0.6–1.3)
ERYTHROCYTE [DISTWIDTH] IN BLOOD BY AUTOMATED COUNT: 15.2 % (ref 11.6–14.5)
GLUCOSE BLD STRIP.AUTO-MCNC: 107 MG/DL (ref 70–110)
GLUCOSE BLD STRIP.AUTO-MCNC: 85 MG/DL (ref 70–110)
GLUCOSE BLD STRIP.AUTO-MCNC: 86 MG/DL (ref 70–110)
GLUCOSE BLD STRIP.AUTO-MCNC: 87 MG/DL (ref 70–110)
GLUCOSE BLD STRIP.AUTO-MCNC: 97 MG/DL (ref 70–110)
GLUCOSE SERPL-MCNC: 88 MG/DL (ref 74–99)
HCT VFR BLD AUTO: 29.1 % (ref 36–48)
HGB BLD-MCNC: 9.1 G/DL (ref 13–16)
MAGNESIUM SERPL-MCNC: 2.2 MG/DL (ref 1.8–2.4)
MCH RBC QN AUTO: 28.3 PG (ref 24–34)
MCHC RBC AUTO-ENTMCNC: 31.3 G/DL (ref 31–37)
MCV RBC AUTO: 90.4 FL (ref 74–97)
PLATELET # BLD AUTO: 149 K/UL (ref 135–420)
PMV BLD AUTO: 11.5 FL (ref 9.2–11.8)
POTASSIUM SERPL-SCNC: 2.8 MMOL/L (ref 3.5–5.5)
RBC # BLD AUTO: 3.22 M/UL (ref 4.7–5.5)
SODIUM SERPL-SCNC: 145 MMOL/L (ref 136–145)
WBC # BLD AUTO: 13.2 K/UL (ref 4.6–13.2)

## 2017-01-20 PROCEDURE — 74011250636 HC RX REV CODE- 250/636: Performed by: INTERNAL MEDICINE

## 2017-01-20 PROCEDURE — 65660000000 HC RM CCU STEPDOWN

## 2017-01-20 PROCEDURE — P9047 ALBUMIN (HUMAN), 25%, 50ML: HCPCS | Performed by: INTERNAL MEDICINE

## 2017-01-20 PROCEDURE — 74011250637 HC RX REV CODE- 250/637: Performed by: INTERNAL MEDICINE

## 2017-01-20 PROCEDURE — 97161 PT EVAL LOW COMPLEX 20 MIN: CPT

## 2017-01-20 PROCEDURE — 74011250637 HC RX REV CODE- 250/637: Performed by: HOSPITALIST

## 2017-01-20 PROCEDURE — 74011000250 HC RX REV CODE- 250: Performed by: INTERNAL MEDICINE

## 2017-01-20 PROCEDURE — 83735 ASSAY OF MAGNESIUM: CPT | Performed by: INTERNAL MEDICINE

## 2017-01-20 PROCEDURE — 94640 AIRWAY INHALATION TREATMENT: CPT

## 2017-01-20 PROCEDURE — 82962 GLUCOSE BLOOD TEST: CPT

## 2017-01-20 PROCEDURE — 77010033678 HC OXYGEN DAILY

## 2017-01-20 PROCEDURE — 97165 OT EVAL LOW COMPLEX 30 MIN: CPT

## 2017-01-20 PROCEDURE — 80048 BASIC METABOLIC PNL TOTAL CA: CPT | Performed by: INTERNAL MEDICINE

## 2017-01-20 PROCEDURE — 31720 CLEARANCE OF AIRWAYS: CPT

## 2017-01-20 PROCEDURE — 85027 COMPLETE CBC AUTOMATED: CPT | Performed by: INTERNAL MEDICINE

## 2017-01-20 PROCEDURE — 77030011256 HC DRSG MEPILEX <16IN NO BORD MOLN -A

## 2017-01-20 PROCEDURE — 74011250637 HC RX REV CODE- 250/637: Performed by: FAMILY MEDICINE

## 2017-01-20 PROCEDURE — C9113 INJ PANTOPRAZOLE SODIUM, VIA: HCPCS | Performed by: INTERNAL MEDICINE

## 2017-01-20 PROCEDURE — 36415 COLL VENOUS BLD VENIPUNCTURE: CPT | Performed by: INTERNAL MEDICINE

## 2017-01-20 RX ORDER — POTASSIUM CHLORIDE 20MEQ/15ML
20 LIQUID (ML) ORAL DAILY
Status: DISCONTINUED | OUTPATIENT
Start: 2017-01-21 | End: 2017-01-27 | Stop reason: HOSPADM

## 2017-01-20 RX ORDER — POTASSIUM CHLORIDE 20MEQ/15ML
40 LIQUID (ML) ORAL
Status: DISPENSED | OUTPATIENT
Start: 2017-01-20 | End: 2017-01-21

## 2017-01-20 RX ORDER — POTASSIUM CHLORIDE 20MEQ/15ML
40 LIQUID (ML) ORAL 2 TIMES DAILY WITH MEALS
Status: DISCONTINUED | OUTPATIENT
Start: 2017-01-20 | End: 2017-01-20 | Stop reason: ALTCHOICE

## 2017-01-20 RX ADMIN — IPRATROPIUM BROMIDE AND ALBUTEROL SULFATE 3 ML: .5; 3 SOLUTION RESPIRATORY (INHALATION) at 04:19

## 2017-01-20 RX ADMIN — SODIUM CHLORIDE 40 MG: 9 INJECTION INTRAMUSCULAR; INTRAVENOUS; SUBCUTANEOUS at 09:00

## 2017-01-20 RX ADMIN — IPRATROPIUM BROMIDE AND ALBUTEROL SULFATE 3 ML: .5; 3 SOLUTION RESPIRATORY (INHALATION) at 07:16

## 2017-01-20 RX ADMIN — ARFORMOTEROL TARTRATE 15 MCG: 15 SOLUTION RESPIRATORY (INHALATION) at 07:16

## 2017-01-20 RX ADMIN — HEPARIN SODIUM 5000 UNITS: 5000 INJECTION, SOLUTION INTRAVENOUS; SUBCUTANEOUS at 09:00

## 2017-01-20 RX ADMIN — BACLOFEN 10 MG: 10 TABLET ORAL at 17:45

## 2017-01-20 RX ADMIN — ALBUMIN (HUMAN) 12.5 G: 0.25 INJECTION, SOLUTION INTRAVENOUS at 05:53

## 2017-01-20 RX ADMIN — IPRATROPIUM BROMIDE AND ALBUTEROL SULFATE 3 ML: .5; 3 SOLUTION RESPIRATORY (INHALATION) at 20:54

## 2017-01-20 RX ADMIN — HEPARIN SODIUM 5000 UNITS: 5000 INJECTION, SOLUTION INTRAVENOUS; SUBCUTANEOUS at 17:45

## 2017-01-20 RX ADMIN — BUDESONIDE 500 MCG: 0.5 INHALANT RESPIRATORY (INHALATION) at 07:16

## 2017-01-20 RX ADMIN — ATORVASTATIN CALCIUM 80 MG: 20 TABLET, FILM COATED ORAL at 09:00

## 2017-01-20 RX ADMIN — ASPIRIN 81 MG 81 MG: 81 TABLET ORAL at 09:00

## 2017-01-20 RX ADMIN — POTASSIUM CHLORIDE 40 MEQ: 20 SOLUTION ORAL at 20:00

## 2017-01-20 RX ADMIN — MULTIPLE VITAMINS W/ MINERALS TAB 1 TABLET: TAB at 09:00

## 2017-01-20 RX ADMIN — IPRATROPIUM BROMIDE AND ALBUTEROL SULFATE 3 ML: .5; 3 SOLUTION RESPIRATORY (INHALATION) at 11:00

## 2017-01-20 RX ADMIN — IPRATROPIUM BROMIDE AND ALBUTEROL SULFATE 3 ML: .5; 3 SOLUTION RESPIRATORY (INHALATION) at 16:15

## 2017-01-20 RX ADMIN — BACLOFEN 10 MG: 10 TABLET ORAL at 22:00

## 2017-01-20 RX ADMIN — HEPARIN SODIUM 5000 UNITS: 5000 INJECTION, SOLUTION INTRAVENOUS; SUBCUTANEOUS at 00:05

## 2017-01-20 RX ADMIN — LEVOTHYROXINE SODIUM ANHYDROUS 87.5 MCG: 100 INJECTION, POWDER, LYOPHILIZED, FOR SOLUTION INTRAVENOUS at 12:21

## 2017-01-20 RX ADMIN — BACLOFEN 10 MG: 10 TABLET ORAL at 09:00

## 2017-01-20 RX ADMIN — BUDESONIDE 500 MCG: 0.5 INHALANT RESPIRATORY (INHALATION) at 20:54

## 2017-01-20 RX ADMIN — ALBUMIN (HUMAN) 12.5 G: 0.25 INJECTION, SOLUTION INTRAVENOUS at 00:03

## 2017-01-20 RX ADMIN — POTASSIUM CHLORIDE 40 MEQ: 20 SOLUTION ORAL at 08:10

## 2017-01-20 RX ADMIN — ARFORMOTEROL TARTRATE 15 MCG: 15 SOLUTION RESPIRATORY (INHALATION) at 20:54

## 2017-01-20 NOTE — PROGRESS NOTES
Problem: Mobility Impaired (Adult and Pediatric)  Goal: *Acute Goals and Plan of Care (Insert Text)  Outcome: Resolved/Met Date Met:  01/20/17  PHYSICAL THERAPY EVALUATION AND DISCHARGE     Patient: Gonzales Guillermo (64 y.o. male)  Date: 1/20/2017  Primary Diagnosis: Hypoxia  peg tube insertion  Procedure(s) (LRB):  PERCUTANEOUS ENDOSCOPIC GASTROSTOMY TUBE INSERTION (N/A) 3 Days Post-Op   Precautions:   Fall      ASSESSMENT AND RECOMMENDATIONS:  Based on the objective data described below, the patient presents with total assist with mobility. Pt indicated that he lives in a long term care facility and is transferred to/ from w/c by pradip lift and he is dependent for w/c propulsion. Skilled physicalal therapy is not indicated at this time. Discharge Recommendations: None  Further Equipment Recommendations for Discharge: N/A        SUBJECTIVE:   Patient stated pt nodded head yes and no to questions.       OBJECTIVE DATA SUMMARY:       Past Medical History   Diagnosis Date    [facility] ([facility]      Bursitis      CAD (coronary artery disease)      Cardiac arrest ([facility]      Cognitive communication deficit      Contracture of muscle      Depression      Diabetes ([facility]      Diarrhea      Disorder of kidney and ureter      Dysphagia      GERD (gastroesophageal reflux disease)      High cholesterol      Hypertension      Hypothyroid      Lack of coordination      Polyarthritis      Sleep apnea      Sleep disorder      Weakness       Past Surgical History   Procedure Laterality Date    Hx gi           peg    Hx amputation        Hx hernia repair         Barriers to Learning/Limitations: None  Compensate with: N/A  Prior Level of Function/Home Situation: dependent for all mobility  Home Situation  Home Environment: Long term care  One/Two Story Residence: One story  Living Alone: No  Support Systems: Skilled nursing facility  Patient Expects to be Discharged to[de-identified]  (Long term care)  Current DME Used/Available at Home: Hospital bed  Critical Behavior:  Neurologic State: Drowsy; Eyes open spontaneously  Psychosocial  Patient Behaviors: Calm  Purposeful Interaction: Yes  Pt Identified Daily Priority: Clinical issues (comment)  Caritas Process: Establish trust  Caring Interventions: Reassure  Reassure: Informing  Therapeutic Modalities: Intentional therapeutic touch  Strength:    Strength: Grossly decreased, non-functional  Tone & Sensation:   Tone: Abnormal      Range Of Motion:  AROM: Grossly decreased, non-functional  Functional Mobility:  Bed Mobility:  dependent  Pain:  Pain Scale 1: Numeric (0 - 10)  Pain level 0     Please refer to the flowsheet for vital signs taken during this treatment. After treatment:   [ ] Patient left in no apparent distress sitting up in chair  [ ] Patient left sitting on EOB  [X] Patient left in no apparent distress in bed  [ ] Patient declined to be OOB at this time due to   [X] Call bell left within reach  [X] Nursing notified(Summer)  [ ] Caregiver present  [ ] Bed alarm activated  [ ] CPM placed on  knee with degrees of PROM  COMMUNICATION/EDUCATION:   [ ]         Fall prevention education was provided and the patient/caregiver indicated understanding. [ ]         Patient/family have participated as able in goal setting and plan of care. [ ]         Patient/family agree to work toward stated goals and plan of care. [ ]         Patient understands intent and goals of therapy, but is neutral about his/her participation. [ ]         Patient is unable to participate in goal setting and plan of care. Thank you for this referral.  Dwain Gill, PT   Time Calculation: 15 mins  Mobility  Current  CN= 100%   Goal  CN= 100%  D/C  CN= 100%. The severity rating is based on the Level of Assistance required for Functional Mobility and ADLs.

## 2017-01-20 NOTE — ROUTINE PROCESS
Bedside and Verbal shift change report given to MARJORIE Lanza RN (oncoming nurse) by Nalini Malin RN  (offgoing nurse). Report included the following information SBAR and Kardex.

## 2017-01-20 NOTE — PROGRESS NOTES
Alarm parameters reviewed, on and audible Adjusted to meet patient clinical condition Shift report received from [facility] Brianna RN. Tube feeding to be held at midnight due to Picc line insertion 01/20/17 per report. 0000 shift assessment completed per doc flow sheet. Patient drowsy, eyes open spont, nods appropriately to questions, follows simple commands, B/L  weak, limited movement to extremities. Lungs coarse, diminished in bases, 02 sats 99% w/02 at 3L via NC. Resting quietly at this time. NAD, Vss.    0200  Hygiene and comfort measures provided. 0400  Reassessment completed per doc flow sheet w/no change in status. 0826  Paged Judy Campbell w/patient's blood sugar 86 and to report his PICC line procedure this morning. Order received for q2h blood sugars until patient begins to eat or tube feeding is started again. 9575  Potassium 2.8  Dr. Mansoor Link notified, order for Potassium Replacement. Alarm parameters reviewed and opportunities for questions provided. Bedside and Verbal shift change report given to TRISTAN Justice RN (oncoming nurse) by Roque Walter RN (offgoing nurse). Report included the following information SBAR, Kardex, Intake/Output, MAR, Recent Results and Alarm Parameters .

## 2017-01-20 NOTE — PROGRESS NOTES
Bedside and Verbal shift change report given to Leana Lozano RN (oncoming nurse) by Marylene Gist rn (offgoing nurse). Report included the following information SBAR, Kardex, Intake/Output, MAR, Accordion, Recent Results, Med Rec Status, Cardiac Rhythm nsr-st and Alarm Parameters .

## 2017-01-20 NOTE — PROGRESS NOTES
Received report and assumed care of pt. Assessment completed per flowsheet. Pt drowsy, awakens to voice and follows commands, does not appear to be able to speak at this time but nods appropriately to questions. Pt denies pain at this time. BUE and BLE rigid and edematous. Nasal trumpet in place for suctioning purposes. PEG tube noted and infusing per order. Ac intact and draining. Multiple skin tears noted to left arm, covered with Mepilex. VSS, will continue to monitor.

## 2017-01-20 NOTE — PROGRESS NOTES
[facility]  Pulmonary, Critical Care, and Sleep Medicine    Name: Nallely Hernández MRN: [id number]   : 1945 Hospital: [facility] [place]   Date: 2017        IMPRESSION:   · Aspiration Pneumonia with acute respiratory failure improved  · Acute hypoxic Respiratory Failure from above, pt reintubated   . Extubated 2017  · Previous anoxic head encephalopathy, now at baseline   · Diastolic CHF at baseline, improved  · S/p PEA arrest  · COPD exacerbation, better  · TIFFANIE resolved      RECOMMENDATIONS:   · Continue Pulmonary toilette, airway clearance maneuvers. · Cards- hemodynamically stable now. Resp arrest led to cardiac arrest 1 mg epi given and 1 minute of CPR with ROSC   · Pulm-  Continue  Brovana and Pulmicort. Taper Solumedrol, convert Duoneb to prn  · Renal- electrolyte replacement per protocol  · Heme- H/H and plt stable  · GI,  I believe pt does aspirate chronically, pt now has PEG. Continue PEG feeds   · ID- aspiration pneumonia food seen at cords  · DVT prophylaxis  · GI prophylaxis to be discontinued, pt on enteral feeds  · Discussed in ICU interdisciplinary rounds     Subjective/History:   17  Remains off vent. No distress overnight  Pt now back to baseline level of comfort  S/p PEG placement     HPI  This patient has been seen and evaluated at the request of Dr. Johana Ramirez for aspiration pneumonia and resp failure. The patient is a 70 y.o. male admitted 3  with COPD exacerbation to the medical floor. Last evening vomited noticed to be hypoxic an dactually lost pulse. 1 Mg epi with ROSC and intubated for cyanosis. Moved to ICU discussed with family and wife should be here this afternoon. In SNF prior to this admission and has diastolic dysfunction at baseline. Currently stable off vent. Copious secretions managed by suctioning. Weak cough    Pt denies chest pain.  Afebrile   Current Facility-Administered Medications   Medication Dose Route Frequency  potassium chloride (KAON 10%) 20 mEq/15 mL oral liquid 40 mEq  40 mEq Oral BID WITH MEALS    [START ON 2017] potassium chloride (KAON 10%) 20 mEq/15 mL oral liquid 20 mEq  20 mEq Oral DAILY    albuterol-ipratropium (DUO-NEB) 2.5 MG-0.5 MG/3 ML  3 mL Nebulization Q4H RT    arformoterol (BROVANA) neb solution 15 mcg  15 mcg Nebulization BID RT    budesonide (PULMICORT) 500 mcg/2 ml nebulizer suspension  500 mcg Nebulization BID RT    atorvastatin (LIPITOR) tablet 80 mg  80 mg Per NG tube DAILY    insulin glargine (LANTUS) injection 12 Units  12 Units SubCUTAneous DAILY    pantoprazole (PROTONIX) 40 mg in sodium chloride 0.9 % 10 mL injection  40 mg IntraVENous DAILY    lidocaine (LIDODERM) 5 % patch 2 Patch  2 Patch TransDERmal Q24H    levothyroxine (SYNTHROID) injection 87.5 mcg  87.5 mcg IntraVENous Q24H    cloNIDine (CATAPRES) 0.1 mg/24 hr patch 1 Patch  1 Patch TransDERmal Q7D    insulin lispro (HUMALOG) injection   SubCUTAneous Q6H    aspirin chewable tablet 81 mg  81 mg Per G Tube DAILY    baclofen (LIORESAL) tablet 10 mg  10 mg Oral TID    multivitamin, tx-iron-ca-min (THERA-M w/ IRON) tablet 1 Tab  1 Tab Oral DAILY    heparin (porcine) injection 5,000 Units  5,000 Units SubCUTAneous Q8H     Allergies   Allergen Reactions    Penicillins Itching      Objective:   Vital Signs:    Visit Vitals    /80    Pulse 91    Temp 98.5 °F (36.9 °C)    Resp 24    Ht 5' 10.08\" (1.78 m)    Wt 89 kg (196 lb 3.4 oz)    SpO2 100%    BMI 28.09 kg/m2       O2 Device: Nasal cannula   O2 Flow Rate (L/min): 3 l/min   Temp (24hrs), Av.1 °F (37.3 °C), Min:98 °F (36.7 °C), Max:100.3 °F (37.9 °C)       Intake/Output:   Last shift:         Last 3 shifts:  1901 -  0700  In: 2347 [I.V.:100]  Out: 4075 [Urine:4075]    Intake/Output Summary (Last 24 hours) at 17 1144  Last data filed at 17 0428   Gross per 24 hour   Intake             1877 ml   Output             2325 ml   Net -448 ml     Physical Exam:    General:   awake and nods appropriately   Head:  Normocephalic, without obvious abnormality,    Eyes:  Conjunctivae/corneas clear. PERRL,   Nose: Nares normal.   Mucosa normal. No drainage or sinus tenderness. Throat: No stridor    Neck: Supple, symmetrical, trachea midline, no adenopathy, no carotid bruit and no JVD. Lungs:   Occasional rhonchi both mid LF   Chest wall:  No tenderness or deformity. Heart:  Regular rate and rhythm, S1, S2 normal, no murmur, click, rub or gallop. Abdomen:   Soft, non-tender. Bowel sounds normal. No masses,  No organomegaly.    Extremities: Extremities normal, atraumatic, no cyanosis , 2+ edema both lower extremities             Data:     Recent Results (from the past 24 hour(s))   GLUCOSE, POC    Collection Time: 01/19/17 11:52 AM   Result Value Ref Range    Glucose (POC) 105 70 - 110 mg/dL   CULTURE, RESPIRATORY/SPUTUM/BRONCH W GRAM STAIN    Collection Time: 01/19/17 12:15 PM   Result Value Ref Range    Special Requests: NO SPECIAL REQUESTS      GRAM STAIN 10-25 WBC/lpf      GRAM STAIN <10 EPI/lpf      GRAM STAIN FEW  GRAM POSITIVE COCCI  IN PAIRS        GRAM STAIN FEW  GRAM POSITIVE COCCI  IN GROUPS        GRAM STAIN RARE  GRAM POSITIVE RODS        GRAM STAIN MUCUS PRESENT      Culture result: PENDING    URINALYSIS W/MICROSCOPIC    Collection Time: 01/19/17 12:15 PM   Result Value Ref Range    Color DARK YELLOW      Appearance TURBID      Specific gravity 1.021 1.005 - 1.030      pH (UA) 5.0 5.0 - 8.0      Protein 100 (A) NEG mg/dL    Glucose NEGATIVE  NEG mg/dL    Ketone TRACE (A) NEG mg/dL    Bilirubin SMALL (A) NEG      Blood LARGE (A) NEG      Urobilinogen 1.0 0.2 - 1.0 EU/dL    Nitrites NEGATIVE  NEG      Leukocyte Esterase NEGATIVE  NEG      WBC 0 to 3 0 - 5 /hpf    RBC 0 to 3 0 - 5 /hpf    Epithelial cells 0 0 - 5 /lpf    Bacteria 1+ (A) NEG /hpf    Granular Cast 4 to 10 NEG /lpf   CULTURE, URINE    Collection Time: 01/19/17 12:15 PM   Result Value Ref Range    Special Requests: NO SPECIAL REQUESTS      Culture result: NO GROWTH AFTER 14 HOURS     CULTURE, BLOOD    Collection Time: 01/19/17  2:20 PM   Result Value Ref Range    Special Requests: NO SPECIAL REQUESTS      Culture result: NO GROWTH AFTER 17 HOURS     PROTHROMBIN TIME + INR    Collection Time: 01/19/17  2:20 PM   Result Value Ref Range    Prothrombin time 15.1 11.5 - 15.2 sec    INR 1.2 0.8 - 1.2     CK    Collection Time: 01/19/17  2:20 PM   Result Value Ref Range    CK 64 39 - 308 U/L   PTT    Collection Time: 01/19/17  2:20 PM   Result Value Ref Range    aPTT 37.7 (H) 23.0 - 36.4 SEC   GLUCOSE, POC    Collection Time: 01/19/17  5:30 PM   Result Value Ref Range    Glucose (POC) 106 70 - 110 mg/dL   GLUCOSE, POC    Collection Time: 01/20/17 12:11 AM   Result Value Ref Range    Glucose (POC) 97 70 - 110 mg/dL   GLUCOSE, POC    Collection Time: 01/20/17  5:54 AM   Result Value Ref Range    Glucose (POC) 86 70 - 110 mg/dL   MAGNESIUM    Collection Time: 01/20/17  6:14 AM   Result Value Ref Range    Magnesium 2.2 1.8 - 2.4 mg/dL   METABOLIC PANEL, BASIC    Collection Time: 01/20/17  6:14 AM   Result Value Ref Range    Sodium 145 136 - 145 mmol/L    Potassium 2.8 (LL) 3.5 - 5.5 mmol/L    Chloride 107 100 - 108 mmol/L    CO2 31 21 - 32 mmol/L    Anion gap 7 3.0 - 18 mmol/L    Glucose 88 74 - 99 mg/dL    BUN 29 (H) 7.0 - 18 MG/DL    Creatinine 0.82 0.6 - 1.3 MG/DL    BUN/Creatinine ratio 35 (H) 12 - 20      GFR est AA >60 >60 ml/min/1.73m2    GFR est non-AA >60 >60 ml/min/1.73m2    Calcium 8.3 (L) 8.5 - 10.1 MG/DL   CBC W/O DIFF    Collection Time: 01/20/17  6:14 AM   Result Value Ref Range    WBC 13.2 4.6 - 13.2 K/uL    RBC 3.22 (L) 4.70 - 5.50 M/uL    HGB 9.1 (L) 13.0 - 16.0 g/dL    HCT 29.1 (L) 36.0 - 48.0 %    MCV 90.4 74.0 - 97.0 FL    MCH 28.3 24.0 - 34.0 PG    MCHC 31.3 31.0 - 37.0 g/dL    RDW 15.2 (H) 11.6 - 14.5 %    PLATELET 550 401 - 131 K/uL    MPV 11.5 9.2 - 11.8 FL GLUCOSE, POC    Collection Time: 01/20/17  9:04 AM   Result Value Ref Range    Glucose (POC) 85 70 - 110 mg/dL   GLUCOSE, POC    Collection Time: 01/20/17 11:01 AM   Result Value Ref Range    Glucose (POC) 87 70 - 110 mg/dL           Recent Labs      01/19/17   0115  01/18/17   1354  01/18/17   0956   FIO2I  32  35  35   HCO3I  28.1*  24.7  25.6   PCO2I  44.7  36.3  43.0   PHI  7.406  7.441  7.384   PO2I  66*  70*  69*     Telemetry:normal sinus rhythm    Imaging:  I have personally reviewed the patients radiographs and have reviewed the reports:  CXR Results  (Last 48 hours)               01/19/17 0140  XR CHEST PORT Final result    Impression:  Impression:       Support devices have been removed. Hypoventilatory exam with mild central vascular congestion, right basilar and   retrocardiac opacities present which may be atelectasis or infiltrate with   probable right pleural effusion. Pattern is not significantly changed. No evidence of pneumothorax. Gaseous distention of upper abdomen bowel loops. No acute pulmonary disease. Narrative:  Portable chest xray        Reason for exam:respir distress       Images: 1       Comparison:  1/18 and 1/17/2017 chest x-ray. .       Findings: The support devices have been removed. The cardiomediastinal contours are   stable. Lung volumes remain low. There is small central vascular congestion not significantly changed. The right   costophrenic angle remains obscured most likely secondary to pleural effusion   with associated atelectasis versus infiltrate. Retrocardiac region is dense but   unchanged. No pneumothorax. No free air. Gaseous distention of upper abdomen bowel loops. Best practice :  Core measures; Antibiotic choice:  Severe Sepsis bundles;SIRS screen met?    Yes  Fluids  Lactic acid ordered- initial and repeat Q6hrs if elevated till normalized. Cultures drawn. Antibiotic administered within 1hr-ICU  And 3hrs ED  Large bore IV- 2 sites          Pressors aim MAP >65mmHg  Steriods  Glycemic control  Mech. Ventilated patients- aim to keep peak plateau pressure 58-83WR H2O.   Sress ulcer prophylaxis  DVT prophylaxis          Raegan Graves MD

## 2017-01-20 NOTE — PROGRESS NOTES
Hospitalist Progress Note-critical care note     Patient: Dami Farrar MRN: [id number]  CSN: [id number]    YOB: 1945  Age: 70 y.o. Sex: male    DOA: 1/3/2017 LOS:  LOS: 16 days            Chief complaint: acute respiratory failure , DM,  HTN, cardiac arrest, aspiration pneumonia     Assessment/Plan         Patient Active Problem List   Diagnosis Code    Card[facility] arrest ([facility] I46.9    Anoxic encephalopathy ([facility] G93.1    Acute respiratory failure ([facility] J96.00    DM (diabetes mellitus) ([facility] O57.4    Diastolic congestive heart failure, NYHA class 1 ([facility]) I50.30    HTN (hypertension) I10    Hypoxia R09.02    COPD (chronic obstructive pulmonary disease) with acute bronchitis ([facility]) J44.0    TIFFANIE (acute kidney injury) ([facility] N17.9    Aspiration pneumonia ([facility] J69.0    Transaminitis R74.0    Bandemia without diagnosis of specific infection D72.825    Hypokalemia E87.6     1. Acute respiratory failure with hypoxia  Extubated on 1/18 , on nc O2, doing well last night, continue chest PT  2. Aspiration pneumonia   Continue breathing tx and abx. 3. Copd  Exacerbation   Breathing tx and steroid , no wheezing now  4. Diastolic dysfunction -chf with PEA  On lasix, and aspirin ,card was on board   5. DM type II   continue insulin , glucose was 85. Will d/c premeal insulin till restart tube feeding   6. Hypokalemia   K replacement   Nurse: no acute issue last night, need PICC line, one iv access, arm swelling     Pt remains ill and still has high mortality and morbility. reamin in ICU   Review of systems:  Unable to obtained due to lethargic   Vital signs/Intake and Output:  Visit Vitals    /72 (BP 1 Location: Right arm, BP Patient Position: At rest)    Pulse 95    Temp 99.5 °F (37.5 °C)    Resp 26    Ht 5' 10.08\" (1.78 m)    Wt 89 kg (196 lb 3.4 oz)    SpO2 98%    BMI 28.09 kg/m2     Current Shift:     Last three shifts:  01/18 1901 - 01/20 0700  In: 2347 [I.V.:100]  Out: 4071 [BTILA:7466]    Physical Exam:  General:  Alert, follow some command , no acute distress   HEENT: NC, Atraumatic. PERRLA, anicteric sclerae. Lungs: + Wheezing/Rhonchi/Rales. Heart:  Regular  rhythm,  + murmur, No Rubs, No Gallops  Abdomen: Soft, Non distended, Non tender.  +Bowel sounds, ,Peg tube noted   Extremities: No c/c, edema and ecchymosis   Psych:   Not anxious or agitated. Neurologic:  Unable to detect due to lethargic           Labs: Results:       Chemistry Recent Labs      01/20/17   0614  01/19/17   0630  01/19/17   0520   GLU  88  102*  99   NA  145  143  142   K  2.8*  3.0*  2.8*   CL  107  106  105   CO2  31  30  31   BUN  29*  30*  29*   CREA  0.82  0.85  0.84   CA  8.3*  7.9*  7.7*   AGAP  7  7  6   BUCR  35*  35*  35*      CBC w/Diff Recent Labs      01/20/17   0614  01/19/17   0520  01/18/17   0545   WBC  13.2  22.4*  12.0   RBC  3.22*  3.56*  3.43*   HGB  9.1*  10.2*  9.7*   HCT  29.1*  31.6*  30.8*   PLT  149  179  153      Cardiac Enzymes Recent Labs      01/19/17   1420   CPK  64      Coagulation Recent Labs      01/19/17   1420   PTP  15.1   INR  1.2   APTT  37.7*       Lipid Panel No results found for: CHOL, CHOLPOCT, CHOLX, CHLST, CHOLV, 552022, HDL, LDL, NLDLCT, DLDL, LDLC, DLDLP, 735298, VLDLC, VLDL, TGL, TGLX, TRIGL, [id number], TRIGP, TGLPOCT, [id number], CHHD, CHHDX   BNP No results for input(s): BNPP in the last 72 hours. Liver Enzymes No results for input(s): TP, ALB, TBIL, AP, SGOT, GPT in the last 72 hours.     No lab exists for component: DBIL   Thyroid Studies Lab Results   Component Value Date/Time    TSH 1.37 08/13/2015 04:35 AM        Procedures/imaging: see electronic medical records for all procedures/Xrays and details which were not copied into this note but were reviewed prior to creation of Boris Souza MD

## 2017-01-20 NOTE — PROGRESS NOTES
Problem: Self Care Deficits Care Plan (Adult)  Goal: *Acute Goals and Plan of Care (Insert Text)  Outcome: Resolved/Met Date Met:  01/20/17  OCCUPATIONAL THERAPY EVALUATION/DISCHARGE     Patient: Lieutenant Ely (17 y.o. male)  Date: 1/20/2017  Primary Diagnosis: Hypoxia  peg tube insertion  Procedure(s) (LRB):  PERCUTANEOUS ENDOSCOPIC GASTROSTOMY TUBE INSERTION (N/A) 3 Days Post-Op   Precautions:   Fall      ASSESSMENT AND RECOMMENDATIONS:  Based on the objective data described below, the patient presents with ability to perform ADL tasks at baseline level. Pt with minimal AROM of BUEs. Pt with minimal PROM of B shoulders and grossly decreased PROM of elbows. Pt reports requiring assist with dressing and bathing. Pt answered yes/no questions by nodding. Pt reports he was able to feed himself, however unsure of accuracy of information provided due to current function. Pt appears to be at baseline level with ADL tasks as pt required assist. Pt with no further OT/ADL concerns at this time. Skilled occupational therapy is not indicated at this time.      Education: Role of OT in acute care     Discharge Recommendations: return to prior level  Further Equipment Recommendations for Discharge: N/A        SUBJECTIVE:   Patient stated .      OBJECTIVE DATA SUMMARY:       Past Medical History   Diagnosis Date    [facility]      Bursitis      CAD (coronary artery disease)      Cardiac arrest ([facility]      Cognitive communication deficit      Contracture of muscle      Depression      Diabetes ([facility]      Diarrhea      Disorder of kidney and ureter      Dysphagia      GERD (gastroesophageal reflux disease)      High cholesterol      Hypertension      Hypothyroid      Lack of coordination      Polyarthritis      Sleep apnea      Sleep disorder      Weakness       Past Surgical History   Procedure Laterality Date    Hx gi           peg    Hx amputation        Hx hernia repair Barriers to Learning/Limitations: yes;  sensory deficits-vision/hearing/speech and physical  Compensate with: visual, verbal, tactile, kinesthetic cues/model  GCODES(GO):n/a  Prior Level of Function/Home Situation: Assist with ADLs  Home Situation  Home Environment: Long term care  One/Two Story Residence: One story  Living Alone: No  Support Systems: Skilled nursing facility  Patient Expects to be Discharged to[de-identified]  (Long term care)  Current DME Used/Available at Home: Wheelchair (pradip lift)     Cognitive/Behavioral Status:  Neurologic State: Alert  Orientation Level: Unable to verbalize  Cognition: Follows commands     Coordination:  Fine Motor Skills-Upper: Left Impaired;Right Impaired    Gross Motor Skills-Upper: Left Impaired;Right Impaired  Strength:  Strength: Grossly decreased, non-functional     Tone & Sensation:  Tone: Abnormal     Range of Motion:  AROM: Grossly decreased, non-functional  PROM: Grossly decreased, non-functional     Pain:  Pre-treatment: 0  Post-treatment: 0  Activity Tolerance:   poor  Please refer to the flowsheet for vital signs taken during this treatment. After treatment:   [ ]  Patient left in no apparent distress sitting up in chair  [X]  Patient left in no apparent distress in bed  [X]  Call bell left within reach  [X]  Nursing notified  [ ]  Caregiver present  [ ]  Bed alarm activated      COMMUNICATION/EDUCATION:   Communication/Collaboration:  [ ]      Home safety education was provided and the patient/caregiver indicated understanding. [X]      Patient/family have participated as able and agree with findings and recommendations. [ ]      Patient is unable to participate in plan of care at this time.      Wilmer Worthy MS OTR/L  Time Calculation: 15 mins

## 2017-01-20 NOTE — PROGRESS NOTES
Speech-Language Pathology:    SLP evaluation orders received; however, upon completion of chart review, pt not appropriate for SLP evaluation this day.  Currently, patient is:    []  Nausea/vomiting  [x]  RN Communication/ suggestion  []  Extreme Pain  []  Dialysis treatment in progress  []  Tolerating current po diet (per RN report)  []  Tolerating current po diet; however, poor po intake (per Rn report)    [x]  Receiving nutrition via tube feeding  []  Lethargic, solomnent, or difficult to arouse for safe po trials   []  Unable to manage secretions  []  Receiving intervention for respiratory distress  []  <6 hours s/p extubation   [x]   Other: Patient with PICC placement and has to lay flat per nursing. Will follow up next day for evaluation as patient's schedule allows and as patient appropriate. Thank you.     Niecy Mims M.A., [id number] [facility]

## 2017-01-20 NOTE — PROGRESS NOTES
Speech-Language Pathology:    SLP evaluation orders received; however, upon completion of chart review, pt not appropriate for SLP evaluation this day.  Currently, patient is:    []  Nausea/vomiting  []  RN Communication/ suggestion  []  Extreme Pain  []  Dialysis treatment in progress  []  Tolerating current po diet (per RN report)  []  Tolerating current po diet; however, poor po intake (per Rn report)    []  Receiving nutrition via tube feeding  []  Lethargic, solomnent, or difficult to arouse for safe po trials   []  Unable to manage secretions  []  Receiving intervention for respiratory distress  []  <6 hours s/p extubation   [x]   Other: with nutrition source and is currently NPO due to [facility] placement later this day. Spoke with ordering physician, Dr. Dallas Melchor, who is agreeable to completion of evaluation later this day for comfort i.e. Ice chips due to increased risk for aspiration. Will follow up as patient's schedule allows. Thank you.     Santo Timmons M.A., [address] [facility]

## 2017-01-20 NOTE — INTERDISCIPLINARY ROUNDS
CRITICAL CARE INTERDISCIPLINARY ROUNDS    Patient Information:    Name:   Shaan Peralta    Age:   70 y.o. Admission Date:   1/3/2017    Critical Care Day: 13 (code blue 1-6)    Attending Provider:   Jeronimo Anderson DO    Surgeon: n/a     Consultant(s):   Michelle Souza    Critical Care Physician:  Kenia Hood    Code Status: Full Code    Problem List:     Patient Active Problem List   Diagnosis Code    Cardiac arrest ([facility] I46.9    Anoxic encephalopathy ([facility] G93.1    Acute respiratory failure ([address] J96.00    DM (diabetes mellitus) ([facility] V45.6    Diastolic congestive heart failure, NYHA class 1 ([facility] I50.30    HTN (hypertension) I10    Hypoxia R09.02    COPD (chronic obstructive pulmonary disease) with acute bronchitis ([facility] J44.0    TIFFANIE (acute kidney injury) ([facility] N17.9    Aspiration pneumonia ([facility] J69.0    Transaminitis R74.0    Bandemia without diagnosis of specific infection D72.825    Hypokalemia E87.6       Principal Problem:  Acute respiratory failure ([address]    During rounds the following quality care indicators and evidence based practices were addressed :  DVT Prophylaxis and PUD Prophylaxis Ac Day 14 ([facility] yes) ; Central Line Day: na Isolation: na; Antibiotic Stewardship: reviewed; Probiotics Necessary: na    Glycemic Control:   Recent Labs      01/20/17   0614  01/19/17   0630  01/19/17   0520  01/18/17   0545   GLU  88  102*  99  112*   ;  Recent Labs      01/19/17   0115  01/18/17   1354  01/18/17   0956   PHI  7.406  7.441  7.384   PCO2I  44.7  36.3  43.0   PO2I  66*  70*  69*    Adjustments     Acute MI/PCI:   Not applicable    Door to Balloon: Admission Time: 0905      Heart Failure:    Not applicable     SCIP Measures for other Surgeries:   Not applicable     Pneumonia:    Not applicable    Interdisciplinary team rounds were held with the following team members , Care Management, Nursing, Nutrition, Physician and Respiratory Therapy. Plan of care discussed.       Goals of Care/ Recommendations: Hx of aspiration HOB closer to 45 degree. Tf to be changed. Osmolyte 1.2. . See clinical pathway and/or care plan for interventions and desired outcomes.     Critical Care Discharge Status:  Red

## 2017-01-20 NOTE — PROGRESS NOTES
1742 Assessment completed. Patient follows commands. Lungs coarse . Bowel sounds active. Heart sounds WNL. Pain 0/10 on  0-10/10 numeric scale. Patient denies numbness and tingling to bilateral upper and lower extremities. Palpable bilateral dorsalis pedis. No nausea/vomiting No SOB, dizziness, distress. Patient instructed not to get out of bed without staff member present and to alert staff when needing to get out of bed for their safety. Patient verbalizes understanding. Patient instructed to alert nurse for pain medication, to stay ahead of pain, and alert nurse if pain is not relieved with pain medication. Patient on continuous Tube feedings. Tube feeding increased by 20 per doctors orders. Patient has heparin for DVT prophylaxis. Mepilex to ANDREINA. 5600 Patients pain reassessed. Pain 0/10 on numeric scale. Patient had an uneventful shift.

## 2017-01-20 NOTE — ROUTINE PROCESS
TRANSFER - IN REPORT:    Verbal report received from Fausto Malone RN(name) on Janet Capps  being received ICU  (unit) for routine progression of care      Report consisted of patients Situation, Background, Assessment and   Recommendations(SBAR). Information from the following report(s) SBAR, Intake/Output and Recent Results was reviewed with the receiving nurse. Opportunity for questions and clarification was provided. Assessment completed upon patients arrival to unit and care assumed.

## 2017-01-20 NOTE — PROGRESS NOTES
1430 Pt arrived to unit vai bed from ICU. Pt non-verbal, alert and open eyes and have good eye contact. Attached to tel monitor and call light placed within reach. SR with SPIO2 in the 98%. Call light within reach and instructed of its use.

## 2017-01-21 LAB
ANION GAP BLD CALC-SCNC: 7 MMOL/L (ref 3–18)
BACTERIA SPEC CULT: NORMAL
BUN SERPL-MCNC: 30 MG/DL (ref 7–18)
BUN/CREAT SERPL: 38 (ref 12–20)
CALCIUM SERPL-MCNC: 7.9 MG/DL (ref 8.5–10.1)
CHLORIDE SERPL-SCNC: 111 MMOL/L (ref 100–108)
CO2 SERPL-SCNC: 31 MMOL/L (ref 21–32)
CREAT SERPL-MCNC: 0.78 MG/DL (ref 0.6–1.3)
ERYTHROCYTE [DISTWIDTH] IN BLOOD BY AUTOMATED COUNT: 15.2 % (ref 11.6–14.5)
GLUCOSE BLD STRIP.AUTO-MCNC: 148 MG/DL (ref 70–110)
GLUCOSE BLD STRIP.AUTO-MCNC: 166 MG/DL (ref 70–110)
GLUCOSE BLD STRIP.AUTO-MCNC: 170 MG/DL (ref 70–110)
GLUCOSE BLD STRIP.AUTO-MCNC: 170 MG/DL (ref 70–110)
GLUCOSE SERPL-MCNC: 177 MG/DL (ref 74–99)
GRAM STN SPEC: NORMAL
HCT VFR BLD AUTO: 28.6 % (ref 36–48)
HGB BLD-MCNC: 9 G/DL (ref 13–16)
MAGNESIUM SERPL-MCNC: 2.4 MG/DL (ref 1.8–2.4)
MCH RBC QN AUTO: 28.8 PG (ref 24–34)
MCHC RBC AUTO-ENTMCNC: 31.5 G/DL (ref 31–37)
MCV RBC AUTO: 91.7 FL (ref 74–97)
PLATELET # BLD AUTO: 151 K/UL (ref 135–420)
PMV BLD AUTO: 11 FL (ref 9.2–11.8)
POTASSIUM SERPL-SCNC: 3.9 MMOL/L (ref 3.5–5.5)
RBC # BLD AUTO: 3.12 M/UL (ref 4.7–5.5)
SERVICE CMNT-IMP: NORMAL
SODIUM SERPL-SCNC: 149 MMOL/L (ref 136–145)
WBC # BLD AUTO: 11.3 K/UL (ref 4.6–13.2)

## 2017-01-21 PROCEDURE — 74011250637 HC RX REV CODE- 250/637: Performed by: FAMILY MEDICINE

## 2017-01-21 PROCEDURE — 74011250637 HC RX REV CODE- 250/637: Performed by: INTERNAL MEDICINE

## 2017-01-21 PROCEDURE — 65660000000 HC RM CCU STEPDOWN

## 2017-01-21 PROCEDURE — 74011636637 HC RX REV CODE- 636/637: Performed by: INTERNAL MEDICINE

## 2017-01-21 PROCEDURE — 83735 ASSAY OF MAGNESIUM: CPT | Performed by: INTERNAL MEDICINE

## 2017-01-21 PROCEDURE — 94640 AIRWAY INHALATION TREATMENT: CPT

## 2017-01-21 PROCEDURE — 80048 BASIC METABOLIC PNL TOTAL CA: CPT | Performed by: INTERNAL MEDICINE

## 2017-01-21 PROCEDURE — 74011250636 HC RX REV CODE- 250/636: Performed by: INTERNAL MEDICINE

## 2017-01-21 PROCEDURE — 74011000250 HC RX REV CODE- 250: Performed by: INTERNAL MEDICINE

## 2017-01-21 PROCEDURE — 85027 COMPLETE CBC AUTOMATED: CPT | Performed by: INTERNAL MEDICINE

## 2017-01-21 PROCEDURE — 77010033678 HC OXYGEN DAILY

## 2017-01-21 PROCEDURE — C9113 INJ PANTOPRAZOLE SODIUM, VIA: HCPCS | Performed by: INTERNAL MEDICINE

## 2017-01-21 PROCEDURE — 36415 COLL VENOUS BLD VENIPUNCTURE: CPT | Performed by: INTERNAL MEDICINE

## 2017-01-21 PROCEDURE — 77030018798 HC PMP KT ENTRL FED COVD -A

## 2017-01-21 PROCEDURE — 74011250637 HC RX REV CODE- 250/637: Performed by: HOSPITALIST

## 2017-01-21 PROCEDURE — 82962 GLUCOSE BLOOD TEST: CPT

## 2017-01-21 PROCEDURE — 92610 EVALUATE SWALLOWING FUNCTION: CPT

## 2017-01-21 RX ORDER — BACLOFEN 10 MG/1
10 TABLET ORAL 3 TIMES DAILY
Status: DISCONTINUED | OUTPATIENT
Start: 2017-01-22 | End: 2017-01-27 | Stop reason: HOSPADM

## 2017-01-21 RX ORDER — BACLOFEN 10 MG/1
10 TABLET ORAL 3 TIMES DAILY
Status: DISCONTINUED | OUTPATIENT
Start: 2017-01-22 | End: 2017-01-21

## 2017-01-21 RX ORDER — INSULIN GLARGINE 100 [IU]/ML
16 INJECTION, SOLUTION SUBCUTANEOUS DAILY
Status: DISCONTINUED | OUTPATIENT
Start: 2017-01-22 | End: 2017-01-22

## 2017-01-21 RX ORDER — LANSOPRAZOLE 30 MG/1
30 TABLET, ORALLY DISINTEGRATING, DELAYED RELEASE ORAL
Status: DISCONTINUED | OUTPATIENT
Start: 2017-01-22 | End: 2017-01-27 | Stop reason: HOSPADM

## 2017-01-21 RX ADMIN — ASPIRIN 81 MG 81 MG: 81 TABLET ORAL at 10:06

## 2017-01-21 RX ADMIN — ARFORMOTEROL TARTRATE 15 MCG: 15 SOLUTION RESPIRATORY (INHALATION) at 20:25

## 2017-01-21 RX ADMIN — INSULIN LISPRO 2 UNITS: 100 INJECTION, SOLUTION INTRAVENOUS; SUBCUTANEOUS at 20:01

## 2017-01-21 RX ADMIN — ARFORMOTEROL TARTRATE 15 MCG: 15 SOLUTION RESPIRATORY (INHALATION) at 08:52

## 2017-01-21 RX ADMIN — LEVOTHYROXINE SODIUM ANHYDROUS 87.5 MCG: 100 INJECTION, POWDER, LYOPHILIZED, FOR SOLUTION INTRAVENOUS at 11:14

## 2017-01-21 RX ADMIN — INSULIN GLARGINE 12 UNITS: 100 INJECTION, SOLUTION SUBCUTANEOUS at 12:41

## 2017-01-21 RX ADMIN — INSULIN LISPRO 2 UNITS: 100 INJECTION, SOLUTION INTRAVENOUS; SUBCUTANEOUS at 07:02

## 2017-01-21 RX ADMIN — IPRATROPIUM BROMIDE AND ALBUTEROL SULFATE 3 ML: .5; 3 SOLUTION RESPIRATORY (INHALATION) at 11:32

## 2017-01-21 RX ADMIN — IPRATROPIUM BROMIDE AND ALBUTEROL SULFATE 3 ML: .5; 3 SOLUTION RESPIRATORY (INHALATION) at 20:25

## 2017-01-21 RX ADMIN — MULTIPLE VITAMINS W/ MINERALS TAB 1 TABLET: TAB at 10:07

## 2017-01-21 RX ADMIN — BUDESONIDE 500 MCG: 0.5 INHALANT RESPIRATORY (INHALATION) at 08:00

## 2017-01-21 RX ADMIN — HEPARIN SODIUM 5000 UNITS: 5000 INJECTION, SOLUTION INTRAVENOUS; SUBCUTANEOUS at 00:49

## 2017-01-21 RX ADMIN — BACLOFEN 10 MG: 10 TABLET ORAL at 23:48

## 2017-01-21 RX ADMIN — BUDESONIDE 500 MCG: 0.5 INHALANT RESPIRATORY (INHALATION) at 20:25

## 2017-01-21 RX ADMIN — HEPARIN SODIUM 5000 UNITS: 5000 INJECTION, SOLUTION INTRAVENOUS; SUBCUTANEOUS at 17:39

## 2017-01-21 RX ADMIN — IPRATROPIUM BROMIDE AND ALBUTEROL SULFATE 3 ML: .5; 3 SOLUTION RESPIRATORY (INHALATION) at 08:52

## 2017-01-21 RX ADMIN — SODIUM CHLORIDE 40 MG: 9 INJECTION INTRAMUSCULAR; INTRAVENOUS; SUBCUTANEOUS at 10:06

## 2017-01-21 RX ADMIN — INSULIN LISPRO 2 UNITS: 100 INJECTION, SOLUTION INTRAVENOUS; SUBCUTANEOUS at 12:42

## 2017-01-21 RX ADMIN — IPRATROPIUM BROMIDE AND ALBUTEROL SULFATE 3 ML: .5; 3 SOLUTION RESPIRATORY (INHALATION) at 00:34

## 2017-01-21 RX ADMIN — ATORVASTATIN CALCIUM 80 MG: 20 TABLET, FILM COATED ORAL at 10:06

## 2017-01-21 RX ADMIN — HEPARIN SODIUM 5000 UNITS: 5000 INJECTION, SOLUTION INTRAVENOUS; SUBCUTANEOUS at 10:07

## 2017-01-21 RX ADMIN — BACLOFEN 10 MG: 10 TABLET ORAL at 10:07

## 2017-01-21 RX ADMIN — BACLOFEN 10 MG: 10 TABLET ORAL at 17:39

## 2017-01-21 RX ADMIN — POTASSIUM CHLORIDE 20 MEQ: 20 SOLUTION ORAL at 10:07

## 2017-01-21 RX ADMIN — IPRATROPIUM BROMIDE AND ALBUTEROL SULFATE 3 ML: .5; 3 SOLUTION RESPIRATORY (INHALATION) at 16:22

## 2017-01-21 NOTE — PROGRESS NOTES
2032:  Bedside and Verbal shift change report received from Tyra Zeng (offgoing nurse) to Torey Stockton RN (oncoming nurse). Report included the following information SBAR, Kardex, Intake/Output, MAR, Recent Results and Cardiac Rhythm SR. Pt resting in bed. Appears to be in no distress at this time. Call bell within reach. Zone phone number given to pt. Pt instructed to call zone phone or call bell if needed. Pt instructed to call for assistance before ambulating.     Shift summary:  Pt rested comfortably throughout the night

## 2017-01-21 NOTE — PROGRESS NOTES
Problem: Dysphagia (Adult)  Goal: *Acute Goals and Plan of Care (Insert Text)  Dysphagia evaluation completed at beside. Patient awake and alert upon SLP arrival. Patient mouth breathing at this time. SLP notes poor oral condition with significant amount of dried blood tinged oral secretions in the posterior portion of oral cavity. Patient is readily agreeable to participation in evaluation after explanation provided by SLP. Patient able to follow commands to complete oral mech which revealed natural dentition, decreased labial/lingual ROM and strength. Patient provided extensive oral care with the use of oral care swab and oral suction via yankauer to remove dried secretions. Patient with positive swallow response noted during oral care with positive s/sx of aspiration. Cough is not productive, but does increase in effectiveness as oral cavity improves in condition. As cough reflex improved SLP able to suction wet secretions that patient able to bring into oral cavity. After oral care, patient presented ice chip trial. Patient with delayed manipulation, efficient propulsion, but decreased, weak hyolaryngeal excursion and positive s/sx of aspiration. Patient with productive cough post ice chip trial. Due to increased risk for aspiration and patients safety no further po trials presented. Patient does have nutrition source at this time via PEG tube. Patient does request \"something to eat\" at this time, however, SLP explained reasoning for replacement of PEG tube and aspiration. Patient nods in understanding. Should patient and family desire oral diet, recommend modified barium swallow study prior to the initiation of oral diet with family present. At this time, it is recommended that the patient be NPO with continued PEG tube feedings. Patient and RN, Uyen Diaz, educated on diet recommendations and POC.  NPO sign posted in patients room with recommendations of oral care at least every 4 hours, HOB at least 30 degrees at all times, and monitoring of vocal quality and respiratory status. SLP will follow up. Rec: NPO except oral care via oral care swab q 4 hours  Strict aspiration/reflux precautions; HOB >30 at all times   PEG tube feedings    Patient will:  1. Utilize compensatory swallow maneuvers (decrease bolus size, decrease rate of intake, cyclical ingestion, etc.) to increase swallow function/safety with min visual, verbal and/or tactile cues in 4/5 trials. 2. Tolerate PO trials utilizing compensatory swallow techniques (decrease bolus size, decrease rate of intake, cyclical ingestion, etc.) without overt s/sx aspiration/distress in 4/5 trials with min visual, verbal, and/or tactile cues    3. Perform restorative oropharyngeal exercises and swallow maneuvers (supraglottic swallow, Mendelson maneuver, effortful swallow, OMEs etc.) to increase oropharyngeal strength/awareness/agility, tongue base retraction, hyolaryngeal excursion, airway protection, and/or bolus clearance with min visual, verbal and/or tactile cues in 4/5 trials. 4. Demonstrate the ability to adequately self-monitor swallowing skills and perform appropriate compensatory techniques to reduce s/sx of aspiration and to safely consume least-restrictive diet with min verbal, visual and/or tactile cues in 4/5 trials. 5. Complete an objective measure of swallow fxn (i.e., MBSS) to assess oropharyngeal anatomy/physiology for determination of safest least-restrictive diet and/or appropriate rehabilitation techniques. Outcome: Progressing Towards Goal  SPEECH LANGUAGE PATHOLOGY BEDSIDE SWALLOW EVALUATION     Patient: Tray Panda (86 y.o. male)  Date: 1/21/2017  Primary Diagnosis: Hypoxia  peg tube insertion  Procedure(s) (LRB):  PERCUTANEOUS ENDOSCOPIC GASTROSTOMY TUBE INSERTION (N/A) 4 Days Post-Op   Precautions: Aspiration,  Fall      ASSESSMENT :  As above.       Patient will benefit from skilled intervention to address the above impairments. Patients rehabilitation potential is considered to be Guarded  Factors which may influence rehabilitation potential include:   [ ]            None noted  [X]            Mental ability/status  [X]            Medical condition  [ ]            Home/family situation and support systems  [X]            Safety awareness  [ ]            Pain tolerance/management  [ ]            Other:        PLAN :  Recommendations and Planned Interventions:  As above. Frequency/Duration: Patient will be followed by speech-language pathology 3 times a week to address goals. Discharge Recommendations: To Be Determined       SUBJECTIVE:   Patient stated Something to eat.       OBJECTIVE:       Past Medical History   Diagnosis Date    An[facility] ([facility]      Bursitis      CAD (coronary artery disease)      [facility]      Cognitive communication deficit      Contracture of muscle      Depression      Diabetes ([facility]      Diarrhea      Disorder of kidney and ureter      Dysphagia      GERD (gastroesophageal reflux disease)      High cholesterol      Hypertension      Hypothyroid      Lack of coordination      Polyarthritis      Sleep apnea      Sleep disorder      Weakness       Past Surgical History   Procedure Laterality Date    Hx gi           peg    Hx amputation        Hx hernia repair         Prior Level of Function/Home Situation:   Home Situation  Home Environment: Long term care  One/Two Story Residence: One story  Living Alone: No  Support Systems: Skilled nursing facility  Patient Expects to be Discharged to[de-identified]  (Long term care)  Current DME Used/Available at Home: Wheelchair (pradip lift)  Current Diet:  PEG Tube  Cognitive and Communication Status:  Neurologic State: Alert, Eyes open spontaneously  Orientation Level: Oriented to person  Cognition: Follows commands  Perception: Appears intact  Perseveration: No perseveration noted  Safety/Judgement: Decreased insight into deficits  Oral Assessment:  Oral Assessment  Labial: Decreased rate  Dentition: Natural  Oral Hygiene: Poor  Lingual: Decreased rate;Decreased strength  Velum: No impairment  Mandible: No impairment  Gag Reflex: Present  P.O. Trials:  Patient Position: HOB > 45  Vocal quality prior to P.O.: No impairment  Consistency Presented: Ice chips (Oral care)  How Presented: SLP-fed/presented (Oral care swab)  Bolus Acceptance: No impairment  Bolus Formation/Control: Impaired  Type of Impairment: Premature spillage  Propulsion: No impairment  Oral Residue: None  Initiation of Swallow: Delayed (# of seconds)  Laryngeal Elevation: Decreased;Weak  Aspiration Signs/Symptoms: Change vocal quality;Delayed cough/throat clear;Strong cough  Pharyngeal Phase Characteristics: Altered vocal quality; Audible swallow;Multiple swallows; Suspected pharyngeal residue  Effective Modifications: None  Cues for Modifications: Maximal  Oral Phase Severity: Severe  Pharyngeal Phase Severity : Severe     GCODESwallowing:  Swallow Current Status CN= 100%   Swallow Goal Status CM= 80-99%     The severity rating is based on the following outcomes:  LINUS Noms Swallow Level 1  Clinical Judgement     PAIN:  Start of Eval: 0  End of Eval: 0     After treatment:   [ ]            Patient left in no apparent distress sitting up in chair  [X]            Patient left in no apparent distress in bed  [X]            Call bell left within reach  [X]            Nursing notified  [ ]            Family present  [ ]            Caregiver present  [ ]            Bed alarm activated  COMMUNICATION/EDUCATION:   [X]            Aspiration precautions; swallow safety; compensatory techniques. [X]            Patient/family have participated as able in goal setting and plan of care. [ ]            Patient/family agree to work toward stated goals and plan of care. [ ]            Patient understands intent and goals of therapy; neutral about participation.   [ ] Patient unable to participate in goal setting/plan of care; educ ongoing with interdisciplinary staff  [ ]             Posted safety precautions in patient's room.      Thank you for this referral.  AL Pool  Time Calculation: 20 mins

## 2017-01-21 NOTE — ROUTINE PROCESS
Bedside and Verbal shift change report given to ROBERTA Carlson (oncoming nurse) by Shakir De La Paz RN (offgoing nurse). Report included the following information SBAR, Kardex, Intake/Output and MAR.

## 2017-01-21 NOTE — PROGRESS NOTES
Cardiology Progress Note      1/20/2017 11:14 PM    Admit Date: 1/3/2017    Admit Diagnosis: Hypoxia  peg tube insertion      Subjective:     Sherry Lebron has multiple issues.     Visit Vitals    /69 (BP 1 Location: Right leg, BP Patient Position: At rest)    Pulse 91    Temp 98.9 °F (37.2 °C)    Resp 20    Ht 5' 10.08\" (1.78 m)    Wt 89 kg (196 lb 3.4 oz)    SpO2 98%    BMI 28.09 kg/m2     Current Facility-Administered Medications   Medication Dose Route Frequency    [START ON 1/21/2017] potassium chloride (KAON 10%) 20 mEq/15 mL oral liquid 20 mEq  20 mEq Oral DAILY    potassium chloride (KAON 10%) 20 mEq/15 mL oral liquid 40 mEq  40 mEq Oral NOW    albuterol-ipratropium (DUO-NEB) 2.5 MG-0.5 MG/3 ML  3 mL Nebulization Q4H RT    arformoterol (BROVANA) neb solution 15 mcg  15 mcg Nebulization BID RT    budesonide (PULMICORT) 500 mcg/2 ml nebulizer suspension  500 mcg Nebulization BID RT    atorvastatin (LIPITOR) tablet 80 mg  80 mg Per NG tube DAILY    insulin glargine (LANTUS) injection 12 Units  12 Units SubCUTAneous DAILY    pantoprazole (PROTONIX) 40 mg in sodium chloride 0.9 % 10 mL injection  40 mg IntraVENous DAILY    lidocaine (LIDODERM) 5 % patch 2 Patch  2 Patch TransDERmal Q24H    hydrALAZINE (APRESOLINE) 20 mg/mL injection 20 mg  20 mg IntraVENous Q6H PRN    levothyroxine (SYNTHROID) injection 87.5 mcg  87.5 mcg IntraVENous Q24H    ELECTROLYTE REPLACEMENT PROTOCOL  1 Each Other PRN    cloNIDine (CATAPRES) 0.1 mg/24 hr patch 1 Patch  1 Patch TransDERmal Q7D    insulin lispro (HUMALOG) injection   SubCUTAneous Q6H    sodium chloride (NS) flush 5-10 mL  5-10 mL IntraVENous PRN    acetaminophen (TYLENOL) tablet 650 mg  650 mg Oral Q6H PRN    aspirin chewable tablet 81 mg  81 mg Per G Tube DAILY    baclofen (LIORESAL) tablet 10 mg  10 mg Oral TID    bisacodyl (DULCOLAX) suppository 10 mg  10 mg Rectal DAILY PRN    guaiFENesin (ROBITUSSIN) 100 mg/5 mL oral liquid 200 mg 200 mg Oral Q6H PRN    multivitamin, tx-iron-ca-min (THERA-M w/ IRON) tablet 1 Tab  1 Tab Oral DAILY    heparin (porcine) injection 5,000 Units  5,000 Units SubCUTAneous Q8H    glucose chewable tablet 16 g  4 Tab Oral PRN    glucagon (GLUCAGEN) injection 1 mg  1 mg IntraMUSCular PRN    dextrose (D50W) injection syrg 12.5-25 g  25-50 mL IntraVENous PRN         Objective:      Physical Exam:  Visit Vitals    /69 (BP 1 Location: Right leg, BP Patient Position: At rest)    Pulse 91    Temp 98.9 °F (37.2 °C)    Resp 20    Ht 5' 10.08\" (1.78 m)    Wt 89 kg (196 lb 3.4 oz)    SpO2 98%    BMI 28.09 kg/m2     General Appearance:  Well developed, well nourished,alert and oriented x 3, and individual in no acute distress. Ears/Nose/Mouth/Throat:   Hearing grossly normal.         Neck: Supple. Chest:   Lungs clear to auscultation bilaterally. Cardiovascular:  Regular rate and rhythm, S1, S2 normal, no murmur. Abdomen:   Soft, non-tender, bowel sounds are active. Extremities: No edema bilaterally. Skin: Warm and dry.                Data Review:   Labs:    Recent Results (from the past 24 hour(s))   GLUCOSE, POC    Collection Time: 01/20/17 12:11 AM   Result Value Ref Range    Glucose (POC) 97 70 - 110 mg/dL   GLUCOSE, POC    Collection Time: 01/20/17  5:54 AM   Result Value Ref Range    Glucose (POC) 86 70 - 110 mg/dL   MAGNESIUM    Collection Time: 01/20/17  6:14 AM   Result Value Ref Range    Magnesium 2.2 1.8 - 2.4 mg/dL   METABOLIC PANEL, BASIC    Collection Time: 01/20/17  6:14 AM   Result Value Ref Range    Sodium 145 136 - 145 mmol/L    Potassium 2.8 (LL) 3.5 - 5.5 mmol/L    Chloride 107 100 - 108 mmol/L    CO2 31 21 - 32 mmol/L    Anion gap 7 3.0 - 18 mmol/L    Glucose 88 74 - 99 mg/dL    BUN 29 (H) 7.0 - 18 MG/DL    Creatinine 0.82 0.6 - 1.3 MG/DL    BUN/Creatinine ratio 35 (H) 12 - 20      GFR est AA >60 >60 ml/min/1.73m2    GFR est non-AA >60 >60 ml/min/1.73m2    Calcium 8.3 (L) 8.5 - 10.1 MG/DL   CBC W/O DIFF    Collection Time: 01/20/17  6:14 AM   Result Value Ref Range    WBC 13.2 4.6 - 13.2 K/uL    RBC 3.22 (L) 4.70 - 5.50 M/uL    HGB 9.1 (L) 13.0 - 16.0 g/dL    HCT 29.1 (L) 36.0 - 48.0 %    MCV 90.4 74.0 - 97.0 FL    MCH 28.3 24.0 - 34.0 PG    MCHC 31.3 31.0 - 37.0 g/dL    RDW 15.2 (H) 11.6 - 14.5 %    PLATELET 608 734 - 368 K/uL    MPV 11.5 9.2 - 11.8 FL   GLUCOSE, POC    Collection Time: 01/20/17  9:04 AM   Result Value Ref Range    Glucose (POC) 85 70 - 110 mg/dL   GLUCOSE, POC    Collection Time: 01/20/17 11:01 AM   Result Value Ref Range    Glucose (POC) 87 70 - 110 mg/dL   GLUCOSE, POC    Collection Time: 01/20/17  5:37 PM   Result Value Ref Range    Glucose (POC) 107 70 - 110 mg/dL       Telemetry: normal sinus rhythm      Assessment:     Principal Problem:    Acute respiratory failure ([facility] (8/3/2015)    Active Problems:    Cardiac arrest ([facility] (8/1/2015)      Anoxic encephalopathy ([facility] (8/3/2015)      DM (diabetes mellitus) ([facility] (6/1/1953)      Diastolic congestive heart failure, NYHA class 1 ([facility] (11/23/2016)      HTN (hypertension) (11/23/2016)      COPD (chronic obstructive pulmonary disease) with acute bronchitis ([facility] (1/5/2017)      TIFFANIE (acute kidney injury) ([facility] (1/5/2017)      Aspiration pneumonia ([facility] (1/7/2017)      Transaminitis (1/15/2017)      Bandemia without diagnosis of specific infection (1/15/2017)      Hypokalemia (1/19/2017)        Plan:     The patient is now on telemetry having been transferred from ICU. He remains hemodynamically stable. His breathing is okay. There is no evidence of acute coronary syndrome. Continue to follow.     Luis Garland MD

## 2017-01-21 NOTE — ROUTINE PROCESS
Bedside and Verbal shift change report given to Esvin Bradford RN (oncoming nurse) by Ángela Johnston RN (offgoing nurse).  Report included the following information SBAR, Kardex, Intake/Output, MAR, Recent Results, Med Rec Status and Cardiac Rhythm SR.

## 2017-01-21 NOTE — PROGRESS NOTES
TRANSFER - OUT REPORT:    Verbal report given to [address] Rn(name) on Jodi Pollen  being transferred to (unit) for routine progression of care       Report consisted of patients Situation, Background, Assessment and   Recommendations(SBAR). Information from the following report(s) SBAR, Kardex, Procedure Summary, Intake/Output, MAR, Accordion, Recent Results, Med Rec Status, Cardiac Rhythm NSR and Alarm Parameters  was reviewed with the receiving nurse. Lines:   Peripheral IV 01/05/17 Right Wrist (Active)   Site Assessment Clean, dry, & intact 1/20/2017  8:00 AM   Phlebitis Assessment 0 1/20/2017  8:00 AM   Infiltration Assessment 0 1/20/2017  8:00 AM   Dressing Status Clean, dry, & intact 1/20/2017  8:00 AM   Dressing Type Tape;Transparent 1/20/2017  8:00 AM   Hub Color/Line Status Blue; Infusing;Capped 1/20/2017  8:00 AM   Action Taken Open ports on tubing capped 1/20/2017 12:00 AM   Alcohol Cap Used Yes 1/20/2017  8:00 AM       Peripheral IV 01/14/17 Left;Upper; Outer Forearm (Active)   Site Assessment Clean, dry, & intact 1/20/2017  8:00 AM   Phlebitis Assessment 0 1/20/2017  8:00 AM   Infiltration Assessment 0 1/20/2017  8:00 AM   Dressing Status Clean, dry, & intact 1/20/2017  8:00 AM   Dressing Type Tape;Transparent 1/20/2017  8:00 AM   Hub Color/Line Status Blue;Flushed 1/20/2017  8:00 AM   Action Taken Open ports on tubing capped 1/20/2017 12:00 AM   Alcohol Cap Used Yes 1/20/2017  8:00 AM       Peripheral IV 01/19/17 Right Hand (Active)   Site Assessment Clean, dry, & intact 1/20/2017  8:00 AM   Phlebitis Assessment 0 1/20/2017  8:00 AM   Infiltration Assessment 0 1/20/2017  8:00 AM   Dressing Status Clean, dry, & intact 1/20/2017  8:00 AM   Dressing Type Tape;Transparent 1/20/2017  8:00 AM   Hub Color/Line Status Blue;Capped 1/20/2017  8:00 AM   Alcohol Cap Used Yes 1/20/2017  8:00 AM        Opportunity for questions and clarification was provided.       Patient transported with: Monitor  O2 @ 3 liters  Patients medications from home  Patient-specific medications from Pharmacy  Registered Nurse     Pt to stay on cont pulse oximetry per MD orders. Tube feeding verified. Wife called updated on room assignment and transfer.

## 2017-01-21 NOTE — PROGRESS NOTES
conducted a Follow up consultation and Spiritual Assessment for Kenrick Martinez, who is a 70 y.o.,male. Patient busy-Nurses in room. The  provided the following Interventions:  Continued the relationship of care and support. Listened empathically. Offered assurance of continued prayer on patients behalf. Chart reviewed. The following outcomes were achieved:  Family expressed gratitude for pastoral care visit. Assessment:  There are no spiritual or [employer]regation issues which require intervention at this time. Plan:  Chaplains will continue to follow and will provide pastoral care on an as needed/requested basis.  recommends bedside caregivers page  on duty if patient shows signs of acute spiritual or emotional distress.        Sister Windy Garciaetelvina [place], [address] [phone]

## 2017-01-21 NOTE — PROGRESS NOTES
Hospitalist Progress Note    Patient: Renetta Hayes MRN: [id number]  CSN: [id number]    YOB: 1945  Age: 70 y.o. Sex: male    DOA: 1/3/2017 LOS:  LOS: 17 days          Chief Complaint:          Assessment/Plan       Patient Active Problem List   Diagnosis Code    Cardiac arrest ([facility] I46.9    Anoxic encephalopathy ([facility] G93.1    [facility] J96.00    DM (diabetes mellitus) ([facility] C44.5    Diastolic congestive heart failure, NYHA class 1 ([facility]) I50.30    HTN (hypertension) I10    Hypoxia R09.02    COPD (chronic obstructive pulmonary disease) with acute bronchitis ([facility]) J44.0    TIFFANIE (acute kidney injury) ([facility] N17.9    Aspiration pneumonia ([facility] J69.0    Transaminitis R74.0    Bandemia without diagnosis of specific infection D72.825    Hypokalemia E87.6          Continue respiratory support and antibiotics for aspiration pneumonia. Continue steroids/nebs/etc for copd. A touch hypernatremic. Free h20 def? Mental status reportedly at baseline. Continue peg tube feedings with routine swallowing precautions. Monitor renal fxn. Avoid nephrotoxins. Increase Lantus to 16 units. Change iv synthroid to per tube. DVT px.  GI px. Dispo - Out Monday? Subjective:    Seen and examined. Data / chart reviewed. Indicated his breathing was ok and that he had no pain with nods of the head.           Vital signs/Intake and Output:  Visit Vitals    /80 (BP 1 Location: Right leg, BP Patient Position: At rest)    Pulse 87    Temp 98 °F (36.7 °C)    Resp 20    Ht 5' 10.08\" (1.78 m)    Wt 100 kg (220 lb 5.8 oz)    SpO2 97%    BMI 31.55 kg/m2     Current Shift:  01/21 0701 - 01/21 1900  In: 100   Out: -   Last three shifts:  01/19 1901 - 01/21 0700  In: 1747   Out: 2125 [Urine:2125]    Exam:    General: Well developed, alert, NAD, OX3  Head/Neck: NCAT, supple, No masses, No lymphadenopathy  CVS:Regular rate and rhythm, no M/R/G, S1/S2 heard, no thrill  Lungs:Coarse. No rales. Abdomen: Soft, bowel sounds present. Extremities: No edema. Labs: Results:       Chemistry Recent Labs      01/21/17   0448  01/20/17   0614  01/19/17   0630   GLU  177*  88  102*   NA  149*  145  143   K  3.9  2.8*  3.0*   CL  111*  107  106   CO2  31  31  30   BUN  30*  29*  30*   CREA  0.78  0.82  0.85   CA  7.9*  8.3*  7.9*   AGAP  7  7  7   BUCR  38*  35*  35*      CBC w/Diff Recent Labs      01/21/17 0448 01/20/17   0614  01/19/17   0520   WBC  11.3  13.2  22.4*   RBC  3.12*  3.22*  3.56*   HGB  9.0*  9.1*  10.2*   HCT  28.6*  29.1*  31.6*   PLT  151  149  179      Cardiac Enzymes Recent Labs      01/19/17   1420   CPK  64      Coagulation Recent Labs      01/19/17   1420   PTP  15.1   INR  1.2   APTT  37.7*       Lipid Panel No results found for: CHOL, CHOLPOCT, CHOLX, CHLST, CHOLV, 167645, HDL, LDL, NLDLCT, DLDL, LDLC, DLDLP, 405466, VLDLC, VLDL, TGL, TGLX, TRIGL, [id number], TRIGP, TGLPOCT, 766187, CHHD, CHHDX   BNP No results for input(s): BNPP in the last 72 hours. Liver Enzymes No results for input(s): TP, ALB, TBIL, AP, SGOT, GPT in the last 72 hours.     No lab exists for component: DBIL   Thyroid Studies Lab Results   Component Value Date/Time    TSH 1.37 08/13/2015 04:35 AM        Procedures/imaging: see electronic medical records for all procedures/Xrays and details which were not copied into this note but were reviewed prior to creation of Bashir Olivo MD

## 2017-01-21 NOTE — PROGRESS NOTES
Cardiology Progress Note      1/21/2017 3:08 PM    Admit Date: 1/3/2017    Admit Diagnosis: Hypoxia  peg tube insertion      Subjective:     Kenrick Fine denies chest pain, chest pressure/discomfort.     Visit Vitals    /71 (BP 1 Location: Right leg, BP Patient Position: At rest)    Pulse 87    Temp 98.2 °F (36.8 °C)    Resp 20    Ht 5' 10.08\" (1.78 m)    Wt 100 kg (220 lb 5.8 oz)    SpO2 98%    BMI 31.55 kg/m2     Current Facility-Administered Medications   Medication Dose Route Frequency    potassium chloride (KAON 10%) 20 mEq/15 mL oral liquid 20 mEq  20 mEq Oral DAILY    albuterol-ipratropium (DUO-NEB) 2.5 MG-0.5 MG/3 ML  3 mL Nebulization Q4H RT    arformoterol (BROVANA) neb solution 15 mcg  15 mcg Nebulization BID RT    budesonide (PULMICORT) 500 mcg/2 ml nebulizer suspension  500 mcg Nebulization BID RT    atorvastatin (LIPITOR) tablet 80 mg  80 mg Per NG tube DAILY    insulin glargine (LANTUS) injection 12 Units  12 Units SubCUTAneous DAILY    pantoprazole (PROTONIX) 40 mg in sodium chloride 0.9 % 10 mL injection  40 mg IntraVENous DAILY    lidocaine (LIDODERM) 5 % patch 2 Patch  2 Patch TransDERmal Q24H    hydrALAZINE (APRESOLINE) 20 mg/mL injection 20 mg  20 mg IntraVENous Q6H PRN    levothyroxine (SYNTHROID) injection 87.5 mcg  87.5 mcg IntraVENous Q24H    ELECTROLYTE REPLACEMENT PROTOCOL  1 Each Other PRN    cloNIDine (CATAPRES) 0.1 mg/24 hr patch 1 Patch  1 Patch TransDERmal Q7D    insulin lispro (HUMALOG) injection   SubCUTAneous Q6H    sodium chloride (NS) flush 5-10 mL  5-10 mL IntraVENous PRN    acetaminophen (TYLENOL) tablet 650 mg  650 mg Oral Q6H PRN    aspirin chewable tablet 81 mg  81 mg Per G Tube DAILY    baclofen (LIORESAL) tablet 10 mg  10 mg Oral TID    bisacodyl (DULCOLAX) suppository 10 mg  10 mg Rectal DAILY PRN    guaiFENesin (ROBITUSSIN) 100 mg/5 mL oral liquid 200 mg  200 mg Oral Q6H PRN    multivitamin, tx-iron-ca-min (THERA-M w/ IRON) tablet 1 Tab  1 Tab Oral DAILY    heparin (porcine) injection 5,000 Units  5,000 Units SubCUTAneous Q8H    glucose chewable tablet 16 g  4 Tab Oral PRN    glucagon (GLUCAGEN) injection 1 mg  1 mg IntraMUSCular PRN    dextrose (D50W) injection syrg 12.5-25 g  25-50 mL IntraVENous PRN         Objective:      Physical Exam:  Visit Vitals    /71 (BP 1 Location: Right leg, BP Patient Position: At rest)    Pulse 87    Temp 98.2 °F (36.8 °C)    Resp 20    Ht 5' 10.08\" (1.78 m)    Wt 100 kg (220 lb 5.8 oz)    SpO2 98%    BMI 31.55 kg/m2     General Appearance:  Well developed, well nourished,alert and oriented x 3, and individual in no acute distress. Ears/Nose/Mouth/Throat:   Hearing grossly normal.         Neck: Supple. Chest:   Lungs clear to auscultation bilaterally. Cardiovascular:  Regular rate and rhythm, S1, S2 normal, no murmur. Abdomen:   Soft, non-tender, bowel sounds are active. Extremities: No edema bilaterally. Skin: Warm and dry.                Data Review:   Labs:    Recent Results (from the past 24 hour(s))   GLUCOSE, POC    Collection Time: 01/20/17  5:37 PM   Result Value Ref Range    Glucose (POC) 107 70 - 110 mg/dL   GLUCOSE, POC    Collection Time: 01/21/17 12:41 AM   Result Value Ref Range    Glucose (POC) 148 (H) 70 - 110 mg/dL   MAGNESIUM    Collection Time: 01/21/17  4:48 AM   Result Value Ref Range    Magnesium 2.4 1.8 - 2.4 mg/dL   METABOLIC PANEL, BASIC    Collection Time: 01/21/17  4:48 AM   Result Value Ref Range    Sodium 149 (H) 136 - 145 mmol/L    Potassium 3.9 3.5 - 5.5 mmol/L    Chloride 111 (H) 100 - 108 mmol/L    CO2 31 21 - 32 mmol/L    Anion gap 7 3.0 - 18 mmol/L    Glucose 177 (H) 74 - 99 mg/dL    BUN 30 (H) 7.0 - 18 MG/DL    Creatinine 0.78 0.6 - 1.3 MG/DL    BUN/Creatinine ratio 38 (H) 12 - 20      GFR est AA >60 >60 ml/min/1.73m2    GFR est non-AA >60 >60 ml/min/1.73m2    Calcium 7.9 (L) 8.5 - 10.1 MG/DL   CBC W/O DIFF    Collection Time: 01/21/17  4:48 AM Result Value Ref Range    WBC 11.3 4.6 - 13.2 K/uL    RBC 3.12 (L) 4.70 - 5.50 M/uL    HGB 9.0 (L) 13.0 - 16.0 g/dL    HCT 28.6 (L) 36.0 - 48.0 %    MCV 91.7 74.0 - 97.0 FL    MCH 28.8 24.0 - 34.0 PG    MCHC 31.5 31.0 - 37.0 g/dL    RDW 15.2 (H) 11.6 - 14.5 %    PLATELET 474 239 - 977 K/uL    MPV 11.0 9.2 - 11.8 FL   GLUCOSE, POC    Collection Time: 01/21/17  6:42 AM   Result Value Ref Range    Glucose (POC) 170 (H) 70 - 110 mg/dL   GLUCOSE, POC    Collection Time: 01/21/17 12:10 PM   Result Value Ref Range    Glucose (POC) 166 (H) 70 - 110 mg/dL       Telemetry: normal sinus rhythm      Assessment:     Principal Problem:    Acute respiratory failure ([facility] (8/3/2015)    Active Problems:    Cardiac arrest ([address] (8/1/2015)      Anoxic encephalopathy ([facility] (8/3/2015)      DM (diabetes mellitus) ([facility] (8/7/1014)      Diastolic congestive heart failure, NYHA class 1 ([address] (11/23/2016)      HTN (hypertension) (11/23/2016)      COPD (chronic obstructive pulmonary disease) with acute bronchitis ([address] (1/5/2017)      TIFFANIE (acute kidney injury) ([facility] (1/5/2017)      Aspiration pneumonia ([facility] (1/7/2017)      Transaminitis (1/15/2017)      Bandemia without diagnosis of specific infection (1/15/2017)      Hypokalemia (1/19/2017)        Plan:     The patient remains stable from the cardiac standpoint. Is no evidence for acute coronary syndrome.     Ildefonso Dong MD

## 2017-01-21 NOTE — PROGRESS NOTES
[facility]  Pulmonary, Critical Care, and Sleep Medicine    Name: Sherry Lebron MRN: [id number]   : 1945 Hospital: [facility] [place]   Date: 2017        IMPRESSION:   · Aspiration Pneumonia with acute respiratory failure resolved and off antibiotics  · Acute hypoxic Respiratory Failure from above, pt reintubated   . Extubated 2017  · Previous anoxic encephalopathy, now at baseline   · Diastolic CHF at baseline, improved  · S/p PEA arrest  · COPD exacerbation, better  · TIFFANIE resolved      RECOMMENDATIONS:   · Continue Pulmonary toilette, airway clearance maneuvers. · Cards- hemodynamically stable now. · Pulm-  Continue  Brovana and Pulmicort. Taper Solumedrol, convert Duoneb to prn  · Renal- electrolyte replacement per protocol  · Heme- H/H and plt stable  · GI,  I believe pt does aspirate chronically, pt now has PEG. Continue PEG feeds, aspiration pneumonia food seen at cords  · DVT prophylaxis  · GI prophylaxis to be discontinued, pt on enteral feeds  · Would ask Dietician for recommendations on bolus feeds  · No further PCCM recommendations, will S/o case. Please call as needed     Subjective/History:   17  Remains off vent. No distress overnight  Pt now back to baseline level of comfort  Out  of ICU     HPI  This patient has been seen and evaluated at the request of Dr. Barrington Church for aspiration pneumonia and resp failure. The patient is a 70 y.o. male admitted 3  with COPD exacerbation to the medical floor. Last evening vomited noticed to be hypoxic an dactually lost pulse. 1 Mg epi with ROSC and intubated for cyanosis. Moved to ICU discussed with family and wife should be here this afternoon. In SNF prior to this admission and has diastolic dysfunction at baseline. Remains off vent, denies cough or SOB    Pt denies chest pain.  Afebrile   Current Facility-Administered Medications   Medication Dose Route Frequency    potassium chloride (KAON 10%) 20 mEq/15 mL oral liquid 20 mEq  20 mEq Oral DAILY    albuterol-ipratropium (DUO-NEB) 2.5 MG-0.5 MG/3 ML  3 mL Nebulization Q4H RT    arformoterol (BROVANA) neb solution 15 mcg  15 mcg Nebulization BID RT    budesonide (PULMICORT) 500 mcg/2 ml nebulizer suspension  500 mcg Nebulization BID RT    atorvastatin (LIPITOR) tablet 80 mg  80 mg Per NG tube DAILY    insulin glargine (LANTUS) injection 12 Units  12 Units SubCUTAneous DAILY    pantoprazole (PROTONIX) 40 mg in sodium chloride 0.9 % 10 mL injection  40 mg IntraVENous DAILY    lidocaine (LIDODERM) 5 % patch 2 Patch  2 Patch TransDERmal Q24H    levothyroxine (SYNTHROID) injection 87.5 mcg  87.5 mcg IntraVENous Q24H    cloNIDine (CATAPRES) 0.1 mg/24 hr patch 1 Patch  1 Patch TransDERmal Q7D    insulin lispro (HUMALOG) injection   SubCUTAneous Q6H    aspirin chewable tablet 81 mg  81 mg Per G Tube DAILY    baclofen (LIORESAL) tablet 10 mg  10 mg Oral TID    multivitamin, tx-iron-ca-min (THERA-M w/ IRON) tablet 1 Tab  1 Tab Oral DAILY    heparin (porcine) injection 5,000 Units  5,000 Units SubCUTAneous Q8H     Allergies   Allergen Reactions    Penicillins Itching      Objective:   Vital Signs:    Visit Vitals    /71 (BP 1 Location: Right leg, BP Patient Position: At rest)    Pulse 87    Temp 98.2 °F (36.8 °C)    Resp 20    Ht 5' 10.08\" (1.78 m)    Wt 100 kg (220 lb 5.8 oz)    SpO2 98%    BMI 31.55 kg/m2       O2 Device: Nasal cannula   O2 Flow Rate (L/min): 3 l/min   Temp (24hrs), Av.6 °F (37 °C), Min:98.2 °F (36.8 °C), Max:98.9 °F (37.2 °C)       Intake/Output:   Last shift:      701 - 1900  In: 100   Out: -   Last 3 shifts: 1901 - 700  In: 1747   Out:  [Urine:]    Intake/Output Summary (Last 24 hours) at 17 1447  Last data filed at 17 1300   Gross per 24 hour   Intake              160 ml   Output             1400 ml   Net            -1240 ml     Physical Exam:    General:   awake and nods appropriately, short sentences, in no distress   Head:  Normocephalic, without obvious abnormality,    Eyes:  Conjunctivae/corneas clear. PERRL,   Nose: Nares normal.   Mucosa normal. No drainage or sinus tenderness. Throat: No stridor    Neck: Supple, symmetrical, trachea midline, no adenopathy, no carotid bruit and no JVD. Lungs:   Occasional rhonchi both mid LF   Chest wall:  No tenderness or deformity. Heart:  Regular rate and rhythm, S1, S2 normal, no murmur, click, rub or gallop. Abdomen:   Soft, non-tender. Bowel sounds normal. No masses,  No organomegaly.    Extremities: Extremities normal, atraumatic, no cyanosis , 1+ edema both lower extremities             Data:     Recent Results (from the past 24 hour(s))   GLUCOSE, POC    Collection Time: 01/20/17  5:37 PM   Result Value Ref Range    Glucose (POC) 107 70 - 110 mg/dL   GLUCOSE, POC    Collection Time: 01/21/17 12:41 AM   Result Value Ref Range    Glucose (POC) 148 (H) 70 - 110 mg/dL   MAGNESIUM    Collection Time: 01/21/17  4:48 AM   Result Value Ref Range    Magnesium 2.4 1.8 - 2.4 mg/dL   METABOLIC PANEL, BASIC    Collection Time: 01/21/17  4:48 AM   Result Value Ref Range    Sodium 149 (H) 136 - 145 mmol/L    Potassium 3.9 3.5 - 5.5 mmol/L    Chloride 111 (H) 100 - 108 mmol/L    CO2 31 21 - 32 mmol/L    Anion gap 7 3.0 - 18 mmol/L    Glucose 177 (H) 74 - 99 mg/dL    BUN 30 (H) 7.0 - 18 MG/DL    Creatinine 0.78 0.6 - 1.3 MG/DL    BUN/Creatinine ratio 38 (H) 12 - 20      GFR est AA >60 >60 ml/min/1.73m2    GFR est non-AA >60 >60 ml/min/1.73m2    Calcium 7.9 (L) 8.5 - 10.1 MG/DL   CBC W/O DIFF    Collection Time: 01/21/17  4:48 AM   Result Value Ref Range    WBC 11.3 4.6 - 13.2 K/uL    RBC 3.12 (L) 4.70 - 5.50 M/uL    HGB 9.0 (L) 13.0 - 16.0 g/dL    HCT 28.6 (L) 36.0 - 48.0 %    MCV 91.7 74.0 - 97.0 FL    MCH 28.8 24.0 - 34.0 PG    MCHC 31.5 31.0 - 37.0 g/dL    RDW 15.2 (H) 11.6 - 14.5 %    PLATELET 123 593 - 216 K/uL    MPV 11.0 9.2 - 11.8 FL   GLUCOSE, POC    Collection Time: 01/21/17  6:42 AM   Result Value Ref Range    Glucose (POC) 170 (H) 70 - 110 mg/dL   GLUCOSE, POC    Collection Time: 01/21/17 12:10 PM   Result Value Ref Range    Glucose (POC) 166 (H) 70 - 110 mg/dL           Recent Labs      01/19/17   0115   FIO2I  32   HCO3I  28.1*   PCO2I  44.7   PHI  7.406   PO2I  66*     Telemetry:normal sinus rhythm    Imaging:  I have personally reviewed the patients radiographs and have reviewed the reports:  CXR Results  (Last 48 hours)    None         Best practice :  Core measures; Antibiotic choice:  Severe Sepsis bundles;SIRS screen met? Yes  Fluids  Lactic acid ordered- initial and repeat Q6hrs if elevated till normalized. Cultures drawn.   Antibiotic administered within 1hr-ICU  And 3hrs ED  Large bore IV- 2 sites          Pressors aim MAP >65mmHg  Steriods  Glycemic control  Sress ulcer prophylaxis  DVT prophylaxis          Nasir Harmon MD

## 2017-01-22 LAB
ANION GAP BLD CALC-SCNC: 5 MMOL/L (ref 3–18)
BUN SERPL-MCNC: 29 MG/DL (ref 7–18)
BUN/CREAT SERPL: 43 (ref 12–20)
CALCIUM SERPL-MCNC: 7.8 MG/DL (ref 8.5–10.1)
CHLORIDE SERPL-SCNC: 112 MMOL/L (ref 100–108)
CO2 SERPL-SCNC: 32 MMOL/L (ref 21–32)
CREAT SERPL-MCNC: 0.68 MG/DL (ref 0.6–1.3)
ERYTHROCYTE [DISTWIDTH] IN BLOOD BY AUTOMATED COUNT: 15 % (ref 11.6–14.5)
GLUCOSE BLD STRIP.AUTO-MCNC: 165 MG/DL (ref 70–110)
GLUCOSE BLD STRIP.AUTO-MCNC: 176 MG/DL (ref 70–110)
GLUCOSE BLD STRIP.AUTO-MCNC: 178 MG/DL (ref 70–110)
GLUCOSE BLD STRIP.AUTO-MCNC: 195 MG/DL (ref 70–110)
GLUCOSE SERPL-MCNC: 176 MG/DL (ref 74–99)
HCT VFR BLD AUTO: 29.4 % (ref 36–48)
HGB BLD-MCNC: 9 G/DL (ref 13–16)
MAGNESIUM SERPL-MCNC: 2.3 MG/DL (ref 1.8–2.4)
MCH RBC QN AUTO: 28.3 PG (ref 24–34)
MCHC RBC AUTO-ENTMCNC: 30.6 G/DL (ref 31–37)
MCV RBC AUTO: 92.5 FL (ref 74–97)
PLATELET # BLD AUTO: 138 K/UL (ref 135–420)
PMV BLD AUTO: 10.8 FL (ref 9.2–11.8)
POTASSIUM SERPL-SCNC: 3.4 MMOL/L (ref 3.5–5.5)
RBC # BLD AUTO: 3.18 M/UL (ref 4.7–5.5)
SODIUM SERPL-SCNC: 149 MMOL/L (ref 136–145)
WBC # BLD AUTO: 8.8 K/UL (ref 4.6–13.2)

## 2017-01-22 PROCEDURE — 74011250637 HC RX REV CODE- 250/637: Performed by: HOSPITALIST

## 2017-01-22 PROCEDURE — 94640 AIRWAY INHALATION TREATMENT: CPT

## 2017-01-22 PROCEDURE — 74011250637 HC RX REV CODE- 250/637: Performed by: INTERNAL MEDICINE

## 2017-01-22 PROCEDURE — 74011250636 HC RX REV CODE- 250/636: Performed by: FAMILY MEDICINE

## 2017-01-22 PROCEDURE — 85027 COMPLETE CBC AUTOMATED: CPT | Performed by: INTERNAL MEDICINE

## 2017-01-22 PROCEDURE — 74011636637 HC RX REV CODE- 636/637: Performed by: INTERNAL MEDICINE

## 2017-01-22 PROCEDURE — 74011000250 HC RX REV CODE- 250: Performed by: INTERNAL MEDICINE

## 2017-01-22 PROCEDURE — 77030018798 HC PMP KT ENTRL FED COVD -A

## 2017-01-22 PROCEDURE — 65660000000 HC RM CCU STEPDOWN

## 2017-01-22 PROCEDURE — 77030011256 HC DRSG MEPILEX <16IN NO BORD MOLN -A

## 2017-01-22 PROCEDURE — 83735 ASSAY OF MAGNESIUM: CPT | Performed by: INTERNAL MEDICINE

## 2017-01-22 PROCEDURE — 82962 GLUCOSE BLOOD TEST: CPT

## 2017-01-22 PROCEDURE — 77010033678 HC OXYGEN DAILY

## 2017-01-22 PROCEDURE — 36415 COLL VENOUS BLD VENIPUNCTURE: CPT | Performed by: INTERNAL MEDICINE

## 2017-01-22 PROCEDURE — 80048 BASIC METABOLIC PNL TOTAL CA: CPT | Performed by: INTERNAL MEDICINE

## 2017-01-22 PROCEDURE — 74011250636 HC RX REV CODE- 250/636: Performed by: INTERNAL MEDICINE

## 2017-01-22 PROCEDURE — 74011250637 HC RX REV CODE- 250/637: Performed by: FAMILY MEDICINE

## 2017-01-22 RX ORDER — CLONIDINE 0.2 MG/24H
1 PATCH, EXTENDED RELEASE TRANSDERMAL
Status: DISCONTINUED | OUTPATIENT
Start: 2017-01-22 | End: 2017-01-27 | Stop reason: HOSPADM

## 2017-01-22 RX ORDER — INSULIN GLARGINE 100 [IU]/ML
20 INJECTION, SOLUTION SUBCUTANEOUS DAILY
Status: DISCONTINUED | OUTPATIENT
Start: 2017-01-22 | End: 2017-01-27 | Stop reason: HOSPADM

## 2017-01-22 RX ORDER — POTASSIUM CHLORIDE 7.45 MG/ML
10 INJECTION INTRAVENOUS ONCE
Status: COMPLETED | OUTPATIENT
Start: 2017-01-22 | End: 2017-01-22

## 2017-01-22 RX ADMIN — INSULIN LISPRO 2 UNITS: 100 INJECTION, SOLUTION INTRAVENOUS; SUBCUTANEOUS at 06:35

## 2017-01-22 RX ADMIN — ACETAMINOPHEN 650 MG: 325 TABLET, FILM COATED ORAL at 18:35

## 2017-01-22 RX ADMIN — IPRATROPIUM BROMIDE AND ALBUTEROL SULFATE 3 ML: .5; 3 SOLUTION RESPIRATORY (INHALATION) at 20:33

## 2017-01-22 RX ADMIN — BACLOFEN 10 MG: 10 TABLET ORAL at 18:03

## 2017-01-22 RX ADMIN — MULTIPLE VITAMINS W/ MINERALS TAB 1 TABLET: TAB at 10:46

## 2017-01-22 RX ADMIN — BUDESONIDE 500 MCG: 0.5 INHALANT RESPIRATORY (INHALATION) at 20:33

## 2017-01-22 RX ADMIN — LEVOTHYROXINE SODIUM 175 MCG: 150 TABLET ORAL at 06:47

## 2017-01-22 RX ADMIN — INSULIN LISPRO 2 UNITS: 100 INJECTION, SOLUTION INTRAVENOUS; SUBCUTANEOUS at 00:58

## 2017-01-22 RX ADMIN — IPRATROPIUM BROMIDE AND ALBUTEROL SULFATE 3 ML: .5; 3 SOLUTION RESPIRATORY (INHALATION) at 16:23

## 2017-01-22 RX ADMIN — IPRATROPIUM BROMIDE AND ALBUTEROL SULFATE 3 ML: .5; 3 SOLUTION RESPIRATORY (INHALATION) at 11:53

## 2017-01-22 RX ADMIN — HEPARIN SODIUM 5000 UNITS: 5000 INJECTION, SOLUTION INTRAVENOUS; SUBCUTANEOUS at 18:04

## 2017-01-22 RX ADMIN — BACLOFEN 10 MG: 10 TABLET ORAL at 22:14

## 2017-01-22 RX ADMIN — ASPIRIN 81 MG 81 MG: 81 TABLET ORAL at 10:45

## 2017-01-22 RX ADMIN — HYDRALAZINE HYDROCHLORIDE 20 MG: 20 INJECTION INTRAMUSCULAR; INTRAVENOUS at 18:35

## 2017-01-22 RX ADMIN — BUDESONIDE 500 MCG: 0.5 INHALANT RESPIRATORY (INHALATION) at 08:50

## 2017-01-22 RX ADMIN — BACLOFEN 10 MG: 10 TABLET ORAL at 10:46

## 2017-01-22 RX ADMIN — ARFORMOTEROL TARTRATE 15 MCG: 15 SOLUTION RESPIRATORY (INHALATION) at 08:50

## 2017-01-22 RX ADMIN — ARFORMOTEROL TARTRATE 15 MCG: 15 SOLUTION RESPIRATORY (INHALATION) at 20:33

## 2017-01-22 RX ADMIN — HEPARIN SODIUM 5000 UNITS: 5000 INJECTION, SOLUTION INTRAVENOUS; SUBCUTANEOUS at 00:16

## 2017-01-22 RX ADMIN — IPRATROPIUM BROMIDE AND ALBUTEROL SULFATE 3 ML: .5; 3 SOLUTION RESPIRATORY (INHALATION) at 04:51

## 2017-01-22 RX ADMIN — ATORVASTATIN CALCIUM 80 MG: 20 TABLET, FILM COATED ORAL at 10:45

## 2017-01-22 RX ADMIN — POTASSIUM CHLORIDE 10 MEQ: 10 INJECTION, SOLUTION INTRAVENOUS at 10:45

## 2017-01-22 RX ADMIN — HEPARIN SODIUM 5000 UNITS: 5000 INJECTION, SOLUTION INTRAVENOUS; SUBCUTANEOUS at 23:54

## 2017-01-22 RX ADMIN — POTASSIUM CHLORIDE 20 MEQ: 20 SOLUTION ORAL at 10:46

## 2017-01-22 RX ADMIN — IPRATROPIUM BROMIDE AND ALBUTEROL SULFATE 3 ML: .5; 3 SOLUTION RESPIRATORY (INHALATION) at 00:48

## 2017-01-22 RX ADMIN — HEPARIN SODIUM 5000 UNITS: 5000 INJECTION, SOLUTION INTRAVENOUS; SUBCUTANEOUS at 10:46

## 2017-01-22 RX ADMIN — INSULIN LISPRO 2 UNITS: 100 INJECTION, SOLUTION INTRAVENOUS; SUBCUTANEOUS at 20:01

## 2017-01-22 RX ADMIN — INSULIN GLARGINE 20 UNITS: 100 INJECTION, SOLUTION SUBCUTANEOUS at 18:04

## 2017-01-22 RX ADMIN — IPRATROPIUM BROMIDE AND ALBUTEROL SULFATE 3 ML: .5; 3 SOLUTION RESPIRATORY (INHALATION) at 08:50

## 2017-01-22 NOTE — ROUTINE PROCESS
Bedside shift change report given to Neha Carpenter (oncoming nurse) by Kwabena Ortez RN (offgoing nurse). Report included the following information SBAR, Kardex and MAR.

## 2017-01-22 NOTE — PROGRESS NOTES
Cardiology Progress Note      1/22/2017 8:49 AM    Admit Date: 1/3/2017    Admit Diagnosis: Hypoxia  peg tube insertion      Subjective:     Vidhi Orourke has multile issues.     Visit Vitals    /72 (BP 1 Location: Right leg, BP Patient Position: At rest)    Pulse 87    Temp 99.1 °F (37.3 °C)    Resp 20    Ht 5' 10.08\" (1.78 m)    Wt 98.4 kg (217 lb)    SpO2 98%    BMI 31.07 kg/m2     Current Facility-Administered Medications   Medication Dose Route Frequency    insulin glargine (LANTUS) injection 16 Units  16 Units SubCUTAneous DAILY    lansoprazole (PREVACID SOLUTAB) disintegrating tablet 30 mg  30 mg Per G Tube ACB    levothyroxine (SYNTHROID) tablet 175 mcg  175 mcg Per G Tube ACB    baclofen (LIORESAL) tablet 10 mg  10 mg Per G Tube TID    potassium chloride (KAON 10%) 20 mEq/15 mL oral liquid 20 mEq  20 mEq Oral DAILY    albuterol-ipratropium (DUO-NEB) 2.5 MG-0.5 MG/3 ML  3 mL Nebulization Q4H RT    arformoterol (BROVANA) neb solution 15 mcg  15 mcg Nebulization BID RT    budesonide (PULMICORT) 500 mcg/2 ml nebulizer suspension  500 mcg Nebulization BID RT    atorvastatin (LIPITOR) tablet 80 mg  80 mg Per NG tube DAILY    lidocaine (LIDODERM) 5 % patch 2 Patch  2 Patch TransDERmal Q24H    hydrALAZINE (APRESOLINE) 20 mg/mL injection 20 mg  20 mg IntraVENous Q6H PRN    ELECTROLYTE REPLACEMENT PROTOCOL  1 Each Other PRN    cloNIDine (CATAPRES) 0.1 mg/24 hr patch 1 Patch  1 Patch TransDERmal Q7D    insulin lispro (HUMALOG) injection   SubCUTAneous Q6H    sodium chloride (NS) flush 5-10 mL  5-10 mL IntraVENous PRN    acetaminophen (TYLENOL) tablet 650 mg  650 mg Oral Q6H PRN    aspirin chewable tablet 81 mg  81 mg Per G Tube DAILY    bisacodyl (DULCOLAX) suppository 10 mg  10 mg Rectal DAILY PRN    guaiFENesin (ROBITUSSIN) 100 mg/5 mL oral liquid 200 mg  200 mg Oral Q6H PRN    multivitamin, tx-iron-ca-min (THERA-M w/ IRON) tablet 1 Tab  1 Tab Oral DAILY    heparin (porcine) injection 5,000 Units  5,000 Units SubCUTAneous Q8H    glucose chewable tablet 16 g  4 Tab Oral PRN    glucagon (GLUCAGEN) injection 1 mg  1 mg IntraMUSCular PRN    dextrose (D50W) injection syrg 12.5-25 g  25-50 mL IntraVENous PRN         Objective:      Physical Exam:  Visit Vitals    /72 (BP 1 Location: Right leg, BP Patient Position: At rest)    Pulse 87    Temp 99.1 °F (37.3 °C)    Resp 20    Ht 5' 10.08\" (1.78 m)    Wt 98.4 kg (217 lb)    SpO2 98%    BMI 31.07 kg/m2     General Appearance:  Well developed, well nourished,alert and oriented x 3, and individual in no acute distress. Ears/Nose/Mouth/Throat:   Hearing grossly normal.         Neck: Supple. Chest:   Lungs clear to auscultation bilaterally. Cardiovascular:  Regular rate and rhythm, S1, S2 normal, no murmur. Abdomen:   Soft, non-tender, bowel sounds are active. Extremities: No edema bilaterally. Skin: Warm and dry.                Data Review:   Labs:    Recent Results (from the past 24 hour(s))   GLUCOSE, POC    Collection Time: 01/21/17 12:10 PM   Result Value Ref Range    Glucose (POC) 166 (H) 70 - 110 mg/dL   GLUCOSE, POC    Collection Time: 01/21/17  7:54 PM   Result Value Ref Range    Glucose (POC) 170 (H) 70 - 110 mg/dL   GLUCOSE, POC    Collection Time: 01/22/17 12:51 AM   Result Value Ref Range    Glucose (POC) 176 (H) 70 - 110 mg/dL   GLUCOSE, POC    Collection Time: 01/22/17  6:13 AM   Result Value Ref Range    Glucose (POC) 178 (H) 70 - 110 mg/dL   MAGNESIUM    Collection Time: 01/22/17  6:30 AM   Result Value Ref Range    Magnesium 2.3 1.8 - 2.4 mg/dL   METABOLIC PANEL, BASIC    Collection Time: 01/22/17  6:30 AM   Result Value Ref Range    Sodium 149 (H) 136 - 145 mmol/L    Potassium 3.4 (L) 3.5 - 5.5 mmol/L    Chloride 112 (H) 100 - 108 mmol/L    CO2 32 21 - 32 mmol/L    Anion gap 5 3.0 - 18 mmol/L    Glucose 176 (H) 74 - 99 mg/dL    BUN 29 (H) 7.0 - 18 MG/DL    Creatinine 0.68 0.6 - 1.3 MG/DL BUN/Creatinine ratio 43 (H) 12 - 20      GFR est AA >60 >60 ml/min/1.73m2    GFR est non-AA >60 >60 ml/min/1.73m2    Calcium 7.8 (L) 8.5 - 10.1 MG/DL   CBC W/O DIFF    Collection Time: 01/22/17  6:30 AM   Result Value Ref Range    WBC 8.8 4.6 - 13.2 K/uL    RBC 3.18 (L) 4.70 - 5.50 M/uL    HGB 9.0 (L) 13.0 - 16.0 g/dL    HCT 29.4 (L) 36.0 - 48.0 %    MCV 92.5 74.0 - 97.0 FL    MCH 28.3 24.0 - 34.0 PG    MCHC 30.6 (L) 31.0 - 37.0 g/dL    RDW 15.0 (H) 11.6 - 14.5 %    PLATELET 189 291 - 234 K/uL    MPV 10.8 9.2 - 11.8 FL       Telemetry: normal sinus rhythm      Assessment:     Principal Problem:    Acute respiratory failure (HCC) (8/3/2015)    Active Problems:    Card[facility] (8/1/2015)      Anoxic encephalopathy ([facility] (8/3/2015)      DM (diabetes mellitus) ([facility] (3/7/1815)      Diastolic congestive heart failure, NYHA class 1 ([facility] (11/23/2016)      HTN (hypertension) (11/23/2016)      COPD (chronic obstructive pulmonary disease) with acute bronchitis ([facility] (1/5/2017)      TIFFANIE (acute kidney injury) ([facility] (1/5/2017)      Aspiration pneumonia ([facility] (1/7/2017)      Transaminitis (1/15/2017)      Bandemia without diagnosis of specific infection (1/15/2017)      Hypokalemia (1/19/2017)        Plan:     Mr. Gianfranco Schumacher remains hemodynamically stable. Theres no evidence of acute coronary syndrome. He has been extubated for several days. Disposition is pending.     Caitlyn Corley MD

## 2017-01-22 NOTE — PROGRESS NOTES
1932:  Bedside and Verbal shift change report received from Nelly Asher, RN (offgoing nurse) to Chetan Warner RN (oncoming nurse). Report included the following information SBAR, Kardex, Intake/Output, MAR, Recent Results and Cardiac Rhythm SR. Wife at bedside. Pt resting in bed. Appears to be in no distress at this time. Call bell within reach. Zone phone number given to pt. Pt instructed to call zone phone or call bell if needed. 2001:  Oral care complete. 0012:  G-tube dressing changed.   Oral care complete

## 2017-01-22 NOTE — PROGRESS NOTES
Hospitalist Progress Note    Patient: Renetta Record MRN: [id number]  CSN: [id number]    YOB: 1945  Age: 70 y.o. Sex: male    DOA: 1/3/2017 LOS:  LOS: 18 days          Chief Complaint:  Aspiration pneumonia. Assessment/Plan         Patient Active Problem List   Diagnosis Code    Cardiac arrest ([facility] I46.9    [facility] G93.1    Acute respiratory failure ([facility] J96.00    DM (diabetes mellitus) ([facility] T67.0    Diastolic congestive heart failure, NYHA class 1 ([facility]) I50.30    HTN (hypertension) I10    Hypoxia R09.02    COPD (chronic obstructive pulmonary disease) with acute bronchitis ([facility]) J44.0    TIFFANIE (acute kidney injury) ([facility] N17.9    Aspiration pneumonia ([facility] J69.0    Transaminitis R74.0    Bandemia without diagnosis of specific infection D72.825    Hypokalemia E87.6     1. Continue respiratory support and antibiotics for aspiration pneumonia. 2. Continue steroids/nebs/etc for copd. 3. A touch hypernatremic. Free h20 def? 4. Mental status reportedly at baseline. 5. Continue peg tube feedings with routine swallowing precautions. 6. Monitor renal fxn. Avoid nephrotoxins. Replete potassium. 7. Increase Lantus to 20 units. 8. DVT px.  9. GI px.  10. Dispo - Rehab on dc. Subjective:    Seen and examined. Data reviewed. Vital signs/Intake and Output:  Visit Vitals    /72 (BP 1 Location: Right leg, BP Patient Position: At rest)    Pulse 87    Temp 99.1 °F (37.3 °C)    Resp 20    Ht 5' 10.08\" (1.78 m)    Wt 98.4 kg (217 lb)    SpO2 100%    BMI 31.07 kg/m2     Current Shift:     Last three shifts:  01/20 1901 - 01/22 0700  In: 520   Out: 1200 [Urine:1200]    Exam:    General: Nad  Head/Neck: mmm  CVS:s1s2  Lungs:Coarse b/l. No wheezes. No rales. Abdomen: Soft, bs+  Extremities: Edema.                   Labs: Results:       Chemistry Recent Labs      01/22/17   0630  01/21/17   0448  01/20/17   0614   GLU  176*  177*  88 NA  149*  149*  145   K  3.4*  3.9  2.8*   CL  112*  111*  107   CO2  32  31  31   BUN  29*  30*  29*   CREA  0.68  0.78  0.82   CA  7.8*  7.9*  8.3*   AGAP  5  7  7   BUCR  43*  38*  35*      CBC w/Diff Recent Labs      01/22/17   0630  01/21/17   0448  01/20/17   0614   WBC  8.8  11.3  13.2   RBC  3.18*  3.12*  3.22*   HGB  9.0*  9.0*  9.1*   HCT  29.4*  28.6*  29.1*   PLT  138  151  149      Cardiac Enzymes Recent Labs      01/19/17   1420   CPK  64      Coagulation Recent Labs      01/19/17   1420   PTP  15.1   INR  1.2   APTT  37.7*       Lipid Panel No results found for: CHOL, CHOLPOCT, CHOLX, CHLST, CHOLV, 665789, HDL, LDL, NLDLCT, DLDL, LDLC, DLDLP, 639879, VLDLC, VLDL, TGL, TGLX, TRIGL, [id number], TRIGP, TGLPOCT, 321579, CHHD, CHHDX   BNP No results for input(s): BNPP in the last 72 hours. Liver Enzymes No results for input(s): TP, ALB, TBIL, AP, SGOT, GPT in the last 72 hours.     No lab exists for component: DBIL   Thyroid Studies Lab Results   Component Value Date/Time    TSH 1.37 08/13/2015 04:35 AM        Procedures/imaging: see electronic medical records for all procedures/Xrays and details which were not copied into this note but were reviewed prior to creation of Ofe Guy MD

## 2017-01-23 LAB
BACTERIA SPEC CULT: NORMAL
GLUCOSE BLD STRIP.AUTO-MCNC: 176 MG/DL (ref 70–110)
GLUCOSE BLD STRIP.AUTO-MCNC: 184 MG/DL (ref 70–110)
GLUCOSE BLD STRIP.AUTO-MCNC: 193 MG/DL (ref 70–110)
GLUCOSE BLD STRIP.AUTO-MCNC: 201 MG/DL (ref 70–110)
MAGNESIUM SERPL-MCNC: 2.2 MG/DL (ref 1.8–2.4)
MYOGLOBIN UR-MCNC: 8 NG/ML (ref 0–13)
SERVICE CMNT-IMP: NORMAL

## 2017-01-23 PROCEDURE — 65660000000 HC RM CCU STEPDOWN

## 2017-01-23 PROCEDURE — 77010033678 HC OXYGEN DAILY

## 2017-01-23 PROCEDURE — 74011250637 HC RX REV CODE- 250/637: Performed by: FAMILY MEDICINE

## 2017-01-23 PROCEDURE — 82962 GLUCOSE BLOOD TEST: CPT

## 2017-01-23 PROCEDURE — 74011636637 HC RX REV CODE- 636/637: Performed by: INTERNAL MEDICINE

## 2017-01-23 PROCEDURE — 74011000250 HC RX REV CODE- 250: Performed by: INTERNAL MEDICINE

## 2017-01-23 PROCEDURE — 74011250636 HC RX REV CODE- 250/636: Performed by: INTERNAL MEDICINE

## 2017-01-23 PROCEDURE — 36415 COLL VENOUS BLD VENIPUNCTURE: CPT | Performed by: INTERNAL MEDICINE

## 2017-01-23 PROCEDURE — 74011250637 HC RX REV CODE- 250/637: Performed by: HOSPITALIST

## 2017-01-23 PROCEDURE — 74011250637 HC RX REV CODE- 250/637: Performed by: INTERNAL MEDICINE

## 2017-01-23 PROCEDURE — 94640 AIRWAY INHALATION TREATMENT: CPT

## 2017-01-23 PROCEDURE — 87493 C DIFF AMPLIFIED PROBE: CPT | Performed by: INTERNAL MEDICINE

## 2017-01-23 PROCEDURE — 83735 ASSAY OF MAGNESIUM: CPT | Performed by: INTERNAL MEDICINE

## 2017-01-23 RX ADMIN — HEPARIN SODIUM 5000 UNITS: 5000 INJECTION, SOLUTION INTRAVENOUS; SUBCUTANEOUS at 12:35

## 2017-01-23 RX ADMIN — INSULIN LISPRO 2 UNITS: 100 INJECTION, SOLUTION INTRAVENOUS; SUBCUTANEOUS at 12:36

## 2017-01-23 RX ADMIN — IPRATROPIUM BROMIDE AND ALBUTEROL SULFATE 3 ML: .5; 3 SOLUTION RESPIRATORY (INHALATION) at 16:56

## 2017-01-23 RX ADMIN — INSULIN GLARGINE 20 UNITS: 100 INJECTION, SOLUTION SUBCUTANEOUS at 12:36

## 2017-01-23 RX ADMIN — BACLOFEN 10 MG: 10 TABLET ORAL at 12:34

## 2017-01-23 RX ADMIN — BACLOFEN 10 MG: 10 TABLET ORAL at 16:00

## 2017-01-23 RX ADMIN — HEPARIN SODIUM 5000 UNITS: 5000 INJECTION, SOLUTION INTRAVENOUS; SUBCUTANEOUS at 20:34

## 2017-01-23 RX ADMIN — ATORVASTATIN CALCIUM 80 MG: 20 TABLET, FILM COATED ORAL at 12:33

## 2017-01-23 RX ADMIN — ASPIRIN 81 MG 81 MG: 81 TABLET ORAL at 12:33

## 2017-01-23 RX ADMIN — BUDESONIDE 500 MCG: 0.5 INHALANT RESPIRATORY (INHALATION) at 08:22

## 2017-01-23 RX ADMIN — INSULIN LISPRO 2 UNITS: 100 INJECTION, SOLUTION INTRAVENOUS; SUBCUTANEOUS at 00:31

## 2017-01-23 RX ADMIN — POTASSIUM CHLORIDE 20 MEQ: 20 SOLUTION ORAL at 12:35

## 2017-01-23 RX ADMIN — LANSOPRAZOLE 30 MG: 30 TABLET, ORALLY DISINTEGRATING, DELAYED RELEASE ORAL at 06:30

## 2017-01-23 RX ADMIN — IPRATROPIUM BROMIDE AND ALBUTEROL SULFATE 3 ML: .5; 3 SOLUTION RESPIRATORY (INHALATION) at 12:30

## 2017-01-23 RX ADMIN — IPRATROPIUM BROMIDE AND ALBUTEROL SULFATE 3 ML: .5; 3 SOLUTION RESPIRATORY (INHALATION) at 03:35

## 2017-01-23 RX ADMIN — INSULIN LISPRO 2 UNITS: 100 INJECTION, SOLUTION INTRAVENOUS; SUBCUTANEOUS at 07:06

## 2017-01-23 RX ADMIN — MULTIPLE VITAMINS W/ MINERALS TAB 1 TABLET: TAB at 12:34

## 2017-01-23 RX ADMIN — IPRATROPIUM BROMIDE AND ALBUTEROL SULFATE 3 ML: .5; 3 SOLUTION RESPIRATORY (INHALATION) at 08:22

## 2017-01-23 RX ADMIN — ARFORMOTEROL TARTRATE 15 MCG: 15 SOLUTION RESPIRATORY (INHALATION) at 08:22

## 2017-01-23 RX ADMIN — INSULIN LISPRO 4 UNITS: 100 INJECTION, SOLUTION INTRAVENOUS; SUBCUTANEOUS at 18:00

## 2017-01-23 RX ADMIN — BACLOFEN 10 MG: 10 TABLET ORAL at 22:43

## 2017-01-23 RX ADMIN — LEVOTHYROXINE SODIUM 175 MCG: 150 TABLET ORAL at 06:30

## 2017-01-23 NOTE — PROGRESS NOTES
Cardiology Progress Note      1/23/2017 12:07 PM    Admit Date: 1/3/2017    Admit Diagnosis: Hypoxia  peg tube insertion      Subjective:     Patric Díaz denies chest pain, chest pressure/discomfort, dyspnea.     Visit Vitals    /73    Pulse 90    Temp 99.3 °F (37.4 °C)    Resp 20    Ht 5' 10.08\" (1.78 m)    Wt 98.4 kg (217 lb)    SpO2 98%    BMI 31.07 kg/m2     Current Facility-Administered Medications   Medication Dose Route Frequency    insulin glargine (LANTUS) injection 20 Units  20 Units SubCUTAneous DAILY    cloNIDine (CATAPRES) 0.2 mg/24 hr patch 1 Patch  1 Patch TransDERmal Q7D    lansoprazole (PREVACID SOLUTAB) disintegrating tablet 30 mg  30 mg Per G Tube ACB    levothyroxine (SYNTHROID) tablet 175 mcg  175 mcg Per G Tube ACB    baclofen (LIORESAL) tablet 10 mg  10 mg Per G Tube TID    potassium chloride (KAON 10%) 20 mEq/15 mL oral liquid 20 mEq  20 mEq Oral DAILY    albuterol-ipratropium (DUO-NEB) 2.5 MG-0.5 MG/3 ML  3 mL Nebulization Q4H RT    arformoterol (BROVANA) neb solution 15 mcg  15 mcg Nebulization BID RT    budesonide (PULMICORT) 500 mcg/2 ml nebulizer suspension  500 mcg Nebulization BID RT    atorvastatin (LIPITOR) tablet 80 mg  80 mg Per NG tube DAILY    lidocaine (LIDODERM) 5 % patch 2 Patch  2 Patch TransDERmal Q24H    hydrALAZINE (APRESOLINE) 20 mg/mL injection 20 mg  20 mg IntraVENous Q6H PRN    ELECTROLYTE REPLACEMENT PROTOCOL  1 Each Other PRN    insulin lispro (HUMALOG) injection   SubCUTAneous Q6H    sodium chloride (NS) flush 5-10 mL  5-10 mL IntraVENous PRN    acetaminophen (TYLENOL) tablet 650 mg  650 mg Oral Q6H PRN    aspirin chewable tablet 81 mg  81 mg Per G Tube DAILY    bisacodyl (DULCOLAX) suppository 10 mg  10 mg Rectal DAILY PRN    guaiFENesin (ROBITUSSIN) 100 mg/5 mL oral liquid 200 mg  200 mg Oral Q6H PRN    multivitamin, tx-iron-ca-min (THERA-M w/ IRON) tablet 1 Tab  1 Tab Oral DAILY    heparin (porcine) injection 5,000 Units 5,000 Units SubCUTAneous Q8H    glucose chewable tablet 16 g  4 Tab Oral PRN    glucagon (GLUCAGEN) injection 1 mg  1 mg IntraMUSCular PRN    dextrose (D50W) injection syrg 12.5-25 g  25-50 mL IntraVENous PRN         Objective:      Physical Exam:  Visit Vitals    /73    Pulse 90    Temp 99.3 °F (37.4 °C)    Resp 20    Ht 5' 10.08\" (1.78 m)    Wt 98.4 kg (217 lb)    SpO2 98%    BMI 31.07 kg/m2     General Appearance:  Well developed, well nourished,alert and oriented x 3, and individual in no acute distress. Ears/Nose/Mouth/Throat:   Hearing grossly normal.         Neck: Supple. Chest:   Lungs clear to auscultation bilaterally. Cardiovascular:  Regular rate and rhythm, S1, S2 normal, no murmur. Abdomen:   Soft, non-tender, bowel sounds are active. Extremities: No edema bilaterally. Skin: Warm and dry.                Data Review:   Labs:    Recent Results (from the past 24 hour(s))   GLUCOSE, POC    Collection Time: 01/22/17  6:36 PM   Result Value Ref Range    Glucose (POC) 195 (H) 70 - 110 mg/dL   GLUCOSE, POC    Collection Time: 01/22/17 11:52 PM   Result Value Ref Range    Glucose (POC) 165 (H) 70 - 110 mg/dL   MAGNESIUM    Collection Time: 01/23/17  5:45 AM   Result Value Ref Range    Magnesium 2.2 1.8 - 2.4 mg/dL   GLUCOSE, POC    Collection Time: 01/23/17  6:35 AM   Result Value Ref Range    Glucose (POC) 193 (H) 70 - 110 mg/dL       Telemetry: normal sinus rhythm      Assessment:     Principal Problem:    Acute respiratory failure ([facility] (8/3/2015)    Active Problems:    Cardiac arrest ([facility] (8/1/2015)      Anoxic encephalopathy ([facility] (8/3/2015)      DM [facility]diabetes mellitus) ([facility] (4/1/4457)      Diastolic congestive heart failure, NYHA class 1 ([facility] (11/23/2016)      HTN (hypertension) (11/23/2016)      COPD (chronic obstructive pulmonary disease) with acute bronchitis ([facility] (1/5/2017)      TIFFANIE (acute kidney injury) ([facility] (1/5/2017)      Aspiration pneumonia ([facility] (1/7/2017) Transaminitis (1/15/2017)      Bandemia without diagnosis of specific infection (1/15/2017)      Hypokalemia (1/19/2017)        Plan:     Mr. Aman Hobson remains clinically stable and hemodynamically stable. There was no evidence of acute coronary syndrome. Disposition is pending.     Cheryl Nielsen MD

## 2017-01-23 NOTE — PROGRESS NOTES
Chart reviewed, CM contacting [facility] to finalize discharge planning for return to facility. Awaiting word on if they are able to accept back.

## 2017-01-23 NOTE — WOUND CARE
Patient seen for wound assessment and dressing change. Patient has red blanchable paul rectal area most likely related to frequent loose stools. Changed protective dressing to right elbow and removed mepilex border from left hand skin appeared intact at this time. Wound care will continue to monitor during admission.

## 2017-01-23 NOTE — PROGRESS NOTES
2000:  Assumed care of patient. Patient resting in bed, slightly flushed in the face. Nods yes when asked if he is in pain; unable to rate. PRN tylenol previously given for fever; hydralazine previously given for elevated blood pressure. Call bell and phone left in reach. 2210:  Patient nods yes when asked if pain improved. BP up to 183/81.    2230:  Reviewed patient's blood pressures with Dr. Nat Perea. Reviewed current BP medications, prior to admission BP meds, and reviewed notes regarding blood pressure management. Dr. Nat Perea to increase clonidine patch dosage for now. 0000:  Dr. Nat Perea paged regarding liquid stool. Acknowledged that patient is on tube feed, however, stool smell concerning for Cdiff, had multiple loose stools while attempting to change/clean patient, and with 100.1 fever this evening just prior to start of shift. Ordered to send CDiff sample and place on enteric precautions. Shift summary:  Patient's BP remained <853 systolic remainder of shift. No further episodes of diarrhea. Patient left in no acute distress with call light and phone within reach.

## 2017-01-23 NOTE — ROUTINE PROCESS
Alarm parameters reviewed, on and audible Appropriate for patient clinical condition     Shift summary: Pt remained stable. No acute changes. Pt continues tube feed with appropriate aspiration precautions. Unable to assess pt's orientation as he can only nod yes or no, but he does nod appropriately. Pt's wife at the bedside this evening. Before change of shift, pt given tylenol for a temp of 100.4 and IV hydralazine for a BP of 192/97. This information passed on to Jenny Pacheco oncoming nurse. Alarm parameters reviewed and opportunities for questions provided.

## 2017-01-23 NOTE — ROUTINE PROCESS
Bedside and Verbal shift change report given to Romeo Wells RN (oncoming nurse) by Rajwinder Hughes RN   (offgoing nurse). Report included the following information SBAR, OR Summary, Procedure Summary, MAR and Recent Results.

## 2017-01-23 NOTE — ROUTINE PROCESS
Bedside and Verbal shift change report given to NIYA Kwong RN (oncoming nurse) by Kellie Conde RN (offgoing nurse).  Report included the following information SBAR, Kardex, Intake/Output, MAR, Recent Results and Cardiac Rhythm SR.

## 2017-01-23 NOTE — PROGRESS NOTES
2528:  Assumed care of patient. Bed locked in lowest position, call light within reach, white board updated. Patient has no needs at this time and has no signs or symptoms of distress. 1237:  Respiratory therapy at bedside for scheduled neb. Patient denies pain. No needs at this time.      Shift summary:  Patient had uneventful shift

## 2017-01-24 ENCOUNTER — APPOINTMENT (OUTPATIENT)
Dept: GENERAL RADIOLOGY | Age: 72
DRG: 207 | End: 2017-01-24
Attending: HOSPITALIST
Payer: MEDICARE

## 2017-01-24 LAB
GLUCOSE BLD STRIP.AUTO-MCNC: 125 MG/DL (ref 70–110)
GLUCOSE BLD STRIP.AUTO-MCNC: 153 MG/DL (ref 70–110)
GLUCOSE BLD STRIP.AUTO-MCNC: 156 MG/DL (ref 70–110)
GLUCOSE BLD STRIP.AUTO-MCNC: 174 MG/DL (ref 70–110)
MAGNESIUM SERPL-MCNC: 2.2 MG/DL (ref 1.8–2.4)

## 2017-01-24 PROCEDURE — 36415 COLL VENOUS BLD VENIPUNCTURE: CPT | Performed by: INTERNAL MEDICINE

## 2017-01-24 PROCEDURE — 94640 AIRWAY INHALATION TREATMENT: CPT

## 2017-01-24 PROCEDURE — 77010033678 HC OXYGEN DAILY

## 2017-01-24 PROCEDURE — 74011250637 HC RX REV CODE- 250/637: Performed by: HOSPITALIST

## 2017-01-24 PROCEDURE — 82962 GLUCOSE BLOOD TEST: CPT

## 2017-01-24 PROCEDURE — 92526 ORAL FUNCTION THERAPY: CPT

## 2017-01-24 PROCEDURE — 71010 XR CHEST PORT: CPT

## 2017-01-24 PROCEDURE — 65660000000 HC RM CCU STEPDOWN

## 2017-01-24 PROCEDURE — 83735 ASSAY OF MAGNESIUM: CPT | Performed by: INTERNAL MEDICINE

## 2017-01-24 PROCEDURE — 74011250637 HC RX REV CODE- 250/637: Performed by: INTERNAL MEDICINE

## 2017-01-24 PROCEDURE — 74011636637 HC RX REV CODE- 636/637: Performed by: INTERNAL MEDICINE

## 2017-01-24 PROCEDURE — 74011250637 HC RX REV CODE- 250/637: Performed by: FAMILY MEDICINE

## 2017-01-24 PROCEDURE — 74011000250 HC RX REV CODE- 250: Performed by: INTERNAL MEDICINE

## 2017-01-24 PROCEDURE — 74011250636 HC RX REV CODE- 250/636: Performed by: INTERNAL MEDICINE

## 2017-01-24 RX ADMIN — BACLOFEN 10 MG: 10 TABLET ORAL at 22:00

## 2017-01-24 RX ADMIN — BACLOFEN 10 MG: 10 TABLET ORAL at 14:00

## 2017-01-24 RX ADMIN — ASPIRIN 81 MG 81 MG: 81 TABLET ORAL at 09:48

## 2017-01-24 RX ADMIN — ATORVASTATIN CALCIUM 80 MG: 20 TABLET, FILM COATED ORAL at 09:00

## 2017-01-24 RX ADMIN — BUDESONIDE 500 MCG: 0.5 INHALANT RESPIRATORY (INHALATION) at 07:47

## 2017-01-24 RX ADMIN — INSULIN LISPRO 2 UNITS: 100 INJECTION, SOLUTION INTRAVENOUS; SUBCUTANEOUS at 09:49

## 2017-01-24 RX ADMIN — IPRATROPIUM BROMIDE AND ALBUTEROL SULFATE 3 ML: .5; 3 SOLUTION RESPIRATORY (INHALATION) at 21:37

## 2017-01-24 RX ADMIN — BUDESONIDE 500 MCG: 0.5 INHALANT RESPIRATORY (INHALATION) at 21:37

## 2017-01-24 RX ADMIN — IPRATROPIUM BROMIDE AND ALBUTEROL SULFATE 3 ML: .5; 3 SOLUTION RESPIRATORY (INHALATION) at 11:42

## 2017-01-24 RX ADMIN — IPRATROPIUM BROMIDE AND ALBUTEROL SULFATE 3 ML: .5; 3 SOLUTION RESPIRATORY (INHALATION) at 07:45

## 2017-01-24 RX ADMIN — IPRATROPIUM BROMIDE AND ALBUTEROL SULFATE 3 ML: .5; 3 SOLUTION RESPIRATORY (INHALATION) at 00:28

## 2017-01-24 RX ADMIN — HEPARIN SODIUM 5000 UNITS: 5000 INJECTION, SOLUTION INTRAVENOUS; SUBCUTANEOUS at 20:00

## 2017-01-24 RX ADMIN — LANSOPRAZOLE 30 MG: 30 TABLET, ORALLY DISINTEGRATING, DELAYED RELEASE ORAL at 07:30

## 2017-01-24 RX ADMIN — POTASSIUM CHLORIDE 20 MEQ: 20 SOLUTION ORAL at 09:50

## 2017-01-24 RX ADMIN — LEVOTHYROXINE SODIUM 175 MCG: 150 TABLET ORAL at 09:50

## 2017-01-24 RX ADMIN — INSULIN GLARGINE 20 UNITS: 100 INJECTION, SOLUTION SUBCUTANEOUS at 12:00

## 2017-01-24 RX ADMIN — HEPARIN SODIUM 5000 UNITS: 5000 INJECTION, SOLUTION INTRAVENOUS; SUBCUTANEOUS at 12:00

## 2017-01-24 RX ADMIN — IPRATROPIUM BROMIDE AND ALBUTEROL SULFATE 3 ML: .5; 3 SOLUTION RESPIRATORY (INHALATION) at 15:39

## 2017-01-24 RX ADMIN — INSULIN LISPRO 2 UNITS: 100 INJECTION, SOLUTION INTRAVENOUS; SUBCUTANEOUS at 00:28

## 2017-01-24 RX ADMIN — HEPARIN SODIUM 5000 UNITS: 5000 INJECTION, SOLUTION INTRAVENOUS; SUBCUTANEOUS at 06:06

## 2017-01-24 RX ADMIN — ARFORMOTEROL TARTRATE 15 MCG: 15 SOLUTION RESPIRATORY (INHALATION) at 07:44

## 2017-01-24 RX ADMIN — INSULIN LISPRO 2 UNITS: 100 INJECTION, SOLUTION INTRAVENOUS; SUBCUTANEOUS at 18:00

## 2017-01-24 RX ADMIN — ARFORMOTEROL TARTRATE 15 MCG: 15 SOLUTION RESPIRATORY (INHALATION) at 21:37

## 2017-01-24 RX ADMIN — MULTIPLE VITAMINS W/ MINERALS TAB 1 TABLET: TAB at 09:50

## 2017-01-24 RX ADMIN — BACLOFEN 10 MG: 10 TABLET ORAL at 06:06

## 2017-01-24 NOTE — ROUTINE PROCESS
Alarm parameters reviewed and opportunities for questions provided. Bedside and Verbal shift change report given to Prosper Chun RN (oncoming nurse) by Angelita Zapata RN   (offgoing nurse). Report included the following information SBAR, Kardex, Intake/Output, MAR, Accordion, Recent Results and Med Rec Status.

## 2017-01-24 NOTE — PROGRESS NOTES
Hospitalist Progress Note    Patient: Jayson Cheney MRN: [id number]  CSN: [id number]    YOB: 1945  Age: 70 y.o. Sex: male    DOA: 1/3/2017 LOS:  LOS: 19 days                Assessment/Plan     Patient Active Problem List   Diagnosis Code    Cardiac arrest ([facility] I46.9    Anoxic encephalopathy ([facility] G93.1    Acute respiratory failure ([facility] J96.00    DM (diabetes mellitus) ([facility] G67.1    Diastolic congestive heart failure, NYHA class 1 ([facility]) I50.30    HTN (hypertension) I10    Hypoxia R09.02    COPD (chronic obstructive pulmonary disease) with acute bronchitis ([facility]) J44.0    TIFFANIE (acute kidney injury) ([facility] N17.9    Aspiration pneumonia ([facility] J69.0    Transaminitis R74.0    Bandemia without diagnosis of specific infection D72.825    Hypokalemia E87.6            69 yo WM with history of prior anoxic brain injury from NH, admitted for COPD exacerbation.      On 01/05/2017, patient had aspiration event and went into respiratory arrest and subsequently cardiac arrest. PEA arrest. CPR initiated and epi given. Patient intubated. He had ROSC. He is transferred to ICU.      Extubated 01/08/2017      Patient had increased oxygen requirement, was started on BiPAP, he was desaturating when tried to wean from BiPAP. Needed intubation 01/11/2017.                Resp - extubated 1/18/2017  COPD - continue solumedrol.      ID - aspiration pneumonia, blood and urine cx no growth to date. resp cultures with normal resp juan. ANTIBIOTICS - was on levaquin azactam, now stopped.          CVS - Monitor HD. Stable hemodynamically.       Heme/onc - Follow H&H, plts. INR.     Renal - creatine improving  Hypernatremia - increase free water flushes.      Endocrine - Follow FSG      Neuro - awake, follows commands       GI -  s/ PEG placement.      Prophylaxis - DVT: heparin, GI: protonix.                     Physical Exam:  General: As above.    HEENT: NC, Atraumatic. PERRLA, anicteric sclerae.   Lungs: CTA Bilaterally. No Wheezing/Rhonchi/Rales. Heart: Regular rhythm,  No murmur, No Rubs, No Gallops  Abdomen: Soft, Non distended, Non tender.  +Bowel sounds,   Extremities: trace edema bilaterally.          Vital signs/Intake and Output:  Visit Vitals    /77    Pulse 91    Temp 99.3 °F (37.4 °C)    Resp 20    Ht 5' 10.08\" (1.78 m)    Wt 98.4 kg (217 lb)    SpO2 97%    BMI 31.07 kg/m2     Current Shift:  01/23 1901 - 01/24 0700  In: -   Out: 700 [Urine:700]  Last three shifts:  01/22 0701 - 01/23 1900  In: 805 [I.V.:100]  Out: 1450 [Urine:1450]            Labs: Results:       Chemistry Recent Labs      01/22/17   0630  01/21/17   0448   GLU  176*  177*   NA  149*  149*   K  3.4*  3.9   CL  112*  111*   CO2  32  31   BUN  29*  30*   CREA  0.68  0.78   CA  7.8*  7.9*   AGAP  5  7   BUCR  43*  38*      CBC w/Diff Recent Labs      01/22/17   0630  01/21/17   0448   WBC  8.8  11.3   RBC  3.18*  3.12*   HGB  9.0*  9.0*   HCT  29.4*  28.6*   PLT  138  151      Cardiac Enzymes No results for input(s): CPK, CKND1, MARCO A in the last 72 hours. No lab exists for component: CKRMB, TROIP   Coagulation No results for input(s): PTP, INR, APTT in the last 72 hours. No lab exists for component: INREXT    Lipid Panel No results found for: CHOL, CHOLPOCT, CHOLX, CHLST, CHOLV, [id number], HDL, LDL, NLDLCT, DLDL, LDLC, DLDLP, [id number], VLDLC, VLDL, TGL, TGLX, TRIGL, [id number], TRIGP, TGLPOCT, [id number], CHHD, CHHDX   BNP No results for input(s): BNPP in the last 72 hours. Liver Enzymes No results for input(s): TP, ALB, TBIL, AP, SGOT, GPT in the last 72 hours.     No lab exists for component: DBIL   Thyroid Studies Lab Results   Component Value Date/Time    TSH 1.37 08/13/2015 04:35 AM        Procedures/imaging: see electronic medical records for all procedures/Xrays and details which were not copied into this note but were reviewed prior to creation of Plan

## 2017-01-24 NOTE — PROGRESS NOTES
NUTRITION FOLLOW-UP    RECOMMENDATIONS/PLAN:   - Recommend changing TF formula to Jevity 1.2 to provide adequate fiber to support bowel function.  - Suggest goal rate of 70 mL/hr via PEG and additional free water flushes 125 mL q3h to assist with hypernatremia  - This will provide total 1848 kcal, 85 g protein, 1243 mL water, 261 g CHO, 28 g fiber, and >100% RDI's for micronutrients. - With flushes total 2200 mL fluid daily    NUTRITION ASSESSMENT:   Client Update: 70 yrs old Male with COPD exacerbation s/p cardiac arrest resulting in anoxic encephalopathy, s/p PEG placement due to NPO status recommended by SLP. Hypernatremia. TF was changed from Vital High Protein during ICU stay to Osmolite 1.2 on 1/20 as RN reported pt with frequent stools. Suggest changing to long term TF formula with fiber to support GI health such as Jevity 1.2. FOOD/NUTRITION INTAKE   Diet Order:  NPO   Food Allergies: NKFA    TF access: [] OGT       [] NGT      [x] PEG      [] J tube  TF Order: Osmolite 1.2 at 70 ml/hr   Free Water Flushes: 120 ml q6h   Provides: 1848 kcal, 85 g protein, 1263 ml H2O, 243 g CHO, >100% RDI's for micronutrients  Pertinent Medications:  [x] Reviewed; Insulin:  [x] SSI     [] Pre-meal:      [x]  Basal:20U    [] None   Cultural/[employer] Food Preferences: None Identified    BIOCHEMICAL DATA & MEDICAL TESTS  Pertinent Labs:  [x] Reviewed; POC -201, Na 149, K 3.4   ANTHROPOMETRICS  Height: 5' 10.08\" (178 cm)       Weight: 97.7 kg (215 lb 6.4 oz)         BMI: 30.8 kg/m^2 obese (30%-39.9% BMI)   Adm Weight: 196 lbs                Weight change: +20 lbs (+edema)  Adjusted Body Weight: 81 kg     NUTRITION-FOCUSED PHYSICAL ASSESSMENT  Skin: paul-rectal area reddened. Pitting edema BLE. GI: last BM 1/23.  Abdomen is NT/ND, +BS per MD.    NUTRITION PRESCRIPTION  Calories: 1954-9314 kcal/day based on MSJ x1.2 -250  Protein:  g/day based on 1-1.3 g/kg AdjBW  CHO: 230 g/day based on 50% of total energy  Fluid: 9426-8030 ml/day based on 1 kcal/ml      NUTRITION DIAGNOSES:   1. Inadequate oral intake related to anoxic encephalopathy as evidenced by unsafe for PO intake per SLP eval  - ongoing    NUTRITION INTERVENTIONS:   INTERVENTIONS:        GOALS:  1. Jevity 1.2 at 70 + fwf 125 q3h 1. Tolerate EN regimen with Jevity 1.2, BM q 1-3 days by next review 1-3 days     LEARNING NEEDS (Diet, Supplementation, Food/Nutrient-Drug Interaction):   [] None Identified     [] Education provided & documented        Identified and patient:   [] Declined      [x] Was not appropriate/indicated        NUTRITION MONITORING /EVALUATION:   Adjust EN/PN as appropriate  Monitor wt  Monitor renal labs, electrolytes, fluid status    Previous Recommendations Implemented: yes        Previous Goals Met:  yes -progressing      [] Participated in Interdisciplinary Rounds    [x] Interdisciplinary Care Plan Reviewed  [x] Discharge Nutrition Plan:  TF's per above    NUTRITION RISK:           [x] At risk                        [] Not currently at risk        Will follow-up per policy.   Kentrell Beltran RD  PAGER:  [phone]

## 2017-01-24 NOTE — PROGRESS NOTES
1920:  Bedside and Verbal shift change report received from Maria Elena Ac RN (offgoing nurse) to Anselmo Mcmanus RN (oncoming nurse). Report included the following information SBAR, Kardex, Intake/Output, MAR, Recent Results and Cardiac Rhythm SR. Pt resting in bed. Appears to be in no distress at this time. Call bell within reach. Zone phone number given to pt. Pt instructed to call zone phone or call bell if needed. Pt instructed to call for assistance before ambulating. Shift summary:  Pt rested comfortably throughout the night.

## 2017-01-24 NOTE — INTERDISCIPLINARY ROUNDS
IDR/SLIDR Summary          Patient: Renetta Record MRN: [id number]    Age: 70 y.o. YOB: 1945 Room/Bed: [facility]   Admit Diagnosis: Hypoxia  peg tube insertion  Principal Diagnosis: Acute respiratory failure ([facility]   Goals:  Need goals of care defined  Readmission: YES  Quality Measure: PNA  VTE Prophylaxis: Chemical  Influenza Vaccine screening completed? NOMobility needs:  Yes    Nutrition plan:Yes  Consults:Respiratory    Financial concerns:No  Escalated to CM? YES  RRAT Score: 29   Interventions:  SNF   Testing due for pt today? NO  LOS: 20 days Expected length of stay 12.1 days  Discharge plan: to [facility] PCP: Yinka Rhodes MD  Transportation needs: Yes    Days before discharge: One more day to discharge    Discharge disposition: Nursing Home  Present for rounds:  Dr. Anjali Guerrero.  Jonathan Tinoco CM, Adenike Lazaro RN   Signed:     Tamiko Hidalgo RN  1/24/2017  7:54 AM

## 2017-01-24 NOTE — PROGRESS NOTES
Cardiology Progress Note      1/24/2017 10:35 AM    Admit Date: 1/3/2017    Admit Diagnosis: Hypoxia  peg tube insertion      Subjective:     Bella Naranjo has multiple issues.     Visit Vitals    /83 (BP 1 Location: Right arm, BP Patient Position: At rest)    Pulse 91    Temp 99.1 °F (37.3 °C)    Resp 18    Ht 5' 10.08\" (1.78 m)    Wt 97.7 kg (215 lb 6.4 oz)    SpO2 99%    BMI 30.84 kg/m2     Current Facility-Administered Medications   Medication Dose Route Frequency    insulin glargine (LANTUS) injection 20 Units  20 Units SubCUTAneous DAILY    cloNIDine (CATAPRES) 0.2 mg/24 hr patch 1 Patch  1 Patch TransDERmal Q7D    lansoprazole (PREVACID SOLUTAB) disintegrating tablet 30 mg  30 mg Per G Tube ACB    levothyroxine (SYNTHROID) tablet 175 mcg  175 mcg Per G Tube ACB    baclofen (LIORESAL) tablet 10 mg  10 mg Per G Tube TID    potassium chloride (KAON 10%) 20 mEq/15 mL oral liquid 20 mEq  20 mEq Oral DAILY    albuterol-ipratropium (DUO-NEB) 2.5 MG-0.5 MG/3 ML  3 mL Nebulization Q4H RT    arformoterol (BROVANA) neb solution 15 mcg  15 mcg Nebulization BID RT    budesonide (PULMICORT) 500 mcg/2 ml nebulizer suspension  500 mcg Nebulization BID RT    atorvastatin (LIPITOR) tablet 80 mg  80 mg Per NG tube DAILY    lidocaine (LIDODERM) 5 % patch 2 Patch  2 Patch TransDERmal Q24H    hydrALAZINE (APRESOLINE) 20 mg/mL injection 20 mg  20 mg IntraVENous Q6H PRN    ELECTROLYTE REPLACEMENT PROTOCOL  1 Each Other PRN    insulin lispro (HUMALOG) injection   SubCUTAneous Q6H    sodium chloride (NS) flush 5-10 mL  5-10 mL IntraVENous PRN    acetaminophen (TYLENOL) tablet 650 mg  650 mg Oral Q6H PRN    aspirin chewable tablet 81 mg  81 mg Per G Tube DAILY    bisacodyl (DULCOLAX) suppository 10 mg  10 mg Rectal DAILY PRN    guaiFENesin (ROBITUSSIN) 100 mg/5 mL oral liquid 200 mg  200 mg Oral Q6H PRN    multivitamin, tx-iron-ca-min (THERA-M w/ IRON) tablet 1 Tab  1 Tab Oral DAILY    heparin (porcine) injection 5,000 Units  5,000 Units SubCUTAneous Q8H    glucose chewable tablet 16 g  4 Tab Oral PRN    glucagon (GLUCAGEN) injection 1 mg  1 mg IntraMUSCular PRN    dextrose (D50W) injection syrg 12.5-25 g  25-50 mL IntraVENous PRN         Objective:      Physical Exam:  Visit Vitals    /83 (BP 1 Location: Right arm, BP Patient Position: At rest)    Pulse 91    Temp 99.1 °F (37.3 °C)    Resp 18    Ht 5' 10.08\" (1.78 m)    Wt 97.7 kg (215 lb 6.4 oz)    SpO2 99%    BMI 30.84 kg/m2     General Appearance:  Well developed, well nourished,alert and oriented x 3, and individual in no acute distress. Ears/Nose/Mouth/Throat:   Hearing grossly normal.         Neck: Supple. Chest:   Lungs clear to auscultation bilaterally. Cardiovascular:  Regular rate and rhythm, S1, S2 normal, no murmur. Abdomen:   Soft, non-tender, bowel sounds are active. Extremities: No edema bilaterally. Skin: Warm and dry.                Data Review:   Labs:    Recent Results (from the past 24 hour(s))   GLUCOSE, POC    Collection Time: 01/23/17 12:06 PM   Result Value Ref Range    Glucose (POC) 184 (H) 70 - 110 mg/dL   GLUCOSE, POC    Collection Time: 01/23/17  6:38 PM   Result Value Ref Range    Glucose (POC) 201 (H) 70 - 110 mg/dL   GLUCOSE, POC    Collection Time: 01/23/17  9:46 PM   Result Value Ref Range    Glucose (POC) 176 (H) 70 - 110 mg/dL   GLUCOSE, POC    Collection Time: 01/24/17 12:21 AM   Result Value Ref Range    Glucose (POC) 174 (H) 70 - 110 mg/dL   MAGNESIUM    Collection Time: 01/24/17  5:08 AM   Result Value Ref Range    Magnesium 2.2 1.8 - 2.4 mg/dL   GLUCOSE, POC    Collection Time: 01/24/17  6:10 AM   Result Value Ref Range    Glucose (POC) 156 (H) 70 - 110 mg/dL       Telemetry: normal sinus rhythm      Assessment:     Principal Problem:    Acute respiratory failure ([facility] (8/3/2015)    Active Problems:    Cardiac arrest ([facility] (8/1/2015)      Anoxic encephalopathy ([address] (8/3/2015)      DM (diabetes mellitus) ([facility] (6/3/0467)      Diastolic congestive heart failure, NYHA class 1 ([facility] (11/23/2016)      HTN (hypertension) (11/23/2016)      COPD (chronic obstructive pulmonary disease) with acute bronchitis ([facility] (1/5/2017)      TIFFANIE (acute kidney injury) ([facility] (1/5/2017)      Aspiration pneumonia ([facility] (1/7/2017)      Transaminitis (1/15/2017)      Bandemia without diagnosis of specific infection (1/15/2017)      Hypokalemia (1/19/2017)        Plan:     The patient remains hemodynamically stable. There was no evidence of acute coronary syndrome on the submission. His rhythm is sinus. His multiple other issues.     Maxi Angel MD

## 2017-01-24 NOTE — PROGRESS NOTES
Problem: Dysphagia (Adult)  Goal: *Acute Goals and Plan of Care (Insert Text)  Patient seen for follow up diagnostic dysphagia therapy to assess for safety and least restrictive diet consistencies. Patient with poor oral cavity condition with increased oral secretions adhered to tongue and palate. Patient with poor lingual movement and strength with decreased labial strength and closure as well. Patient provided extensive oral care with the use of toothbrush, toothette, and oral suction via yankauer to remove dried secretions. Patient provided over 20 minutes of oral care to thoroughly clean oral cavity. Patient with positive s/sx of aspiration with oral care without hyolaryngeal excursion noted. Patient with nonproductive coughing during oral care. Patient appears fatigued post oral care, but requests continuation of trials. SLP presented ice chip trials. Patient with delayed oral manipualtion and propulsion of bolus with decreased hyolaryngeal excursion. Patient with decreased tolerance of ice chip trials and has positive s/sx of aspiration. Patient presenting with severe oropharyngeal dysphagia. Recommend oral care only with PEG tube feedings and strict aspiration/reflux precautions. Should patient and family desire oral diet, recommend modified barium swallow study prior to the initiation of oral diet with family present. Recommended that the patient be NPO with continued PEG tube feedings. Patient and RN, [facility], educated on diet recommendations and POC. NPO sign posted in patients room with recommendations of oral care at least every 4 hours, HOB at least 30 degrees at all times, and monitoring of vocal quality and respiratory status. SLP will follow up as patient appropriate. Rec: NPO except oral care via oral care swab q 4 hours  Strict aspiration/reflux precautions; HOB >30 at all times   PEG tube feedings    Patient will:  1.  Utilize compensatory swallow maneuvers (decrease bolus size, decrease rate of intake, cyclical ingestion, etc.) to increase swallow function/safety with min visual, verbal and/or tactile cues in 4/5 trials. 2. Tolerate PO trials utilizing compensatory swallow techniques (decrease bolus size, decrease rate of intake, cyclical ingestion, etc.) without overt s/sx aspiration/distress in 4/5 trials with min visual, verbal, and/or tactile cues    3. Perform restorative oropharyngeal exercises and swallow maneuvers (supraglottic swallow, Mendelson maneuver, effortful swallow, OMEs etc.) to increase oropharyngeal strength/awareness/agility, tongue base retraction, hyolaryngeal excursion, airway protection, and/or bolus clearance with min visual, verbal and/or tactile cues in 4/5 trials. 4. Demonstrate the ability to adequately self-monitor swallowing skills and perform appropriate compensatory techniques to reduce s/sx of aspiration and to safely consume least-restrictive diet with min verbal, visual and/or tactile cues in 4/5 trials. 5. Complete an objective measure of swallow fxn (i.e., MBSS) to assess oropharyngeal anatomy/physiology for determination of safest least-restrictive diet and/or appropriate rehabilitation techniques. Outcome: Progressing Towards Goal  SPEECH LANGUAGE PATHOLOGY DYSPHAGIA TREATMENT     Patient: Bella Naranjo (85 y.o. male)  Date: 1/24/2017  Diagnosis: Hypoxia  peg tube insertion Acute respiratory failure (HCC)  Procedure(s) (LRB):  PERCUTANEOUS ENDOSCOPIC GASTROSTOMY TUBE INSERTION (N/A) 7 Days Post-Op  Precautions: Aspiration, Fall      ASSESSMENT:  As above. Progression toward goals:  [ ]         Improving appropriately and progressing toward goals  [X]         Improving slowly and progressing toward goals  [ ]         Not making progress toward goals and plan of care will be adjusted       PLAN:  Recommendations and Planned Interventions:  As above. Patient continues to benefit from skilled intervention to address the above impairments.  Continue treatment per established plan of care. Discharge Recommendations: To Be Determined       SUBJECTIVE:   Patient nonverbal during therapy. OBJECTIVE:   Cognitive and Communication Status:  Neurologic State: Alert  Orientation Level: Unable to verbalize  Cognition: Unable to assess (comment)  Perception: Appears intact  Perseveration: No perseveration noted  Safety/Judgement: Decreased insight into deficits  Dysphagia Treatment:  Oral Assessment:  Oral Assessment  Labial: Decreased rate  Dentition: Natural  Oral Hygiene: Poor  Lingual: Decreased rate, Decreased strength  Velum: No impairment  Mandible: No impairment  Gag Reflex: Present  P.O. Trials:  Patient Position: HOB > 45  Vocal quality prior to P.O.: No impairment  Consistency Presented: Ice chips (Oral care)  How Presented: SLP-fed/presented  Bolus Acceptance: No impairment  Bolus Formation/Control: Impaired  Type of Impairment: Premature spillage  Propulsion: No impairment  Oral Residue: None  Initiation of Swallow: Delayed (# of seconds)  Laryngeal Elevation: Decreased, Weak  Aspiration Signs/Symptoms: Change vocal quality, Clear throat, Facial redness, Strong cough, Watery eyes  Pharyngeal Phase Characteristics: Altered vocal quality, Audible swallow, Multiple swallows, Suspected pharyngeal residue  Effective Modifications: None  Cues for Modifications: Maximal  Oral Phase Severity: Severe  Pharyngeal Phase Severity : Severe  Pain:  Pain Scale 1: FLACC  Pain Intensity 1: 0  After treatment:   [ ]              Patient left in no apparent distress sitting up in chair  [X]              Patient left in no apparent distress in bed  [X]              Call bell left within reach  [X]              Nursing notified  [ ]              Caregiver present  [X]              Bed alarm activated  COMMUNICATION/EDUCATION:   [X]              Posted safety precautions in patient's room.      AL Velazquez  Time Calculation: 38 mins

## 2017-01-24 NOTE — ROUTINE PROCESS
Bedside and Verbal shift change report given to Iona Hatfield RN (oncoming nurse) by Tony Lock RN (offgoing nurse). Report included the following information SBAR, Intake/Output, MAR, cardiac rhythm SR and Recent Results.

## 2017-01-24 NOTE — PROGRESS NOTES
Assumed care of pt. Pt is alert no sign of distress. call light within reach. Bed in lowest position. Pt resting no sign of distress. Call light within reach. Family at bedside no sign of distress. Call light within reach. Ask about PT tomorrow. Family at bedside no sign.

## 2017-01-25 LAB
ANION GAP BLD CALC-SCNC: 4 MMOL/L (ref 3–18)
ANION GAP BLD CALC-SCNC: 6 MMOL/L (ref 3–18)
BACTERIA SPEC CULT: NORMAL
BUN SERPL-MCNC: 24 MG/DL (ref 7–18)
BUN SERPL-MCNC: 27 MG/DL (ref 7–18)
BUN/CREAT SERPL: 36 (ref 12–20)
BUN/CREAT SERPL: 43 (ref 12–20)
CALCIUM SERPL-MCNC: 7.9 MG/DL (ref 8.5–10.1)
CALCIUM SERPL-MCNC: 8.2 MG/DL (ref 8.5–10.1)
CHLORIDE SERPL-SCNC: 106 MMOL/L (ref 100–108)
CHLORIDE SERPL-SCNC: 109 MMOL/L (ref 100–108)
CO2 SERPL-SCNC: 32 MMOL/L (ref 21–32)
CO2 SERPL-SCNC: 32 MMOL/L (ref 21–32)
CREAT SERPL-MCNC: 0.63 MG/DL (ref 0.6–1.3)
CREAT SERPL-MCNC: 0.66 MG/DL (ref 0.6–1.3)
ERYTHROCYTE [DISTWIDTH] IN BLOOD BY AUTOMATED COUNT: 14.9 % (ref 11.6–14.5)
GLUCOSE BLD STRIP.AUTO-MCNC: 115 MG/DL (ref 70–110)
GLUCOSE BLD STRIP.AUTO-MCNC: 119 MG/DL (ref 70–110)
GLUCOSE BLD STRIP.AUTO-MCNC: 163 MG/DL (ref 70–110)
GLUCOSE BLD STRIP.AUTO-MCNC: 167 MG/DL (ref 70–110)
GLUCOSE SERPL-MCNC: 119 MG/DL (ref 74–99)
GLUCOSE SERPL-MCNC: 167 MG/DL (ref 74–99)
HCT VFR BLD AUTO: 29 % (ref 36–48)
HGB BLD-MCNC: 9 G/DL (ref 13–16)
MAGNESIUM SERPL-MCNC: 1.9 MG/DL (ref 1.8–2.4)
MAGNESIUM SERPL-MCNC: 2.2 MG/DL (ref 1.8–2.4)
MCH RBC QN AUTO: 28.5 PG (ref 24–34)
MCHC RBC AUTO-ENTMCNC: 31 G/DL (ref 31–37)
MCV RBC AUTO: 91.8 FL (ref 74–97)
PLATELET # BLD AUTO: 137 K/UL (ref 135–420)
PMV BLD AUTO: 10.8 FL (ref 9.2–11.8)
POTASSIUM SERPL-SCNC: 3.5 MMOL/L (ref 3.5–5.5)
POTASSIUM SERPL-SCNC: 3.6 MMOL/L (ref 3.5–5.5)
RBC # BLD AUTO: 3.16 M/UL (ref 4.7–5.5)
SERVICE CMNT-IMP: NORMAL
SODIUM SERPL-SCNC: 144 MMOL/L (ref 136–145)
SODIUM SERPL-SCNC: 145 MMOL/L (ref 136–145)
WBC # BLD AUTO: 9.8 K/UL (ref 4.6–13.2)

## 2017-01-25 PROCEDURE — 82962 GLUCOSE BLOOD TEST: CPT

## 2017-01-25 PROCEDURE — 65660000000 HC RM CCU STEPDOWN

## 2017-01-25 PROCEDURE — 80048 BASIC METABOLIC PNL TOTAL CA: CPT | Performed by: INTERNAL MEDICINE

## 2017-01-25 PROCEDURE — 74011250636 HC RX REV CODE- 250/636: Performed by: INTERNAL MEDICINE

## 2017-01-25 PROCEDURE — 74011250637 HC RX REV CODE- 250/637: Performed by: INTERNAL MEDICINE

## 2017-01-25 PROCEDURE — 36415 COLL VENOUS BLD VENIPUNCTURE: CPT | Performed by: INTERNAL MEDICINE

## 2017-01-25 PROCEDURE — 74011636637 HC RX REV CODE- 636/637: Performed by: INTERNAL MEDICINE

## 2017-01-25 PROCEDURE — 74011250637 HC RX REV CODE- 250/637: Performed by: HOSPITALIST

## 2017-01-25 PROCEDURE — 74011000250 HC RX REV CODE- 250: Performed by: INTERNAL MEDICINE

## 2017-01-25 PROCEDURE — 83735 ASSAY OF MAGNESIUM: CPT | Performed by: INTERNAL MEDICINE

## 2017-01-25 PROCEDURE — 77010033678 HC OXYGEN DAILY

## 2017-01-25 PROCEDURE — 74011250637 HC RX REV CODE- 250/637: Performed by: FAMILY MEDICINE

## 2017-01-25 PROCEDURE — 83735 ASSAY OF MAGNESIUM: CPT | Performed by: FAMILY MEDICINE

## 2017-01-25 PROCEDURE — 80048 BASIC METABOLIC PNL TOTAL CA: CPT | Performed by: FAMILY MEDICINE

## 2017-01-25 PROCEDURE — 85027 COMPLETE CBC AUTOMATED: CPT | Performed by: INTERNAL MEDICINE

## 2017-01-25 PROCEDURE — 94640 AIRWAY INHALATION TREATMENT: CPT

## 2017-01-25 RX ADMIN — IPRATROPIUM BROMIDE AND ALBUTEROL SULFATE 3 ML: .5; 3 SOLUTION RESPIRATORY (INHALATION) at 11:20

## 2017-01-25 RX ADMIN — HEPARIN SODIUM 5000 UNITS: 5000 INJECTION, SOLUTION INTRAVENOUS; SUBCUTANEOUS at 21:48

## 2017-01-25 RX ADMIN — IPRATROPIUM BROMIDE AND ALBUTEROL SULFATE 3 ML: .5; 3 SOLUTION RESPIRATORY (INHALATION) at 23:59

## 2017-01-25 RX ADMIN — ARFORMOTEROL TARTRATE 15 MCG: 15 SOLUTION RESPIRATORY (INHALATION) at 19:05

## 2017-01-25 RX ADMIN — HEPARIN SODIUM 5000 UNITS: 5000 INJECTION, SOLUTION INTRAVENOUS; SUBCUTANEOUS at 04:00

## 2017-01-25 RX ADMIN — ARFORMOTEROL TARTRATE 15 MCG: 15 SOLUTION RESPIRATORY (INHALATION) at 07:15

## 2017-01-25 RX ADMIN — IPRATROPIUM BROMIDE AND ALBUTEROL SULFATE 3 ML: .5; 3 SOLUTION RESPIRATORY (INHALATION) at 19:05

## 2017-01-25 RX ADMIN — BACLOFEN 10 MG: 10 TABLET ORAL at 06:00

## 2017-01-25 RX ADMIN — LANSOPRAZOLE 30 MG: 30 TABLET, ORALLY DISINTEGRATING, DELAYED RELEASE ORAL at 07:30

## 2017-01-25 RX ADMIN — LEVOTHYROXINE SODIUM 175 MCG: 150 TABLET ORAL at 07:30

## 2017-01-25 RX ADMIN — MULTIPLE VITAMINS W/ MINERALS TAB 1 TABLET: TAB at 09:57

## 2017-01-25 RX ADMIN — ATORVASTATIN CALCIUM 80 MG: 20 TABLET, FILM COATED ORAL at 09:57

## 2017-01-25 RX ADMIN — BUDESONIDE 500 MCG: 0.5 INHALANT RESPIRATORY (INHALATION) at 07:15

## 2017-01-25 RX ADMIN — ASPIRIN 81 MG 81 MG: 81 TABLET ORAL at 09:57

## 2017-01-25 RX ADMIN — IPRATROPIUM BROMIDE AND ALBUTEROL SULFATE 3 ML: .5; 3 SOLUTION RESPIRATORY (INHALATION) at 15:00

## 2017-01-25 RX ADMIN — HEPARIN SODIUM 5000 UNITS: 5000 INJECTION, SOLUTION INTRAVENOUS; SUBCUTANEOUS at 12:24

## 2017-01-25 RX ADMIN — BACLOFEN 10 MG: 10 TABLET ORAL at 21:48

## 2017-01-25 RX ADMIN — BACLOFEN 10 MG: 10 TABLET ORAL at 14:00

## 2017-01-25 RX ADMIN — IPRATROPIUM BROMIDE AND ALBUTEROL SULFATE 3 ML: .5; 3 SOLUTION RESPIRATORY (INHALATION) at 07:15

## 2017-01-25 RX ADMIN — INSULIN GLARGINE 20 UNITS: 100 INJECTION, SOLUTION SUBCUTANEOUS at 12:24

## 2017-01-25 RX ADMIN — IPRATROPIUM BROMIDE AND ALBUTEROL SULFATE 3 ML: .5; 3 SOLUTION RESPIRATORY (INHALATION) at 05:20

## 2017-01-25 RX ADMIN — POTASSIUM CHLORIDE 20 MEQ: 20 SOLUTION ORAL at 09:57

## 2017-01-25 RX ADMIN — IPRATROPIUM BROMIDE AND ALBUTEROL SULFATE 3 ML: .5; 3 SOLUTION RESPIRATORY (INHALATION) at 00:26

## 2017-01-25 RX ADMIN — BUDESONIDE 500 MCG: 0.5 INHALANT RESPIRATORY (INHALATION) at 19:05

## 2017-01-25 RX ADMIN — INSULIN LISPRO 2 UNITS: 100 INJECTION, SOLUTION INTRAVENOUS; SUBCUTANEOUS at 18:00

## 2017-01-25 NOTE — PROGRESS NOTES
Assumed care of pt. Pt is alert no sign of distress. Call light within reach. Bed in lowest position.

## 2017-01-25 NOTE — PROGRESS NOTES
NUTRITION UPDATE    - Tolerating TF's well per RN, and running at goal rate: Jevity 1.2 at 70 mL/hr, free water 125 mL q3h via PEG.  - Continue current nutrition plan of care.     Follow per policy  Drew Patterson, JERONIMO  PAGER:  [phone]

## 2017-01-25 NOTE — ROUTINE PROCESS
Bedside shift change report given to Anders Ferguson RN (oncoming nurse) by Mortimer Mirza RN (offgoing nurse).  Report included the following information BRITTA Armenta, Mar  .

## 2017-01-25 NOTE — PROGRESS NOTES
1952:Assumed care of pt. . The pt. was in the bed and in no active distress. Family member by bedside. Call light and the phone within reach. Patient verbalized understanding of how to call for assistance. White board was   updated. 0030:  Pt had loose stool. Perineal care was provided. Oral care was provided  0400: Pt had loose stool Perineal care was provide  0442: Pt had episode of SVT. Pt was asymptomatic. Shift summary: Pt had uneventful shift. During the shift oral care was provided every two hours, lip mousturizer was applied. Ongoing nurse was notified about episode of  SVT. Pt left resting in bed with call light and phone within reach.

## 2017-01-25 NOTE — PROGRESS NOTES
Hospitalist Progress Note    Patient: Shaan Peralta MRN: [id number]  CSN: [id number]    YOB: 1945  Age: 70 y.o. Sex: male    DOA: 1/3/2017 LOS:  LOS: 20 days                Assessment/Plan     Patient Active Problem List   Diagnosis Code    Cardiac arrest ([facility] I46.9    Anoxic encephalopathy ([facility] G93.1    Acute respiratory failure ([facility] J96.00    DM (diabetes mellitus) ([facility] C95.6    Diastolic congestive heart failure, NYHA class 1 ([facility]) I50.30    HTN (hypertension) I10    Hypoxia R09.02    COPD (chronic obstructive pulmonary disease) with acute bronchitis ([facility]) J44.0    TIFFANIE (acute kidney injury) ([facility] N17.9    Aspiration pneumonia ([facility] J69.0    Transaminitis R74.0    Bandemia without diagnosis of specific infection D72.825    Hypokalemia E87.6            71 yo WM with history of prior anoxic brain injury from NH, admitted for COPD exacerbation.      On 01/05/2017, patient had aspiration event and went into respiratory arrest and subsequently cardiac arrest. PEA arrest. CPR initiated and epi given. Patient intubated. He had ROSC. He is transferred to ICU.      Extubated 01/08/2017      Patient had increased oxygen requirement, was started on BiPAP, he was desaturating when tried to wean from BiPAP. Needed intubation 01/11/2017.                  Resp - extubated 1/18/2017  COPD - continue solumedrol.      ID - aspiration pneumonia, blood and urine cx no growth to date. resp cultures with normal resp juan. ANTIBIOTICS - was on levaquin azactam, now stopped.           [facility] - Monitor HD. Stable hemodynamically.       Heme/onc - Follow H&H, plts. INR.     Renal - creatine improving  Hypernatremia - increase free water flushes.      Endocrine - Follow FSG      Neuro - awake, follows commands       GI - s/ PEG placement.      Prophylaxis - DVT: heparin, GI: protonix.                     Physical Exam:  General: As above.    HEENT: NC, Atraumatic. PERRLA, anicteric sclerae.   Lungs: CTA Bilaterally. No Wheezing/Rhonchi/Rales. Heart: Regular rhythm,  No murmur, No Rubs, No Gallops  Abdomen: Soft, Non distended, Non tender.  +Bowel sounds,   Extremities: trace edema bilaterally.          Vital signs/Intake and Output:  Visit Vitals    /78 (BP 1 Location: Right arm, BP Patient Position: At rest)    Pulse 90    Temp 98.7 °F (37.1 °C)    Resp 20    Ht 5' 10.08\" (1.78 m)    Wt 97.7 kg (215 lb 6.4 oz)    SpO2 100%    BMI 30.84 kg/m2     Current Shift:     Last three shifts:  01/23 0701 - 01/24 1900  In: 1510   Out: 2500 [Urine:2500]            Labs: Results:       Chemistry Recent Labs      01/22/17   0630   GLU  176*   NA  149*   K  3.4*   CL  112*   CO2  32   BUN  29*   CREA  0.68   CA  7.8*   AGAP  5   BUCR  43*      CBC w/Diff Recent Labs      01/22/17   0630   WBC  8.8   RBC  3.18*   HGB  9.0*   HCT  29.4*   PLT  138      Cardiac Enzymes No results for input(s): CPK, CKND1, MARCO A in the last 72 hours. No lab exists for component: CKRMB, TROIP   Coagulation No results for input(s): PTP, INR, APTT in the last 72 hours. No lab exists for component: INREXT    Lipid Panel No results found for: CHOL, CHOLPOCT, CHOLX, CHLST, CHOLV, [id number], HDL, LDL, NLDLCT, DLDL, LDLC, DLDLP, [id number], VLDLC, VLDL, TGL, TGLX, TRIGL, [id number], TRIGP, TGLPOCT, [id number], CHHD, CHHDX   BNP No results for input(s): BNPP in the last 72 hours. Liver Enzymes No results for input(s): TP, ALB, TBIL, AP, SGOT, GPT in the last 72 hours.     No lab exists for component: DBIL   Thyroid Studies Lab Results   Component Value Date/Time    TSH 1.37 08/13/2015 04:35 AM        Procedures/imaging: see electronic medical records for all procedures/Xrays and details which were not copied into this note but were reviewed prior to creation of Plan

## 2017-01-25 NOTE — ROUTINE PROCESS
Bedside shift change report  Was given to [address] [place] [address] [address], RN (oncoming nurse) by Ramos Riley   (offgoing nurse). Report included the following information SBAR, Intake/Output, MAR, Recent Results and Cardiac Rhythm NS with T- depression. Alarm parameters reviewed and opportunities for questions provided.

## 2017-01-25 NOTE — PROGRESS NOTES
Cardiology Progress Note      1/25/2017 1:52 PM    Admit Date: 1/3/2017    Admit Diagnosis: Hypoxia  peg tube insertion      Subjective:     Patric Díaz denies chest pain, chest pressure/discomfort, dyspnea.     Visit Vitals    /77 (BP 1 Location: Right arm, BP Patient Position: At rest)    Pulse 92    Temp 99.1 °F (37.3 °C)    Resp 19    Ht 5' 10.08\" (1.78 m)    Wt 97.7 kg (215 lb 6.4 oz)    SpO2 95%    BMI 30.84 kg/m2     Current Facility-Administered Medications   Medication Dose Route Frequency    insulin glargine (LANTUS) injection 20 Units  20 Units SubCUTAneous DAILY    cloNIDine (CATAPRES) 0.2 mg/24 hr patch 1 Patch  1 Patch TransDERmal Q7D    lansoprazole (PREVACID SOLUTAB) disintegrating tablet 30 mg  30 mg Per G Tube ACB    levothyroxine (SYNTHROID) tablet 175 mcg  175 mcg Per G Tube ACB    baclofen (LIORESAL) tablet 10 mg  10 mg Per G Tube TID    potassium chloride (KAON 10%) 20 mEq/15 mL oral liquid 20 mEq  20 mEq Oral DAILY    albuterol-ipratropium (DUO-NEB) 2.5 MG-0.5 MG/3 ML  3 mL Nebulization Q4H RT    arformoterol (BROVANA) neb solution 15 mcg  15 mcg Nebulization BID RT    budesonide (PULMICORT) 500 mcg/2 ml nebulizer suspension  500 mcg Nebulization BID RT    atorvastatin (LIPITOR) tablet 80 mg  80 mg Per NG tube DAILY    lidocaine (LIDODERM) 5 % patch 2 Patch  2 Patch TransDERmal Q24H    hydrALAZINE (APRESOLINE) 20 mg/mL injection 20 mg  20 mg IntraVENous Q6H PRN    ELECTROLYTE REPLACEMENT PROTOCOL  1 Each Other PRN    insulin lispro (HUMALOG) injection   SubCUTAneous Q6H    sodium chloride (NS) flush 5-10 mL  5-10 mL IntraVENous PRN    acetaminophen (TYLENOL) tablet 650 mg  650 mg Oral Q6H PRN    aspirin chewable tablet 81 mg  81 mg Per G Tube DAILY    bisacodyl (DULCOLAX) suppository 10 mg  10 mg Rectal DAILY PRN    guaiFENesin (ROBITUSSIN) 100 mg/5 mL oral liquid 200 mg  200 mg Oral Q6H PRN    multivitamin, tx-iron-ca-min (THERA-M w/ IRON) tablet 1 Tab 1 Tab Oral DAILY    heparin (porcine) injection 5,000 Units  5,000 Units SubCUTAneous Q8H    glucose chewable tablet 16 g  4 Tab Oral PRN    glucagon (GLUCAGEN) injection 1 mg  1 mg IntraMUSCular PRN    dextrose (D50W) injection syrg 12.5-25 g  25-50 mL IntraVENous PRN         Objective:      Physical Exam:  Visit Vitals    /77 (BP 1 Location: Right arm, BP Patient Position: At rest)    Pulse 92    Temp 99.1 °F (37.3 °C)    Resp 19    Ht 5' 10.08\" (1.78 m)    Wt 97.7 kg (215 lb 6.4 oz)    SpO2 95%    BMI 30.84 kg/m2     General Appearance:  Well developed, well nourished,alert and oriented x 3, and individual in no acute distress. Ears/Nose/Mouth/Throat:   Hearing grossly normal.         Neck: Supple. Chest:   Lungs clear to auscultation bilaterally. Cardiovascular:  Regular rate and rhythm, S1, S2 normal, no murmur. Abdomen:   Soft, non-tender, bowel sounds are active. Extremities: No edema bilaterally. Skin: Warm and dry.                Data Review:   Labs:    Recent Results (from the past 24 hour(s))   GLUCOSE, POC    Collection Time: 01/24/17  6:33 PM   Result Value Ref Range    Glucose (POC) 153 (H) 70 - 110 mg/dL   GLUCOSE, POC    Collection Time: 01/25/17 12:19 AM   Result Value Ref Range    Glucose (POC) 119 (H) 70 - 110 mg/dL   MAGNESIUM    Collection Time: 01/25/17  2:44 AM   Result Value Ref Range    Magnesium 2.2 1.8 - 2.4 mg/dL   CBC W/O DIFF    Collection Time: 01/25/17  2:44 AM   Result Value Ref Range    WBC 9.8 4.6 - 13.2 K/uL    RBC 3.16 (L) 4.70 - 5.50 M/uL    HGB 9.0 (L) 13.0 - 16.0 g/dL    HCT 29.0 (L) 36.0 - 48.0 %    MCV 91.8 74.0 - 97.0 FL    MCH 28.5 24.0 - 34.0 PG    MCHC 31.0 31.0 - 37.0 g/dL    RDW 14.9 (H) 11.6 - 14.5 %    PLATELET 752 953 - 891 K/uL    MPV 10.8 9.2 - 62.9 FL   METABOLIC PANEL, BASIC    Collection Time: 01/25/17  2:44 AM   Result Value Ref Range    Sodium 145 136 - 145 mmol/L    Potassium 3.5 3.5 - 5.5 mmol/L    Chloride 109 (H) 100 - 108 mmol/L    CO2 32 21 - 32 mmol/L    Anion gap 4 3.0 - 18 mmol/L    Glucose 119 (H) 74 - 99 mg/dL    BUN 27 (H) 7.0 - 18 MG/DL    Creatinine 0.63 0.6 - 1.3 MG/DL    BUN/Creatinine ratio 43 (H) 12 - 20      GFR est AA >60 >60 ml/min/1.73m2    GFR est non-AA >60 >60 ml/min/1.73m2    Calcium 8.2 (L) 8.5 - 10.1 MG/DL   GLUCOSE, POC    Collection Time: 01/25/17  6:51 AM   Result Value Ref Range    Glucose (POC) 115 (H) 70 - 110 mg/dL   GLUCOSE, POC    Collection Time: 01/25/17 12:39 PM   Result Value Ref Range    Glucose (POC) 167 (H) 70 - 110 mg/dL       Telemetry: normal sinus rhythm      Assessment:     Principal Problem:    Acute respiratory failure ([address] (8/3/2015)    Active Problems:    Cardiac arrest ([address] (8/1/2015)      Anoxic encephalopathy ([facility] (8/3/2015)      DM (diabetes mellitus) ([address] (9/2/9944)      Diastolic congestive heart failure, NYHA class 1 ([address] (11/23/2016)      HTN (hypertension) (11/23/2016)      COPD (chronic obstructive pulmonary disease) with acute bronchitis ([address] (1/5/2017)      TIFFANIE (acute kidney injury) ([facility] (1/5/2017)      Aspiration pneumonia ([facility] (1/7/2017)      Transaminitis (1/15/2017)      Bandemia without diagnosis of specific infection (1/15/2017)      Hypokalemia (1/19/2017)        Plan:     The patient remains hemodynamically stable. His rhythm and sinus. He has a history of encephalopathy after cardiac arrest. He is awake and conversant today. He is status post an episode of aspiration pneumonia requiring intubation and ventilation. Follow. Disposition is pending.     Juliann Fraire MD

## 2017-01-26 LAB
GLUCOSE BLD STRIP.AUTO-MCNC: 129 MG/DL (ref 70–110)
GLUCOSE BLD STRIP.AUTO-MCNC: 145 MG/DL (ref 70–110)
GLUCOSE BLD STRIP.AUTO-MCNC: 170 MG/DL (ref 70–110)
GLUCOSE BLD STRIP.AUTO-MCNC: 177 MG/DL (ref 70–110)
MAGNESIUM SERPL-MCNC: 1.8 MG/DL (ref 1.8–2.4)

## 2017-01-26 PROCEDURE — 74011636637 HC RX REV CODE- 636/637: Performed by: INTERNAL MEDICINE

## 2017-01-26 PROCEDURE — 74011250637 HC RX REV CODE- 250/637: Performed by: HOSPITALIST

## 2017-01-26 PROCEDURE — 74011250637 HC RX REV CODE- 250/637: Performed by: INTERNAL MEDICINE

## 2017-01-26 PROCEDURE — 83735 ASSAY OF MAGNESIUM: CPT | Performed by: INTERNAL MEDICINE

## 2017-01-26 PROCEDURE — 77010033678 HC OXYGEN DAILY

## 2017-01-26 PROCEDURE — 74011250636 HC RX REV CODE- 250/636: Performed by: FAMILY MEDICINE

## 2017-01-26 PROCEDURE — 82962 GLUCOSE BLOOD TEST: CPT

## 2017-01-26 PROCEDURE — 65660000000 HC RM CCU STEPDOWN

## 2017-01-26 PROCEDURE — 74011000250 HC RX REV CODE- 250

## 2017-01-26 PROCEDURE — 74011250637 HC RX REV CODE- 250/637: Performed by: FAMILY MEDICINE

## 2017-01-26 PROCEDURE — 77030018836 HC SOL IRR NACL ICUM -A

## 2017-01-26 PROCEDURE — 74011000250 HC RX REV CODE- 250: Performed by: INTERNAL MEDICINE

## 2017-01-26 PROCEDURE — 36415 COLL VENOUS BLD VENIPUNCTURE: CPT | Performed by: INTERNAL MEDICINE

## 2017-01-26 PROCEDURE — 74011250636 HC RX REV CODE- 250/636: Performed by: INTERNAL MEDICINE

## 2017-01-26 PROCEDURE — 94640 AIRWAY INHALATION TREATMENT: CPT

## 2017-01-26 RX ORDER — IPRATROPIUM BROMIDE AND ALBUTEROL SULFATE 2.5; .5 MG/3ML; MG/3ML
SOLUTION RESPIRATORY (INHALATION)
Status: COMPLETED
Start: 2017-01-26 | End: 2017-01-26

## 2017-01-26 RX ADMIN — BUDESONIDE 500 MCG: 0.5 INHALANT RESPIRATORY (INHALATION) at 20:38

## 2017-01-26 RX ADMIN — HEPARIN SODIUM 5000 UNITS: 5000 INJECTION, SOLUTION INTRAVENOUS; SUBCUTANEOUS at 13:18

## 2017-01-26 RX ADMIN — ARFORMOTEROL TARTRATE 15 MCG: 15 SOLUTION RESPIRATORY (INHALATION) at 07:38

## 2017-01-26 RX ADMIN — IPRATROPIUM BROMIDE AND ALBUTEROL SULFATE 3 ML: .5; 3 SOLUTION RESPIRATORY (INHALATION) at 15:49

## 2017-01-26 RX ADMIN — IPRATROPIUM BROMIDE AND ALBUTEROL SULFATE 3 ML: .5; 3 SOLUTION RESPIRATORY (INHALATION) at 23:49

## 2017-01-26 RX ADMIN — BACLOFEN 10 MG: 10 TABLET ORAL at 05:56

## 2017-01-26 RX ADMIN — ASPIRIN 81 MG 81 MG: 81 TABLET ORAL at 09:24

## 2017-01-26 RX ADMIN — IPRATROPIUM BROMIDE AND ALBUTEROL SULFATE 3 ML: .5; 3 SOLUTION RESPIRATORY (INHALATION) at 07:38

## 2017-01-26 RX ADMIN — ARFORMOTEROL TARTRATE 15 MCG: 15 SOLUTION RESPIRATORY (INHALATION) at 20:38

## 2017-01-26 RX ADMIN — INSULIN LISPRO 2 UNITS: 100 INJECTION, SOLUTION INTRAVENOUS; SUBCUTANEOUS at 01:15

## 2017-01-26 RX ADMIN — LEVOTHYROXINE SODIUM 175 MCG: 150 TABLET ORAL at 05:57

## 2017-01-26 RX ADMIN — IPRATROPIUM BROMIDE AND ALBUTEROL SULFATE 3 ML: .5; 3 SOLUTION RESPIRATORY (INHALATION) at 12:13

## 2017-01-26 RX ADMIN — INSULIN LISPRO 2 UNITS: 100 INJECTION, SOLUTION INTRAVENOUS; SUBCUTANEOUS at 13:16

## 2017-01-26 RX ADMIN — IPRATROPIUM BROMIDE AND ALBUTEROL SULFATE 3 ML: .5; 3 SOLUTION RESPIRATORY (INHALATION) at 20:38

## 2017-01-26 RX ADMIN — LANSOPRAZOLE 30 MG: 30 TABLET, ORALLY DISINTEGRATING, DELAYED RELEASE ORAL at 05:57

## 2017-01-26 RX ADMIN — BACLOFEN 10 MG: 10 TABLET ORAL at 13:16

## 2017-01-26 RX ADMIN — MULTIPLE VITAMINS W/ MINERALS TAB 1 TABLET: TAB at 09:24

## 2017-01-26 RX ADMIN — HEPARIN SODIUM 5000 UNITS: 5000 INJECTION, SOLUTION INTRAVENOUS; SUBCUTANEOUS at 20:14

## 2017-01-26 RX ADMIN — ATORVASTATIN CALCIUM 80 MG: 20 TABLET, FILM COATED ORAL at 09:24

## 2017-01-26 RX ADMIN — HYDRALAZINE HYDROCHLORIDE 20 MG: 20 INJECTION INTRAMUSCULAR; INTRAVENOUS at 15:33

## 2017-01-26 RX ADMIN — INSULIN GLARGINE 20 UNITS: 100 INJECTION, SOLUTION SUBCUTANEOUS at 13:18

## 2017-01-26 RX ADMIN — POTASSIUM CHLORIDE 20 MEQ: 20 SOLUTION ORAL at 09:23

## 2017-01-26 RX ADMIN — HEPARIN SODIUM 5000 UNITS: 5000 INJECTION, SOLUTION INTRAVENOUS; SUBCUTANEOUS at 03:12

## 2017-01-26 RX ADMIN — BUDESONIDE 500 MCG: 0.5 INHALANT RESPIRATORY (INHALATION) at 07:38

## 2017-01-26 NOTE — PROGRESS NOTES
Resting in bed, wife called earlier to check on pt status. Pt checked, no stool noted, dry and clean. Occasional cough, no sign of distress noted. Was informed by Tele tech of a 5 run beats of VTach. Will notify MD and continue to monitor.

## 2017-01-26 NOTE — PROGRESS NOTES
Shift Progress note:  Assumed care of patient in bed awake, wife @ bedside. Patient tolerating peg tube feeds with minimal aspirate. Patient had 2 large liquid stools. Uneventful night remains on 2lpm by nasal cannula. Patient on telemetry with 6 beats of v-tach. Lab work obtained. No change in treatment no further dysthymia noted. Call bell within reach.   Patient Vitals for the past 12 hrs:   Temp Pulse Resp BP SpO2   01/26/17 0414 97.9 °F (36.6 °C) 93 20 157/74 93 %   01/26/17 0000 - - - - 95 %   01/25/17 2300 97.8 °F (36.6 °C) 92 20 171/79 98 %   01/25/17 2011 98.4 °F (36.9 °C) 95 20 175/83 97 %

## 2017-01-26 NOTE — PROGRESS NOTES
New OT orders received and chart reviewed. Pt evaluated on 1/20/17 and was discharged from OT caseload. Pt requires assistance with all ADLs at baseline level. Pt requires pradip lift for transfer to wheelchair. Pt is not appropriate for skilled OT services. Recommend return to prior level of care.     Eddy Singh MS OTR/L

## 2017-01-26 NOTE — PROGRESS NOTES
PT orders acknowledged and chart reviewed. Patient was evaluated on this admission earlier this week. Per evaluation, patient was found to be completely dependent with all mobility and was discharged from PT caseload. Patient requires a pradip lift for transfers. Per nursing patient requires three people just to roll in bed. Spoke with case management, who reports that patient does not require PT/OT assessment for discharge planning, as he will return to long term care. Will DC patient from PT caseload.     Arti Backbone PT

## 2017-01-26 NOTE — PROGRESS NOTES
Patient Vitals for the past 8 hrs:   Temp Pulse Resp BP SpO2   01/26/17 1551 - - - - 91 %   01/26/17 1511 98.5 °F (36.9 °C) 96 19 (!) 191/91 96 %   01/26/17 1213 - - - - 96 %   01/26/17 1054 98 °F (36.7 °C) 95 19 177/90 99 %

## 2017-01-26 NOTE — PROGRESS NOTES
Progress note:  Patient with 6 beats of v-tach. MD cespedes notified. Lab work ordered stat, lab called and aware.

## 2017-01-26 NOTE — PROGRESS NOTES
Cardiology Progress Note      1/26/2017 12:30 PM    Admit Date: 1/3/2017    Admit Diagnosis: Hypoxia  peg tube insertion      Subjective:     Carias Messenger denies chest pain, chest pressure/discomfort.     Visit Vitals    /90 (BP 1 Location: Left arm, BP Patient Position: At rest)    Pulse 95    Temp 98 °F (36.7 °C)    Resp 19    Ht 5' 10.08\" (1.78 m)    Wt 97.7 kg (215 lb 6.4 oz)    SpO2 96%    BMI 30.84 kg/m2     Current Facility-Administered Medications   Medication Dose Route Frequency    insulin glargine (LANTUS) injection 20 Units  20 Units SubCUTAneous DAILY    cloNIDine (CATAPRES) 0.2 mg/24 hr patch 1 Patch  1 Patch TransDERmal Q7D    lansoprazole (PREVACID SOLUTAB) disintegrating tablet 30 mg  30 mg Per G Tube ACB    levothyroxine (SYNTHROID) tablet 175 mcg  175 mcg Per G Tube ACB    baclofen (LIORESAL) tablet 10 mg  10 mg Per G Tube TID    potassium chloride (KAON 10%) 20 mEq/15 mL oral liquid 20 mEq  20 mEq Oral DAILY    albuterol-ipratropium (DUO-NEB) 2.5 MG-0.5 MG/3 ML  3 mL Nebulization Q4H RT    arformoterol (BROVANA) neb solution 15 mcg  15 mcg Nebulization BID RT    budesonide (PULMICORT) 500 mcg/2 ml nebulizer suspension  500 mcg Nebulization BID RT    atorvastatin (LIPITOR) tablet 80 mg  80 mg Per NG tube DAILY    lidocaine (LIDODERM) 5 % patch 2 Patch  2 Patch TransDERmal Q24H    hydrALAZINE (APRESOLINE) 20 mg/mL injection 20 mg  20 mg IntraVENous Q6H PRN    ELECTROLYTE REPLACEMENT PROTOCOL  1 Each Other PRN    insulin lispro (HUMALOG) injection   SubCUTAneous Q6H    sodium chloride (NS) flush 5-10 mL  5-10 mL IntraVENous PRN    acetaminophen (TYLENOL) tablet 650 mg  650 mg Oral Q6H PRN    aspirin chewable tablet 81 mg  81 mg Per G Tube DAILY    bisacodyl (DULCOLAX) suppository 10 mg  10 mg Rectal DAILY PRN    guaiFENesin (ROBITUSSIN) 100 mg/5 mL oral liquid 200 mg  200 mg Oral Q6H PRN    multivitamin, tx-iron-ca-min (THERA-M w/ IRON) tablet 1 Tab  1 Tab Oral DAILY    heparin (porcine) injection 5,000 Units  5,000 Units SubCUTAneous Q8H    glucose chewable tablet 16 g  4 Tab Oral PRN    glucagon (GLUCAGEN) injection 1 mg  1 mg IntraMUSCular PRN    dextrose (D50W) injection syrg 12.5-25 g  25-50 mL IntraVENous PRN         Objective:      Physical Exam:  Visit Vitals    /90 (BP 1 Location: Left arm, BP Patient Position: At rest)    Pulse 95    Temp 98 °F (36.7 °C)    Resp 19    Ht 5' 10.08\" (1.78 m)    Wt 97.7 kg (215 lb 6.4 oz)    SpO2 96%    BMI 30.84 kg/m2     General Appearance:  Well developed, well nourished,alert and oriented x 3, and individual in no acute distress. Ears/Nose/Mouth/Throat:   Hearing grossly normal.         Neck: Supple. Chest:   Lungs clear to auscultation bilaterally. Cardiovascular:  Regular rate and rhythm, S1, S2 normal, no murmur. Abdomen:   Soft, non-tender, bowel sounds are active. Extremities: No edema bilaterally. Skin: Warm and dry.                Data Review:   Labs:    Recent Results (from the past 24 hour(s))   GLUCOSE, POC    Collection Time: 01/25/17 12:39 PM   Result Value Ref Range    Glucose (POC) 167 (H) 70 - 110 mg/dL   GLUCOSE, POC    Collection Time: 01/25/17  6:39 PM   Result Value Ref Range    Glucose (POC) 163 (H) 70 - 484 mg/dL   METABOLIC PANEL, BASIC    Collection Time: 01/25/17  9:00 PM   Result Value Ref Range    Sodium 144 136 - 145 mmol/L    Potassium 3.6 3.5 - 5.5 mmol/L    Chloride 106 100 - 108 mmol/L    CO2 32 21 - 32 mmol/L    Anion gap 6 3.0 - 18 mmol/L    Glucose 167 (H) 74 - 99 mg/dL    BUN 24 (H) 7.0 - 18 MG/DL    Creatinine 0.66 0.6 - 1.3 MG/DL    BUN/Creatinine ratio 36 (H) 12 - 20      GFR est AA >60 >60 ml/min/1.73m2    GFR est non-AA >60 >60 ml/min/1.73m2    Calcium 7.9 (L) 8.5 - 10.1 MG/DL   MAGNESIUM    Collection Time: 01/25/17  9:00 PM   Result Value Ref Range    Magnesium 1.9 1.8 - 2.4 mg/dL   GLUCOSE, POC    Collection Time: 01/26/17  1:12 AM   Result Value Ref Range Glucose (POC) 170 (H) 70 - 110 mg/dL   MAGNESIUM    Collection Time: 01/26/17  1:57 AM   Result Value Ref Range    Magnesium 1.8 1.8 - 2.4 mg/dL   GLUCOSE, POC    Collection Time: 01/26/17  5:54 AM   Result Value Ref Range    Glucose (POC) 145 (H) 70 - 110 mg/dL       Telemetry: normal sinus rhythm      Assessment:     Principal Problem:    Acute respiratory failure ([address] (8/3/2015)    Active Problems:    Cardiac arrest ([address] (8/1/2015)      Anoxic encephalopathy ([address] (8/3/2015)      DM (diabetes mellitus) ([address] (8/0/0119)      Diastolic congestive heart failure, NYHA class 1 ([address] (11/23/2016)      HTN (hypertension) (11/23/2016)      COPD (chronic obstructive pulmonary disease) with acute bronchitis ([address] (1/5/2017)      TIFFANIE (acute kidney injury) ([facility] (1/5/2017)      Aspiration pneumonia ([address] (1/7/2017)      Transaminitis (1/15/2017)      Bandemia without diagnosis of specific infection (1/15/2017)      Hypokalemia (1/19/2017)        Plan:     The patient remains hemodynamically stable. He has multiple issues. His respiratory status stabilized after he was ventilated for several days. He was extubated and subsequently has done well. There was no evidence of acute coronary syndrome. Discharge planning.     Harmony Vazquez MD

## 2017-01-26 NOTE — ROUTINE PROCESS
Bedside and Verbal shift change report given to [address] [place] [address] (oncoming nurse) by Oralia Pruitt RN   (offgoing nurse). Report included the following information SBAR, Kardex, MAR and Recent Results.

## 2017-01-26 NOTE — PROGRESS NOTES
SCHEDULED NEB TX GIVEN. PATIENT INITIALLY DYSPNEIC AT REST. BS COARSE/CRACKLES/WET. TACHYCARDIC 111-119. NASOTRACHEALLY SUCTIONED FOR LARGE AMT THICK YELLOW SECRETIONS WITH SOME RELIEF.  SPO2 IMPROVED FROM 91% ON 2L NC TO 94%. NELLY WORLEY, NOTIFIED OF EVENTS.  ALSO PLEASE NOTE THAT THERAPIST HAD TO OVERRIDE PYXIS FOR SCHEDULED 1600 NEB TX AS SYSTEM WAS FAILING TO SHOW THE TX WAS DUE. LINKED TO ORIGINAL ORDER.

## 2017-01-26 NOTE — ROUTINE PROCESS
Bedside shift change report given to Carol Godwin RN (oncoming nurse) by Vlad Longoria RN (offgoing nurse). Report included the following information SBAR, Kardex and MAR.

## 2017-01-26 NOTE — PROGRESS NOTES
Hospitalist Progress Note    Patient: Dami Farrar MRN: [id number]  CSN: [id number]    YOB: 1945  Age: 70 y.o. Sex: male    DOA: 1/3/2017 LOS:  LOS: 21 days                Assessment/Plan     Patient Active Problem List   Diagnosis Code    Cardiac arrest ([facility] I46.9    [facility] ([facility] G93.1    Acute respiratory failure ([facility] J96.00    DM (diabetes mellitus) ([facility] R02.6    Diastolic congestive heart failure, NYHA class 1 ([facility]) I50.30    HTN (hypertension) I10    Hypoxia R09.02    COPD (chronic obstructive pulmonary disease) with acute bronchitis ([facility]) J44.0    TIFFANIE (acute kidney injury) ([facility] N17.9    Aspiration pneumonia ([facility] J69.0    Transaminitis R74.0    Bandemia without diagnosis of specific infection D72.825    Hypokalemia E87.6            69 yo WM with history of prior anoxic brain injury from NH, admitted for COPD exacerbation.      On 01/05/2017, patient had aspiration event and went into respiratory arrest and subsequently cardiac arrest. PEA arrest. CPR initiated and epi given. Patient intubated. He had ROSC. He is transferred to ICU.      Extubated 01/08/2017      Patient had increased oxygen requirement, was started on BiPAP, he was desaturating when tried to wean from BiPAP. Needed intubation 01/11/2017.                   Resp - extubated 1/18/2017  COPD - continue solumedrol.      ID - aspiration pneumonia, blood and urine cx no growth to date. resp cultures with normal resp juan. ANTIBIOTICS - was on levaquin azactam, now stopped.           [facility] - Monitor HD. Stable hemodynamically.       Heme/onc - Follow H&H, plts. INR.     Renal - creatine improving  Hypernatremia - increase free water flushes.      Endocrine - Follow FSG      Neuro - awake, follows commands       GI - s/ PEG placement.      Prophylaxis - DVT: heparin, GI: protonix.     Discussed with wife at bedside    Discharge planning.                      Physical Exam:  General: As above.    HEENT: NC, Atraumatic. PERRLA, anicteric sclerae. Lungs: CTA Bilaterally. No Wheezing/Rhonchi/Rales. Heart: Regular rhythm,  No murmur, No Rubs, No Gallops  Abdomen: Soft, Non distended, Non tender.  +Bowel sounds,   Extremities: trace edema bilaterally.             Vital signs/Intake and Output:  Visit Vitals    /83 (BP 1 Location: Right arm, BP Patient Position: At rest)    Pulse 95    Temp 98.4 °F (36.9 °C)    Resp 20    Ht 5' 10.08\" (1.78 m)    Wt 97.7 kg (215 lb 6.4 oz)    SpO2 97%    BMI 30.84 kg/m2     Current Shift:     Last three shifts:  01/24 0701 - 01/25 1900  In: 2470   Out: 1300 [Urine:1300]            Labs: Results:       Chemistry Recent Labs      01/25/17   0244   GLU  119*   NA  145   K  3.5   CL  109*   CO2  32   BUN  27*   CREA  0.63   CA  8.2*   AGAP  4   BUCR  43*      CBC w/Diff Recent Labs      01/25/17   0244   WBC  9.8   RBC  3.16*   HGB  9.0*   HCT  29.0*   PLT  137      Cardiac Enzymes No results for input(s): CPK, CKND1, MARCO A in the last 72 hours. No lab exists for component: CKRMB, TROIP   Coagulation No results for input(s): PTP, INR, APTT in the last 72 hours. No lab exists for component: INREXT    Lipid Panel No results found for: CHOL, CHOLPOCT, CHOLX, CHLST, CHOLV, [id number], HDL, LDL, NLDLCT, DLDL, LDLC, DLDLP, [id number], VLDLC, VLDL, TGL, TGLX, TRIGL, [id number], TRIGP, TGLPOCT, [id number], CHHD, CHHDX   BNP No results for input(s): BNPP in the last 72 hours. Liver Enzymes No results for input(s): TP, ALB, TBIL, AP, SGOT, GPT in the last 72 hours.     No lab exists for component: DBIL   Thyroid Studies Lab Results   Component Value Date/Time    TSH 1.37 08/13/2015 04:35 AM        Procedures/imaging: see electronic medical records for all procedures/Xrays and details which were not copied into this note but were reviewed prior to creation of Plan

## 2017-01-27 VITALS
WEIGHT: 216.6 LBS | RESPIRATION RATE: 22 BRPM | TEMPERATURE: 98 F | OXYGEN SATURATION: 97 % | HEART RATE: 100 BPM | BODY MASS INDEX: 31.01 KG/M2 | DIASTOLIC BLOOD PRESSURE: 74 MMHG | SYSTOLIC BLOOD PRESSURE: 155 MMHG | HEIGHT: 70 IN

## 2017-01-27 LAB
GLUCOSE BLD STRIP.AUTO-MCNC: 171 MG/DL (ref 70–110)
GLUCOSE BLD STRIP.AUTO-MCNC: 197 MG/DL (ref 70–110)
GLUCOSE BLD STRIP.AUTO-MCNC: 210 MG/DL (ref 70–110)
MAGNESIUM SERPL-MCNC: 2.1 MG/DL (ref 1.8–2.4)

## 2017-01-27 PROCEDURE — 74011250637 HC RX REV CODE- 250/637: Performed by: HOSPITALIST

## 2017-01-27 PROCEDURE — 77030018798 HC PMP KT ENTRL FED COVD -A

## 2017-01-27 PROCEDURE — 82962 GLUCOSE BLOOD TEST: CPT

## 2017-01-27 PROCEDURE — 74011250637 HC RX REV CODE- 250/637: Performed by: INTERNAL MEDICINE

## 2017-01-27 PROCEDURE — 74011000250 HC RX REV CODE- 250: Performed by: INTERNAL MEDICINE

## 2017-01-27 PROCEDURE — 83735 ASSAY OF MAGNESIUM: CPT | Performed by: INTERNAL MEDICINE

## 2017-01-27 PROCEDURE — 36415 COLL VENOUS BLD VENIPUNCTURE: CPT | Performed by: INTERNAL MEDICINE

## 2017-01-27 PROCEDURE — 74011636637 HC RX REV CODE- 636/637: Performed by: INTERNAL MEDICINE

## 2017-01-27 PROCEDURE — 94640 AIRWAY INHALATION TREATMENT: CPT

## 2017-01-27 PROCEDURE — 74011250636 HC RX REV CODE- 250/636: Performed by: INTERNAL MEDICINE

## 2017-01-27 PROCEDURE — 77010033678 HC OXYGEN DAILY

## 2017-01-27 PROCEDURE — 74011250637 HC RX REV CODE- 250/637: Performed by: FAMILY MEDICINE

## 2017-01-27 RX ORDER — BUDESONIDE 0.5 MG/2ML
500 INHALANT ORAL 2 TIMES DAILY
Qty: 10 EACH | Refills: 0 | Status: SHIPPED
Start: 2017-01-27

## 2017-01-27 RX ORDER — ATORVASTATIN CALCIUM 80 MG/1
80 TABLET, FILM COATED ORAL DAILY
Qty: 10 TAB | Refills: 0 | Status: SHIPPED
Start: 2017-01-27

## 2017-01-27 RX ORDER — INSULIN GLARGINE 100 [IU]/ML
20 INJECTION, SOLUTION SUBCUTANEOUS
Qty: 1 VIAL | Refills: 0 | Status: SHIPPED
Start: 2017-01-27

## 2017-01-27 RX ORDER — ARFORMOTEROL TARTRATE 15 UG/2ML
15 SOLUTION RESPIRATORY (INHALATION) 2 TIMES DAILY
Qty: 1 VIAL | Refills: 0 | Status: SHIPPED
Start: 2017-01-27

## 2017-01-27 RX ADMIN — ATORVASTATIN CALCIUM 80 MG: 20 TABLET, FILM COATED ORAL at 09:00

## 2017-01-27 RX ADMIN — LANSOPRAZOLE 30 MG: 30 TABLET, ORALLY DISINTEGRATING, DELAYED RELEASE ORAL at 06:37

## 2017-01-27 RX ADMIN — POTASSIUM CHLORIDE 20 MEQ: 20 SOLUTION ORAL at 09:00

## 2017-01-27 RX ADMIN — BUDESONIDE 500 MCG: 0.5 INHALANT RESPIRATORY (INHALATION) at 07:28

## 2017-01-27 RX ADMIN — LEVOTHYROXINE SODIUM 175 MCG: 150 TABLET ORAL at 06:37

## 2017-01-27 RX ADMIN — ASPIRIN 81 MG 81 MG: 81 TABLET ORAL at 09:00

## 2017-01-27 RX ADMIN — BACLOFEN 10 MG: 10 TABLET ORAL at 00:11

## 2017-01-27 RX ADMIN — IPRATROPIUM BROMIDE AND ALBUTEROL SULFATE 3 ML: .5; 3 SOLUTION RESPIRATORY (INHALATION) at 03:53

## 2017-01-27 RX ADMIN — INSULIN LISPRO 4 UNITS: 100 INJECTION, SOLUTION INTRAVENOUS; SUBCUTANEOUS at 02:23

## 2017-01-27 RX ADMIN — INSULIN LISPRO 2 UNITS: 100 INJECTION, SOLUTION INTRAVENOUS; SUBCUTANEOUS at 12:00

## 2017-01-27 RX ADMIN — BACLOFEN 10 MG: 10 TABLET ORAL at 06:37

## 2017-01-27 RX ADMIN — ARFORMOTEROL TARTRATE 15 MCG: 15 SOLUTION RESPIRATORY (INHALATION) at 07:28

## 2017-01-27 RX ADMIN — IPRATROPIUM BROMIDE AND ALBUTEROL SULFATE 3 ML: .5; 3 SOLUTION RESPIRATORY (INHALATION) at 12:04

## 2017-01-27 RX ADMIN — INSULIN GLARGINE 20 UNITS: 100 INJECTION, SOLUTION SUBCUTANEOUS at 12:00

## 2017-01-27 RX ADMIN — HEPARIN SODIUM 5000 UNITS: 5000 INJECTION, SOLUTION INTRAVENOUS; SUBCUTANEOUS at 04:28

## 2017-01-27 RX ADMIN — MULTIPLE VITAMINS W/ MINERALS TAB 1 TABLET: TAB at 09:00

## 2017-01-27 RX ADMIN — INSULIN LISPRO 2 UNITS: 100 INJECTION, SOLUTION INTRAVENOUS; SUBCUTANEOUS at 06:37

## 2017-01-27 RX ADMIN — HEPARIN SODIUM 5000 UNITS: 5000 INJECTION, SOLUTION INTRAVENOUS; SUBCUTANEOUS at 12:00

## 2017-01-27 RX ADMIN — IPRATROPIUM BROMIDE AND ALBUTEROL SULFATE 3 ML: .5; 3 SOLUTION RESPIRATORY (INHALATION) at 07:28

## 2017-01-27 NOTE — PROGRESS NOTES
[address] [phone] patient will need medical transport for safety,please send avs,d/c summary,medication hardscripts,facility need to receive pt by 3pm.Care Management Interventions  PCP Verified by CM: Yes  Palliative Care Consult (Criteria: CHF and RRAT>21): No  Reason for No Palliative Care Consult:  Other (see comment)  Mode of Transport at Discharge: BLS  Transition of Care Consult (CM Consult): SNF  Discharge Durable Medical Equipment: No  Health Maintenance Reviewed: Yes  Physical Therapy Consult: Yes  Occupational Therapy Consult: Yes  Speech Therapy Consult: Yes  Current Support Network: Nursing Facility  Confirm Follow Up Transport: Other (see comment)  Plan discussed with Pt/Family/Caregiver: Yes  Freedom of Choice Offered: Yes  Discharge Location  Discharge Placement: Skilled nursing facility

## 2017-01-27 NOTE — PROGRESS NOTES
1329:  Spoke with Lida Heart LPN at [facility] to give report on patient. Report to include SBAR, MAR, last set of vitals, tube feeding settings, chance for questions and answers, ETA of 1500 (patient will be leaving 1400 by transport). 1355:  Left message for spouse on home phone.

## 2017-01-27 NOTE — PROGRESS NOTES
NUTRITION FOLLOW-UP     RECOMMENDATIONS/PLAN:   - Continue Jevity 1.2 to provide adequate fiber to support bowel function.  - Suggest goal rate of 70 mL/hr via PEG and additional free water flushes 125 mL q3h to assist with hypernatremia  - This will provide total 1848 kcal, 85 g protein, 1243 mL water, 261 g CHO, 28 g fiber, and >100% RDI's for micronutrients. - With flushes total 2200 mL fluid daily     NUTRITION ASSESSMENT:   Client Update: 70 yrs old Male with COPD exacerbation s/p cardiac arrest resulting in anoxic encephalopathy, s/p PEG placement due to NPO status recommended by SLP. Hypernatremia. TF was changed from Vital High Protein during ICU stay to Osmolite 1.2 on 1/20 as RN reported pt with frequent stools. Tube feed changed to Jevity 1.2 @70ml per hour, pt is tolerating per nursing documentation with +BM.     FOOD/NUTRITION INTAKE   Diet Order: NPO   Food Allergies: NKFA     TF access: [] OGT [] NGT [x] PEG [] J tube  TF Order: Jevity 1.2 at 70 ml/hr   Free Water Flushes: 125 ml q6h   Provides: 1848 kcal, 85 g protein, 1243 ml H2O, 261 g CHO, >100% RDI's for micronutrients  Pertinent Medications: [x] Reviewed; Insulin:  [x] SSI [] Pre-meal: [x] Basal:20U [] None   Cultural/Zoroastrianism Food Preferences: None Identified     BIOCHEMICAL DATA & MEDICAL TESTS  Pertinent Labs: [x] Reviewed; POC -210 ANTHROPOMETRICS  Height: 5' 10.08\" (178 cm) Weight: 98.2 kg (215 lb 6.4 oz)   BMI: 30.8 kg/m^2 obese (30%-39.9% BMI)   Adm Weight: 196 lbs Weight change: wt trending up since admit, likely related to fluid retention   Adjusted Body Weight: 81 kg      NUTRITION-FOCUSED PHYSICAL ASSESSMENT  Skin: Ecchymosis w/ excoriation BLE. GI: last BM 1/26. .     NUTRITION PRESCRIPTION  Calories: 3431-6928 kcal/day based on MSJ x1.2 -250  Protein:  g/day based on 1-1.3 g/kg AdjBW  CHO: 230 g/day based on 50% of total energy  Fluid: 9284-8653 ml/day based on 1 kcal/ml       NUTRITION DIAGNOSES:   1.  Inadequate oral intake related to anoxic encephalopathy as evidenced by unsafe for PO intake per SLP eval - ongoing     NUTRITION INTERVENTIONS:   INTERVENTIONS:        GOALS:  1. Jevity 1.2 at 70 + fwf 125 q3h 1. Continue to tolerate EN regimen with Jevity 1.2, BM q 1-3 days by next review 1-3 days      LEARNING NEEDS (Diet, Supplementation, Food/Nutrient-Drug Interaction):  [] None Identified  [] Education provided & documented  Identified and patient: [] Declined [x] Was not appropriate/indicated         NUTRITION MONITORING /EVALUATION:   Adjust EN/PN as appropriate  Monitor wt  Monitor renal labs, electrolytes, fluid status     Previous Recommendations Implemented: yes   Previous Goals Met: yes -progressing      [] Participated in Interdisciplinary Rounds   [x] Interdisciplinary Care Plan Reviewed  [x] Discharge Nutrition Plan: TF's per above     NUTRITION RISK: [x] At risk [] Not currently at risk    Will follow-up per policy.   Micaela Perry, RD  PAGER: [phone]

## 2017-01-27 NOTE — PROGRESS NOTES
Cardiology Progress Note      1/27/2017 11:30 AM    Admit Date: 1/3/2017    Admit Diagnosis: Hypoxia  peg tube insertion      Subjective:     Pierre Delgado denies chest pain, chest pressure/discomfort.     Visit Vitals    /74 (BP 1 Location: Left arm, BP Patient Position: At rest)    Pulse 100    Temp 98 °F (36.7 °C)    Resp 22    Ht 5' 10.08\" (1.78 m)    Wt 98.2 kg (216 lb 9.6 oz)    SpO2 94%    BMI 31.01 kg/m2     Current Facility-Administered Medications   Medication Dose Route Frequency    insulin glargine (LANTUS) injection 20 Units  20 Units SubCUTAneous DAILY    cloNIDine (CATAPRES) 0.2 mg/24 hr patch 1 Patch  1 Patch TransDERmal Q7D    lansoprazole (PREVACID SOLUTAB) disintegrating tablet 30 mg  30 mg Per G Tube ACB    levothyroxine (SYNTHROID) tablet 175 mcg  175 mcg Per G Tube ACB    baclofen (LIORESAL) tablet 10 mg  10 mg Per G Tube TID    potassium chloride (KAON 10%) 20 mEq/15 mL oral liquid 20 mEq  20 mEq Oral DAILY    albuterol-ipratropium (DUO-NEB) 2.5 MG-0.5 MG/3 ML  3 mL Nebulization Q4H RT    arformoterol (BROVANA) neb solution 15 mcg  15 mcg Nebulization BID RT    budesonide (PULMICORT) 500 mcg/2 ml nebulizer suspension  500 mcg Nebulization BID RT    atorvastatin (LIPITOR) tablet 80 mg  80 mg Per NG tube DAILY    lidocaine (LIDODERM) 5 % patch 2 Patch  2 Patch TransDERmal Q24H    hydrALAZINE (APRESOLINE) 20 mg/mL injection 20 mg  20 mg IntraVENous Q6H PRN    ELECTROLYTE REPLACEMENT PROTOCOL  1 Each Other PRN    insulin lispro (HUMALOG) injection   SubCUTAneous Q6H    sodium chloride (NS) flush 5-10 mL  5-10 mL IntraVENous PRN    acetaminophen (TYLENOL) tablet 650 mg  650 mg Oral Q6H PRN    aspirin chewable tablet 81 mg  81 mg Per G Tube DAILY    bisacodyl (DULCOLAX) suppository 10 mg  10 mg Rectal DAILY PRN    guaiFENesin (ROBITUSSIN) 100 mg/5 mL oral liquid 200 mg  200 mg Oral Q6H PRN    multivitamin, tx-iron-ca-min (THERA-M w/ IRON) tablet 1 Tab  1 Tab Oral DAILY    heparin (porcine) injection 5,000 Units  5,000 Units SubCUTAneous Q8H    glucose chewable tablet 16 g  4 Tab Oral PRN    glucagon (GLUCAGEN) injection 1 mg  1 mg IntraMUSCular PRN    dextrose (D50W) injection syrg 12.5-25 g  25-50 mL IntraVENous PRN         Objective:      Physical Exam:  Visit Vitals    /74 (BP 1 Location: Left arm, BP Patient Position: At rest)    Pulse 100    Temp 98 °F (36.7 °C)    Resp 22    Ht 5' 10.08\" (1.78 m)    Wt 98.2 kg (216 lb 9.6 oz)    SpO2 94%    BMI 31.01 kg/m2     General Appearance:  Well developed, well nourished,alert and oriented x 3, and individual in no acute distress. Ears/Nose/Mouth/Throat:   Hearing grossly normal.         Neck: Supple. Chest:   Lungs clear to auscultation bilaterally. Cardiovascular:  Regular rate and rhythm, S1, S2 normal, no murmur. Abdomen:   Soft, non-tender, bowel sounds are active. Extremities: No edema bilaterally. Skin: Warm and dry.                Data Review:   Labs:    Recent Results (from the past 24 hour(s))   GLUCOSE, POC    Collection Time: 01/26/17  1:05 PM   Result Value Ref Range    Glucose (POC) 177 (H) 70 - 110 mg/dL   GLUCOSE, POC    Collection Time: 01/26/17  3:59 PM   Result Value Ref Range    Glucose (POC) 129 (H) 70 - 110 mg/dL   MAGNESIUM    Collection Time: 01/27/17  1:05 AM   Result Value Ref Range    Magnesium 2.1 1.8 - 2.4 mg/dL   GLUCOSE, POC    Collection Time: 01/27/17  2:18 AM   Result Value Ref Range    Glucose (POC) 210 (H) 70 - 110 mg/dL   GLUCOSE, POC    Collection Time: 01/27/17  6:22 AM   Result Value Ref Range    Glucose (POC) 171 (H) 70 - 110 mg/dL       Telemetry: normal sinus rhythm      Assessment:     Principal Problem:    Acute respiratory failure ([facility] (8/3/2015)    Active Problems:    Cardiac arrest ([facility] (8/1/2015)      Anoxic encephalopathy ([facility] (8/3/2015)      DM (diabetes mellitus) ([facility] [address] (2/9/9708)      Diastolic congestive heart failure, NYHA class 1 [facility] (11/23/2016)      HTN (hypertension) (11/23/2016)      COPD (chronic obstructive pulmonary disease) with acute bronchitis ([facility] [address] (1/5/2017)      TIFFANIE (acute kidney injury) ([facility] (1/5/2017)      Aspiration pneumonia ([facility] (1/7/2017)      Transaminitis (1/15/2017)      Bandemia without diagnosis of specific infection (1/15/2017)      Hypokalemia (1/19/2017)        Plan:     Mr. Howard Steele has been out of ICU for several days now. He appears stable from the cardiac standpoint. His rhythm is sinus. There was no evidence for acute coronary syndrome during this hospitalization. He did have acute respiratory failure with possible aspiration. He has a history of hypoxic encephalopathy and actually was admitted from a nursing home. Disposition is pending.     Andrew Magallanes MD

## 2017-01-27 NOTE — ROUTINE PROCESS
Bedside and Verbal shift change report given to [address] [place] [address] (oncoming nurse) by Babak Felder RN (offgoing nurse). Report included the following information SBAR and Kardex.  Alarm parameters reviewed, on and audible Appropriate for patient clinical condition

## 2017-01-27 NOTE — PROGRESS NOTES
Problem: Dysphagia (Adult)  Goal: *Acute Goals and Plan of Care (Insert Text)    Rec: NPO except oral care via oral care swab q 4 hours  Strict aspiration/reflux precautions; HOB >30 at all times   PEG tube feedings    Patient will:  1. Utilize compensatory swallow maneuvers (decrease bolus size, decrease rate of intake, cyclical ingestion, etc.) to increase swallow function/safety with min visual, verbal and/or tactile cues in 4/5 trials. 2. Tolerate PO trials utilizing compensatory swallow techniques (decrease bolus size, decrease rate of intake, cyclical ingestion, etc.) without overt s/sx aspiration/distress in 4/5 trials with min visual, verbal, and/or tactile cues    3. Perform restorative oropharyngeal exercises and swallow maneuvers (supraglottic swallow, Mendelson maneuver, effortful swallow, OMEs etc.) to increase oropharyngeal strength/awareness/agility, tongue base retraction, hyolaryngeal excursion, airway protection, and/or bolus clearance with min visual, verbal and/or tactile cues in 4/5 trials. 4. Demonstrate the ability to adequately self-monitor swallowing skills and perform appropriate compensatory techniques to reduce s/sx of aspiration and to safely consume least-restrictive diet with min verbal, visual and/or tactile cues in 4/5 trials. 5. Complete an objective measure of swallow fxn (i.e., MBSS) to assess oropharyngeal anatomy/physiology for determination of safest least-restrictive diet and/or appropriate rehabilitation techniques. Outcome: Not Met  Addendum to chart for 1/27/17:      Goals were ongoing at time of discharge. Will resolve this plan of care as patient transitioned to next level of care. Patient discharged to SNF.         Thank you,        Kristen Shaver M.A., [id number] [facility]

## 2017-01-27 NOTE — DISCHARGE SUMMARY
Discharge Summary    Patient: Kenrick Martinez MRN: [id number]  CSN: [id number]    YOB: 1945  Age: 70 y.o. Sex: male    DOA: 1/3/2017 LOS:  LOS: 23 days   Discharge Date:      Primary Care Provider:  Kassie Mcdonald MD    Admission Diagnoses: Hypoxia  peg tube insertion    Discharge Diagnoses:    Problem List as of 2017  Date Reviewed: 2017          Codes Class Noted - Resolved    Hypokalemia ICD-10-CM: E87.6  ICD-9-CM: 276.8  2017 - Present        Transaminitis ICD-10-CM: R74.0  ICD-9-CM: 790.4  1/15/2017 - Present        Bandemia without diagnosis of specific infection ICD-10-CM: D72.825  ICD-9-CM: 288.66  1/15/2017 - Present        Aspiration pneumonia ([facility] ICD-10-CM: J69.0  ICD-9-CM: 507.0  2017 - Present        COPD (chronic obstructive pulmonary disease) with acute bronchitis ([facility] ICD-10-CM: J44.0  ICD-9-CM: 491.22  2017 - Present        TIFFANIE (acute kidney injury) ([facility] ICD-10-CM: N17.9  ICD-9-CM: 584.9  2017 - Present        Hypoxia ICD-10-CM: R09.02  ICD-9-CM: 799.02  1/3/2017 - Present        Diastolic congestive heart failure, NYHA class 1 ([facility] ICD-10-CM: I50.30  ICD-9-CM: 428.30  2016 - Present        HTN (hypertension) ICD-10-CM: I10  ICD-9-CM: 401.9  2016 - Present        Anoxic encephalopathy ([facility] ICD-10-CM: G93.1  ICD-9-CM: 348.1  8/3/2015 - Present        * (Principal)Acute respiratory failure ([facility] ICD-10-CM: J96.00  ICD-9-CM: 518.81  8/3/2015 - Present        DM (diabetes mellitus) ([address] ICD-10-CM: E11.9  ICD-9-CM: 250.00  8/3/2015 - Present        Cardiac arrest [facility]) ICD-10-CM: I46.9  ICD-9-CM: 427.5  2015 - Present              Discharge Medications:     Current Discharge Medication List      START taking these medications    Details   arformoterol (BROVANA) 15 mcg/2 mL nebu neb solution 2 mL by Nebulization route two (2) times a day.   Qty: 1 Vial, Refills: 0      atorvastatin (LIPITOR) 80 mg tablet 1 Tab by Per NG tube route daily.  Qty: 10 Tab, Refills: 0      budesonide (PULMICORT) 0.5 mg/2 mL nbsp 2 mL by Nebulization route two (2) times a day. Qty: 10 Each, Refills: 0      insulin glargine (LANTUS) 100 unit/mL injection 20 Units by SubCUTAneous route nightly. Indications: type 2 diabetes mellitus  Qty: 1 Vial, Refills: 0         CONTINUE these medications which have NOT CHANGED    Details   citalopram (CELEXA) 40 mg tablet Take 40 mg by mouth daily. aspirin delayed-release 81 mg tablet Take 81 mg by mouth daily. cloNIDine (CATAPRES) 0.2 mg/24 hr patch 1 Patch by TransDERmal route every Monday. levothyroxine (SYNTHROID) 175 mcg tablet Take 175 mcg by mouth Daily (before breakfast). furosemide (LASIX) 40 mg tablet Take 40 mg by mouth daily. bisacodyl (DULCOLAX, BISACODYL,) 10 mg suppository Insert 10 mg into rectum daily as needed. multivitamin, tx-iron-ca-min (THERA-M W/ IRON) 9 mg iron-400 mcg tab tablet Take 1 Tab by mouth daily. baclofen (LIORESAL) 10 mg tablet Take 10 mg by mouth three (3) times daily. pantoprazole (PROTONIX) 20 mg tablet Take 20 mg by mouth daily. ferrous sulfate (FEROSUL) 220 mg (44 mg iron)/5 mL elix Take 7.4 mL by mouth daily. acetaminophen (TYLENOL) 325 mg tablet Take 650 mg by mouth every six (6) hours as needed for Pain.      labetalol (NORMODYNE) 200 mg tablet Take 200 mg by mouth two (2) times a day. Hold for heart rate less than 60 and advise provider. lidocaine (LIDODERM) 5 % 2 Patches by TransDERmal route every twenty-four (24) hours. Place Two patches to right trapezius region in morning. Remove patches 12 hours later. Apply 7AM Remove 7PM.  Qty: 1 Each, Refills: 0      albuterol-ipratropium (DUO-NEB) 2.5 mg-0.5 mg/3 ml nebulizer solution 3 mL by Nebulization route every four (4) hours as needed for Wheezing.   Qty: 30 Vial, Refills: 0         STOP taking these medications       nitroglycerin (NITROBID) 2 % ointment Comments:   Reason for Stopping:         fentaNYL (DURAGESIC) 50 mcg/hr PATCH Comments:   Reason for Stopping:         enalapril (VASOTEC) 5 mg tablet Comments:   Reason for Stopping:         Menthol-Zinc Oxide (RISAMINE) 0.44-20.6 % oint Comments:   Reason for Stopping:         melatonin 5 mg cap capsule Comments:   Reason for Stopping:         isosorbide mononitrate (ISMO, MONOKET) 20 mg tablet Comments:   Reason for Stopping:         loperamide (IMODIUM) 2 mg capsule Comments:   Reason for Stopping:         promethazine (PHENERGAN) 12.5 mg tablet Comments:   Reason for Stopping:         guaiFENesin (ROBAFEN) 100 mg/5 mL liquid Comments:   Reason for Stopping:         magnesium hydroxide ([place] MILK OF MAGNESIA) 400 mg/5 mL suspension Comments:   Reason for Stopping:         mometasone (NASONEX) 50 mcg/actuation nasal spray Comments:   Reason for Stopping:         GLUCOSAMINE HCL/CHONDR MAY A NA (GLUCOSAMINE-CHONDROITIN) 750-600 mg tab Comments:   Reason for Stopping:         predniSONE (DELTASONE) 10 mg tablet Comments:   Reason for Stopping:         potassium chloride (KAON 10%) 20 mEq/15 mL solution Comments:   Reason for Stopping:         diclofenac (VOLTAREN) 1 % gel Comments:   Reason for Stopping:         oxyCODONE-acetaminophen (PERCOCET) 5-325 mg per tablet Comments:   Reason for Stopping:         guaiFENesin (ROBAFEN) 100 mg/5 mL liquid Comments:   Reason for Stopping:         aspirin 81 mg chewable tablet Comments:   Reason for Stopping:               Discharge Condition: Stable    Procedures : PEG, intubation    Consults: Cardiology, Gastroenterology and Pulmonary/Critical Care      PHYSICAL EXAM   Visit Vitals    /74 (BP 1 Location: Left arm, BP Patient Position: At rest)    Pulse 100    Temp 98 °F (36.7 °C)    Resp 22    Ht 5' 10.08\" (1.78 m)    Wt 98.2 kg (216 lb 9.6 oz)    SpO2 97%    BMI 31.01 kg/m2     General: Awake,  no acute distress    HEENT: NC, Atraumatic. PERRLA, EOMI.  Anicteric sclerae. Lungs:  CTA Bilaterally. No Wheezing/Rhonchi/Rales. Heart:  Regular  rhythm,  No murmur, No Rubs, No Gallops  Abdomen: Soft, Non distended, Non tender. +Bowel sounds,   Extremities: Edema LE bilaterally  Psych:   Not anxious or agitated. Neurologic:  Speech difficulty, history of anoxic brain injury, bed bound                                     Admission HPI :   Renetta Dean is a 70 y.o. male with past medical history significant for CAD w cardiac arrest and subsequent anoxic brain injury, asbestosis, Dm2 debilitated bed- chair bound male presents to the Er with shortness of breath. He states that he had been coughing for the past 2-3 days, nonproductive , which was associated w low grade fever. He was noted to be hypoxic at the convalescent center and was promptly transfer to ER. On presentation to the ER he was afebrile, hypertensive satting in 90s on bipap. His exam revealed scattered rhonchi and expiratory wheezes, no edema or JVD noted. EKG with nothing acute, CXR clear, BNP normal, he was with out leukocytosis or significant left shift, his chemistries were normal as well. He received duonebs, steroids and IV antibioitcs w persistent wheezing. Medicine is asked to admit for further management. Hospital Course : On 01/05/2017, patient had aspiration event and went into respiratory arrest and subsequently cardiac arrest. PEA arrest. CPR initiated and epi given. Patient intubated. He had ROSC. He was transferred to ICU.      Extubated 01/08/2017      Patient had increased oxygen requirement, was started on BiPAP, he was desaturating when tried to wean from BiPAP. Needed re intubation 01/11/2017. Extubated 1/18/2017  COPD - initially started on solumedrol and inhaled bronchodilators. He was seen and followed by pulmonary critical care. He is now on pulmicort and brovana.      ID - aspiration pneumonia, blood and urine cx no growth to date.  resp cultures with normal resp juan.  ANTIBIOTICS - was on levaquin azactam, now stopped.         HTN - relatively controlled, continued home medications      Heme/onc - Follow H&H, plts. INR.     TIFFANIE - improved with hydration. He had hypernatremia which improved with increase in free water flushes. Justice Hargrove  Endocrine - continued synthroid. He is started on lantus for DM.       Neuro - history of anoxic gumaro injury in the past. He is bed bound. Mental status waxing waning. He has speech deficits.       GI - dysphagia. He is s/ PEG placement. He has dysphagia and he is high risk for aspiration. Head of the bed elevated to 30 degrees always.      I had long discussion with patient's wife, patient condition and long term prognosis is poor. His condition at present is likely his new baseline. Have discussed with wife that placing PEG tube is not going to prevent aspiration events. He is very high risk for readmission. This writer and other physicians had discussions with patient's wife about poor long term prognosis.       Activity: Activity as tolerated    Diet: PEG feeding     Follow-up: PCP    Disposition: SNF    Minutes spent on discharge: 55       Labs: Results:       Chemistry Recent Labs      01/25/17   2100  01/25/17   0244   GLU  167*  119*   NA  144  145   K  3.6  3.5   CL  106  109*   CO2  32  32   BUN  24*  27*   CREA  0.66  0.63   CA  7.9*  8.2*   AGAP  6  4   BUCR  36*  43*      CBC w/Diff Recent Labs      01/25/17   0244   WBC  9.8   RBC  3.16*   HGB  9.0*   HCT  29.0*   PLT  137      Cardiac Enzymes No results for input(s): CPK, CKND1, MARCO A in the last 72 hours. No lab exists for component: CKRMB, TROIP   Coagulation No results for input(s): PTP, INR, APTT in the last 72 hours.     No lab exists for component: INREXT    Lipid Panel No results found for: CHOL, CHOLPOCT, CHOLX, CHLST, CHOLV, [id number], HDL, LDL, NLDLCT, DLDL, LDLC, DLDLP, [id number], VLDLC, VLDL, TGL, TGLX, TRIGL, [id number], TRIGP, TGLPOCT, [id number], CHHD, CHHDX   BNP No results for input(s): BNPP in the last 72 hours. Liver Enzymes No results for input(s): TP, ALB, TBIL, AP, SGOT, GPT in the last 72 hours. No lab exists for component: DBIL   Thyroid Studies Lab Results   Component Value Date/Time    TSH 1.37 08/13/2015 04:35 AM            Significant Diagnostic Studies: Xr Chest Sngl V    Result Date: 1/5/2017  EXAM:Chest X-Ray  History: Intubation, evaluate ET tube Technique:  Portable Frontal View Comparison: 01/04/2017, 01/03/2017 _______________ FINDINGS: -Endotracheal tube tip is 3.6] images above the lilli. -Nasogastric tube coursing left lateral to the tracheal air column with the sidehole in the distal thoracic esophagus and the tip above the expected position of the gastroesophageal junction. The trachea is midline. Persistent cardiomegaly. The lungs are grossly clear without evidence of focal consolidation, effusion, or pneumothorax. Stable intact osseous structures. Other: There is a small amount of retained oral contrast from preceding day's. Barium swallow examination in the stomach. Prominent gaseous distention of the partial visualized colonic structures in bilateral the upper abdomen. _______________     IMPRESSION: 1. Satisfactory position of ET tube. 2. Suboptimal position of the enteric line with the tip in the region of the distal thoracic esophagus. Recommend advancing 8 to 10 cm with repeat imaging to confirm placement. 3. Cardiomegaly with hypoinflation. No acute cardiac pulmonary process. 4. Prominent amount of nonspecific upper abdominal colonic gas, which can be further evaluated with 2 views of the abdomen, based upon clinical findings and concerns. Xr Chest Pa Lat    Result Date: 1/4/2017  History: Follow-up pneumonia Comparison 1/3/2017 and 11/23/2016 FINDINGS: AP and lateral views obtained with the patient sitting in bed. Stable enlarged cardiac silhouette with tortuous aortic arch.  Stable mild retrocardiac density medial left lung base only appreciated on AP view. No other evidence of new lung consolidation pleural effusion or pneumothorax. There is lung hypoventilation. Degenerative thoracic spondylosis. IMPRESSION: 1. Stable mild retrocardiac density suggesting probable atelectasis and/or scarring medial left base. 2. No other new active disease or significant interval change. Stable enlarged cardiac silhouette. Xr Abd (kub)    Result Date: 1/11/2017  EXAM: AP portable abdomen, one view INDICATION: Intubation and orogastric tube placement COMPARISON: 01/06/2017 _______________ FINDINGS: Interval orogastric tube placement, with its course and tip location suggesting it is in the second portion of the duodenum. Colon remains mildly distended, contrast in the right colon, previously in the stomach. _______________     IMPRESSION: Orogastric tube tip likely in the second portion of the duodenum. Colonic distention, without change, suggesting ileus. Xr Abd (kub)    Result Date: 1/5/2017  EXAM: Frontal View of the Abdomen And Pelvis Indication: NG tube placement Technique: Single frontal view  of the abdomen and pelvis. Comparison: 2 prior abdomen two views performed same day. CT of the chest/abdomen/pelvis from 08/01/2015 _______________ Findings: There is persistent high positioning of the ET tube, in the region of the distal thoracic esophagus, not convincingly within the stomach. Moderate diffuse abdominal gaseous distention. _______________    IMPRESSION: 1. Persistent abnormal high positioning of the enteric line, not convincingly in the stomach. Xr Swallow Func Video    Result Date: 1/4/2017  History: Dysphagia, aspiration The procedure was performed with the speech pathologist, Ms. Fred Lepe. Different consistencies of barium tinged products were given to swallow. Patient demonstrated premature spillage with all consistencies given. Puree: Residuals noted in the epiglottic valleculae.  Mechanical soft: Residuals noted in the epiglottic valleculae. . Mixed: No additional abnormality seen. Solid: Residuals noted in the epiglottic valleculae. Honey: Trace penetration when utilizing the cup but not when utilizing the straw. Nectar: Moderate penetration noted using both a cup and straw. Thin: Moderate penetration noted when utilizing the cup but silent aspiration noted when utilizing the straw. Radiation dose in mGy: 54.63. Fluoroscopy time: 48 seconds. Fluoroscopic images: Video     Impression: Premature spillage with all consistencies. Penetration seen with nectar and thin consistencies with silent aspiration with thin consistency when utilizing the straw. Videotape is available for review. Please refer to the speech pathologist's recommendations. Xr Chest Port    Result Date: 1/24/2017  PORTABLE CHEST AT 1453 HOURS: Indication: Shortness of breath. Technique:  Portable, erect AP view. Comparison:  01/19/2017 Findings:  Lungs are mildly hypoinflated. Prominence of the pulmonary vasculature is again noted. Increasing opacity at the left base with decreasing right basilar opacity. No discernible pneumothorax. Cardiac silhouette is mildly enlarged, stable. Tortuous thoracic aorta. IMPRESSION: 1. Persistent findings consistent with pulmonary vascular congestion/mild edema. 2. Increasing opacity at the left base may represent small pleural effusion, sequela of edema and/or superimposed consolidation such as atelectasis or pneumonia. 3. Decreasing small right pleural effusion. Xr Chest Port    Result Date: 1/19/2017  Portable chest xray Reason for exam:respir distress Images: 1 Comparison:  1/18 and 1/17/2017 chest x-ray. . Findings: The support devices have been removed. The cardiomediastinal contours are stable. Lung volumes remain low. There is small central vascular congestion not significantly changed.  The right costophrenic angle remains obscured most likely secondary to pleural effusion with associated atelectasis versus infiltrate. Retrocardiac region is dense but unchanged. No pneumothorax. No free air. Gaseous distention of upper abdomen bowel loops. Impression: Support devices have been removed. Hypoventilatory exam with mild central vascular congestion, right basilar and retrocardiac opacities present which may be atelectasis or infiltrate with probable right pleural effusion. Pattern is not significantly changed. No evidence of pneumothorax. Gaseous distention of upper abdomen bowel loops. No acute pulmonary disease. Xr Chest Port    Result Date: 1/18/2017  EXAM:Chest X-Ray  History: Intubated, acute respiratory failure, follow-up airspace disease Technique:  Portable Frontal View Comparison: 01/17/2017, 01/16/2017, 01/15/2017 _______________ FINDINGS: -Endotracheal tube tip is 4 cm above the lilli. -Nasogastric tube tip is collimated off the inferior margin of the film in the left upper quadrant. Persistent hypoinflation, unchanged enlarged configuration of the cardiac silhouette. Persistent bronchovascular congestion with mild cephalized appearance. No significant interval change in the diffuse hazy coarse and interstitial pattern. Persistent confluent retrocardiac and left diaphragmatic opacity with blunting of the left costophrenic angle. More apparent consolidative right infrahilar parenchymal opacity, similar appearance to the 01/15/2017 exam. Unchanged right lower lung hazy confluent and diaphragmatic consolidative opacity with blunted right costophrenic angle. Intact osseous structures. Other: Gaseous distended colon left upper quadrant. _______________     IMPRESSION: 1. Support lines and tubes, as above. Nonvisualized nasogastric/orogastric tube tip projecting the left upper quadrant. 2. Hypoinflation with unchanged findings of congestion/cephalization and findings of interstitial and alveolar edema.  Nonspecific right infrahilar confluent opacity could represent focal atelectasis/pneumonia. Suspected small right greater than left pleural effusions. 3. Nonspecific left upper quadrant colonic gaseous distention. This could be further evaluated with a upright/decubitus and supine views of the abdomen based on clinical findings and concerns. Xr Chest Port    Result Date: 1/17/2017  Chest AP portable INDICATION: Endotracheal tube. COMPARISON: 01/16/2017. FINDINGS: Endotracheal tube tip is 3.2 cm from the lilli. NG tube extends into the stomach out of field-of-view. Monitoring leads overlie the chest. Low lung volume with persistent enlarged cardiac silhouette and vascular congestion. Diffuse haziness more confluent in the lower lung zone slightly obscuring the diaphragm and blunting of the costophrenic angles. No pneumothorax. IMPRESSION: 1. Support lines and tubes appear in adequate position. 2. Cardiomegaly, vascular congestion and likely pulmonary edema with small bilateral pleural effusions, not appreciably changed. Xr Chest Port    Result Date: 1/16/2017  EXAM:Chest X-Ray  History: Intubated Technique:  Portable Frontal View Comparison: 01/15/2017, 01/14/2017, 01/13/2017 _______________ FINDINGS: -Endotracheal tube tip is 3.2 cm above the lilli. -Unchanged position of the orogastric tube with the tip projecting inferiorly and collimated of the inferior margin of the film. Multiple EKG leads and lines overlie the chest.  Pulmonary hypoinflation, slightly increased compared to the prior study. Persistent cardiomegaly with diffuse hazy and indistinct interstitial markings in both lungs suggesting slight progression in the left upper lung. Mild increased confluence in the lateral left lower lung compared to the prior examination. No interval change of the confluent/consolidative opacity in the medial right hemithorax to the right diaphragmatic surface. No convincing foraminal change of suspected bilateral pleural effusions.  No plain film evidence for pneumothorax. Stable osseous structures. Upper abdomen: Colonic gaseous distention in the left greater the right upper abdomen. _______________     IMPRESSION: 1. Stable lines and tubes as above. 2. Continued progressive left basilar opacity and left upper lung interstitial edema when compared to the prior examination 3. Otherwise, no significant change in findings of hypoinflation/edema/parenchymal opacity and bilateral effusions. 4. Nonspecific unchanged bilateral upper abdominal colonic gaseous distention, potentially ileus. Xr Chest Port    Result Date: 1/15/2017  EXAM:Chest X-Ray  History: Acute respiratory failure, intubated Technique:  Portable Frontal View Comparison: 01/14/2017, 01/13/2017 _______________ FINDINGS: -Endotracheal tube tip is 3.7 cm above the lilli. -Orogastric tube tip projects to the right upper quadrant, the tip is collimated off the inferior margin of the film. Persistent enlarged appearance of the cardiac silhouette which is possibly accentuated by persisting low lung volumes. Increased hazy confluent left hilar and lateral lower lung opacity with obscured diaphragmatic margin. Mild decreased hazy confluent right hemithoracic opacity with persistent perihilar and lateral lower lung confluent opacity with obscured diaphragmatic margin and blunted right costophrenic angle. Right lower lung small consolidative opacity, not convincingly present on the prior examination. No pneumothorax. Intact osseous structures. Persistent prominent upper abdominal bowel gas. _______________     IMPRESSION: 1. Support lines and tubes as above. 2. Interval worsening left lower lung opacity that may represent pleural effusion/edema/atelectasis. 3. Interval decreased right hemithoracic opacity with new appearing right infrahilar lower lung small area of consolidation. Persistent right greater than left effusions.      Xr Chest Port    Result Date: 1/14/2017  EXAM: Chest portable INDICATION: Intubated COMPARISON: Single view chest 1/13/2017 _______________ FINDINGS: AP portable chest film was performed. Endotracheal tube and NG tube continue to be present in good position. Poor inspiration similar to prior exam. Opacity inferior right hemithorax with blunting right costophrenic angle not significantly changed. Left lung is clear. No pneumothorax. Heart remains enlarged. Pulmonary vascularity appears normal. _______________     IMPRESSION: 1. Lines and tubes in good position. 2. Probable stable left effusion with underlying atelectasis. 3. Cardiomegaly without CHF. Xr Chest Port    Result Date: 1/13/2017  Chest, single view Indication: Respiratory failure, follow-up examination Comparison: Several prior exams, most recently 1/12/2017. Findings:  Portable upright AP view of the chest was obtained. There is an endotracheal tube present 4.1 cm above the lilli. An esophagogastric tube is present below the diaphragm, the tip of which is collimated from view, but likely with in the duodenum. The degree of pulmonary inflation is similar to prior, with moderate hypoventilatory changes demonstrated. Lower lung airspace opacities are similar to prior, with right-sided pleural effusion and possible small left pleural effusion noted. No pneumothorax. Cardiac size and mediastinal contours are stable, with redemonstration of an enlarged cardiac silhouette as well as a tortuous thoracic aorta. No acute osseous abnormality present. Impression: 1. Endotracheal tube and esophagogastric tubes in position as above. 2. Persistent pulmonary hypoinflation with associated bibasilar atelectasis or infiltrate. Small right and possible trace left pleural effusions unchanged. 3. Cardiac enlargement similar to prior.     Xr Chest Port    Result Date: 1/12/2017  Chest, single view Indication: Respiratory failure, follow-up examination Comparison: Several prior exams, most recently 1/11/2017 Findings:  Portable upright AP view of the chest was obtained. There is an endotracheal tube present with tip 3.3 cm above the level of the lilli. The degree of pulmonary inflation is decreased in the interval from prior, with moderate hypoventilatory changes noted. No pneumothorax. Bibasilar airspace opacities are present with a small right pleural effusion. Enlargement of the cardiac silhouette is similar to prior, with redemonstration of an atherosclerotic and tortuous thoracic aorta. No acute osseous abnormality present     Impression: 1. Endotracheal tube in position as above. 2. Moderate hypoventilatory changes noted, increased from prior examination which may represent atelectasis or infiltrate. Small right pleural effusion. 3. Cardiomegaly, similar to prior. Xr Chest Port    Result Date: 1/11/2017  EXAM: Portable chest, one view INDICATION: Endotracheal tube placement COMPARISON: 1/10/2017 _______________ FINDINGS: Endotracheal tube has replaced with its tip about 1 cm above the lilli directed towards the right mainstem bronchus. Orogastric tube is present extending below the range of imaging. Cardiac silhouette is prominent, aorta is tortuous. Pulmonary vessels within normal range for the portable technique. Hazy opacities are present both hemithorax bases with obscuration of the costophrenic angles. No pneumothorax. _______________     IMPRESSION: 1. ETT 1 cm above the lilli. This finding was telephoned to the charge nurse, Umair Romo, in the ICU at 1:12 PM on 1/11/2017. 2. Small bilateral developing pleural effusions plus or minus underlying infiltrate or atelectasis. Xr Chest Port    Result Date: 1/10/2017  History: Possible aspiration Comparison: 1/8/2017. Exam performed AP portable at 10:30. Findings: There is cardiomegaly, similar to prior. Patient's been extubated. No pneumothorax. No significant effusion. There may be developing atelectasis in the right lower lobe. Pulmonary vascularity does not appear significantly engorged. Impression: 1. Cardiomegaly about the same. No overt CHF 2. Minor atelectasis right lower lobe. Xr Chest Port    Result Date: 1/8/2017  Indication:  ET tube placement Comparison:  01/07/17 Time of study - 0611 Findings:  AP erect portable chest The heart size is stable. The endotracheal tube appears stable in position approximately 4 cm above the lilli. The NG tube tip appears to be within stomach. The lungs are again hypoventilated. No pneumothorax is evident. IMPRESSION: Stable chest and catheters. Xr Chest Port    Result Date: 1/7/2017  Indication:  ET tube placement Comparison:  01/06/17 Time of study - 0530 Findings:  AP erect portable chest The patient is rotated to the right of the heart size is unchanged. The lungs are hypoventilated. Atelectasis is evident both lung bases. The endotracheal tube appears in good position. The NG tube enters the stomach. IMPRESSION: Catheters as discussed, similar appearance of the heart and lungs to the prior study. Xr Chest Port    Result Date: 1/6/2017  EXAM:Chest X-Ray  History: Intubated Technique:  Portable Frontal View Comparison: Abdomen x-ray from 01/05/2017, chest x-rays from 01/04/2017 and 01/03/2017 _______________ FINDINGS: -Endotracheal tube tip is at the thoracic inlet, approximately 4 cm above the lilli. -No significant change in the position of the nasogastric tube with the tip at the level of the gastroesophageal junction and the sidehole in the distal thoracic esophagus. Pulmonary hypoinflation with confluent paramedial right infrahilar retrocardiac opacity. Mild increased patchy opacity lateral right lower lung near the costophrenic angle. No discrete left lung developing opacity. No pneumothorax. Stable intact osseous structures.  Persisting retained oral contrast in the medial left upper abdomen/proximal stomach. _______________     IMPRESSION: 1. Pulmonary hypoinflation with nonspecific appearing paramedial right lower lung parenchymal opacity, representing atelectasis/pneumonia, to include aspiration pneumonia. 2. Persistent high position of the nasogastric tube tip near the gastroesophageal junction. ET tube as above. Xr Chest Port    Result Date: 1/4/2017  Indication:  Patient at risk for aspiration, cough with eating Comparison:  Prior study of the same date Time of study - 1950 Findings:  AP erect portable chest The chest is hypoventilated. The heart size is stable. The lungs are stable. No new infiltrate is evident. IMPRESSION: Stable chest.    Xr Chest Port    Result Date: 1/3/2017  EXAM:Chest X-Ray  History: Sepsis and shortness of breath Technique:  Portable Frontal View Comparison: 11/23/2016, 08/08/2015 _______________ FINDINGS: The trachea is midline. Stable midline enlarged cardiac silhouette with a tortuous thoracic aorta. Patchy paramedial left lower lung retrocardiac opacity is not significantly changed. Mild coarsened interstitial markings, likely hypoinflation and accentuation. No pneumothorax or effusion. Stable and intact osseous structures. _______________     IMPRESSION: 1. Radiographically stable exam. No acute cardiopulmonary process. Xr Abd Port  1 V    Result Date: 1/6/2017  Portable abdomen 1323 hours OG tube placement Frontal view demonstrates an esophageal tube extends extends into the stomach. Small amount of oral contrast is seen within the stomach. Distended bowel loops are noted similar to the study one day earlier. IMPRESSION: Esophageal tube tip is in the distal stomach. Residual contrast noted in the gastric antrum. Dilated bowel loops. Xr Abd Port  1 V    Result Date: 1/5/2017  EXAM: Frontal View of the Abdomen And Pelvis Indication: OG tube placement Technique: Single frontal view  of the abdomen and pelvis.  Comparison: Chest x-ray performed earlier same day _______________ Findings: No significant interval change in the high position of the enteric line with the tip in the distal chest, at or just above the level of the gastroesophageal junction. Diffuse gaseous distention of the upper abdomen. _______________    IMPRESSION: 1. No significant interval change of the high position of enteric line with the tip in the distal chest, at or just above the level of the GE junction. 2. Nonspecific diffuse gaseous distention of the upper abdomen, possibly ileus. No results found for this or any previous visit.         CC: Jaylyn Mcallister MD

## 2017-03-23 NOTE — WOUND CARE
Pt seen by wound care. No change in skin assessment. Wound care will continue following pt daily while in ICU. Shannon Dubon(Attending)

## 2017-08-13 NOTE — PROGRESS NOTES
conducted a Follow up consultation and Spiritual Assessment for Go Brown, who is a 70 y.o.,male. Patient is now on vent which sister at bedside stated \"He asked to be put on it, so he must be struggling. \"  She stated she thinks this is the end and asked about his Spiritual health. I told her that we had talked at length on Tuesday and he was very at peace and calm. She was comforted. He didn't  until he was 47 and never had any children. Wife apparently is \"not getting how sick he is. \"     The  provided the following Interventions with family:  Continued the relationship of care and support. Listened empathically. Offered prayer and assurance of continued prayer on patients behalf. Chart reviewed. The following outcomes were achieved:  Family expressed gratitude for Spiritual Care visit. Assessment:  There are no further spiritual or Gnosticist issues which require Spiritual Care Services intervention at this time. Plan:  Chaplains will continue to follow and will provide pastoral care as needed or requested.  recommends bedside caregivers page the  on duty if patient shows signs of acute spiritual or emotional distress. [address], [place].    Board Certified   [phone] - Office stated

## 2018-02-06 NOTE — PROGRESS NOTES
Hospitalist Progress Note    Patient: Bryanna Bustamante MRN: [id number]  CSN: [id number]    YOB: 1945  Age: 70 y.o. Sex: male    DOA: 1/3/2017 LOS:  LOS: 22 days                Assessment/Plan     Patient Active Problem List   Diagnosis Code    Cardiac arrest ([facility] I46.9    Anoxic encephalopathy ([facility] G93.1    Acute respiratory failure ([facility] J96.00    DM (diabetes mellitus) ([facility] C41.0    Diastolic congestive heart failure, NYHA class 1 ([facility]) I50.30    HTN (hypertension) I10    Hypoxia R09.02    COPD (chronic obstructive pulmonary disease) with acute bronchitis ([facility]) J44.0    TIFFANIE (acute kidney injury) ([facility] N17.9    Aspiration pneumonia ([facility] J69.0    Transaminitis R74.0    Bandemia without diagnosis of specific infection D72.825    Hypokalemia E87.6            71 yo WM with history of prior anoxic brain injury from NH, admitted for COPD exacerbation.      On 01/05/2017, patient had aspiration event and went into respiratory arrest and subsequently cardiac arrest. PEA arrest. CPR initiated and epi given. Patient intubated. He had ROSC. He is transferred to ICU.      Extubated 01/08/2017      Patient had increased oxygen requirement, was started on BiPAP, he was desaturating when tried to wean from BiPAP. Needed intubation 01/11/2017.                   Resp - extubated 1/18/2017  COPD - continue solumedrol.      ID - aspiration pneumonia, blood and urine cx no growth to date. resp cultures with normal resp juan. ANTIBIOTICS - was on levaquin azactam, now stopped.           [facility] - Monitor HD. Stable hemodynamically.       Heme/onc - Follow H&H, plts. INR.       Renal - creatine improving  Hypernatremia - increase free water flushes.      Endocrine - Follow FSG      Neuro - awake, follows commands       GI - s/ PEG placement.      Prophylaxis - DVT: heparin, GI: protonix.     Discussed with wife at bedside     Discharge planning.                      Physical Exam:  General: As above.    HEENT: NC, Atraumatic. PERRLA, anicteric sclerae. Lungs: CTA Bilaterally. No Wheezing/Rhonchi/Rales. Heart: Regular rhythm,  No murmur, No Rubs, No Gallops  Abdomen: Soft, Non distended, Non tender.  +Bowel sounds,   Extremities: trace edema bilaterally.          Vital signs/Intake and Output:  Visit Vitals    /65 (BP 1 Location: Left arm, BP Patient Position: At rest;Supine; Head of bed elevated (Comment degrees))    Pulse 60    Temp 97.8 °F (36.6 °C)    Resp 22    Ht 5' 10.08\" (1.78 m)    Wt 97.7 kg (215 lb 6.4 oz)    SpO2 95%    BMI 30.84 kg/m2     Current Shift:     Last three shifts:  01/25 0701 - 01/26 1900  In: 3140   Out: 550 [Urine:550]            Labs: Results:       Chemistry Recent Labs      01/25/17   2100  01/25/17   0244   GLU  167*  119*   NA  144  145   K  3.6  3.5   CL  106  109*   CO2  32  32   BUN  24*  27*   CREA  0.66  0.63   CA  7.9*  8.2*   AGAP  6  4   BUCR  36*  43*      CBC w/Diff Recent Labs      01/25/17   0244   WBC  9.8   RBC  3.16*   HGB  9.0*   HCT  29.0*   PLT  137      Cardiac Enzymes No results for input(s): CPK, CKND1, MARCO A in the last 72 hours. No lab exists for component: CKRMB, TROIP   Coagulation No results for input(s): PTP, INR, APTT in the last 72 hours. No lab exists for component: INREXT    Lipid Panel No results found for: CHOL, CHOLPOCT, CHOLX, CHLST, CHOLV, [id number], HDL, LDL, NLDLCT, DLDL, LDLC, DLDLP, [id number], VLDLC, VLDL, TGL, TGLX, TRIGL, [id number], TRIGP, TGLPOCT, [id number], CHHD, CHHDX   BNP No results for input(s): BNPP in the last 72 hours. Liver Enzymes No results for input(s): TP, ALB, TBIL, AP, SGOT, GPT in the last 72 hours.     No lab exists for component: DBIL   Thyroid Studies Lab Results   Component Value Date/Time    TSH 1.37 08/13/2015 04:35 AM        Procedures/imaging: see electronic medical records for all procedures/Xrays and details which were not copied into this note but were reviewed prior to creation of Plan 06-Feb-2018

## 2018-03-29 NOTE — PROGRESS NOTES
Hospitalist Progress Note    Patient: Devin Chandler MRN: [id number]  CSN: [id number]    YOB: 1945  Age: 70 y.o. Sex: male    DOA: 1/3/2017 LOS:  LOS: 14 days                Assessment/Plan     Patient Active Problem List   Diagnosis Code    Cardiac arrest ([facility] I46.9    Anoxic encephalopathy ([facility] G93.1    Acute respiratory failure ([facility] J96.00    DM (diabetes mellitus) ([facility] Y00.0    Diastolic congestive heart failure, NYHA class 1 ([facility]) I50.30    HTN (hypertension) I10    Hypoxia R09.02    COPD (chronic obstructive pulmonary disease) with acute bronchitis ([facility]) J44.0    TIFFANIE (acute kidney injury) ([facility] N17.9    Aspiration pneumonia ([facility] J69.0    Transaminitis R74.0    Bandemia without diagnosis of specific infection D72.825            71 yo WM with history of prior anoxic brain injury from NH, admitted for COPD exacerbation.      On 01/05/2017, patient had aspiration event and went into respiratory arrest and subsequently cardiac arrest. PEA arrest. CPR initiated and epi given. Patient intubated. He had ROSC. He is transferred to ICU.      Extubated 01/08/2017      Patient had increased oxygen requirement, was started on BiPAP, he was desaturating when tried to wean from BiPAP. Needed intubation 01/11/2017. He is intubated awake and follows commands.          CRITICAL CARE PLAN      Resp - vent management per pulm, SBT and extubation per pulmonology. COPD - continue solumedrol.      ID - aspiration pneumonia, blood and urine cx no growth to date. resp cultures with normal resp juan. ANTIBIOTICS - was on levaquin azactam, now stopped.         [facility]S - Monitor HD. Stable hemodynamically.       Heme/onc - Follow H&H, plts. INR.     Renal - creatine improving  Hypernatremia - resolved.      Endocrine - Follow FSG      Neuro - awake, follows commands       GI - NG tube and tube feeding. s/ PEG placement.      Prophylaxis - DVT: heparin, GI: protonix.                       Physical CT scans were obtained, which revealed acute left 4th-8th rib fractures, left superior ramus fracture with a large extraperitoneal hematoma & multiple foci of active extravasation, left inferior pubic ramus fracture, right superior pubic ramus fracture, left L5 lamina & hemisacrum fractures, several spleen lacerations (largest is a grade 2 laceration), and grade 2 left kidney laceration. Patient was taken to IR for embolization and was intubated for the procedure. He underwent glue & coil embolization of the left inferior epigastric artery, left pubic rami supply from a distal branch of the CFA, left internal iliac artery branches, right inferior epigastric artery, and distal main splenic artery. Exam:  General: As above.    HEENT: NC, Atraumatic. PERRLA, anicteric sclerae. Lungs: CTA Bilaterally. No Wheezing/Rhonchi/Rales. Heart: Regular rhythm,  No murmur, No Rubs, No Gallops  Abdomen: Soft, Non distended, Non tender.  +Bowel sounds,   Extremities: trace edema bilaterally.            Vital signs/Intake and Output:  Visit Vitals    /69    Pulse 93    Temp 100.2 °F (37.9 °C)    Resp 11    Ht 5' 10.08\" (1.78 m)    Wt 89 kg (196 lb 3.4 oz)    SpO2 94%    BMI 28.09 kg/m2     Current Shift:  01/18 0701 - 01/18 1900  In: -   Out: 425 [Urine:425]  Last three shifts:  01/16 1901 - 01/18 0700  In: 1887.1 [I.V.:214.1]  Out: 1150 [HRGCX:7512]            Labs: Results:       Chemistry Recent Labs      01/16/17   1111   GLU  204*   NA  142   K  4.0   CL  109*   CO2  26   BUN  44*   CREA  0.93   CA  7.9*   AGAP  7   BUCR  47*      CBC w/Diff Recent Labs      01/18/17   0545  01/16/17   1111   WBC  12.0  14.5*   RBC  3.43*  3.93*   HGB  9.7*  10.9*   HCT  30.8*  34.8*   PLT  153  179   GRANS   --   90*   LYMPH   --   3*   EOS   --   0      Cardiac Enzymes Recent Labs      01/15/17   2215  01/15/17   1630   CPK  56  58   CKND1  3.8  4.0      Coagulation Recent Labs      01/16/17   1111   PTP  13.2   INR  1.0   APTT  22.6*       Lipid Panel No results found for: CHOL, CHOLPOCT, CHOLX, CHLST, CHOLV, 960510, HDL, LDL, NLDLCT, DLDL, LDLC, DLDLP, 291642, VLDLC, VLDL, TGL, TGLX, TRIGL, [id number], TRIGP, TGLPOCT, [id number], CHHD, CHHDX   BNP No results for input(s): BNPP in the last 72 hours. Liver Enzymes No results for input(s): TP, ALB, TBIL, AP, SGOT, GPT in the last 72 hours.     No lab exists for component: DBIL   Thyroid Studies Lab Results   Component Value Date/Time    TSH 1.37 08/13/2015 04:35 AM        Procedures/imaging: see electronic medical records for all procedures/Xrays and details which were not copied into this note but were reviewed prior to creation of Plan

## 2018-06-19 NOTE — PROGRESS NOTES
Cardiology Progress Note      1/16/2017 9:37 AM    Admit Date: 1/3/2017    Admit Diagnosis: Hypoxia      Subjective:     Sharmin Cabrera has multiple issues.     Visit Vitals    /76 (BP 1 Location: Right arm, BP Patient Position: At rest;Head of bed elevated (Comment degrees))    Pulse 78    Temp 98.2 °F (36.8 °C)    Resp 20    Ht 5' 10.08\" (1.78 m)    Wt 89 kg (196 lb 3.4 oz)    SpO2 97%    BMI 28.09 kg/m2     Current Facility-Administered Medications   Medication Dose Route Frequency    albuterol-ipratropium (DUO-NEB) 2.5 MG-0.5 MG/3 ML  3 mL Nebulization Q4H PRN    arformoterol (BROVANA) neb solution 15 mcg  15 mcg Nebulization BID RT    budesonide (PULMICORT) 500 mcg/2 ml nebulizer suspension  500 mcg Nebulization BID RT    methylPREDNISolone (PF) (SOLU-MEDROL) injection 20 mg  20 mg IntraVENous Q6H    atorvastatin (LIPITOR) tablet 80 mg  80 mg Per NG tube DAILY    insulin glargine (LANTUS) injection 12 Units  12 Units SubCUTAneous DAILY    insulin lispro (HUMALOG) injection 4 Units  4 Units SubCUTAneous Q6H    pantoprazole (PROTONIX) 40 mg in sodium chloride 0.9 % 10 mL injection  40 mg IntraVENous DAILY    lidocaine (LIDODERM) 5 % patch 2 Patch  2 Patch TransDERmal Q24H    propofol (DIPRIVAN) infusion  5-50 mcg/kg/min IntraVENous TITRATE    hydrALAZINE (APRESOLINE) 20 mg/mL injection 20 mg  20 mg IntraVENous Q6H PRN    levothyroxine (SYNTHROID) injection 87.5 mcg  87.5 mcg IntraVENous Q24H    chlorhexidine (PERIDEX) 0.12 % mouthwash 15 mL  15 mL Oral Q12H    ELECTROLYTE REPLACEMENT PROTOCOL  1 Each Other PRN    cloNIDine (CATAPRES) 0.1 mg/24 hr patch 1 Patch  1 Patch TransDERmal Q7D    insulin lispro (HUMALOG) injection   SubCUTAneous Q6H    sodium chloride (NS) flush 5-10 mL  5-10 mL IntraVENous PRN    acetaminophen (TYLENOL) tablet 650 mg  650 mg Oral Q6H PRN    aspirin chewable tablet 81 mg  81 mg Per G Tube DAILY    baclofen (LIORESAL) tablet 10 mg  10 mg Oral TID   [place] bisacodyl (DULCOLAX) suppository 10 mg  10 mg Rectal DAILY PRN    guaiFENesin (ROBITUSSIN) 100 mg/5 mL oral liquid 200 mg  200 mg Oral Q6H PRN    multivitamin, tx-iron-ca-min (THERA-M w/ IRON) tablet 1 Tab  1 Tab Oral DAILY    heparin (porcine) injection 5,000 Units  5,000 Units SubCUTAneous Q8H    glucose chewable tablet 16 g  4 Tab Oral PRN    glucagon (GLUCAGEN) injection 1 mg  1 mg IntraMUSCular PRN    dextrose (D50W) injection syrg 12.5-25 g  25-50 mL IntraVENous PRN    albuterol (PROVENTIL VENTOLIN) nebulizer solution 2.5 mg  2.5 mg Nebulization Q2.5H PRN         Objective:      Physical Exam:  Visit Vitals    /76 (BP 1 Location: Right arm, BP Patient Position: At rest;Head of bed elevated (Comment degrees))    Pulse 78    Temp 98.2 °F (36.8 °C)    Resp 20    Ht 5' 10.08\" (1.78 m)    Wt 89 kg (196 lb 3.4 oz)    SpO2 97%    BMI 28.09 kg/m2     General Appearance:  Well developed, well nourished,alert and oriented x 3, and individual in no acute distress. Ears/Nose/Mouth/Throat:   Hearing grossly normal.         Neck: Supple. Chest:   Lungs clear to auscultation bilaterally. Cardiovascular:  Regular rate and rhythm, S1, S2 normal, no murmur. Abdomen:   Soft, non-tender, bowel sounds are active. Extremities: No edema bilaterally. Skin: Warm and dry.                Data Review:   Labs:    Recent Results (from the past 24 hour(s))   EKG, 12 LEAD, INITIAL    Collection Time: 01/15/17 10:56 AM   Result Value Ref Range    Ventricular Rate 96 BPM    Atrial Rate 96 BPM    P-R Interval 136 ms    QRS Duration 86 ms    Q-T Interval 378 ms    QTC Calculation (Bezet) 477 ms    Calculated P Axis 24 degrees    Calculated R Axis -6 degrees    Calculated T Axis 116 degrees    Diagnosis       Sinus rhythm with blocked premature atrial complexes  Left ventricular hypertrophy with repolarization abnormality  Inferior infarct , age undetermined  Abnormal ECG  When compared with ECG of 03-JAN-2017 09:14,  premature atrial complexes are now present  ST no longer depressed in Anterior leads  Nonspecific T wave abnormality now evident in Inferior leads     CARDIAC PANEL,(CK, CKMB & TROPONIN)    Collection Time: 01/15/17 11:00 AM   Result Value Ref Range    CK 58 39 - 308 U/L    CK - MB 2.5 0.5 - 3.6 ng/ml    CK-MB Index 4.3 (H) 0.0 - 4.0 %    Troponin-I, Qt. 4.21 (HH) 0.00 - 0.06 NG/ML   GLUCOSE, POC    Collection Time: 01/15/17 11:34 AM   Result Value Ref Range    Glucose (POC) 203 (H) 70 - 110 mg/dL   C REACTIVE PROTEIN, QT    Collection Time: 01/15/17 12:47 PM   Result Value Ref Range    C-Reactive protein <0.3 0 - 0.3 mg/dL   CARDIAC PANEL,(CK, CKMB & TROPONIN)    Collection Time: 01/15/17  4:30 PM   Result Value Ref Range    CK 58 39 - 308 U/L    CK - MB 2.3 0.5 - 3.6 ng/ml    CK-MB Index 4.0 0.0 - 4.0 %    Troponin-I, Qt. 3.93 (HH) 0.00 - 0.06 NG/ML   GLUCOSE, POC    Collection Time: 01/15/17  5:55 PM   Result Value Ref Range    Glucose (POC) 186 (H) 70 - 110 mg/dL   URINALYSIS W/ RFLX MICROSCOPIC    Collection Time: 01/15/17 10:15 PM   Result Value Ref Range    Color YELLOW      Appearance CLOUDY      Specific gravity 1.012 1.003 - 1.030      pH (UA) 5.5 5.0 - 8.0      Protein NEGATIVE  NEG mg/dL    Glucose NEGATIVE  NEG mg/dL    Ketone NEGATIVE  NEG mg/dL    Bilirubin NEGATIVE  NEG      Blood SMALL (A) NEG      Urobilinogen 0.2 0.2 - 1.0 EU/dL    Nitrites NEGATIVE  NEG      Leukocyte Esterase NEGATIVE  NEG     CARDIAC PANEL,(CK, CKMB & TROPONIN)    Collection Time: 01/15/17 10:15 PM   Result Value Ref Range    CK 56 39 - 308 U/L    CK - MB 2.1 0.5 - 3.6 ng/ml    CK-MB Index 3.8 0.0 - 4.0 %    Troponin-I, Qt. 3.73 (HH) 0.00 - 0.06 NG/ML   URINE MICROSCOPIC ONLY    Collection Time: 01/15/17 10:15 PM   Result Value Ref Range    WBC NEGATIVE  0 - 5 /hpf    RBC NEGATIVE  0 - 5 /hpf    Epithelial cells FEW 0 - 5 /lpf    Bacteria FEW (A) NEG /hpf    Mucus FEW (A) NEG /lpf   GLUCOSE, POC    Collection Time: 01/16/17 12:30 AM   Result Value Ref Range    Glucose (POC) 211 (H) 70 - 110 mg/dL   MAGNESIUM    Collection Time: 01/16/17  5:10 AM   Result Value Ref Range    Magnesium 2.2 1.8 - 2.4 mg/dL   GLUCOSE, POC    Collection Time: 01/16/17  5:57 AM   Result Value Ref Range    Glucose (POC) 183 (H) 70 - 110 mg/dL       Telemetry: normal sinus rhythm      Assessment:     Principal Problem:    Acute respiratory failure ([address] (8/3/2015)    Active Problems:    Cardiac arrest ([address] (8/1/2015)      Anoxic encephalopathy ([facility] (8/3/2015)      DM (diabetes mellitus) ([facility] (7/4/7122)      Diastolic congestive heart failure, NYHA class 1 ([facility] (11/23/2016)      HTN (hypertension) (11/23/2016)      COPD (chronic obstructive pulmonary disease) with acute bronchitis ([facility] (1/5/2017)      TIFFANIE (acute kidney injury) ([facility] (1/5/2017)      Aspiration pneumonia ([facility] (1/7/2017)      Transaminitis (1/15/2017)      Bandemia without diagnosis of specific infection (1/15/2017)        Plan:     Troponin curve is flat. There is no evidence of ACS. Recheck EKG. Follow.     Norberto Salinas MD No

## 2022-09-30 NOTE — ROUTINE PROCESS
7095 assumed care of pt after bedside verbal report was given by off going nurse, pt resting in bed quietly, pt on 3L o2 therapy, no acute distress noted, osmolite 1.2 lela tube feeding infusing at 40 ml hr, will continue to monitor     0955 pt resting in bed awke, mouth care provide, incontinence care provided, will monitor     1300 pt resting in bed quietly, 40 ml of gastric residual noted via peg tube, increased feeding rate to 60 ml/hr as per order, will monitor     1636 Bedside and Verbal shift change report given to [employer] RN (oncoming nurse) by Kasi Vieyra RN (offgoing nurse). Report included the following information SBAR, Kardex and MAR. Family Medicine Daily Progress Note     Date:  9/30/2022    Attending:  Fransisco Bejarano DO     S:  Werner Osuna is a 67 year old male who was admitted on 7/27/2022     Patient seen and examined, no new complaints, he just wants to eat what he wants to eat without restrictions, discussed end of life care, also called his SDM, Erin and discussed with her as well, questions answered to the best of my abilities.    REVIEW OF SYSTEMS:   Constitutional:  [x] no fever  [] no chills  [] no weakness  [] no fatigue    Skin:  [] no rash  [] no bruising  [] no wound  [] no lesion  Head:  [] no head injury   [x] no headache   [x] no dizziness  Eyes:  [x] no vision changes   [] wears glasses or contacts   [] no redness   [] no drainage  Ears:  [] no tinnitus   [] no earache   [] hearing aids   [] no hearing changes  Nose:  [] no stuffiness   [] no nosebleeds   [] no drainage  Throat:  [] no soreness   [] no dry mouth   [] no hoarseness  Neck:  [] no swollen glands   [] no pain   [] no stiffness  Respiratory:  [] no cough  [] no wheezing  [x] no dyspnea   [] no hemoptysis   Cardiovascular:  [x] no chest pain  [] no chest pressure  [] no palpitations  [] diaphoresis.   Gastrointestinal:  [x] no nausea [] no vomiting  [] no diarrhea [x]  No abdominal pain   [] no heartburn  Genitourinary:  [] no urgency  [] no frequency  [] no hematuria  []no flank pain  Extremities:  [x] no swelling  [] no joint pain  Neurologic:  [] no change in sensory  [x]change in motor function  [x] no headache   [x] change in speech  Endocrine:  [] no heat or cold intolerance  [] no abnormal weight loss or gain   [] no excessive sweating  Hematological:  [] no bleeding  [] no bruising  [] no adenopathy  Psychiatric:  [] no change in affect  [] change in mentation  [] no sleep disturbance       O: vitals with low normal BPs  Vital 24 Hour Range Most Recent Value   Temperature Temp  Min: 97.3 °F (36.3 °C)  Max: 99 °F (37.2 °C) 97.3 °F (36.3 °C)   Pulse  Pulse  Min: 52  Max: 66 (!) 52   Respiratory Resp  Min: 17  Max: 18 18   Blood Pressure BP  Min: 113/65  Max: 127/73 127/73   Pulse Oximetry SpO2  Min: 98 %  Max: 100 %    O2 No data recorded      Vital Most Recent Value First Value   Weight 74.4 kg (164 lb 0.4 oz) Weight: 83.2 kg (183 lb 6.8 oz)   Height 6' 3\" (190.5 cm) Height: 6' 3\" (190.5 cm)     BP Readings from Last 3 Encounters:   09/30/22 127/73   01/10/22 138/88   12/13/21 138/78     Wt Readings from Last 3 Encounters:   09/30/22 74.4 kg (164 lb 0.4 oz)   01/10/22 80 kg (176 lb 5.9 oz)   12/13/21 80.7 kg (177 lb 14.6 oz)        I/O last 3 completed shifts:  In: 1100 [P.O.:1100]  Out: 875 [Urine:875]     Scheduled Medications   Current Facility-Administered Medications   Medication Dose Route Frequency Provider Last Rate Last Admin   • amLODIPine (NORVASC) tablet 2.5 mg  2.5 mg Oral Daily Fransisco Player, DO   2.5 mg at 09/29/22 0833   • lisinopril (ZESTRIL) tablet 10 mg  10 mg Oral Daily Fransisco Player, DO   10 mg at 09/29/22 0832   • pantoprazole (PROTONIX) 40 MG/20ML (compounded) suspension 40 mg  40 mg Oral Daily Nicole Vivas, DO   40 mg at 09/28/22 0824   • heparin (porcine) injection 5,000 Units  5,000 Units Subcutaneous 3 times per day Monie Arriaga NP   5,000 Units at 09/29/22 2152   • digoxin (LANOXIN) tablet 250 mcg  250 mcg Oral Daily Fransisco Player, DO   250 mcg at 09/29/22 0833   • tamsulosin (FLOMAX) capsule 0.4 mg  0.4 mg Oral Daily PC Fransisco Player, DO   0.4 mg at 09/29/22 0832   • aspirin chewable 81 mg  81 mg Oral Daily Fransisco Player, DO   81 mg at 09/29/22 0833   • atorvastatin (LIPITOR) tablet 40 mg  40 mg Oral Nightly Fransisco Player, DO   40 mg at 09/29/22 2152   • folic acid (FOLATE) tablet 1 mg  1 mg Oral Daily Fransisco Bejarano, DO   1 mg at 09/29/22 0833   • metoPROLOL tartrate (LOPRESSOR) tablet 100 mg  100 mg Oral 2 times per day Fransisco Bejarano,    100 mg at 09/29/22 2152   • thiamine (VITAMIN B1) tablet 100 mg  100 mg Oral Daily  Fransisco Bejarano, DO   100 mg at 09/29/22 0832   • betamethasone dipropionate (DIPROSONE) 0.05 % cream   Topical BID Leah Stafufer MD   Given at 09/29/22 2152   • Potassium Standard Replacement Protocol (Levels 3.5 and lower)   Does not apply See Admin Instructions Eryn Ricks MD       • Phosphorus Standard Replacement Protocol   Does not apply See Admin Instructions Eryn Ricks MD       • Magnesium Standard Replacement Protocol   Does not apply See Admin Instructions Eryn Ricks MD            PHYSICAL EXAM:   General: no distress  ENT:  Oral mucous membranes are moist  Cardiovascular: irregularly irregular +S1+S2.  Respiratory:  Normal respiratory effort.  Clear to auscultation anteriorly  Gastrointestinal:  Soft and non tender.  Normal bowel sounds.  No rebound, rigidity or guarding  Musculoskeletal:  No edema b/l LE.  Psychiatric: flattened affect    Most Recent Labs:  Recent Labs   Lab 09/28/22  0616 09/27/22  0618 09/26/22  0548   SODIUM 140 141 141   POTASSIUM 4.6 4.2 4.1   CHLORIDE 108 108 108   CO2 28 28 28   BUN 15 16 14   CREATININE 0.57* 0.58* 0.65*   GLUCOSE 104* 101* 103*     Recent Labs   Lab 09/30/22  0614 09/28/22  0616 09/27/22  0618   WBC 4.2 4.4 4.4   HGB 8.7* 8.8* 8.7*   HCT 27.7* 28.3* 27.6*    241 211   MCV 97.5 97.9 98.6       Imaging   7/27/2022 CT Head  IMPRESSION:    1.  No acute intracranial abnormality.   2.  Chronic left thalamostriate lacunar infarcts and patchy chronic small  vessel disease.    7/27/2022 CT angio head and neck  IMPRESSION:  CTA HEAD:    *  Occlusion of the basal artery and distal vertebral arteries with  nonopacified, likely occluded right PICA.  Extent of vertebral artery  thrombosis is not entirely certain.  Recommend angiographic assessment as  clinically warranted.  *  Preserved opacification of the basilar tip and bilateral PCAs likely  relate to collateral flow from the posterior communicating arteries.   CTA NECK:    *  No  hemodynamically significant extracranial stenosis, occlusion or  dissection.  *  Multiple dental extractions with periapical lucencies involving multiple  right mandibular teeth as discussed. Suggest correlation with dental exam.  CT HEAD W CONTRAST:  *  Acute infarct of the right inferior cerebellum. Mass effect with partial  effacement of the fourth ventricle (posterior inferior cerebellar artery  territory).  Consider MRI for further assessment.  *  Chronic right maxillary sinusitis.    7/28/2022 MRI Brain wo contrast  IMPRESSION:    1.  Multifocal evolving infarcts in the bilateral cerebral hemispheres,  chaz and, to a lesser extent, the midbrain and bilateral occipital poles.  There are petechial blood product, predominantly in the inferior right  cerebellum, without overt hemorrhagic transformation. Continued follow-up  CT is recommended.  2.  Small evolving infarct in the left inferior frontal gyrus without mass  effect or hemorrhage.  3.  Partial effacement of fourth ventricle without evidence for herniation  or hydrocephalus.     7/29/2022 CT head wo contrast  IMPRESSION:  *  Evolving subacute infarcts present throughout the bilateral cerebellar  hemispheres, left occipital lobe, left frontal lobe, and left paramedian  chaz.  *  Mild posterior fossa mass effect with mild effacement of 4th ventricle  without evidence of herniation, hemorrhage, or obstructive hydrocephalus.  *  Small foci of hypodensity in the left thalamus, suspect small evolving  thalamoperforator infarct.    7/30/2022 CT head wo contrast  IMPRESSION:  1.  Multifocal supratentorial and infratentorial evolving subacute  infarcts, worse within the vertebrobasilar territory and similar in extent  and configuration to prior.  2.  Similar mass effect contributing to effacement of the fourth ventricle  without evidence for supratentorial hydrocephalus.  3.  Unchanged petechial hemorrhage associated with infarcts in the  cerebellar hemispheres and  left occipital lobe.    7/30/2022 CXR  IMPRESSION:  1.  No acute cardiopulmonary findings.   2.  Mild cardiac enlargement.  3.  Gaseous colonic distention, incompletely visualized and evaluated    7/31/2022 CT Head wo contrast  IMPRESSION:  1.  Evolving infarcts within the bilateral cerebellar hemispheres and chaz  with similar degree of posterior fossa mass effect including mild right  cerebellar tonsillar ectopia and fourth ventricular effacement. No evidence  for interval supratentorial hydrocephalus.  2.  Multifocal small supratentorial evolving infarcts without significant  mass effect or midline shift.  3.  Unchanged mild petechial hemorrhage associated with infarcts in the  cerebellum and left occipital lobe. No new or enlarging hemorrhage.    8/4/2022 CT Head   IMPRESSION:    1.  Evolving infarcts in the bilateral cerebral hemispheres and chaz with a  similar degree of cerebellar tonsillar ectopia and effacement of the fourth  ventricle. There is mildly increased prominence of the supratentorial  ventricular system raising concern for early hydrocephalus.   2.  Multifocal smaller supratentorial evolving infarcts as described above,  similar to prior.  3.  Unchanged mild petechial hemorrhage in the left occipital lobe and  cerebellum. No new or enlarging hemorrhage.    8/4/2022 CT Angio head and neck  IMPRESSION:  CTA neck:    1.  Patent extracranial vasculature including patent recanalized distal  right vertebral artery. No evidence of acute extracranial vascular  occlusion or near-occlusion.  2.  Nonflow limiting atherosclerosis of the aortic arch, great vessel  origins and carotid bifurcations.  3.  Nasogastric tube curled within the pharynx.  CTA head:    1.  Patent intracranial vasculature including recanalization of the right  vertebral artery and basilar artery.  2.  Residual severe stenosis at the right PICA artery origin and mild  diffuse caliber attenuation of the basilar artery and bilateral  superior  cerebellar arteries.   3.  No evidence of new intracranial large vessel occlusion.      8/5/2022 CT head wo contrast  IMPRESSION:  1.  Overall similar appearance of evolving infarcts within the posterior  fossa with associated fourth ventricular effacement, cerebellar tonsillar  herniation and leftward cerebellar vermis midline shift. Stable  supratentorial ventricular caliber.  2.  Multifocal small supratentorial evolving infarct without significant  mass effect or midline shift.  3.  Unchanged mild petechial hemorrhage associated with infarcts in the  cerebellum and left occipital lobe. No new or enlarging hemorrhage.    8/6/2022 CT Head wo contrast  IMPRESSION:  1.  Redemonstrated extensive cerebellar infarction and edema with tonsillar  ectopia and effacement of the fourth ventricle. The lateral and third  ventricles remain stable in size.  2.  Evolving infarcts also noted in the left occipital lobe as well as  possibly the right occipital lobe, chaz and thalami.    8/12/2022 CT head wo contrast  IMPRESSION:  1.  Continued evolution of infarcts involving the cerebellum and left  occipital lobe with development of petechial hemorrhage. There remains mild  mass effect with improved effacement of the fourth ventricle. No  herniation.  2.  Additional possible evolving infarcts involving the thalami are again  seen.     8/13/2022 CT Head wo contrast  IMPRESSION:    1.  Evolving infarcts in the cerebellum and left occipital lobe with  slightly increased internal blood products. Mild local mass effect is  stable.  2.  Punctate evolving infarcts in the bilateral thalami, slightly more  conspicuous on the right.    8/15/2022 CT Head wo contrast  IMPRESSION:  Stable evolving infarcts in the cerebellum, chaz, thalami and left  occipital lobe, with associated hemorrhage in the cerebellum and left  occipital lobe.    8/25/2022 CT Head wo contrast  IMPRESSION:    1.  Evolving infarcts in the cerebellum and left  occipital lobe with  slightly decreased edema and decreased internal blood products. No  significant mass effect.  2.  Slightly progressive hypodensity in the left frontal white matter,  likely also representing a small evolving infarct. No mass effect or  hemorrhage.  3.  Punctate infarcts in the bilateral thalami are unchanged.    8/25/2022 XR Teeth  FINDINGS / IMPRESSION:  1.  Multiple mandibular dental extractions, crown erosions and retained  root tip fragments.  Diffuse periodontal disease.  2.  Decoronation and periapical abscess involving #24.   3.  Maxillary edentulous.    8/31/2022 CT chest abdomen/pelvis w contrast  IMPRESSION:  1.  Polypoid mass arising from the anterior aspect of the rectum  corresponding to the abnormality noted on recent colonoscopy. Please  correlate with pending pathology results.  2.  An enlarged 0.8 cm perirectal lymph node is indeterminate but could  represent normal metastasis.  3.  Mildly enlarged indeterminate left supraclavicular lymph nodes  measuring up to 1.2 cm do not appear significantly changed compared to  6/18/2019.  4.  Small bilateral pulmonary micronodules are likely benign. Continued  attention on follow-up.  5.  Dilatation of the main pulmonary artery which may be seen in the  setting of pulmonary arterial hypertension.  6.  Patchy groundglass opacities within the left lower lobe suspicious for  mild infectious/inflammatory process.  7.  Patchy area of hypoenhancement within the anterior left kidney could  represent renal infarct versus area of pyelonephritis. Correlation with  urinalysis is recommended.    8/31/2022 CT Head wo contrast  IMPRESSION:  *  Continued evolution of hemorrhagic infarcts within the bilateral  cerebellar hemispheres, and nonhemorrhagic infarcts in the left occipital  lobe, chaz, left frontal lobe.   *  No enhancing new infarct, hemorrhage, or mass effect.  *  Chronic-appearing right maxillary sinus disease.    9/9/2022 MRI Rectum  3T  Impression:  Prematurely terminated scan due to patient inability to tolerate full scan.  Repeat MRI may be performed under anesthesia as clinically able.  1.  Anterior mid to high rectal mass measuring 4.5 cm in length. Excessive  patient motion precludes assessment for invasion of tumor into the  perirectal fat.  2.  Suspected mesorectal and JESSI chain lymphadenopathy, also incompletely  Assessed.    9/15/2022 MRI Rectum  Impression:  1.  Category: T3c N1 mid to high rectal tumor semicircumferentially  involving the anterior rectum, with cicatrization suggesting shallow  extramural invasion. Suspicious JESSI chain lymph nodes measuring up to 8 mm.  A 7 mm right mesorectal lymph node is present with no additional suspicious  features.  2.  CRM: clear  3.  Sphincter involvement: absent  4.  No peritoneal reflection involvement.    9/17/2022 CT Head wo contrast  IMPRESSION:  Continued evolution of infarcts in the cerebellar hemispheres, right  greater than left. Infarcts in the left occipital lobe, left frontoparietal  region and brainstem demonstrate interval evolution as well. No definite  new infarct is identified. The hemorrhagic components involving the  cerebellar infarcts appear decreased.  If there is continued clinical concern for new area of ischemia, consider  MRI.    ASSESSMENT AND PLAN:   Werner Osuna is a 67 year old male who was admitted on 7/27/2022    Decisional Capacity - Non-decisional   - Psych evaluation, Dr. Jarrett, identified as lacking the capacity for medical decision making  - activating the surrogate decision maker, Spouse (although ) Erin Osuna, 9/9/2022  - DNR/DNI activated 9/8/2022, state bracelet placed 9/9/2022    Fevers - resolved  UTI - resolved  - juan discontinued 9/20/2022, if he is unable to empty his bladder due to the prostate and rectal mass then we will need to place the juan cagain  - Urine and blood cultures 9/20/2022 pending -- Pseudomonas, pending  sensitivities   - ceftriaxone 9/20 --> 9/22, changing to cefepime > ciprofloxacin 500mg BID through 9/28    Moderately differentiated adenocarcinoma of the Rectum  9/22/2022 Rectal Biopsy  A and B.  Colon, rectum, central ulceration of rectal mass and rectal mass, biopsies:   -Moderately differentiated adenocarcinoma with invasion at least into the submucosa, arising in a tubulovillous adenoma with high-grade dysplasia.  -Focal lymphatic invasion is present.  =================================================================================  - GI has signed off  - colorectal surgery on consult, no specific intervention with surgery at this time  - heme/onc on consult, no progression to chemotherapy at this time  - Rad/onc consulted, patient is not a good candidate for radiation targeted therapy due to severe GI toxicity, diarrhea, skin breakdown and wound management that would be required.  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 Bird, no specific surgical intervention at this time, signed off 8/7/2022  - basa daily restarted 9/28/2022  - PTA Eliquis has been held given the adenocarcinoma of the rectum and rectal bleeding    Dysphagia  - GI on consult, improved swallowing and ability to eat, no need for PEG at this time, allow previous PEG site that patient pulled out to heal  - Nutritional services on  consult  - speech therapy on consult    Diabetic foot ulcer  Shearing of the buttock  - wound care following, recommends: 8/18/2022  Iodoflex and wound gel to the left plantar foot wound, cover with gauze and kerlix wrap, change 3x weekly and PRN  Nutrashield to dorsal toes   Nutrashield to buttocks TID and PRN    NICM (nonischemic cardiomyopathy) (CMS/[facility])  7/28/2022 ECHO:  Normal left ventricular systolic function. LVEF 54%.  Hyperdynamic LV apical walls with small apical pouch visualized with Definity contrast.  Mildly increased RV size with mildly reduced systolic function.  Right ventricular systolic pressure; 43 mmHg (estimated RAP 10).  Moderate tricuspid valve regurgitation.  Dilated sinuses of Valsalva (42 mm) and ascending aorta (40 mm).  No pericardial effusion.  No recent echocardiogram available for comparison. LVEF has improved compared to study from 2019 (30% to 54%).  =================================================================================  - follows with EP as an outpatient  - continues the metopolol 100mg BID  - Lisinopril 40mg daily  - amlodipine 5mg daily   - prn hydralazine and labetalol IV are accessible     Rheumatoid arthritis involving multiple sites, unspecified whether rheumatoid factor present (CMS/[facility])  - does not use medication to control  - previously followed by Rheumatology but no longer     Chronic systolic (congestive) heart failure (CMS/[facility])  7/28/2022 ECHO:  Normal left ventricular systolic function. LVEF 54%.  Hyperdynamic LV apical walls with small apical pouch visualized with Definity contrast.  Mildly increased RV size with mildly reduced systolic function.  Right ventricular systolic pressure; 43 mmHg (estimated RAP 10).  Moderate tricuspid valve regurgitation.  Dilated sinuses of Valsalva (42 mm) and ascending aorta (40 mm).  No pericardial effusion.  No recent echocardiogram available for comparison. LVEF has improved compared to study from 2019 (30% to  54%).  =================================================================================  - continues the metopolol 100mg BID  - continues the atorvastatin 40mg daily  - continues the lisinopril 40mg daily     Alcohol dependence with unspecified alcohol-induced disorder (CMS/HCC)  States no longer drinks since the major trauma in December 2021      Persistent atrial fibrillation (CMS/HCC)  - follows with EP as an outpatient  - continues the metopolol 100mg BID  - cont digoxin 250mcg daily  - PTA Eliquis has been held given the adenocarcinoma of the rectum and rectal bleeding     Primary hypertension  Continues the lisinopril 40mg daily --> decreased to 10mg daily 9/23/2022 due to hypotension  - continues the metopolol 100mg BID  - Holding the lasix 20mg daily  - amlodipine 5mg daily 8/13 --> decreased to 2.5mg daily 9/23/2022 due to hypotension  - hydralazine and labetalol IV prn as well    Poor dentition s/p Full Edentulation with Alveoloplasty and Mandibular Angelique Reduction 9/8/2022, Dr. Becker     Patient Care Team:  Fransisco Bejarano DO as PCP - General (Family Practice)  Aliza Carlton RN as Care Transitions Nurse (Registered Nurse)    Dispo: Hospice care, no chemotherapy, radiation or surgical interventions, end of life expected within the next 6 months    Fransisco Bejarano DO

## (undated) DEVICE — PAD,ABDOMINAL,5"X9",STERILE,LF,1/PK: Brand: MEDLINE INDUSTRIES, INC.

## (undated) DEVICE — SPONGE GZ W4XL4IN COT 12 PLY TYP VII WVN C FLD DSGN

## (undated) DEVICE — MAJ-1414 SINGLE USE ADPATER BIOPSY VALV: Brand: SINGLE USE ADAPTOR BIOPSY VALVE

## (undated) DEVICE — DEVON™ KNEE AND BODY STRAP 60" X 3" (1.5 M X 7.6 CM): Brand: DEVON

## (undated) DEVICE — SOLUTION IV 250ML 0.9% SOD CHL CLR INJ FLX BG CONT PRT CLSR

## (undated) DEVICE — 1200 GUARD II KIT W/5MM TUBE W/O VAC TUBE: Brand: GUARDIAN

## (undated) DEVICE — TRAP SUC MUCOUS 70ML -- MEDICHOICE MEDLINE

## (undated) DEVICE — Device

## (undated) DEVICE — MEDI-VAC NON-CONDUCTIVE SUCTION TUBING: Brand: CARDINAL HEALTH

## (undated) DEVICE — CONTAINER,SPECIMEN,OR STERILE,4OZ: Brand: MEDLINE